# Patient Record
Sex: FEMALE | Race: OTHER | Employment: OTHER | URBAN - METROPOLITAN AREA
[De-identification: names, ages, dates, MRNs, and addresses within clinical notes are randomized per-mention and may not be internally consistent; named-entity substitution may affect disease eponyms.]

---

## 2022-04-17 ENCOUNTER — APPOINTMENT (OUTPATIENT)
Dept: CT IMAGING | Age: 72
DRG: 882 | End: 2022-04-17
Payer: COMMERCIAL

## 2022-04-17 ENCOUNTER — HOSPITAL ENCOUNTER (INPATIENT)
Age: 72
LOS: 11 days | Discharge: NURSING, REHAB OR LONG-TERM CARE | DRG: 882 | End: 2022-04-28
Attending: EMERGENCY MEDICINE
Payer: COMMERCIAL

## 2022-04-17 ENCOUNTER — APPOINTMENT (OUTPATIENT)
Dept: GENERAL RADIOLOGY | Age: 72
DRG: 882 | End: 2022-04-17
Payer: COMMERCIAL

## 2022-04-17 ENCOUNTER — HOSPITAL ENCOUNTER (OUTPATIENT)
Age: 72
Discharge: HOME | End: 2022-04-17

## 2022-04-17 DIAGNOSIS — I63.9 CEREBROVASCULAR ACCIDENT (CVA), UNSPECIFIED MECHANISM (HCC): Primary | ICD-10-CM

## 2022-04-17 PROBLEM — G81.91 HEMIPARESIS OF RIGHT DOMINANT SIDE DUE TO ACUTE CEREBROVASCULAR DISEASE (HCC): Status: ACTIVE | Noted: 2022-04-17

## 2022-04-17 PROBLEM — I67.89 HEMIPARESIS OF RIGHT DOMINANT SIDE DUE TO ACUTE CEREBROVASCULAR DISEASE (HCC): Status: ACTIVE | Noted: 2022-04-17

## 2022-04-17 LAB
ANION GAP: 9 MMOL/L (ref 7–16)
GFR NON-AFRICAN AMERICAN: >60 ML/MIN
GFR SERPL CREATININE-BSD FRML MDRD: >60 ML/MIN
GFR SERPL CREATININE-BSD FRML MDRD: ABNORMAL ML/MIN/{1.73_M2}
GLUCOSE BLD-MCNC: 188 MG/DL (ref 74–100)
HCO3 VENOUS: 23.4 MMOL/L (ref 22–29)
NEGATIVE BASE EXCESS, VEN: 2 (ref 0–2)
O2 SAT, VEN: 87 % (ref 60–85)
PCO2, VEN: 42.4 MM HG (ref 41–51)
PH VENOUS: 7.35 (ref 7.32–7.43)
PO2, VEN: 55.8 MM HG (ref 30–50)
POC BUN: 13 MG/DL (ref 8–26)
POC CHLORIDE: 109 MMOL/L (ref 98–107)
POC CREATININE: 0.49 MG/DL (ref 0.51–1.19)
POC HEMATOCRIT: 34 % (ref 36–46)
POC HEMOGLOBIN: 11.5 G/DL (ref 12–16)
POC IONIZED CALCIUM: 1.15 MMOL/L (ref 1.15–1.33)
POC LACTIC ACID: 1.11 MMOL/L (ref 0.56–1.39)
POC POTASSIUM: 4.5 MMOL/L (ref 3.5–4.5)
POC SODIUM: 141 MMOL/L (ref 138–146)
POC TCO2: 24 MMOL/L (ref 22–30)

## 2022-04-17 PROCEDURE — 94002 VENT MGMT INPAT INIT DAY: CPT

## 2022-04-17 PROCEDURE — 5A1945Z RESPIRATORY VENTILATION, 24-96 CONSECUTIVE HOURS: ICD-10-PCS | Performed by: STUDENT IN AN ORGANIZED HEALTH CARE EDUCATION/TRAINING PROGRAM

## 2022-04-17 PROCEDURE — 6360000002 HC RX W HCPCS: Performed by: PSYCHIATRY & NEUROLOGY

## 2022-04-17 PROCEDURE — 2000000003 HC NEURO ICU R&B

## 2022-04-17 PROCEDURE — 93005 ELECTROCARDIOGRAM TRACING: CPT | Performed by: STUDENT IN AN ORGANIZED HEALTH CARE EDUCATION/TRAINING PROGRAM

## 2022-04-17 PROCEDURE — 85730 THROMBOPLASTIN TIME PARTIAL: CPT

## 2022-04-17 PROCEDURE — 71045 X-RAY EXAM CHEST 1 VIEW: CPT

## 2022-04-17 PROCEDURE — 96374 THER/PROPH/DIAG INJ IV PUSH: CPT

## 2022-04-17 PROCEDURE — 99223 1ST HOSP IP/OBS HIGH 75: CPT | Performed by: PSYCHIATRY & NEUROLOGY

## 2022-04-17 PROCEDURE — 80177 DRUG SCRN QUAN LEVETIRACETAM: CPT

## 2022-04-17 PROCEDURE — 85014 HEMATOCRIT: CPT

## 2022-04-17 PROCEDURE — 2580000003 HC RX 258: Performed by: STUDENT IN AN ORGANIZED HEALTH CARE EDUCATION/TRAINING PROGRAM

## 2022-04-17 PROCEDURE — 99283 EMERGENCY DEPT VISIT LOW MDM: CPT

## 2022-04-17 PROCEDURE — 82803 BLOOD GASES ANY COMBINATION: CPT

## 2022-04-17 PROCEDURE — 84520 ASSAY OF UREA NITROGEN: CPT

## 2022-04-17 PROCEDURE — 83874 ASSAY OF MYOGLOBIN: CPT

## 2022-04-17 PROCEDURE — 2700000000 HC OXYGEN THERAPY PER DAY

## 2022-04-17 PROCEDURE — 80051 ELECTROLYTE PANEL: CPT

## 2022-04-17 PROCEDURE — 70496 CT ANGIOGRAPHY HEAD: CPT

## 2022-04-17 PROCEDURE — 82550 ASSAY OF CK (CPK): CPT

## 2022-04-17 PROCEDURE — 82553 CREATINE MB FRACTION: CPT

## 2022-04-17 PROCEDURE — 84484 ASSAY OF TROPONIN QUANT: CPT

## 2022-04-17 PROCEDURE — 80061 LIPID PANEL: CPT

## 2022-04-17 PROCEDURE — 70450 CT HEAD/BRAIN W/O DYE: CPT

## 2022-04-17 PROCEDURE — 83605 ASSAY OF LACTIC ACID: CPT

## 2022-04-17 PROCEDURE — 82565 ASSAY OF CREATININE: CPT

## 2022-04-17 PROCEDURE — 94761 N-INVAS EAR/PLS OXIMETRY MLT: CPT

## 2022-04-17 PROCEDURE — 82330 ASSAY OF CALCIUM: CPT

## 2022-04-17 PROCEDURE — 80048 BASIC METABOLIC PNL TOTAL CA: CPT

## 2022-04-17 PROCEDURE — 2500000003 HC RX 250 WO HCPCS: Performed by: PSYCHIATRY & NEUROLOGY

## 2022-04-17 PROCEDURE — 0BH17EZ INSERTION OF ENDOTRACHEAL AIRWAY INTO TRACHEA, VIA NATURAL OR ARTIFICIAL OPENING: ICD-10-PCS | Performed by: STUDENT IN AN ORGANIZED HEALTH CARE EDUCATION/TRAINING PROGRAM

## 2022-04-17 PROCEDURE — 85025 COMPLETE CBC W/AUTO DIFF WBC: CPT

## 2022-04-17 PROCEDURE — 2580000003 HC RX 258: Performed by: PSYCHIATRY & NEUROLOGY

## 2022-04-17 PROCEDURE — 6360000004 HC RX CONTRAST MEDICATION: Performed by: STUDENT IN AN ORGANIZED HEALTH CARE EDUCATION/TRAINING PROGRAM

## 2022-04-17 PROCEDURE — 83036 HEMOGLOBIN GLYCOSYLATED A1C: CPT

## 2022-04-17 PROCEDURE — 85610 PROTHROMBIN TIME: CPT

## 2022-04-17 PROCEDURE — 96375 TX/PRO/DX INJ NEW DRUG ADDON: CPT

## 2022-04-17 PROCEDURE — 82947 ASSAY GLUCOSE BLOOD QUANT: CPT

## 2022-04-17 PROCEDURE — 6360000002 HC RX W HCPCS: Performed by: STUDENT IN AN ORGANIZED HEALTH CARE EDUCATION/TRAINING PROGRAM

## 2022-04-17 RX ORDER — ATORVASTATIN CALCIUM 80 MG/1
80 TABLET, FILM COATED ORAL NIGHTLY
Status: DISCONTINUED | OUTPATIENT
Start: 2022-04-18 | End: 2022-04-19

## 2022-04-17 RX ORDER — ALBUTEROL SULFATE 90 UG/1
6 AEROSOL, METERED RESPIRATORY (INHALATION) EVERY 6 HOURS PRN
Status: DISCONTINUED | OUTPATIENT
Start: 2022-04-17 | End: 2022-04-28 | Stop reason: HOSPADM

## 2022-04-17 RX ORDER — PANTOPRAZOLE SODIUM 40 MG/1
40 TABLET, DELAYED RELEASE ORAL
Status: DISCONTINUED | OUTPATIENT
Start: 2022-04-18 | End: 2022-04-19 | Stop reason: CLARIF

## 2022-04-17 RX ORDER — FENTANYL CITRATE 50 UG/ML
200 INJECTION, SOLUTION INTRAMUSCULAR; INTRAVENOUS ONCE
Status: COMPLETED | OUTPATIENT
Start: 2022-04-17 | End: 2022-04-17

## 2022-04-17 RX ORDER — ONDANSETRON 2 MG/ML
4 INJECTION INTRAMUSCULAR; INTRAVENOUS EVERY 6 HOURS PRN
Status: DISCONTINUED | OUTPATIENT
Start: 2022-04-17 | End: 2022-04-28 | Stop reason: HOSPADM

## 2022-04-17 RX ORDER — SODIUM CHLORIDE 9 MG/ML
INJECTION, SOLUTION INTRAVENOUS CONTINUOUS
Status: DISCONTINUED | OUTPATIENT
Start: 2022-04-18 | End: 2022-04-21

## 2022-04-17 RX ORDER — ASPIRIN 300 MG/1
300 SUPPOSITORY RECTAL DAILY
Status: DISCONTINUED | OUTPATIENT
Start: 2022-04-18 | End: 2022-04-27

## 2022-04-17 RX ORDER — ROCURONIUM BROMIDE 10 MG/ML
80 INJECTION, SOLUTION INTRAVENOUS ONCE
Status: COMPLETED | OUTPATIENT
Start: 2022-04-17 | End: 2022-04-17

## 2022-04-17 RX ORDER — PROPOFOL 10 MG/ML
INJECTION, EMULSION INTRAVENOUS
Status: DISPENSED
Start: 2022-04-17 | End: 2022-04-18

## 2022-04-17 RX ORDER — LABETALOL HYDROCHLORIDE 5 MG/ML
10 INJECTION, SOLUTION INTRAVENOUS EVERY 10 MIN PRN
Status: DISCONTINUED | OUTPATIENT
Start: 2022-04-17 | End: 2022-04-28 | Stop reason: HOSPADM

## 2022-04-17 RX ORDER — ONDANSETRON 4 MG/1
4 TABLET, ORALLY DISINTEGRATING ORAL EVERY 8 HOURS PRN
Status: DISCONTINUED | OUTPATIENT
Start: 2022-04-17 | End: 2022-04-28 | Stop reason: HOSPADM

## 2022-04-17 RX ORDER — INSULIN GLARGINE 100 [IU]/ML
10 INJECTION, SOLUTION SUBCUTANEOUS NIGHTLY
Status: DISCONTINUED | OUTPATIENT
Start: 2022-04-18 | End: 2022-04-20

## 2022-04-17 RX ORDER — ASPIRIN 81 MG/1
81 TABLET ORAL DAILY
Status: DISCONTINUED | OUTPATIENT
Start: 2022-04-18 | End: 2022-04-28 | Stop reason: HOSPADM

## 2022-04-17 RX ORDER — POLYETHYLENE GLYCOL 3350 17 G/17G
17 POWDER, FOR SOLUTION ORAL DAILY PRN
Status: DISCONTINUED | OUTPATIENT
Start: 2022-04-17 | End: 2022-04-28 | Stop reason: HOSPADM

## 2022-04-17 RX ADMIN — IOPAMIDOL 90 ML: 755 INJECTION, SOLUTION INTRAVENOUS at 20:49

## 2022-04-17 RX ADMIN — SODIUM CHLORIDE 5 MG/HR: 0.9 INJECTION, SOLUTION INTRAVENOUS at 20:43

## 2022-04-17 RX ADMIN — ALTEPLASE 53.1 MG: KIT at 19:58

## 2022-04-17 RX ADMIN — ALTEPLASE 5.9 MG: KIT at 19:56

## 2022-04-17 RX ADMIN — FENTANYL CITRATE 200 MCG: 50 INJECTION, SOLUTION INTRAMUSCULAR; INTRAVENOUS at 20:28

## 2022-04-17 RX ADMIN — ROCURONIUM BROMIDE 80 MG: 10 INJECTION INTRAVENOUS at 20:28

## 2022-04-17 RX ADMIN — LEVETIRACETAM 2000 MG: 100 INJECTION, SOLUTION INTRAVENOUS at 23:21

## 2022-04-17 ASSESSMENT — PULMONARY FUNCTION TESTS
PIF_VALUE: 19
PIF_VALUE: 22

## 2022-04-18 ENCOUNTER — APPOINTMENT (OUTPATIENT)
Dept: MRI IMAGING | Age: 72
DRG: 882 | End: 2022-04-18
Payer: COMMERCIAL

## 2022-04-18 ENCOUNTER — APPOINTMENT (OUTPATIENT)
Dept: GENERAL RADIOLOGY | Age: 72
DRG: 882 | End: 2022-04-18
Payer: COMMERCIAL

## 2022-04-18 LAB
-: ABNORMAL
ABSOLUTE EOS #: 0.03 K/UL (ref 0–0.44)
ABSOLUTE IMMATURE GRANULOCYTE: <0.03 K/UL (ref 0–0.3)
ABSOLUTE LYMPH #: 2.65 K/UL (ref 1.1–3.7)
ABSOLUTE MONO #: 0.3 K/UL (ref 0.1–1.2)
AMPHETAMINE SCREEN URINE: NEGATIVE
ANION GAP SERPL CALCULATED.3IONS-SCNC: 14 MMOL/L (ref 9–17)
ANION GAP SERPL CALCULATED.3IONS-SCNC: 14 MMOL/L (ref 9–17)
BACTERIA: ABNORMAL
BARBITURATE SCREEN URINE: NEGATIVE
BASOPHILS # BLD: 0 % (ref 0–2)
BASOPHILS ABSOLUTE: <0.03 K/UL (ref 0–0.2)
BENZODIAZEPINE SCREEN, URINE: NEGATIVE
BILIRUBIN URINE: NEGATIVE
BUN BLDV-MCNC: 13 MG/DL (ref 8–23)
BUN BLDV-MCNC: 13 MG/DL (ref 8–23)
CALCIUM SERPL-MCNC: 8.7 MG/DL (ref 8.6–10.4)
CALCIUM SERPL-MCNC: 9 MG/DL (ref 8.6–10.4)
CANNABINOID SCREEN URINE: NEGATIVE
CASTS UA: ABNORMAL /LPF (ref 0–8)
CHLORIDE BLD-SCNC: 102 MMOL/L (ref 98–107)
CHLORIDE BLD-SCNC: 109 MMOL/L (ref 98–107)
CHOLESTEROL/HDL RATIO: 3.7
CHOLESTEROL: 202 MG/DL
CO2: 17 MMOL/L (ref 20–31)
CO2: 20 MMOL/L (ref 20–31)
COCAINE METABOLITE, URINE: NEGATIVE
COLOR: YELLOW
CREAT SERPL-MCNC: 0.4 MG/DL (ref 0.5–0.9)
CREAT SERPL-MCNC: 0.54 MG/DL (ref 0.5–0.9)
EOSINOPHILS RELATIVE PERCENT: 1 % (ref 1–4)
EPITHELIAL CELLS UA: ABNORMAL /HPF (ref 0–5)
ESTIMATED AVERAGE GLUCOSE: 249 MG/DL
GFR AFRICAN AMERICAN: >60 ML/MIN
GFR AFRICAN AMERICAN: >60 ML/MIN
GFR NON-AFRICAN AMERICAN: >60 ML/MIN
GFR NON-AFRICAN AMERICAN: >60 ML/MIN
GFR SERPL CREATININE-BSD FRML MDRD: ABNORMAL ML/MIN/{1.73_M2}
GFR SERPL CREATININE-BSD FRML MDRD: ABNORMAL ML/MIN/{1.73_M2}
GLUCOSE BLD-MCNC: 128 MG/DL (ref 65–105)
GLUCOSE BLD-MCNC: 144 MG/DL (ref 65–105)
GLUCOSE BLD-MCNC: 150 MG/DL (ref 65–105)
GLUCOSE BLD-MCNC: 157 MG/DL (ref 65–105)
GLUCOSE BLD-MCNC: 173 MG/DL (ref 70–99)
GLUCOSE BLD-MCNC: 208 MG/DL (ref 70–99)
GLUCOSE BLD-MCNC: 224 MG/DL (ref 65–105)
GLUCOSE URINE: ABNORMAL
HBA1C MFR BLD: 10.3 % (ref 4–6)
HCT VFR BLD CALC: 32.1 % (ref 36.3–47.1)
HCT VFR BLD CALC: 35.8 % (ref 36.3–47.1)
HDLC SERPL-MCNC: 55 MG/DL
HEMOGLOBIN: 11.3 G/DL (ref 11.9–15.1)
HEMOGLOBIN: 12.2 G/DL (ref 11.9–15.1)
IMMATURE GRANULOCYTES: 0 %
INR BLD: 1
KEPPRA: 14 UG/ML
KETONES, URINE: ABNORMAL
LDL CHOLESTEROL: 114 MG/DL (ref 0–130)
LEUKOCYTE ESTERASE, URINE: ABNORMAL
LYMPHOCYTES # BLD: 57 % (ref 24–43)
MAGNESIUM: 1.8 MG/DL (ref 1.6–2.6)
MCH RBC QN AUTO: 31.5 PG (ref 25.2–33.5)
MCH RBC QN AUTO: 32 PG (ref 25.2–33.5)
MCHC RBC AUTO-ENTMCNC: 34.1 G/DL (ref 28.4–34.8)
MCHC RBC AUTO-ENTMCNC: 35.2 G/DL (ref 28.4–34.8)
MCV RBC AUTO: 90.9 FL (ref 82.6–102.9)
MCV RBC AUTO: 92.5 FL (ref 82.6–102.9)
METHADONE SCREEN, URINE: NEGATIVE
MONOCYTES # BLD: 7 % (ref 3–12)
MYOGLOBIN: <21 NG/ML (ref 25–58)
NITRITE, URINE: NEGATIVE
NRBC AUTOMATED: 0 PER 100 WBC
NRBC AUTOMATED: 0 PER 100 WBC
OPIATES, URINE: NEGATIVE
OXYCODONE SCREEN URINE: NEGATIVE
PARTIAL THROMBOPLASTIN TIME: 23.6 SEC (ref 20.5–30.5)
PDW BLD-RTO: 12.9 % (ref 11.8–14.4)
PDW BLD-RTO: 13 % (ref 11.8–14.4)
PH UA: 8 (ref 5–8)
PHENCYCLIDINE, URINE: NEGATIVE
PHOSPHORUS: 3.1 MG/DL (ref 2.6–4.5)
PLATELET # BLD: 173 K/UL (ref 138–453)
PLATELET # BLD: 179 K/UL (ref 138–453)
PMV BLD AUTO: 9.8 FL (ref 8.1–13.5)
PMV BLD AUTO: 9.8 FL (ref 8.1–13.5)
POC CREATININE: 0.6 MG/DL (ref 0.6–1.4)
POTASSIUM SERPL-SCNC: 3.4 MMOL/L (ref 3.7–5.3)
POTASSIUM SERPL-SCNC: 3.6 MMOL/L (ref 3.7–5.3)
PROTEIN UA: NEGATIVE
PROTHROMBIN TIME: 10.4 SEC (ref 9.1–12.3)
RBC # BLD: 3.53 M/UL (ref 3.95–5.11)
RBC # BLD: 3.87 M/UL (ref 3.95–5.11)
RBC UA: ABNORMAL /HPF (ref 0–4)
SEG NEUTROPHILS: 35 % (ref 36–65)
SEGMENTED NEUTROPHILS ABSOLUTE COUNT: 1.64 K/UL (ref 1.5–8.1)
SODIUM BLD-SCNC: 136 MMOL/L (ref 135–144)
SODIUM BLD-SCNC: 140 MMOL/L (ref 135–144)
SPECIFIC GRAVITY UA: 1.02 (ref 1–1.03)
TEST INFORMATION: NORMAL
TOTAL CK: 39 U/L (ref 26–192)
TRIGL SERPL-MCNC: 164 MG/DL
TROPONIN, HIGH SENSITIVITY: 8 NG/L (ref 0–14)
TURBIDITY: ABNORMAL
URINE HGB: NEGATIVE
UROBILINOGEN, URINE: NORMAL
VALPROIC ACID % FREE: ABNORMAL % (ref 5–18.4)
VALPROIC ACID LEVEL: <3 UG/ML (ref 50–125)
VALPROIC ACID, FREE: <0.4 UG/ML (ref 7–23)
WBC # BLD: 4.6 K/UL (ref 3.5–11.3)
WBC # BLD: 8.5 K/UL (ref 3.5–11.3)
WBC UA: ABNORMAL /HPF (ref 0–5)

## 2022-04-18 PROCEDURE — 82947 ASSAY GLUCOSE BLOOD QUANT: CPT

## 2022-04-18 PROCEDURE — 2500000003 HC RX 250 WO HCPCS: Performed by: STUDENT IN AN ORGANIZED HEALTH CARE EDUCATION/TRAINING PROGRAM

## 2022-04-18 PROCEDURE — 94761 N-INVAS EAR/PLS OXIMETRY MLT: CPT

## 2022-04-18 PROCEDURE — A9576 INJ PROHANCE MULTIPACK: HCPCS | Performed by: STUDENT IN AN ORGANIZED HEALTH CARE EDUCATION/TRAINING PROGRAM

## 2022-04-18 PROCEDURE — 70553 MRI BRAIN STEM W/O & W/DYE: CPT

## 2022-04-18 PROCEDURE — 74018 RADEX ABDOMEN 1 VIEW: CPT

## 2022-04-18 PROCEDURE — 80307 DRUG TEST PRSMV CHEM ANLYZR: CPT

## 2022-04-18 PROCEDURE — 85027 COMPLETE CBC AUTOMATED: CPT

## 2022-04-18 PROCEDURE — 99291 CRITICAL CARE FIRST HOUR: CPT | Performed by: PSYCHIATRY & NEUROLOGY

## 2022-04-18 PROCEDURE — 83735 ASSAY OF MAGNESIUM: CPT

## 2022-04-18 PROCEDURE — 2700000000 HC OXYGEN THERAPY PER DAY

## 2022-04-18 PROCEDURE — 80164 ASSAY DIPROPYLACETIC ACD TOT: CPT

## 2022-04-18 PROCEDURE — 6360000002 HC RX W HCPCS: Performed by: HEALTH CARE PROVIDER

## 2022-04-18 PROCEDURE — 6370000000 HC RX 637 (ALT 250 FOR IP): Performed by: STUDENT IN AN ORGANIZED HEALTH CARE EDUCATION/TRAINING PROGRAM

## 2022-04-18 PROCEDURE — 6360000002 HC RX W HCPCS: Performed by: STUDENT IN AN ORGANIZED HEALTH CARE EDUCATION/TRAINING PROGRAM

## 2022-04-18 PROCEDURE — 84100 ASSAY OF PHOSPHORUS: CPT

## 2022-04-18 PROCEDURE — 80165 DIPROPYLACETIC ACID FREE: CPT

## 2022-04-18 PROCEDURE — 94003 VENT MGMT INPAT SUBQ DAY: CPT

## 2022-04-18 PROCEDURE — 6360000004 HC RX CONTRAST MEDICATION: Performed by: STUDENT IN AN ORGANIZED HEALTH CARE EDUCATION/TRAINING PROGRAM

## 2022-04-18 PROCEDURE — 80048 BASIC METABOLIC PNL TOTAL CA: CPT

## 2022-04-18 PROCEDURE — 2580000003 HC RX 258: Performed by: STUDENT IN AN ORGANIZED HEALTH CARE EDUCATION/TRAINING PROGRAM

## 2022-04-18 PROCEDURE — 2000000003 HC NEURO ICU R&B

## 2022-04-18 PROCEDURE — 71045 X-RAY EXAM CHEST 1 VIEW: CPT

## 2022-04-18 PROCEDURE — 81001 URINALYSIS AUTO W/SCOPE: CPT

## 2022-04-18 PROCEDURE — 36415 COLL VENOUS BLD VENIPUNCTURE: CPT

## 2022-04-18 RX ORDER — DEXAMETHASONE SODIUM PHOSPHATE 10 MG/ML
10 INJECTION INTRAMUSCULAR; INTRAVENOUS ONCE
Status: COMPLETED | OUTPATIENT
Start: 2022-04-18 | End: 2022-04-18

## 2022-04-18 RX ORDER — NICOTINE POLACRILEX 4 MG
15 LOZENGE BUCCAL PRN
Status: DISCONTINUED | OUTPATIENT
Start: 2022-04-18 | End: 2022-04-28 | Stop reason: HOSPADM

## 2022-04-18 RX ORDER — FENOFIBRATE 160 MG/1
160 TABLET ORAL DAILY
Status: ON HOLD | COMMUNITY
End: 2022-05-12 | Stop reason: HOSPADM

## 2022-04-18 RX ORDER — DEXAMETHASONE SODIUM PHOSPHATE 10 MG/ML
10 INJECTION INTRAMUSCULAR; INTRAVENOUS EVERY 8 HOURS
Status: DISCONTINUED | OUTPATIENT
Start: 2022-04-18 | End: 2022-04-19

## 2022-04-18 RX ORDER — POTASSIUM CHLORIDE 7.45 MG/ML
10 INJECTION INTRAVENOUS PRN
Status: DISCONTINUED | OUTPATIENT
Start: 2022-04-18 | End: 2022-04-28 | Stop reason: HOSPADM

## 2022-04-18 RX ORDER — DEXTROSE MONOHYDRATE 50 MG/ML
100 INJECTION, SOLUTION INTRAVENOUS PRN
Status: DISCONTINUED | OUTPATIENT
Start: 2022-04-18 | End: 2022-04-28 | Stop reason: HOSPADM

## 2022-04-18 RX ORDER — LEVETIRACETAM 5 MG/ML
500 INJECTION INTRAVASCULAR EVERY 12 HOURS
Status: DISCONTINUED | OUTPATIENT
Start: 2022-04-18 | End: 2022-04-19

## 2022-04-18 RX ORDER — DEXMEDETOMIDINE HYDROCHLORIDE 4 UG/ML
.1-1.5 INJECTION, SOLUTION INTRAVENOUS CONTINUOUS
Status: DISCONTINUED | OUTPATIENT
Start: 2022-04-18 | End: 2022-04-18

## 2022-04-18 RX ORDER — PROPOFOL 10 MG/ML
5-50 INJECTION, EMULSION INTRAVENOUS CONTINUOUS
Status: DISCONTINUED | OUTPATIENT
Start: 2022-04-18 | End: 2022-04-19

## 2022-04-18 RX ORDER — DEXTROSE MONOHYDRATE 25 G/50ML
12.5 INJECTION, SOLUTION INTRAVENOUS PRN
Status: DISCONTINUED | OUTPATIENT
Start: 2022-04-18 | End: 2022-04-28 | Stop reason: HOSPADM

## 2022-04-18 RX ORDER — CLOPIDOGREL BISULFATE 75 MG/1
75 TABLET ORAL NIGHTLY
Status: ON HOLD | COMMUNITY
End: 2022-05-12 | Stop reason: HOSPADM

## 2022-04-18 RX ORDER — ACETAMINOPHEN 325 MG/1
650 TABLET ORAL EVERY 6 HOURS PRN
Status: DISCONTINUED | OUTPATIENT
Start: 2022-04-18 | End: 2022-04-28 | Stop reason: HOSPADM

## 2022-04-18 RX ORDER — SODIUM CHLORIDE 0.9 % (FLUSH) 0.9 %
10 SYRINGE (ML) INJECTION PRN
Status: DISCONTINUED | OUTPATIENT
Start: 2022-04-18 | End: 2022-04-28 | Stop reason: HOSPADM

## 2022-04-18 RX ORDER — POTASSIUM CHLORIDE 20 MEQ/1
40 TABLET, EXTENDED RELEASE ORAL PRN
Status: DISCONTINUED | OUTPATIENT
Start: 2022-04-18 | End: 2022-04-28 | Stop reason: HOSPADM

## 2022-04-18 RX ORDER — VALPROIC ACID 250 MG/5ML
500 SOLUTION ORAL EVERY 12 HOURS SCHEDULED
Status: DISCONTINUED | OUTPATIENT
Start: 2022-04-18 | End: 2022-04-24

## 2022-04-18 RX ORDER — POTASSIUM CHLORIDE 20 MEQ/1
20 TABLET, EXTENDED RELEASE ORAL 2 TIMES DAILY
Status: ON HOLD | COMMUNITY
End: 2022-05-12 | Stop reason: HOSPADM

## 2022-04-18 RX ORDER — GABAPENTIN 600 MG/1
600 TABLET ORAL 3 TIMES DAILY
Status: ON HOLD | COMMUNITY
End: 2022-05-12 | Stop reason: HOSPADM

## 2022-04-18 RX ADMIN — INSULIN LISPRO 1 UNITS: 100 INJECTION, SOLUTION INTRAVENOUS; SUBCUTANEOUS at 21:45

## 2022-04-18 RX ADMIN — LEVETIRACETAM 500 MG: 5 INJECTION INTRAVENOUS at 20:35

## 2022-04-18 RX ADMIN — DEXAMETHASONE SODIUM PHOSPHATE 10 MG: 10 INJECTION INTRAMUSCULAR; INTRAVENOUS at 21:45

## 2022-04-18 RX ADMIN — LEVETIRACETAM 500 MG: 5 INJECTION INTRAVENOUS at 09:24

## 2022-04-18 RX ADMIN — VALPROIC ACID 500 MG: 250 SOLUTION ORAL at 21:44

## 2022-04-18 RX ADMIN — INSULIN LISPRO 1 UNITS: 100 INJECTION, SOLUTION INTRAVENOUS; SUBCUTANEOUS at 17:26

## 2022-04-18 RX ADMIN — DEXAMETHASONE SODIUM PHOSPHATE 10 MG: 10 INJECTION INTRAMUSCULAR; INTRAVENOUS at 14:17

## 2022-04-18 RX ADMIN — ENOXAPARIN SODIUM 40 MG: 100 INJECTION SUBCUTANEOUS at 21:44

## 2022-04-18 RX ADMIN — ATORVASTATIN CALCIUM 80 MG: 80 TABLET, FILM COATED ORAL at 21:45

## 2022-04-18 RX ADMIN — ATORVASTATIN CALCIUM 80 MG: 80 TABLET, FILM COATED ORAL at 04:59

## 2022-04-18 RX ADMIN — VALPROATE SODIUM 500 MG: 100 INJECTION, SOLUTION INTRAVENOUS at 14:19

## 2022-04-18 RX ADMIN — POTASSIUM BICARBONATE 40 MEQ: 782 TABLET, EFFERVESCENT ORAL at 13:25

## 2022-04-18 RX ADMIN — VALPROATE SODIUM 500 MG: 100 INJECTION, SOLUTION INTRAVENOUS at 02:45

## 2022-04-18 RX ADMIN — PROPOFOL 45 MCG/KG/MIN: 10 INJECTION, EMULSION INTRAVENOUS at 13:37

## 2022-04-18 RX ADMIN — SODIUM CHLORIDE: 9 INJECTION, SOLUTION INTRAVENOUS at 00:15

## 2022-04-18 RX ADMIN — PANTOPRAZOLE SODIUM 40 MG: 40 TABLET, DELAYED RELEASE ORAL at 08:21

## 2022-04-18 RX ADMIN — ACETAMINOPHEN 650 MG: 325 TABLET ORAL at 12:33

## 2022-04-18 RX ADMIN — POTASSIUM BICARBONATE 20 MEQ: 782 TABLET, EFFERVESCENT ORAL at 09:19

## 2022-04-18 RX ADMIN — SODIUM CHLORIDE: 9 INJECTION, SOLUTION INTRAVENOUS at 13:34

## 2022-04-18 RX ADMIN — INSULIN LISPRO 2 UNITS: 100 INJECTION, SOLUTION INTRAVENOUS; SUBCUTANEOUS at 13:24

## 2022-04-18 RX ADMIN — GADOTERIDOL 12 ML: 279.3 INJECTION, SOLUTION INTRAVENOUS at 18:20

## 2022-04-18 RX ADMIN — Medication 81 MG: at 21:45

## 2022-04-18 RX ADMIN — PROPOFOL 50 MCG/KG/MIN: 10 INJECTION, EMULSION INTRAVENOUS at 00:26

## 2022-04-18 RX ADMIN — INSULIN GLARGINE 10 UNITS: 100 INJECTION, SOLUTION SUBCUTANEOUS at 02:41

## 2022-04-18 ASSESSMENT — PULMONARY FUNCTION TESTS
PIF_VALUE: 22
PIF_VALUE: 10
PIF_VALUE: 22
PIF_VALUE: 22
PIF_VALUE: 11
PIF_VALUE: 11

## 2022-04-18 ASSESSMENT — PAIN SCALES - GENERAL
PAINLEVEL_OUTOF10: 0
PAINLEVEL_OUTOF10: 0

## 2022-04-18 NOTE — FLOWSHEET NOTE
[facility] [address]     Emergency/Trauma Note    PATIENT NAME: Vincent Khoury    Shift date: 4/17/2022  Shift day: Sunday   Shift # 2    Room # 12/12   Name: Vincent Khoury           Age: 80 y.o. Gender: female          Protestant: [id number] of [place]:      Trauma/Incident type: Stroke Alert  Admit Date & Time: 4/17/2022  8:39 PM  TRAUMA NAME: Vincent Khoury    ADVANCE DIRECTIVES IN CHART? No    NAME OF DECISION MAKER: (unconfirmed) [de-identified] of Children    RELATIONSHIP OF DECISION MAKER TO PATIENT: Children    PATIENT/EVENT DESCRIPTION:  Vincent Khoury is a 80 y.o. female who arrived via transport from scene as a Stroke Alert following a CMS. . Pt to be admitted to 12/12. SPIRITUAL ASSESSMENT/INTERVENTION:     notified family and provided them care when they arrived.  served as a liaison between the medical team and family. Family was initially anxious but appeared less so as the night progressed and updates were received.  provided compassionate and active listen to family. 04/17/22 0116   Encounter Summary   Services provided to: Family   Referral/Consult From: Multi-disciplinary team   Support System Children   Continue Visiting   (4/17/22)   Complexity of Encounter High   Length of Encounter 1 hour   Spiritual Assessment Completed Yes   Routine   Type Initial   Assessment Anxious; Fearful;Coping   Intervention Active listening;Explored feelings, thoughts, concerns;Prayer;Discussed belief system/Gnosticism practices/mari   Outcome Expressed gratitude          PATIENT BELONGINGS:   did not manage patient's belongings    ANY BELONGINGS OF SIGNIFICANT VALUE NOTED:  No    REGISTRATION STAFF NOTIFIED?   Yes      WHAT IS YOUR SPIRITUAL CARE PLAN FOR THIS PATIENT?:   Chaplains will remain available to offer spiritual and emotional support upon request.      Electronically signed by Chaplain Resident Jaki Corona, [address] 4/17/2022 at 11:37 PM [facility]  [phone]

## 2022-04-18 NOTE — PROGRESS NOTES
[facility]  Speech Language Pathology    Date: 4/18/2022  Patient Name: Mata Armenta  YOB: 1950   AGE: 80 y.o. MRN: [id number]        Patient Not Available for Speech Therapy     Due to:  [] Testing  [] Hemodialysis  [] Cancelled by RN  [] Surgery   [x] Intubation/Sedation/Pain Medication  [] Medical instability  [] Other:    Next scheduled treatment: as medically appropriate   Completed by:  RIVAS Abrams, M.S. [address] [facility]

## 2022-04-18 NOTE — ED PROVIDER NOTES
[facility]     Emergency Department     Faculty Attestation        I performed a history and physical examination of the patient and discussed management with the resident. I reviewed the residents note and agree with the documented findings and plan of care. Any areas of disagreement are noted on the chart. I was personally present for the key portions of any procedures. I have documented in the chart those procedures where I was not present during the key portions. I have reviewed the emergency nurses triage note. I agree with the chief complaint, past medical history, past surgical history, allergies, medications, social and family history as documented unless otherwise noted below. For mid-level providers such as nurse practitioners as well as physicians assistants:    I have personally seen and evaluated the patient. I find the patient's history and physical exam are consistent with NP/PA documentation. I agree with the care provided, treatment rendered, disposition, & follow-up plan. Additional findings are as noted. Vital Signs: BP (!) 194/138   Pulse 137   Resp 16   SpO2 100%   PCP:  Elias Jolley MD    Pertinent Comments:     Patient presents as a stroke alert. Had a right-sided weakness and recently gave his preference was given TPA and became unresponsive afterwards. She had a nearly identical presentation here 2012 and was intubated as well. She is paralyzed by EMS and intubated. Patient taken emergently to CT scanner. Due to the immediate potential for life-threatening deterioration due to stroke, TPA, unresponsive, I spent 15 minutes providing critical care. This time is excluding time spent performing procedures.             Nilsa Esteban MD    Attending Emergency Medicine Physician              Sathish Cruz MD  04/17/22 7887

## 2022-04-18 NOTE — H&P
Neuro ICU History & Physical    Patient Name: Dina Aly  Patient : 1950  Room/Bed: 0527/0527-01  Code Status: FULL   Allergies: No Known Allergies    CHIEF COMPLAINT   Acute Right sided hemiparesis and AMS   HPI    History Obtained From: patient, son, daughter and EMR review     The patient is a 80 y.o.  female who presents via Mobile stroke unit to our ER 22 with acute onset right-sided hemiparesis when discussing with her daughter starting at 6:45 PM this evening. Mobile stroke unit documenting NIH of 21 on arrival at 1915 2 for altered mental status unable to follow commands or answer questions appropriately, facial palsy right, right arm and leg hemiparesis, complete sensory loss on the right, aphasia and neglect. CT head without unremarkable thus patient was given IV tPA bolus and started on infusion when it appears around 20:00 patient became obtunded flaccid paralysis in all 4 extremities unable to answer or follow commands thus was concern for possible hemorrhagic conversion thus tPA infusion was discontinued. Patient required intubation with rocuronium and 200 MCG of fentanyl for sedation. Patient started on Cardene for blood pressure control SBP goal less than 180 which she maintained well under.     On arrival to our emergency department patient was already intubated and paralyzed this went directly to CT scanner for repeat CT head without which again was unremarkable left basal ganglia calcification although no sign of hemorrhagic conversion. CTA head and neck also completed and no evidence for LVO. Decision was made to continue and finish tPA infusion per stroke attending and patient loaded with Keppra 2 g given concern for possible seizure given her history.   Patient admitted to neuro ICU for further work-up including stroke versus seizure and LTM monitoring.        Past medical history significant for very similar episode in 2014 although at that time she had acute onset left-sided hemiparesis and initially thought to be having a stroke then found to be unresponsive requiring intubation and sedation with propofol stroke work-up at that time unremarkable and patient was diagnosed with nonconvulsive seizures started on Keppra 500 mg twice daily and Depakote 500 mg twice daily which is still her home medication and dosage at this time, hypertension, hyperlipidemia.       Of note in care everywhere patient also documented August 2018 to have acute right hemiparesis status post IV TPA at that time for stroke concerns MRI completed no acute intracranial mass, hemorrhage or infarct although did demonstrate white matter signal abnormalities unchanged from previous exams consistent with small vessel ischemia.   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     December 2014-right-sided numbness--> MRI at that time no acute intracranial abnormality    Admitted to ICU From: ER 12  Reason for ICU Admission: Post TPA protocal       PATIENT HISTORY   Past Medical History:        Diagnosis Date    CVA (cerebral infarction) ([address]     Hyperlipidemia     Hypertension        Past Surgical History:        Procedure Laterality Date    APPENDECTOMY      BRONCHOSCOPY DIAGNOSTIC  7/24/2014         HYSTERECTOMY         Social History:   Social History     Socioeconomic History    Marital status:      Spouse name: Not on file    Number of children: Not on file    Years of education: Not on file    Highest education level: Not on file   Occupational History    Not on file   Tobacco Use    Smoking status: Never Smoker    Smokeless tobacco: Never Used   Substance and Sexual Activity    Alcohol use: No    Drug use: No    Sexual activity: Not on file   Other Topics Concern    Not on file   Social History Narrative    Not on file     Social Determinants of Health     Financial Resource Strain:     Difficulty of Paying Living Expenses: Not on file   Food Insecurity:     Worried About Running Out of Food in the Last Year: Not on file    Ran Out of Food in the Last Year: Not on file   Transportation Needs:     Lack of Transportation (Medical): Not on file    Lack of Transportation (Non-Medical): Not on file   Physical Activity:     Days of Exercise per Week: Not on file    Minutes of Exercise per Session: Not on file   Stress:     Feeling of Stress : Not on file   Social Connections:     Frequency of Communication with Friends and Family: Not on file    Frequency of Social Gatherings with Friends and Family: Not on file    Attends Restorationist Services: Not on file    Active Member of [address] [place] [address] [employer] or Organizations: Not on file    Attends Club or Organization Meetings: Not on file    Marital Status: Not on file   Intimate Partner Violence:     Fear of Current or Ex-Partner: Not on file    Emotionally Abused: Not on file    Physically Abused: Not on file    Sexually Abused: Not on file   Housing Stability:     Unable to Pay for Housing in the Last Year: Not on file    Number of [place] in the Last Year: Not on file    Unstable Housing in the Last Year: Not on file       Family History:   No family history on file. Allergies:    Patient has no known allergies. Medications Prior to Admission:    Medications Prior to Admission: Pantoprazole Sodium (PROTONIX PO), Take  by mouth. acetaminophen (TYLENOL) 160 MG/5ML solution, Take 20.3 mLs by mouth every 4 hours as needed for Fever. albuterol (PROVENTIL HFA;VENTOLIN HFA) 108 (90 BASE) MCG/ACT inhaler, Inhale 6 puffs into the lungs every 6 hours as needed for Wheezing. glucagon, rDNA, 1 MG SOLR injection, Inject 1 mg into the muscle as needed for Low blood sugar (Blood glucose less than 70 mg/dL and patient NOT ALERT or NPO and does not have IV access. ). glucose (GLUTOSE) 40 % GEL, Take 15 g by mouth as needed.   insulin glargine (LANTUS) 100 UNIT/ML injection vial, Inject 10 Units into the skin nightly. insulin lispro (HUMALOG) 100 UNIT/ML injection vial, Inject 0-12 Units into the skin every 6 hours. niacin (NIASPAN) 500 MG CR tablet, Take 1 tablet by mouth nightly. potassium chloride (KAYCIEL) 20 MEQ/15ML (10%) solution, Take 15 mLs by mouth daily. levETIRAcetam (KEPPRA) 500 MG tablet, Take 1 tablet by mouth 2 times daily. valproate (DEPAKENE) 250 MG/5ML syrup, Take 10 mLs by mouth 2 times daily.     Current Medications:  Current Facility-Administered Medications: propofol injection, 5-50 mcg/kg/min, IntraVENous, Continuous  valproate (DEPACON) 500 mg in sodium chloride 0.9 % 100 mL IVPB, 500 mg, IntraVENous, Q12H  glucose (GLUTOSE) 40 % oral gel 15 g, 15 g, Oral, PRN  dextrose 50 % IV solution, 12.5 g, IntraVENous, PRN  glucagon (rDNA) injection 1 mg, 1 mg, IntraMUSCular, PRN  dextrose 5 % solution, 100 mL/hr, IntraVENous, PRN  potassium chloride (KLOR-CON M) extended release tablet 40 mEq, 40 mEq, Oral, PRN **OR** potassium bicarb-citric acid (EFFER-K) effervescent tablet 40 mEq, 40 mEq, Oral, PRN **OR** potassium chloride 10 mEq/100 mL IVPB (Peripheral Line), 10 mEq, IntraVENous, PRN  insulin lispro (HUMALOG) injection vial 0-6 Units, 0-6 Units, SubCUTAneous, TID WC  insulin lispro (HUMALOG) injection vial 0-3 Units, 0-3 Units, SubCUTAneous, Nightly  albuterol sulfate  (90 Base) MCG/ACT inhaler 6 puff, 6 puff, Inhalation, Q6H PRN  insulin glargine (LANTUS) injection vial 10 Units, 10 Units, SubCUTAneous, Nightly  pantoprazole (PROTONIX) tablet 40 mg, 40 mg, Oral, QAM AC  potassium bicarb-citric acid (EFFER-K) effervescent tablet 20 mEq, 20 mEq, Oral, Daily  ondansetron (ZOFRAN-ODT) disintegrating tablet 4 mg, 4 mg, Oral, Q8H PRN **OR** ondansetron (ZOFRAN) injection 4 mg, 4 mg, IntraVENous, Q6H PRN  polyethylene glycol (GLYCOLAX) packet 17 g, 17 g, Oral, Daily PRN  enoxaparin (LOVENOX) injection 40 mg, 40 mg, SubCUTAneous, Daily  aspirin EC tablet 81 mg, 81 mg, Oral, Daily **OR** aspirin suppository 300 mg, 300 mg, Rectal, Daily  perflutren lipid microspheres (DEFINITY) injection 1.65 mg, 1.5 mL, IntraVENous, ONCE PRN  0.9 % sodium chloride infusion, , IntraVENous, Continuous  labetalol (NORMODYNE;TRANDATE) injection 10 mg, 10 mg, IntraVENous, Q10 Min PRN  niCARdipine (CARDENE) 25 mg in sodium chloride 0.9 % 250 mL infusion, 2.5-15 mg/hr, IntraVENous, Continuous  atorvastatin (LIPITOR) tablet 80 mg, 80 mg, Oral, Nightly    REVIEW OF SYSTEMS   Limited review of systems as patient is currently intubated on sedation and was paralyzed prior to arrival to our emergency department with rocuronium. However patient's daughter and son were present in the emergency department stated that she just got home from [place] and has been in an argument with her sister very angry and over the last 2 days has been feeling generally unwell and starting day prior to her symptoms began to notice right arm sensations describing somewhat of an aura for this patient    PHYSICAL EXAM:     BP 97/65   Pulse 67   Temp 97 °F (36.1 °C) (Oral)   Resp 18   Ht 5' 5\" (1.651 m)   Wt 136 lb 1.6 oz (61.7 kg)   SpO2 100%   BMI 22.65 kg/m²     PHYSICAL EXAM:  CONSTITUTIONAL:   Critically ill  female who presents via mobile stroke unit after being found with altered mental status and right-sided hemiparesis. Concern for stroke versus seizure with Paulo's paralysis. Patient currently on sedation and when weaned off becomes very combative attempting to pull off her medical equipment.    HEAD:  normocephalic, atraumatic    EYES:  PERRLA, EOMI.   ENT:  moist mucous membranes   NECK:  supple, symmetric, no midline tenderness to palpation    BACK:  without midline tenderness, step-offs or deformities    LUNGS:  Equal air entry bilaterally   CARDIOVASCULAR:  normal s1 / s2   ABDOMEN:  Soft, no rigidity   NEUROLOGIC:  Mental Status:  Not oriented to date, Not oriented to person and Not oriented to place, patient is on sedation with propofol although when weaned quickly becomes combative altered and agitated moving all 4 extremities spontaneously although is noted to have minimal right upper extremity weakness and more significant right lower extremity weakness             Cranial Nerves:    cranial nerves II-XII are grossly intact     Motor Exam:    Drift:  present - Right upper and lower   Tone:  normal     Motor exam is 5 out of 5 all extremities with the exception of RUE 4-/5 RLE 3/5      Sensory:    Touch:    Patient briskly withdraws to pain on the left and less briskly on the right lower extremity and upper.     Deep Tendon Reflexes:    Right Bicep:  1+  Left Bicep:  1+  Right Knee:  1+  Left Knee:  1+     Plantar Response:  Right:  downgoing  Left:  downgoing     Clonus:  absent  Nicholson's:  absent           INITIAL NIH STROKE SCALE:     Time Performed:   9:00PM   Immediately after patient was paralyzed thus will use her exam after CT head WO when taken off sedation for short period of time      1a. Level of consciousness:  0 - alert; keenly responsive  1b. Level of consciousness questions:  2 - answers neither question correctly  1c. Level of consciousness questions:  0 - performs both tasks correctly  2. Best Gaze:  1 - partial gaze palsy  3. Visual:  0 - no visual loss  4. Facial Palsy:  0 - normal symmetric movement  5a. Motor left arm:  0 - no drift, limb holds 90 (or 45) degrees for full 10 seconds  5b. Motor right arm:  2 - some effort against gravity, limb cannot get to or maintain (if cued) 90 (or 45) degrees, drifts down to bed, but has some effort against gravity   6a. Motor left le - no drift; leg holds 30 degree position for full 5 seconds  6b. Motor right le - some effort against gravity; leg falls to bed by 5 seconds but has some effort against gravity  7. Limb Ataxia:  0 - absent  8.     Sensory:  1 - mild to moderate sensory loss; patient feels pinprick is less sharp or is dull on the affected side; there is a loss of superficial pain with pinprick but patient is aware of being touched   9. Best Language:  3 - mute, global aphasia; no usable speech or auditory comprehension  10. Dysarthria:  UN - intubated or other physical barrier  11. Extinction and Inattention:  1 - visual, tactile, auditory, spatial or personal inattention or extinction to bilateral simultaneous stimulation in one of the sensory modalities      TOTAL:  12      SKIN:  no rash          LABS AND IMAGING:     RECENT LABS:  CBC with Differential:    Lab Results   Component Value Date    WBC 8.5 04/18/2022    RBC 3.53 04/18/2022    HGB 11.3 04/18/2022    HCT 32.1 04/18/2022     04/18/2022    MCV 90.9 04/18/2022    MCH 32.0 04/18/2022    MCHC 35.2 04/18/2022    RDW 13.0 04/18/2022    LYMPHOPCT 57 04/17/2022    MONOPCT 7 04/17/2022    BASOPCT 0 04/17/2022    MONOSABS 0.30 04/17/2022    LYMPHSABS 2.65 04/17/2022    EOSABS 0.03 04/17/2022    BASOSABS <0.03 04/17/2022    DIFFTYPE NOT REPORTED 07/23/2014     BMP:    Lab Results   Component Value Date     04/18/2022    K 3.6 04/18/2022     04/18/2022    CO2 17 04/18/2022    BUN 13 04/18/2022    LABALBU 2.9 07/23/2014    CREATININE 0.40 04/18/2022    CREATININE 0.49 04/17/2022    CALCIUM 8.7 04/18/2022    GFRAA >60 04/18/2022    LABGLOM >60 04/18/2022    GLUCOSE 208 04/18/2022       RADIOLOGY:     1.) CT brain without contrast: Reviewed at 8:50 PM 4/17/22  Impression   1. No acute intracranial abnormality. 2. No evidence of large vessel occlusion or hemodynamically significant   stenosis involving the head and neck arteries.            2.) CTA Head/Neck: 4/17/22  Impression   1. No acute intracranial abnormality. 2. No evidence of large vessel occlusion or hemodynamically significant   stenosis involving the head and neck arteries. MRI Cervical spine W/WO 7/25/2014  IMPRESSION:    1.  Degenerative changes most pronounced at C5-C6 where there is moderate    narrowing of spinal canal, severe narrowing of the right neuroforamen and    minimal narrowing of left neuroforamen. 2. Subtle increased intramedullary T2 signal intensity at C6-C7 level    with patchy enhancement. Finding is nonspecific and may represent an    infectious or inflammatory process. Suggest clinical correlation and    follow up imaging in 2-3 months. MRI brain WO 7/19/2014     IMPRESSION:      1. STABLE WHITE MATTER CHANGES, PROBABLY MOST CONSISTENT WITH CHRONIC    MICROVASCULAR ISCHEMIC CHANGE    2. NO EVIDENCE OF ACUTE INFARCTION OR OTHER ACUTE INTRACRANIAL    ABNORMALITY. 3. PARANASAL SINUS INFLAMMATORY CHANGES   4. CHRONIC LEFT MASTOID INFLAMMATORY CHANGE           Labs and Images reviewed with:    [x] Mohammed S. Karilyn Pallas, MD        ASSESSMENT AND PLAN:         ASSESSMENT:     This is a 80 y.o. female who presents via mobile stroke unit 4/18/2022 after receiving IV tPA for acute onset altered mental status and right-sided hemiparesis. Patient's past medical history significant for seizure on Keppra 500 mg twice daily and Depakote 500 mg twice daily, hypertension and hyperlipidemia. Patient intubated and paralyzed with rocuronium prior to arrival to the emergency department. Given patient's altered mental status and right-sided hemiparesis it is most likely that she had a seizure with Paulo's paralysis versus acute CVA. Patient mated to neuro ICU and will obtain MRI brain with and without and initiate LTM E to rule out epileptiform activity. Patient was loaded with 2 g Keppra and will continue Depacon 500 mg twice daily. Patient care will be discussed with attending, will reevaluate patient along with attending.      PLAN/MEDICAL DECISION MAKING:        NEUROLOGIC:  - Imaging   CT head WO and CTA head and neck completed in ER-unremarkable  FU MRI brain W/WO   - AEDs   Loaded keppra 2 grams continue 500mg BID IV   Continue depakon 500mg BID   Valproic acid level on arrival < 3  keppra level 14   - Neuro checks per protocol    CARDIOVASCULAR:  - Goal SBP <180  - started on IV cardene by MSU can continue to titrate as needed  -PRN Labetalol 10mg Q4 hours for SBP>180  - Continue telemetry  ECHO 7/21/2014-->will repeat this admission with bubble study   -EF >65%, mild LVH, grade 1 DD    PULMONARY:  - Vent Settings:  - Daily ABG: FU AM   CXR 4/17/22  Impression   Status post ET tube placement in good position with mild left basilar   atelectasis       RENAL/FLUID/ELECTROLYTE:  - BUN 13/ Creatinine . 54  - Urine output strict intake and output   - IVF: 75 CC/hr NaCl   - Replace electrolytes PRN  - Daily BMP    GI/NUTRITION:  NUTRITION:  Diet NPO  - Bowel regimen: glycolax   - GI prophylaxis: Protonix 40 PO     ID:  - Tmax 97  - wbc 4.6   - Continue to monitor for fevers  - Daily CBC    HEME:   - H&H 12.2/35.8  - Platelets 514  - Daily CBC    ENDOCRINE:  - Continue to monitor blood glucose, goal <180  - FU HBA1c and lipid panel  -on lantus 10 at home and humalog   POCT glucose checks with low intensity sliding scale will hold lantus until patient is able to tolerate a diet.      OTHER:  - PT/OT/ST     PROPHYLAXIS:  Stress ulcer: PPI    DVT PROPHYLAXIS:  - SCD sleeves - Thigh High   - ALLIE stockings - Thigh High  - No chemoprophylaxis anticoagulation at this time post IV TPA 24 hours     DISPOSITION: ADMIT to Aureliano Ramos MD   PGY-3 Neurology Resident   Neuro Critical Care Service   Pager [phone]  4/18/2022     2:34 AM

## 2022-04-18 NOTE — PROGRESS NOTES
Physical Therapy        Physical Therapy Cancel Note      DATE: 2022    NAME: Kentrell Mejia  MRN: [id number]   : 1950      Patient not seen this date for Physical Therapy due to: Other: CPAP trial this AM. PT will continue to montior for needs during admission.        Electronically signed by Puma Ruelas PT on 2022 at 10:25 AM

## 2022-04-18 NOTE — ED NOTES
Pt. Presents to the ED with a CVA. MSU reports pt. Was with family when symptoms started suddenly at 200. Pt. Has total right sided weakness. Pt. Was assessed by MSU and TPA was started at 53.1 ml/ hr with a bolus of 5.9 ml and a waste of 41 mL.      Deion Moreno RN  04/17/22 [id number]

## 2022-04-18 NOTE — PLAN OF CARE
Problem: OXYGENATION/RESPIRATORY FUNCTION  Goal: Patient will maintain patent airway  4/18/2022 1021 by Mary Kessler  Outcome: Ongoing  Goal: Patient will achieve/maintain normal respiratory rate/effort  Description: Respiratory rate and effort will be within normal limits for the patient  4/18/2022 1021 by Mary Kessler  Outcome: Ongoing     Problem: MECHANICAL VENTILATION  Goal: Patient will maintain patent airway  4/18/2022 1021 by Mary Kessler  Outcome: Ongoing  Goal: Oral health is maintained or improved  4/18/2022 1021 by Mary Kessler  Outcome: Ongoing  Goal: ET tube will be managed safely  4/18/2022 1021 by Mary Kessler  Outcome: Ongoing  Goal: Ability to express needs and understand communication  4/18/2022 1908 by Cassidy Garcia  Outcome: Ongoing  4/18/2022 1021 by Mary Kessler  Outcome: Ongoing  Goal: Mobility/activity is maintained at optimum level for patient  4/18/2022 1908 by Cassidy Garcia  Outcome: Ongoing  4/18/2022 1021 by Mary Kessler  Outcome: Ongoing  Patient remained free from falls/injuries. Bed locked and in lowest position. Call light and bedside table within reach. Patient taught to call out appropriately. Bed alarm set. Problem: SKIN INTEGRITY  Goal: Skin integrity is maintained or improved  4/18/2022 1908 by Cassidy Garcia  Outcome: Ongoing  4/18/2022 1907 by Cassidy Garcia  Outcome: Ongoing  4/18/2022 1021 by Mary Kessler  Outcome: Ongoing  Patient's skin is assessed for new skin breakdown. Harrison scale assessment completed. Brief checked frequently. Barrier cream/powder applied as needed. Patient turned every two hours. Heels elevated off of the bed.        Problem: Non-Violent Restraints  Goal: Removal from restraints as soon as assessed to be safe  4/18/2022 1908 by Cassidy Garcia  Outcome: Ongoing  4/18/2022 1907 by Cassidy Garcia  Outcome: Ongoing  Goal: No harm/injury to patient while restraints in use  4/18/2022 1908 by Cassidy Garcia  Outcome: Ongoing  4/18/2022 1907 by Cristy Weinstein  Outcome: Ongoing  Goal: Patient's dignity will be maintained  4/18/2022 1908 by Pernell Weinstein  Outcome: Ongoing  4/18/2022 1907 by Nidhi Leblanc  Outcome: Ongoing     Problem: Nutrition  Goal: Optimal nutrition therapy  4/18/2022 1505 by Santosh Salter RD, LD  Outcome: Ongoing  Note: Nutrition Problem #1: Inadequate oral intake  Intervention: Food and/or Nutrient Delivery: Continue NPO  Nutritional Goals: Meet 75% or greater of estimated nutrition needs      Problem: HEMODYNAMIC STATUS  Goal: Patient has stable vital signs and fluid balance  Outcome: Ongoing     Problem: ACTIVITY INTOLERANCE/IMPAIRED MOBILITY  Goal: Mobility/activity is maintained at optimum level for patient  4/18/2022 1908 by Nidhi Leblanc  Outcome: Ongoing  4/18/2022 1021 by [employer]  Outcome: Ongoing     Problem: COMMUNICATION IMPAIRMENT  Goal: Ability to express needs and understand communication  4/18/2022 1908 by Nidhi Leblanc  Outcome: Ongoing  4/18/2022 1021 by [employer]  Outcome: Ongoing

## 2022-04-18 NOTE — CONSULTS
Stroke Alert paged @ 8:30PM  ER Room #12  Arrival to patient bedside @ 8:43PM         Reason for Consult:  Stroke alert arrived via Mobile stroke unit   Requesting Physician:  Dr. Caren Mari MD   Stroke attending:   [x]Dr. Tanja Santos       History Obtained From:  patient, family member - , electronic medical record    CHIEF COMPLAINT:     Acute onset right sided hemiparesis at 6:45 PM 4/17/22 witness by patients daughter     HISTORY OF PRESENT ILLNESS:     The patient is a 80 y.o.  female who presents via Mobile stroke unit to our ER 4/17/22 with acute onset right-sided hemiparesis when discussing with her daughter starting at 6:45 PM this evening. Mobile stroke unit documenting NIH of 21 on arrival at 1915 2 for altered mental status unable to follow commands or answer questions appropriately, facial palsy right, right arm and leg hemiparesis, complete sensory loss on the right, aphasia and neglect. CT head without unremarkable thus patient was given IV tPA bolus and started on infusion when it appears around 20:00 patient became obtunded flaccid paralysis in all 4 extremities unable to answer or follow commands thus was concern for possible hemorrhagic conversion thus tPA infusion was discontinued. Patient required intubation with rocuronium and 200 MCG of fentanyl for sedation. Patient started on Cardene for blood pressure control SBP goal less than 180 which she maintained well under. On arrival to our emergency department patient was already intubated and paralyzed this went directly to CT scanner for repeat CT head without which again was unremarkable left basal ganglia calcification although no sign of hemorrhagic conversion. CTA head and neck also completed and no evidence for LVO.   Decision was made to continue and finish tPA infusion per stroke attending and patient loaded with Keppra 2 g given concern for possible seizure given her history. Patient admitted to neuro ICU for further work-up including stroke versus seizure and LTM monitoring. Past medical history significant for very similar episode in July 2014 although at that time she had acute onset left-sided hemiparesis and initially thought to be having a stroke then found to be unresponsive requiring intubation and sedation with propofol stroke work-up at that time unremarkable and patient was diagnosed with nonconvulsive seizures started on Keppra 500 mg twice daily and Depakote 500 mg twice daily which is still her home medication and dosage at this time, hypertension, hyperlipidemia. Of note in care everywhere patient also documented August 2018 to have acute right hemiparesis status post IV TPA at that time for stroke concerns MRI completed no acute intracranial mass, hemorrhage or infarct although did demonstrate white matter signal abnormalities unchanged from previous exams consistent with small vessel ischemia. 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     December 2014-right-sided numbness--> MRI at that time no acute intracranial abnormality      Last know well: 6:45 PM 4/17/22     On presentation:  BP:194/138  BSL:188    Prior to arrival patient was on  Antiplatelets/anticoagulants:none   Statins:none     PAST MEDICAL HISTORY :       Past Medical History:        Diagnosis Date    CVA (cerebral infarction) ([facility] [address]     Hyperlipidemia     Hypertension        Past Surgical History:        Procedure Laterality Date    APPENDECTOMY      BRONCHOSCOPY DIAGNOSTIC  7/24/2014         HYSTERECTOMY         Social History:   Social History     Socioeconomic History    Marital status:      Spouse name: Not on file    Number of children: Not on file    Years of education: Not on file    Highest education level: Not on file   Occupational History    Not on file   Tobacco Use  Smoking status: Never Smoker    Smokeless tobacco: Never Used   Substance and Sexual Activity    Alcohol use: No    Drug use: No    Sexual activity: Not on file   Other Topics Concern    Not on file   Social History Narrative    Not on file     Social Determinants of Health     Financial Resource Strain:     Difficulty of Paying Living Expenses: Not on file   Food Insecurity:     Worried About Running Out of Food in the Last Year: Not on file    Tammy of Food in the Last Year: Not on file   Transportation Needs:     Lack of Transportation (Medical): Not on file    Lack of Transportation (Non-Medical): Not on file   Physical Activity:     Days of Exercise per Week: Not on file    Minutes of Exercise per Session: Not on file   Stress:     Feeling of Stress : Not on file   Social Connections:     Frequency of Communication with Friends and Family: Not on file    Frequency of Social Gatherings with Friends and Family: Not on file    Attends Yazidism Services: Not on file    Active Member of [address] [place] [address] or Organizations: Not on file    Attends Club or Organization Meetings: Not on file    Marital Status: Not on file   Intimate Partner Violence:     Fear of Current or Ex-Partner: Not on file    Emotionally Abused: Not on file    Physically Abused: Not on file    Sexually Abused: Not on file   Housing Stability:     Unable to Pay for Housing in the Last Year: Not on file    Number of [place] in the Last Year: Not on file    Unstable Housing in the Last Year: Not on file       Family History:   No family history on file. Allergies:  Patient has no known allergies. Home Medications:  Prior to Admission medications    Medication Sig Start Date End Date Taking? Authorizing Provider   Pantoprazole Sodium (PROTONIX PO) Take  by mouth. Historical Provider, MD   acetaminophen (TYLENOL) 160 MG/5ML solution Take 20.3 mLs by mouth every 4 hours as needed for Fever.  7/28/14   Radha Monzon DO   albuterol (PROVENTIL HFA;VENTOLIN HFA) 108 (90 BASE) MCG/ACT inhaler Inhale 6 puffs into the lungs every 6 hours as needed for Wheezing. 7/28/14   Fabian Trejo DO   glucagon, rDNA, 1 MG SOLR injection Inject 1 mg into the muscle as needed for Low blood sugar (Blood glucose less than 70 mg/dL and patient NOT ALERT or NPO and does not have IV access. ). 7/28/14   Fabian Trejo DO   glucose (GLUTOSE) 40 % GEL Take 15 g by mouth as needed. 7/28/14   Fabian Trejo, DO   insulin glargine (LANTUS) 100 UNIT/ML injection vial Inject 10 Units into the skin nightly. 7/28/14   Fabian Trejo, DO   insulin lispro (HUMALOG) 100 UNIT/ML injection vial Inject 0-12 Units into the skin every 6 hours. 7/28/14   Fabian Trejo DO   niacin (NIASPAN) 500 MG CR tablet Take 1 tablet by mouth nightly. 7/28/14   Fabian Trejo DO   potassium chloride (KAYCIEL) 20 MEQ/15ML (10%) solution Take 15 mLs by mouth daily. 7/28/14   Fabian Trejo DO   levETIRAcetam (KEPPRA) 500 MG tablet Take 1 tablet by mouth 2 times daily. 7/28/14   Fabian Trejo DO   valproate (DEPAKENE) 250 MG/5ML syrup Take 10 mLs by mouth 2 times daily. 7/28/14   Fabian Trejo DO       Current Medications:   Current Facility-Administered Medications: niCARdipine (CARDENE) 25 mg in sodium chloride 0.9 % 250 mL infusion, 5 mg/hr, IntraVENous, Continuous  propofol 1000 MG/100ML injection, , ,     REVIEW OF SYSTEMS:     Limited review of systems as patient is currently intubated on sedation and was paralyzed prior to arrival to our emergency department with rocuronium.   However patient's daughter and son were present in the emergency department stated that she just got home from [place] and has been in an argument with her sister very angry and over the last 2 days has been feeling generally unwell and starting day prior to her symptoms began to notice right arm sensations describing somewhat of an aura for this patient    Review of systems otherwise negative. PHYSICAL EXAM:       BP (!) 145/90   Pulse 85   Temp 97.1 °F (36.2 °C)   Resp 17   Wt 145 lb (65.8 kg)   SpO2 100%   BMI 23.40 kg/m²     CONSTITUTIONAL:   Critically ill  female who presents via mobile stroke unit after being found with altered mental status and right-sided hemiparesis. Concern for stroke versus seizure with Paulo's paralysis. Patient currently on sedation and when weaned off becomes very combative attempting to pull off her medical equipment. HEAD:  normocephalic, atraumatic    EYES:  PERRLA, EOMI.   ENT:  moist mucous membranes   NECK:  supple, symmetric, no midline tenderness to palpation    BACK:  without midline tenderness, step-offs or deformities    LUNGS:  Equal air entry bilaterally   CARDIOVASCULAR:  normal s1 / s2   ABDOMEN:  Soft, no rigidity   NEUROLOGIC:  Mental Status:  Not oriented to date, Not oriented to person and Not oriented to place, patient is on sedation with propofol although when weaned quickly becomes combative altered and agitated moving all 4 extremities spontaneously although is noted to have minimal right upper extremity weakness and more significant right lower extremity weakness             Cranial Nerves:    cranial nerves II-XII are grossly intact    Motor Exam:    Drift:  present - Right upper and lower   Tone:  normal    Motor exam is 5 out of 5 all extremities with the exception of RUE 4-/5 RLE 3/5     Sensory:    Touch:    Patient briskly withdraws to pain on the left and less briskly on the right lower extremity and upper. Deep Tendon Reflexes:    Right Bicep:  1+  Left Bicep:  1+  Right Knee:  1+  Left Knee:  1+    Plantar Response:  Right:  downgoing  Left:  downgoing    Clonus:  absent  Nicholson's:  absent        INITIAL NIH STROKE SCALE:    Time Performed:   9:00PM   Immediately after patient was paralyzed thus will use her exam after CT head WO when taken off sedation for short period of time     1a.   Level of consciousness:  0 - alert; keenly responsive  1b. Level of consciousness questions:  2 - answers neither question correctly  1c. Level of consciousness questions:  0 - performs both tasks correctly  2. Best Gaze:  1 - partial gaze palsy  3. Visual:  0 - no visual loss  4. Facial Palsy:  0 - normal symmetric movement  5a. Motor left arm:  0 - no drift, limb holds 90 (or 45) degrees for full 10 seconds  5b. Motor right arm:  2 - some effort against gravity, limb cannot get to or maintain (if cued) 90 (or 45) degrees, drifts down to bed, but has some effort against gravity   6a. Motor left le - no drift; leg holds 30 degree position for full 5 seconds  6b. Motor right le - some effort against gravity; leg falls to bed by 5 seconds but has some effort against gravity  7. Limb Ataxia:  0 - absent  8. Sensory:  1 - mild to moderate sensory loss; patient feels pinprick is less sharp or is dull on the affected side; there is a loss of superficial pain with pinprick but patient is aware of being touched   9. Best Language:  3 - mute, global aphasia; no usable speech or auditory comprehension  10. Dysarthria:  UN - intubated or other physical barrier  11.   Extinction and Inattention:  1 - visual, tactile, auditory, spatial or personal inattention or extinction to bilateral simultaneous stimulation in one of the sensory modalities     TOTAL:  12     SKIN:  no rash      Modified Amara Score Scale:     [] Zero: No symptoms at all   [x] 1: No significant disability despite symptoms; able to carry out all usual duties and activities   [] 2: Slight disability; unable to carry out all previous activities, but able to look after own affairs without assistance   [] 3:Moderate disability; requiring some help, but able to walk without assistance   [] 4: Moderately severe disability; unable to walk and attend to bodily needs without assistance   [] 5:Severe disability; bedridden, incontinent and requiring constant nursing care and attention    LABS AND IMAGING:     CBC with Differential:    Lab Results   Component Value Date    WBC 5.7 07/28/2014    RBC 2.88 07/28/2014    HGB 9.4 07/28/2014    HCT 27.2 07/28/2014     07/28/2014    MCV 94.3 07/28/2014    MCH 32.6 07/28/2014    MCHC 34.5 07/28/2014    RDW 14.1 07/28/2014    LYMPHOPCT 16 07/23/2014    MONOPCT 6 07/23/2014    BASOPCT 0 07/23/2014    MONOSABS 0.33 07/23/2014    LYMPHSABS 0.88 07/23/2014    EOSABS 0.00 07/23/2014    BASOSABS 0.00 07/23/2014    DIFFTYPE NOT REPORTED 07/23/2014         BMP:    Lab Results   Component Value Date     07/28/2014    K 3.2 07/28/2014     07/28/2014    CO2 31 07/28/2014    BUN 16 07/28/2014    LABALBU 2.9 07/23/2014    CREATININE 0.49 04/17/2022    CREATININE 0.29 07/28/2014    CALCIUM 8.5 07/28/2014    GFRAA >60 07/28/2014    LABGLOM >60 04/17/2022    GLUCOSE 165 07/28/2014       Radiology Review:    1.) CT brain without contrast: (Reviewed at 8:50 PM 4/17/22)  Impression   1. No acute intracranial abnormality. 2. No evidence of large vessel occlusion or hemodynamically significant   stenosis involving the head and neck arteries. 2.) CTA Head/Neck: (Reviewed at 8:55PM 4/17/22)  Impression   1. No acute intracranial abnormality. 2. No evidence of large vessel occlusion or hemodynamically significant   stenosis involving the head and neck arteries.        ASSESSMENT AND PLAN:       Patient Active Problem List   Diagnosis    Altered mental status    Generalized nonconvulsive seizures ([address]    History of CVA (cerebrovascular accident)    Noncompliance    Hemiparesis ([address]    Respiratory failure ([address]    Upper respiratory infection    Acute respiratory failure ([address]    HTN (hypertension)    HLD (hyperlipidemia)    Hyperglycemia    Hypokalemia    Anemia due to acute blood loss    Hemiparesis of right dominant side due to acute cerebrovascular disease ([address]       Assessment 80 y.o. female who presents with acute onset right sided hemiparesis LKW 6:45 PM 4/17/22. PMH significant for HTN, HLD and CVA in 2012 with left sided deficits, found unresponsive 7/18/2014    1. Last Known Well (date and time): 6:45 PM witnessed by patients daughter     2. Candidate for IV tPA therapy     Yes [x]     No  [] due to the following exclusion criteria:     3. Candidate for Thrombectomy    Yes []      No [x] due to the following exclusion criteria: No LVO identified    - Discussed with Dr. Chidi Michael MD      Recommendations:    [] General Neurology Care Status - prefer 5th floor (5A/5C)   [] Internal Medicine General Care Status   [x] NICU Status - (5B)     [] MICU Status   [] Observation Status    Please use the following admission order set for stroke admission:   [] [id number] - MARY Intercerebral Hemorrhage Admission   [] [id number] - MARY Sub Arachnoid Hemorrhage Admission   [] [id number] - MARY Ischemic Stroke TPA Treatment Focused   [x] [id number] - IP Ischemic Stroke ICU Post Alteplase (TPA) Admission    [] [id number] - GEN Ischemic Stroke Non-Thrombolytic Focussed      Imaging   - CT Head  WO : done    - CTA Head and Neck : done   - MRI Brain WO : in AM   - ECHO      Medications   -  Given IV TPA bolus and infusion  - IV keppra 2 grams in ER   - Folic acid 1mg BID  - Atorvastatin 80 mg nightly     Labs  - Fasting Lipid panel  - HgbA1c lab      - PT, OT, Speech eval   - Hydrate with 1000cc bolus then IVF NS @ 75cc/hr   - Telemetry   - Neuro checks per protocol  - We recommend SBP <180  - Blood glucose goal less than 180  - Please avoid dextrose containing solutions        Additional recommendations may follow    Please contact NICU with any changes in patients neurologic status. Thank you for your consult.        Carli Grewal MD   PGY 3 Neurology Resident  4/17/2022 at 11:39 PM

## 2022-04-18 NOTE — CARE COORDINATION
Case Management Initial Discharge Plan  Kentrell Mejia,             Met with:family member son to discuss discharge plans. Information verified: address, contacts, phone number, , insurance Yes  Insurance Provider:   [facility]    Emergency Contact/Next of Kin name & number:   Meli Pierre [phone]       Who are involved in patient's support system? Patient's children    PCP: Pasquale Stauffer MD  Date of last visit: past year      Discharge Planning    Living Arrangements:        Home has 1 stories  0 stairs to climb to get into front door,   Patient able to perform ADL's:Independent    Current Services (outpatient & in home)   DME equipment: glucometer  DME provider:     Is patient receiving oral anticoagulation therapy? No    Does patient have any issues/concerns obtaining medications? No  If yes, what are patient's concerns? Is there a preferred Pharmacy after hours or on weekends? Yes    If yes, which pharmacy? [facility] Needed:       Patient agreeable to home care: Yes  Freedom of choice provided:  yes    Prior SNF/Rehab Placement and Facility: [facility]    Agreeable to SNF/Rehab: Yes  [place] of choice provided: yes     Evaluation: yes    Expected Discharge date:       Patient expects to be discharged to: If home: is the family and/or caregiver wiling & able to provide support at home? yes  Who will be providing this support? Patient's children willing to stay with mother once ready for d/c*    Follow Up Appointment: Best Day/ Time:      Transportation provider: dependent on dispo  Transportation arrangements needed for discharge: Yes    Readmission Risk              Risk of Unplanned Readmission:  17             Does patient have a readmission risk score greater than 14?: Yes  If yes, follow-up appointment must be made within 7 days of discharge.      Goals of Care:       Educated patient's son on transitional options, provided freedom of choice and are agreeable with plan      Discharge Plan: home goal; agreeable to placement. Referral sent to [facility] per patient's son. Will discuss with sister for more SNF choices and IP Rehab.              Electronically signed by Sofia Jackson RN on 4/18/22 at 5:07 PM EDT

## 2022-04-18 NOTE — PROGRESS NOTES
Comprehensive Nutrition Assessment    Type and Reason for Visit:  Initial    Nutrition Recommendations/Plan:   -Monitor for start of nutrition. If TF, suggest Vital HP (Peptide based High Protein) at goal rate of 45ml/hr to provide 1080 kcal, 94gm protein (with Propofol at current rate-will provide total of 1521 kcal/d)    Nutrition Assessment:  Admitted d/t Rt side hemiparesis/AMS. Currrently intubated/sedated (on Propofol). Remains NPO. Malnutrition Assessment:  Malnutrition Status:  Insufficient data    Context:  Acute Illness         Estimated Daily Nutrient Needs:  Energy (kcal):  7822-6413 kcal/d (22-25 kcal/kg); Weight Used for Energy Requirements:  Current     Protein (g):  75-95gm pro/d (1.2-1.5gm/kg);  Weight Used for Protein Requirements:  Current        Fluid (ml/day):  or per MD; Method Used for Fluid Requirements:  1 ml/kcal      Nutrition Related Findings:  labs/meds reviewed      Wounds:  None       Current Nutrition Therapies:    Diet NPO  Additional Calorie Sources:   Propofol at 16.7ml/hr =441 kcal/d    Anthropometric Measures:  · Height: 5' 5\" (165.1 cm)  · Current Body Weight: 136 lb 0.4 oz (61.7 kg)    · Ideal Body Weight: 125 lbs; % Ideal Body Weight 108.8 %   · BMI: 22.6  · BMI Categories: Normal Weight (BMI 22.0 to 24.9) age over 72       Nutrition Diagnosis:   · Inadequate oral intake related to impaired respiratory function as evidenced by NPO or clear liquid status due to medical condition,intubation      Nutrition Interventions:   Food and/or Nutrient Delivery:  Continue NPO  Nutrition Education/Counseling:  No recommendation at this time   Coordination of Nutrition Care:  Continue to monitor while inpatient    Goals:  Meet 75% or greater of estimated nutrition needs       Nutrition Monitoring and Evaluation:   Behavioral-Environmental Outcomes:  None Identified   Food/Nutrient Intake Outcomes:  Diet Advancement/Tolerance  Physical Signs/Symptoms Outcomes:  Biochemical Data,Weight,Nutrition Focused Physical Findings     Discharge Planning:     Too soon to determine     Electronically signed by Jeff Andre RD, LD on 4/18/22 at 3:03 PM EDT    Contact: [phone]

## 2022-04-18 NOTE — PLAN OF CARE
Problem: OXYGENATION/RESPIRATORY FUNCTION  Goal: Patient will maintain patent airway  4/18/2022 1021 by Shruthifelix Ruvalcaba  Outcome: Ongoing     Problem: OXYGENATION/RESPIRATORY FUNCTION  Goal: Patient will achieve/maintain normal respiratory rate/effort  Description: Respiratory rate and effort will be within normal limits for the patient  4/18/2022 1021 by Shruthifelix Ruvalcaba  Outcome: Ongoing     Problem: MECHANICAL VENTILATION  Goal: Patient will maintain patent airway  4/18/2022 1021 by Shruthifelix Ruvalcaba  Outcome: Ongoing     Problem: MECHANICAL VENTILATION  Goal: Oral health is maintained or improved  4/18/2022 1021 by Shruthifelix Ruvalcaba  Outcome: Ongoing     Problem: MECHANICAL VENTILATION  Goal: ET tube will be managed safely  4/18/2022 1021 by Shruthifelix Ruvalcaba  Outcome: Ongoing     Problem: MECHANICAL VENTILATION  Goal: Ability to express needs and understand communication  4/18/2022 1021 by Shruthi Ruvalcaba  Outcome: Ongoing     Problem: MECHANICAL VENTILATION  Goal: Mobility/activity is maintained at optimum level for patient  4/18/2022 1021 by Shruthi Ruvalcaba  Outcome: Ongoing     Problem: SKIN INTEGRITY  Goal: Skin integrity is maintained or improved  4/18/2022 1021 by Shruthi Ruvalcaba  Outcome: Ongoing

## 2022-04-18 NOTE — PROGRESS NOTES
The transport originated from [phone]. Pt. was transported to CT and back. Assisting with the transport was RN + RT. Appropriate devices were applied to monitor the patient's condition during transport. Patient transported  via 80% O2 via ventilator. Patient tolerated well.         Hilda Walker RCP  9:13 PM

## 2022-04-18 NOTE — PROGRESS NOTES
No cuff leak during bedside rounds. Give 10 mg decadron x1 and will reassess later. Exam much improved since arriving to the NICU. Alert and following commands. MAEW. Pending MRI Brain. Vitals:    04/18/22 1300   BP: 121/66   Pulse: 80   Resp:    Temp:    SpO2: 100%           Natividad Mendoza MD, BEATA  Ne[facility] Critical [facility]  4/18/2022 at 1:55 PM         This note is created with the assistance of a speech recognition program.  While intending to generate a document that actually reflects the content of the visit, the document can still have some errors including those of syntax and sound like substitutions which may escape proof reading. In such instances, actual meaning can be extrapolated by contextual diversion.

## 2022-04-18 NOTE — PROGRESS NOTES
04/18/22 0725   Vent Information   Vent Mode   (CPAP/PS)   Pressure Support 6 cmH20   FiO2  30 %   PEEP/CPAP 5     Pt started on SBT. Pt tolerating well at this time. Will continue to monitor.

## 2022-04-18 NOTE — ED NOTES
..    4/17/2022 9:52 PM    Patient: Praveena Vidal, 80 y.o., female Race:   Patient Address: [address] [place] [id number]  Incident Address:  [address] [place] [address]     [] [facility] [place]  [] [facility]  [] [facility] [place]   [x] BLAKE MERCADO JR. [facility]  [] [facility]  Patient Phone #: [phone]   Insurance: Payor: /   Rebecca Hy:  []  Yes   [x]  No  Emergency Contact: Extended Emergency Contact Information  Primary Emergency Contact: [address] [place] [address] Phone: [phone]  Relation: Child  Secondary Emergency Contact: [facility] Phone: [phone]  Relation: Child  MRN: [id number]  # [id number]  YOB: 1950  Primary Care Physician: Robe Boone MD  Advance Directive: [x] Full Code   []DNR-CC   []DNR-CCA    MSU Crew: [place] - CT Tech, JOSE Mclain - Paramedic, Sundeep Becker  - RN    Pt Transported To:  [x] [place] [address]  [] [facility]  [] [facility]  [] [facility]   [] [facility]  [] [facility]  [facility] [] [facility] [] [facility]    Was patient transported to closest facility? []Yes  [x]No (if No specify reason)   []   Patient/family request    [x]   Divert to specialist       Response Code   [] 2  [x] 3  []   Change  [] 2  [] 3   Transport Code   [x] 2  [] 3  []   Change  [] 2  [] 3     Mileage:  [address] [id number]   Total Connye Nab 30.7     Call Received 4/17/2022 1854   Dispatched 4/17/2022 1855   Enroute 4/17/2022 1856   Arrived Scene 4/17/2022 1908   At Patient 4/17/2022 1912   Decision to Scan 4/17/2022 1914   Scan 4/17/2022 1929   Departed Scene 4/17/2022 1 Iain Wilson 4/17/2022 2044   [facility] 4/17/2022 2250   In Service 4/17/2022 2250         Allergies  [] Updated/Reviewed by MSU  [x] Historical Data Only  [] Unavailable  has No Known Allergies. Medications  [] Updated/Reviewed by MSU  [x] Historical Data Only  [] Unavailable  Prior to Admission medications    Medication Sig Start Date End Date Taking?  Authorizing Provider   Pantoprazole Sodium (PROTONIX PO) Take  by mouth. Historical Provider, MD   acetaminophen (TYLENOL) 160 MG/5ML solution Take 20.3 mLs by mouth every 4 hours as needed for Fever. 7/28/14   Anabel Soda, DO   albuterol (PROVENTIL HFA;VENTOLIN HFA) 108 (90 BASE) MCG/ACT inhaler Inhale 6 puffs into the lungs every 6 hours as needed for Wheezing. 7/28/14   Anabel Soda, DO   glucagon, rDNA, 1 MG SOLR injection Inject 1 mg into the muscle as needed for Low blood sugar (Blood glucose less than 70 mg/dL and patient NOT ALERT or NPO and does not have IV access. ). 7/28/14   Anabel Soda, DO   glucose (GLUTOSE) 40 % GEL Take 15 g by mouth as needed. 7/28/14   Anabel Soda, DO   insulin glargine (LANTUS) 100 UNIT/ML injection vial Inject 10 Units into the skin nightly. 7/28/14   Anabel Soda, DO   insulin lispro (HUMALOG) 100 UNIT/ML injection vial Inject 0-12 Units into the skin every 6 hours. 7/28/14   Anabel Soda, DO   niacin (NIASPAN) 500 MG CR tablet Take 1 tablet by mouth nightly. 7/28/14   Anabel Soda, DO   potassium chloride (KAYCIEL) 20 MEQ/15ML (10%) solution Take 15 mLs by mouth daily. 7/28/14   Anabel Soda, DO   levETIRAcetam (KEPPRA) 500 MG tablet Take 1 tablet by mouth 2 times daily. 7/28/14   Anabel Soda, DO   valproate (DEPAKENE) 250 MG/5ML syrup Take 10 mLs by mouth 2 times daily. 7/28/14   Anabel Soda, DO      Past Medical History  [] Updated/Reviewed by MSU  [x] Historical Data Only  [] Unavailable   has a past medical history of CVA (cerebral infarction) ([address] Hyperlipidemia, and Hypertension.     Patient Weight: 144 lbs   [x]   Estimated   []   Stated          VITALS          Time BP Pulse   Resp  Rate N-normal  S-shallow  D-deep Monitor SpO2 O2    LPM BGL   Temp Pain   1930 176/80 78 12 N %      1945 177/89 67 14 N % RA      2000 149/80 63 13 N NSR 98% RA      2015 155/71 68 20 Assisted NSR 97% VENT      2030 190/121 113 10 Assisted % VENT         Pupils GCS   Time R L E  4 V  [facility] 3 3 3 5 11   1945 3 3 3 3 5 11   2000 3 3 1 1 1 3   2015 3 3 1 1 1 3   2030 3 3 1 1 1 3       NIH -   record on all transports and Q15 min after tPA administration    NIHSS       Time 1915 1945 2000 2015   1a. LOC - Arousal Status 2 2 2 2   1b. LOC - Questions 2 2 2 2   1c. LOC - Commands 1 1 2 2   2. Eye Movements (gaze) 0 0 0 0   3. Visual Fields 0 0 0 0   4. Facial Palsy 2 2 2 2   5a. Motor Left Arm 0 0 4 4   5b. Motor Right Arm 4 4 4 4   6a. Motor Left Leg 0 0 4 4   6b. Motor Right Leg 4 4 4 4   7. Limb Ataxia 0 0 0 0   8. Sensory 2 2 2 2   9. Language/Aphasia 2 2 2 2   10. Dysarthria 0 0 0 0   11. Neglect 2 2 2 2   TOTAL 21 21 29 29       Research:    RACE Score: 8 Time: 1915  StrokeVAN Score: positive Time: 1915    Time Last Known Well: 1562 4/17/22    Narrative:    Dispatched to possible CVA per [facility] dispatch to meet [facility]. Arrived to find Mata Armenta, 80 y.o., female c/o right sided weakness, decreased mentation, right facial droop. Report received from [facility] at patients side. Pt very difficult to wake, Reported blood sugar 175, complete neglect to right side. Upon MSU arrival, pt in [facility] rig. Complete right sided neglect. Unable to move right arm, right leg. NO movement on right side with painful stimuli. Significant right sided facial droop. Pt very drowsy and difficult to wake in rig but more responsive once moved over to MSU cot. Pt quickly closes eyes and only opens on verbal commands. Decision to scan. Pt very anxious and swinging left arm during scan attempt. Presumed pt was claustrophobic. Cloth placed over pt's eyes and encouragement given during scan. Dr. Valencia Tatum at patient side via telemedicine unit. Order given for TPA administration. Discussed risks and benefits with patient however pt doesn't seem to respond to verbal que's. At 15 min neuro check pt not moving left or right side. Not responding to verbal commands or painful stimuli. Dr. Mery Brock notified and TPA stopped. (total of 41.6ml remains on pump) Decision made to intubated pt due to GCS of 3. SPO2 remained % Pt intubated and transport remains to [place] [address]. Cardene started due to spike in BP over 165 systolic. Patient received the following medications prior to MSU arrival: NONE  Patient has the following lines prior to MSU arrival: 18g LT FA  Patient has the following airway placed prior to MSU arrival: NONE    Patient was transferred to cot via 2 person assist and secured with straps x3. Zoll ECG, SpO2, and NIBP applied and monitored throughout encounter. HOB maintained at 30 degrees. Patient was transferred to ambulance, cot secured in scan position. Non contrast CT scan performed. After scan cot secured in transport position. IV tPA inclusion/exclusion criteria reviewed with patient and physician. Candidate for IV tPA therapy? [x] Yes   [] No - due to the following exclusion criteria:    [] ICH   [] Taking anticoagulant -   [] Beyond last time known well window  [] At or returned to baseline   [] Marked improvement of symptoms  [] No disabling symptoms  [] Other -     Patient is being transported to [place] [address]. During transport patient rests comfortably. Cabin temp maintained at 70-72 °F throughout transport. Patient was transferred to bed 12 via 4 person sheet lift. Patient care handoff completed with  RN and physician at bedside.        Imaging:  Images were obtained onboard the MSU including:  [x] CT brain without contrast - see results below:      CT HEAD WO CONTRAST    Result Date: 4/17/2022  EXAMINATION: CT OF THE HEAD WITHOUT CONTRAST; CTA OF THE HEAD AND NECK WITH CONTRAST 4/17/2022 8:44 pm: TECHNIQUE: CT of the head was performed without the administration of intravenous contrast. Dose modulation, iterative reconstruction, and/or weight based adjustment of the mA/kV was utilized to reduce the radiation dose to as low as reasonably achievable.; CTA of the head and neck was performed with the administration of intravenous contrast. Multiplanar reformatted images are provided for review. MIP images are provided for review. Stenosis of the internal carotid arteries measured using NASCET criteria. Dose modulation, iterative reconstruction, and/or weight based adjustment of the mA/kV was utilized to reduce the radiation dose to as low as reasonably achievable. Noncontrast CT of the head with reconstructed 2-D images are also provided for review. COMPARISON: None. HISTORY: ORDERING SYSTEM PROVIDED HISTORY: post TPA acute onset right sided hemiparesis TECHNOLOGIST PROVIDED HISTORY: post TPA acute onset right sided hemiparesis Decision Support Exception - unselect if not a suspected or confirmed emergency medical condition->Emergency Medical Condition (MA); ORDERING SYSTEM PROVIDED HISTORY: post TPA acute onset right sided hemiparesis TECHNOLOGIST PROVIDED HISTORY: post TPA acute onset right sided hemiparesis Decision Support Exception - unselect if not a suspected or confirmed emergency medical condition->Emergency Medical Condition (MA) FINDINGS: An endotracheal tube is noted in place. CT HEAD: BRAIN/VENTRICLES:  No acute intracranial hemorrhage or extraaxial fluid collection. Grey-white differentiation is maintained. No evidence of mass, mass effect or midline shift. No evidence of hydrocephalus. Chronic and age related changes including age-appropriate cerebral volume loss and scattered nonspecific white matter hypodensities which are likely the sequela of chronic small vessel ischemic disease. ORBITS: The visualized portion of the orbits demonstrate no acute abnormality. SINUSES:  The visualized paranasal sinuses and mastoid air cells demonstrate no acute abnormality. SOFT TISSUES/SKULL: No acute abnormality of the visualized skull or soft tissues. CTA NECK: AORTIC ARCH/ARCH VESSELS: No dissection or arterial injury.   No significant stenosis of the brachiocephalic or subclavian arteries. CAROTID ARTERIES: No dissection, arterial injury, or hemodynamically significant stenosis by NASCET criteria. VERTEBRAL ARTERIES: No dissection, arterial injury, or significant stenosis. SOFT TISSUES: The lung apices are clear. No cervical or superior mediastinal lymphadenopathy. The larynx and pharynx are unremarkable. No acute abnormality of the salivary and thyroid glands. BONES: No acute osseous abnormality. CTA HEAD: ANTERIOR CIRCULATION: There is absence of a right A1 segment, a normal variant. No significant stenosis of the intracranial internal carotid, anterior cerebral, or middle cerebral arteries. No aneurysm. 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 No significant stenosis of the vertebral, basilar, or posterior cerebral arteries. No aneurysm. OTHER: No dural venous sinus thrombosis on this non-dedicated study. 1. No acute intracranial abnormality. 2. No evidence of large vessel occlusion or hemodynamically significant stenosis involving the head and neck arteries. RECOMMENDATIONS: Unavailable     CT HEAD WO CONTRAST    Result Date: 4/17/2022  EXAMINATION: CT OF THE HEAD WITHOUT CONTRAST  4/17/2022 7:27 pm TECHNIQUE: CT of the head was performed without the administration of intravenous contrast. Dose modulation, iterative reconstruction, and/or weight based adjustment of the mA/kV was utilized to reduce the radiation dose to as low as reasonably achievable. COMPARISON: MRI brain in July 19, 2014, CT head July 18, 2014 HISTORY: ORDERING SYSTEM PROVIDED HISTORY: stroke- unresponsive TECHNOLOGIST PROVIDED HISTORY: stroke- unresponsive Decision Support Exception - unselect if not a suspected or confirmed emergency medical condition->Emergency Medical Condition (MA) FINDINGS: Limited due to motion artifacts. BRAIN/VENTRICLES: There is no acute intracranial hemorrhage, mass effect or midline shift.   No abnormal extra-axial fluid collection. The gray-white differentiation is maintained without evidence of an acute infarct. There is no evidence of hydrocephalus. There is focal calcification in the left basal ganglia (series 1 image 17). ORBITS: The visualized portion of the orbits demonstrate no acute abnormality. SINUSES: The visualized paranasal sinuses and mastoid air cells demonstrate no acute abnormality. SOFT TISSUES/SKULL:  No acute abnormality of the visualized skull or soft tissues. No acute intracranial abnormality. Results were reported to Dr. Shilpi Polanco at 7:50 p.m. on April 17, 2022. Physical assessment performed:    Neuro: right sided hemiplegia, obtunded,    Resp: lungs clear to auscultation bilaterally, normal effort  Cardiac: regular rate, no murmur, 12 lead ECG shows NSR  Muscular/skeletal/peripheral vascular: no edema, no redness or tenderness in the calves, pulses present in 4 extremities   Abd/GI/: abd flat, soft, non tender   Skin: normal color, warm, dry      IV LINES   Time Size Site # Attempts Performed By   1940 20g RT AC 1  S. Mclain EMT-P                     Total Amount of IV Fluids Infused: 150ml    MEDS / ELECTRICAL RX   Time Therapy Dosage Route Response Performed By   2026 Fentenyl 200mcg IV RSI S. Mclain EMT-P   2028 Rocuonium 80mg IV RSI S. Mclain EMT-P   2040 Cardene 5mg/hr IV  S. Mclain EMT-P                               TPA Administration    Time Bolus Dose Infusion Dose Waste   1956 5.9     1958  53.1 41.0       [x] tPA dosing double checked by: Nathaly Hayden EMT-P    Intubation details:  Intubation time: 2030  BY: Christen Morin RN  Tube size: 7.0  Attempts: 1  Centimeter argelia at lip: 25  Secured with: Estela Tube Henderson   Tube confirmed by:  bilateral breath sounds, end tidal CO2, fogging present in tube, symmetrical chest rise.    Due to time restrains, Ventilations were assisted with BVM and 100% O2 at a rate of 10 bpm      ACUTE STROKE DYSPHAGIA SCREEN              YES NO   1 GCS less than 13?         2 Facial Asymmetry or Weakness? 3 Tongue Asymmetry or Weakness? 4 Palatal Asymmetry or Weakness? 5 Signs of aspiration during 3oz water test?*                 If answers to questions 1-4 are NO proceed to 3oz water test               *Administer 3oz of water for sequential drinks, note any throat clearing, cough, or change in vocal quality immediately after and 1 minute following the swallow.                If answers to questions 1-5 are NO proceed with ordered PO meds       POC LABS      TIME 1950     Test (reference range)      FSBG ()      PT (11-13.5)      INR (0.8-1.1)      Na (138-146)      K (3.5-4.9)      Cl ()      iCa (1.2-1.32)      TCO2 (24-29)      Glu ()      BUN (8.0-26)      Crea (0.6-1.3) 0.6     Hct (38-51)      Hb (12.0-17)      AnGap 10.0-20)                Assistance:   []    Fire -     []    Police    [x]    Other EMS (See Below)    Mele Saucedo Notification:    [address] -  [x] [address]  [] [facility]  [] [facility]  [] [facility]  [] [facility] [] [facility] [] [facility] [] [facility]  [] [facility]     [x]    Med Channel:     []    Cell Phone     Med Control Orders Received:   [x] No  []  Yes:     Med Control Physician (if on line medical direction received)  -        Hospital Team Alert:   []    Trauma Alert    []    STEMI Alert         []    Stroke Alert    [x]    RACE Alert      Description Of Valuables: Pt clothing, meds in clear plastic bag    Patient Valuables:   [x]    With Patient    []    Not Received    [x]    ER / Lab     Call Outcome:   [x]    Transport to Facility    []    Care Transferred to [facility]    []    Cancelled    []    Patient Refusal    []                           Mobile Stroke Unit    Electronically signed by Sweta Guy RN on 4/17/22 at 9:52 PM EDT     Sweta Guy RN  04/17/22 Deepika Escamilla RN  04/17/22 0699

## 2022-04-18 NOTE — ED PROVIDER NOTES
[facility] ED  Emergency Department Encounter  Emergency Medicine Resident     Pt Name: Karolyn Rose  MRN: [id number]  [place] 1950  Date of evaluation: 4/17/22  PCP:  Miley Murillo MD    CHIEF COMPLAINT       Chief Complaint   Patient presents with    Cerebrovascular Accident       HISTORY OFPRESENT ILLNESS  (Location/Symptom, Timing/Onset, Context/Setting, Quality, Duration, Modifying Factors,Severity.)      Karolyn Rose is a 80 y. o.yo female who presents with mobile stroke due to Cerebrovascular event. Per report, patient with complete right hemiplegia, with right gaze deficit. Last known well of 6:45 PM, patient was found by her daughter. CT head did not show any bleed, patient was started on TPA, post TPA after bolus was given and after 15 male of infusion was given, patient became completely unresponsive. Patient was intubated prior to arrival to the emergency room. PAST MEDICAL / SURGICAL / SOCIAL / FAMILY HISTORY      has a past medical history of CVA (cerebral infarction) ([address] Hyperlipidemia, and Hypertension. has a past surgical history that includes Appendectomy; Hysterectomy; and Bronchoscopy diagnostic (7/24/2014).      Social History     Socioeconomic History    Marital status:      Spouse name: Not on file    Number of children: Not on file    Years of education: Not on file    Highest education level: Not on file   Occupational History    Not on file   Tobacco Use    Smoking status: Never Smoker    Smokeless tobacco: Never Used   Substance and Sexual Activity    Alcohol use: No    Drug use: No    Sexual activity: Not on file   Other Topics Concern    Not on file   Social History Narrative    Not on file     Social Determinants of Health     Financial Resource Strain:     Difficulty of Paying Living Expenses: Not on file   Food Insecurity:     Worried About Running Out of Food in the Last Year: Not on file    [address] in the Last Year: Not on file   Transportation Needs:     Lack of Transportation (Medical): Not on file    Lack of Transportation (Non-Medical): Not on file   Physical Activity:     Days of Exercise per Week: Not on file    Minutes of Exercise per Session: Not on file   Stress:     Feeling of Stress : Not on file   Social Connections:     Frequency of Communication with Friends and Family: Not on file    Frequency of Social Gatherings with Friends and Family: Not on file    Attends [employer] Services: Not on file    Active Member of [address] [place] [address] Fon or Organizations: Not on file    Attends Club or Organization Meetings: Not on file    Marital Status: Not on file   Intimate Partner Violence:     Fear of Current or Ex-Partner: Not on file    Emotionally Abused: Not on file    Physically Abused: Not on file    Sexually Abused: Not on file   Housing Stability:     Unable to Pay for Housing in the Last Year: Not on file    Number of [place] in the Last Year: Not on file    Unstable Housing in the Last Year: Not on file       No family history on file. Allergies:  Patient has no known allergies. Home Medications:  Prior to Admission medications    Medication Sig Start Date End Date Taking? Authorizing Provider   Pantoprazole Sodium (PROTONIX PO) Take  by mouth. Historical Provider, MD   acetaminophen (TYLENOL) 160 MG/5ML solution Take 20.3 mLs by mouth every 4 hours as needed for Fever. 7/28/14   Melisa Police, DO   albuterol (PROVENTIL HFA;VENTOLIN HFA) 108 (90 BASE) MCG/ACT inhaler Inhale 6 puffs into the lungs every 6 hours as needed for Wheezing. 7/28/14   Melisa Police, DO   glucagon, rDNA, 1 MG SOLR injection Inject 1 mg into the muscle as needed for Low blood sugar (Blood glucose less than 70 mg/dL and patient NOT ALERT or NPO and does not have IV access. ). 7/28/14   Melisa Police, DO   glucose (GLUTOSE) 40 % GEL Take 15 g by mouth as needed.  7/28/14   Melisa Police, DO   insulin glargine (LANTUS) 100 UNIT/ML injection vial Inject 10 Units into the skin nightly. 7/28/14   Candelario Bell DO   insulin lispro (HUMALOG) 100 UNIT/ML injection vial Inject 0-12 Units into the skin every 6 hours. 7/28/14   Candelario Bell DO   niacin (NIASPAN) 500 MG CR tablet Take 1 tablet by mouth nightly. 7/28/14   Candelario Bell DO   potassium chloride (KAYCIEL) 20 MEQ/15ML (10%) solution Take 15 mLs by mouth daily. 7/28/14   Candelario Bell DO   levETIRAcetam (KEPPRA) 500 MG tablet Take 1 tablet by mouth 2 times daily. 7/28/14   Candelario Bell DO   valproate (DEPAKENE) 250 MG/5ML syrup Take 10 mLs by mouth 2 times daily. 7/28/14   Candelario Bell DO       REVIEW OFSYSTEMS    (2-9 systems for level 4, 10 or more for level 5)      Review of Systems   Unable to perform ROS: Intubated       PHYSICAL EXAM   (up to 7 for level 4, 8 or more forlevel 5)      INITIAL VITALS:   ED Triage Vitals [04/17/22 2040]   BP Temp Temp src Pulse Resp SpO2 Height Weight   (!) 194/138 -- -- 137 16 100 % -- --       Physical Exam  Constitutional:       Comments: Pt intubated, paralyzed and sedated    HENT:      Head: Normocephalic and atraumatic. Right Ear: Tympanic membrane normal.      Left Ear: Tympanic membrane normal.      Mouth/Throat:      Mouth: Mucous membranes are moist.   Eyes:      Comments: 2mm, not reactive   Cardiovascular:      Rate and Rhythm: Tachycardia present. Pulmonary:      Comments: Patient intubated and on mechanical ventilation  Abdominal:      General: There is no distension. Palpations: Abdomen is soft. Musculoskeletal:         General: No deformity or signs of injury. Cervical back: No rigidity. Right lower leg: No edema. Left lower leg: No edema. Skin:     Capillary Refill: Capillary refill takes less than 2 seconds. Findings: No bruising or erythema.    Neurological:      Comments: Patient intubated, GCS 3T, she is not responding to painful stimuli DIFFERENTIAL  DIAGNOSIS     PLAN (LABS / IMAGING / EKG):  Orders Placed This Encounter   Procedures    CT HEAD WO CONTRAST    CT HEAD WO CONTRAST    CTA HEAD NECK W CONTRAST    XR CHEST PORTABLE    CT head without contrast    MRI brain without contrast    XR CHEST PORTABLE    XR ABDOMEN (KUB) (SINGLE AP VIEW)    Blood gas, arterial    CBC    Basic Metabolic Panel w/ Reflex to MG    ELECTROLYTES PLUS    Hemoglobin and hematocrit, blood    CALCIUM, IONIC (POC)    Valproic Acid Level, Total and Free    Hemoglobin A1C    Levetiracetam Level    Lipid Panel    STROKE PANEL    Diet NPO    Vital signs during and post alteplase (tPA)    Notify physician during and post alteplase (tPA)    Notify physician during and post alteplase (tPA)    Bedrest during and post alteplase (tPA)    Elevate HOB during and post alteplase (tPA)    Adv Diet as Tolerated (nurse communication)    NIH Stroke Scale (NIHSS) during and post alteplase (tPA)    Implement alteplase (tPA) hemorrhage/bleeding precautions    Avoid antiplatelet and antithrombotic medications for 24 hours post administration of alteplase (tPA)    No Anticoagulants for 24 hours after alteplase (tPA)    Implement suspected intracerebral hemorrhage (ICH) guidelines    Swallow screen by nursing before diet and oral medications started.     Stroke education    Encourage deep breathing and coughing every two hours while awake    Place intermittent pneumatic compression device    Telemetry monitoring - continuous duration    Misc nursing order (specify)    Misc nursing order (specify)    HYPOGLYCEMIA TREATMENT: blood glucose less than 50 mg/dL and patient  ALERT and TOLERATING PO    HYPOGLYCEMIA TREATMENT: blood glucose less than 70 mg/dL and patient ALERT and TOLERATING PO    HYPOGLYCEMIA TREATMENT: blood glucose less than 70 mg/dL and patient NOT ALERT or NPO    Telemetry monitoring - continuous duration    Full Code    Inpatient consult to Neurocritical care    Pharmacy to Dose: Other - See Comments: The pharmacist will review this patient's medication profile to evaluate IV medications and change all base solutions to 0.9% sodium chloride if possible based on compatibility and product availability. This. .. [address] to change base solutions.  Inpatient consult to Stroke Team    OT eval and treat    PT evaluation and treat    Initiate RT Adult Mechanical Ventilation Protocol    Mechanical Ventilation with default initial settings    End Tidal CO2 Continuous    Initiate Oxygen Therapy Protocol    Pulse oximetry, continuous    Initiate Oxygen Therapy Protocol    Incentive spirometry    Pulse oximetry, continuous    Speech Language Pathology (SLP) eval and treat    Venous Blood Gas, POC    Creatinine W/GFR Point of Care    POCT urea (BUN)    Lactic Acid, POC    POCT Glucose    POC Glucose Fingerstick    POCT Glucose    POCT glucose    EKG 12 Lead    Echo Complete    EEG video monitoring    Initiate minimum 2 IV lines for alteplase (tPA) infusion    ADMIT TO INPATIENT    Fall precautions       MEDICATIONS ORDERED:  Orders Placed This Encounter   Medications    alteplase (ACTIVASE) injection    alteplase (ACTIVASE) injection    iopamidol (ISOVUE-370) 76 % injection 90 mL    DISCONTD: niCARdipine (CARDENE) 25 mg in sodium chloride 0.9 % 250 mL infusion     Order Specific Question:   Titrate Infusion? Answer:   No     Order Specific Question:   Infusion Dose: Answer:   5 mg/hr    fentaNYL (SUBLIMAZE) injection 200 mcg    rocuronium (ZEMURON) injection 80 mg    propofol 1000 MG/100ML injection     Martina Silence: cabinet override    albuterol sulfate  (90 Base) MCG/ACT inhaler 6 puff     Order Specific Question:   Initiate RT Bronchodilator Protocol     Answer:    Yes    insulin glargine (LANTUS) injection vial 10 Units    pantoprazole (PROTONIX) tablet 40 mg    potassium bicarb-citric acid (EFFER-K) effervescent tablet 20 mEq    DISCONTD: valproate (DEPAKENE) 250 MG/5ML solution 500 mg    OR Linked Order Group     ondansetron (ZOFRAN-ODT) disintegrating tablet 4 mg     ondansetron (ZOFRAN) injection 4 mg    polyethylene glycol (GLYCOLAX) packet 17 g    enoxaparin (LOVENOX) injection 40 mg    OR Linked Order Group     aspirin EC tablet 81 mg     aspirin suppository 300 mg    perflutren lipid microspheres (DEFINITY) injection 1.65 mg    0.9 % sodium chloride infusion    labetalol (NORMODYNE;TRANDATE) injection 10 mg    niCARdipine (CARDENE) 25 mg in sodium chloride 0.9 % 250 mL infusion     Order Specific Question:   Titrate Infusion? Answer:   Yes     Order Specific Question:   Initial Infusion Rate: Answer:   5 mg/hr     Order Specific Question:   Goal of Therapy is: Answer: Other     Order Specific Question:   Other Goal:     Answer:   Maintain SBP less than 180 mmHg and DBP less than 105 mmHg. Order Specific Question:   Contact Provider if:     Answer:   Patient is receiving the maximum dose and is not achieving the goal of therapy    atorvastatin (LIPITOR) tablet 80 mg    levETIRAcetam (KEPPRA) 2,000 mg in sodium chloride 0.9 % 100 mL IVPB    propofol injection     Order Specific Question:   Titrate Infusion? Answer:   Yes     Order Specific Question:   Initial Infusion Dose:      Answer:   20 mcg/kg/min     Order Specific Question:   Goal of Therapy:     Answer:   RASS of -1 to 0     Order Specific Question:   Contact Provider if:     Answer:   New onset HR less than 50 bpm     Order Specific Question:   Contact Provider if:     Answer:   New onset SBP less than 90 mmHg     Order Specific Question:   Contact Provider if:     Answer:   Patient is receiving maximum dose and is not achieving the goal of therapy     Order Specific Question:   Contact Provider if:     Answer:   Triglycerides greater than 500 mg/dL    valproate (DEPACON) 500 mg in sodium chloride 0.9 % 100 mL IVPB    DISCONTD: dexmedetomidine (PRECEDEX) 400 mcg in sodium chloride 0.9 % 100 mL infusion     Order Specific Question:   Titrate Infusion? Answer:   Yes     Order Specific Question:   Initial Infusion Dose: Answer:   0.2 mcg/kg/hr     Order Specific Question:   Goal of Therapy:     Answer:   RASS of -1 to 0     Order Specific Question:   Contact Provider if:     Answer:   New onset HR less than 50 bpm     Order Specific Question:   Contact Provider if:     Answer:   New onset SBP less than 90 mmHg     Order Specific Question:   Contact Provider if:     Answer:   Patient is receiving maximum dose and is not achieving the goal of therapy    glucose (GLUTOSE) 40 % oral gel 15 g    dextrose 50 % IV solution    glucagon (rDNA) injection 1 mg    dextrose 5 % solution    OR Linked Order Group     potassium chloride (KLOR-CON M) extended release tablet 40 mEq     potassium bicarb-citric acid (EFFER-K) effervescent tablet 40 mEq     potassium chloride 10 mEq/100 mL IVPB (Peripheral Line)    insulin lispro (HUMALOG) injection vial 0-6 Units    insulin lispro (HUMALOG) injection vial 0-3 Units           Initial MDM/Plan: 80 y.o. female who presents with unresponsiveness after she was started on tPA. On arrival, patient intubated, airways intact with 7.0 ET tube, at 21cm at the lip. Of note, patient was sedated paralyzed with 80 mg of rocuronium and 200 of fentanyl. Patient is not responsive, not moving, she is tachycardic, stat well on vent. Intact bilateral breath sounds.   Her pupils are almost pinpoint, not reactive  Pressures were in the 190s on arrival, Cardene drip that was started per EMS was resumed  Stroke team at bedside  CT head and CTA of head and neck plan, the CT head did not show any bleed  Plan for neuro critical care admission    DIAGNOSTIC RESULTS / EMERGENCYDEPARTMENT COURSE / MDM     LABS:  Labs Reviewed   ELECTROLYTES PLUS - Abnormal; Notable for the following components:       Result Value    POC Chloride 109 (*)     All other components within normal limits   HGB/HCT - Abnormal; Notable for the following components:    POC Hemoglobin 11.5 (*)     POC Hematocrit 34 (*)     All other components within normal limits   CBC - Abnormal; Notable for the following components:    RBC 3.53 (*)     Hemoglobin 11.3 (*)     Hematocrit 32.1 (*)     MCHC 35.2 (*)     All other components within normal limits   BASIC METABOLIC PANEL W/ REFLEX TO MG FOR LOW K - Abnormal; Notable for the following components:    Glucose 208 (*)     CREATININE 0.40 (*)     Potassium 3.6 (*)     Chloride 109 (*)     CO2 17 (*)     All other components within normal limits   LIPID PANEL - Abnormal; Notable for the following components:    Cholesterol 202 (*)     Triglycerides 164 (*)     All other components within normal limits   STROKE PANEL - Abnormal; Notable for the following components:    Glucose 173 (*)     Potassium 3.4 (*)     RBC 3.87 (*)     Hematocrit 35.8 (*)     Seg Neutrophils 35 (*)     Lymphocytes 57 (*)     Myoglobin <21 (*)     All other components within normal limits   VALPROIC ACID LEVEL, TOTAL AND FREE - Abnormal; Notable for the following components:    Valproic Acid Lvl <3 (*)     All other components within normal limits   VENOUS BLOOD GAS, POINT OF CARE - Abnormal; Notable for the following components:    pO2, [place] 55.8 (*)     O2 Sat, Esau 87 (*)     All other components within normal limits   CREATININE W/GFR POINT OF CARE - Abnormal; Notable for the following components:    POC Creatinine 0.49 (*)     All other components within normal limits   POCT GLUCOSE - Abnormal; Notable for the following components:    POC Glucose 188 (*)     All other components within normal limits   POC GLUCOSE FINGERSTICK - Abnormal; Notable for the following components:    POC Glucose 144 (*)     All other components within normal limits   CALCIUM, IONIC (POC)   LEVETIRACETAM LEVEL   HEMOGLOBIN A1C UREA N (POC)   LACTIC ACID,POINT OF CARE   POCT GLUCOSE   POCT GLUCOSE   POCT GLUCOSE         RADIOLOGY:  CT HEAD WO CONTRAST    Result Date: 4/17/2022  EXAMINATION: CT OF THE HEAD WITHOUT CONTRAST; CTA OF THE HEAD AND NECK WITH CONTRAST 4/17/2022 8:44 pm: TECHNIQUE: CT of the head was performed without the administration of intravenous contrast. Dose modulation, iterative reconstruction, and/or weight based adjustment of the mA/kV was utilized to reduce the radiation dose to as low as reasonably achievable.; CTA of the head and neck was performed with the administration of intravenous contrast. Multiplanar reformatted images are provided for review. MIP images are provided for review. Stenosis of the internal carotid arteries measured using NASCET criteria. Dose modulation, iterative reconstruction, and/or weight based adjustment of the mA/kV was utilized to reduce the radiation dose to as low as reasonably achievable. Noncontrast CT of the head with reconstructed 2-D images are also provided for review. COMPARISON: None. HISTORY: ORDERING SYSTEM PROVIDED HISTORY: post TPA acute onset right sided hemiparesis TECHNOLOGIST PROVIDED HISTORY: post TPA acute onset right sided hemiparesis Decision Support Exception - unselect if not a suspected or confirmed emergency medical condition->Emergency Medical Condition (MA); ORDERING SYSTEM PROVIDED HISTORY: post TPA acute onset right sided hemiparesis TECHNOLOGIST PROVIDED HISTORY: post TPA acute onset right sided hemiparesis Decision Support Exception - unselect if not a suspected or confirmed emergency medical condition->Emergency Medical Condition (MA) FINDINGS: An endotracheal tube is noted in place. CT HEAD: BRAIN/VENTRICLES:  No acute intracranial hemorrhage or extraaxial fluid collection. Grey-white differentiation is maintained. No evidence of mass, mass effect or midline shift. No evidence of hydrocephalus.   Chronic and age related changes including age-appropriate cerebral volume loss and scattered nonspecific white matter hypodensities which are likely the sequela of chronic small vessel ischemic disease. ORBITS: The visualized portion of the orbits demonstrate no acute abnormality. SINUSES:  The visualized paranasal sinuses and mastoid air cells demonstrate no acute abnormality. SOFT TISSUES/SKULL: No acute abnormality of the visualized skull or soft tissues. CTA NECK: AORTIC ARCH/ARCH VESSELS: No dissection or arterial injury. No significant stenosis of the brachiocephalic or subclavian arteries. CAROTID ARTERIES: No dissection, arterial injury, or hemodynamically significant stenosis by NASCET criteria. VERTEBRAL ARTERIES: No dissection, arterial injury, or significant stenosis. SOFT TISSUES: The lung apices are clear. No cervical or superior mediastinal lymphadenopathy. The larynx and pharynx are unremarkable. No acute abnormality of the salivary and thyroid glands. BONES: No acute osseous abnormality. CTA HEAD: ANTERIOR CIRCULATION: There is absence of a right A1 segment, a normal variant. No significant stenosis of the intracranial internal carotid, anterior cerebral, or middle cerebral arteries. No aneurysm. 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 No significant stenosis of the vertebral, basilar, or posterior cerebral arteries. No aneurysm. OTHER: No dural venous sinus thrombosis on this non-dedicated study. 1. No acute intracranial abnormality. 2. No evidence of large vessel occlusion or hemodynamically significant stenosis involving the head and neck arteries.  RECOMMENDATIONS: Unavailable     CT HEAD WO CONTRAST    Result Date: 4/17/2022  EXAMINATION: CT OF THE HEAD WITHOUT CONTRAST  4/17/2022 7:27 pm TECHNIQUE: CT of the head was performed without the administration of intravenous contrast. Dose modulation, iterative reconstruction, and/or weight based adjustment of the mA/kV was utilized to reduce the radiation dose to as low as reasonably achievable. COMPARISON: MRI brain in July 19, 2014, CT head July 18, 2014 HISTORY: ORDERING SYSTEM PROVIDED HISTORY: stroke- unresponsive TECHNOLOGIST PROVIDED HISTORY: stroke- unresponsive Decision Support Exception - unselect if not a suspected or confirmed emergency medical condition->Emergency Medical Condition (MA) FINDINGS: Limited due to motion artifacts. BRAIN/VENTRICLES: There is no acute intracranial hemorrhage, mass effect or midline shift. No abnormal extra-axial fluid collection. The gray-white differentiation is maintained without evidence of an acute infarct. There is no evidence of hydrocephalus. There is focal calcification in the left basal ganglia (series 1 image 17). ORBITS: The visualized portion of the orbits demonstrate no acute abnormality. SINUSES: The visualized paranasal sinuses and mastoid air cells demonstrate no acute abnormality. SOFT TISSUES/SKULL:  No acute abnormality of the visualized skull or soft tissues. No acute intracranial abnormality. Results were reported to Dr. Mattie Beasley at 7:50 p.m. on April 17, 2022. XR CHEST PORTABLE    Result Date: 4/17/2022  EXAMINATION: ONE XRAY VIEW OF THE CHEST 4/17/2022 10:00 pm COMPARISON: 07/28/2014 HISTORY: ORDERING SYSTEM PROVIDED HISTORY: altered TECHNOLOGIST PROVIDED HISTORY: altered Reason for Exam: port Supine FINDINGS: The heart is less prominent. The pulmonary vessels are less prominent. There is an ET tube in place with the tip approximately 4 cm above the amy. There is some hazy left basilar interstitial and ground-glass opacity which is less prominent. No effusion is seen.      Status post ET tube placement in good position with mild left basilar atelectasis     CTA HEAD NECK W CONTRAST    Result Date: 4/17/2022  EXAMINATION: CT OF THE HEAD WITHOUT CONTRAST; CTA OF THE HEAD AND NECK WITH CONTRAST 4/17/2022 8:44 pm: TECHNIQUE: CT of the head was performed without the administration of intravenous contrast. Dose modulation, iterative reconstruction, and/or weight based adjustment of the mA/kV was utilized to reduce the radiation dose to as low as reasonably achievable.; CTA of the head and neck was performed with the administration of intravenous contrast. Multiplanar reformatted images are provided for review. MIP images are provided for review. Stenosis of the internal carotid arteries measured using NASCET criteria. Dose modulation, iterative reconstruction, and/or weight based adjustment of the mA/kV was utilized to reduce the radiation dose to as low as reasonably achievable. Noncontrast CT of the head with reconstructed 2-D images are also provided for review. COMPARISON: None. HISTORY: ORDERING SYSTEM PROVIDED HISTORY: post TPA acute onset right sided hemiparesis TECHNOLOGIST PROVIDED HISTORY: post TPA acute onset right sided hemiparesis Decision Support Exception - unselect if not a suspected or confirmed emergency medical condition->Emergency Medical Condition (MA); ORDERING SYSTEM PROVIDED HISTORY: post TPA acute onset right sided hemiparesis TECHNOLOGIST PROVIDED HISTORY: post TPA acute onset right sided hemiparesis Decision Support Exception - unselect if not a suspected or confirmed emergency medical condition->Emergency Medical Condition (MA) FINDINGS: An endotracheal tube is noted in place. CT HEAD: BRAIN/VENTRICLES:  No acute intracranial hemorrhage or extraaxial fluid collection. Grey-white differentiation is maintained. No evidence of mass, mass effect or midline shift. No evidence of hydrocephalus. Chronic and age related changes including age-appropriate cerebral volume loss and scattered nonspecific white matter hypodensities which are likely the sequela of chronic small vessel ischemic disease. ORBITS: The visualized portion of the orbits demonstrate no acute abnormality.  SINUSES:  The visualized paranasal sinuses and mastoid air cells demonstrate no acute abnormality. SOFT TISSUES/SKULL: No acute abnormality of the visualized skull or soft tissues. CTA NECK: AORTIC ARCH/ARCH VESSELS: No dissection or arterial injury. No significant stenosis of the brachiocephalic or subclavian arteries. CAROTID ARTERIES: No dissection, arterial injury, or hemodynamically significant stenosis by NASCET criteria. VERTEBRAL ARTERIES: No dissection, arterial injury, or significant stenosis. SOFT TISSUES: The lung apices are clear. No cervical or superior mediastinal lymphadenopathy. The larynx and pharynx are unremarkable. No acute abnormality of the salivary and thyroid glands. BONES: No acute osseous abnormality. CTA HEAD: ANTERIOR CIRCULATION: There is absence of a right A1 segment, a normal variant. No significant stenosis of the intracranial internal carotid, anterior cerebral, or middle cerebral arteries. No aneurysm. 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 No significant stenosis of the vertebral, basilar, or posterior cerebral arteries. No aneurysm. OTHER: No dural venous sinus thrombosis on this non-dedicated study. 1. No acute intracranial abnormality. 2. No evidence of large vessel occlusion or hemodynamically significant stenosis involving the head and neck arteries. RECOMMENDATIONS: Unavailable         EKG      All EKG's are interpreted by the Emergency Department Physicianwho either signs or Co-signs this chart in the absence of a cardiologist.    EMERGENCY DEPARTMENT COURSE:          PROCEDURES:  None    CONSULTS:  IP CONSULT TO NEUROCRITICAL CARE  IP CONSULT TO STROKE TEAM  IP CONSULT TO PHARMACY  PHARMACY TO CHANGE BASE FLUIDS    CRITICAL CARE:      FINAL IMPRESSION      1. Cerebrovascular accident (CVA), unspecified mechanism ([facility] [address]          DISPOSITION / [address] Admitted 04/17/2022 09:28:46 PM      PATIENT REFERRED TO:  No follow-up provider specified.     DISCHARGE MEDICATIONS:  Current Discharge Medication List          Joce Silva MD  Emergency Medicine Resident    (Please note that portions of this note were completed with a voice recognition program.Efforts were made to edit the dictations but occasionally words are mis-transcribed.)        Joce Silva MD  Resident  04/18/22 [id number]

## 2022-04-19 LAB
ALLEN TEST: POSITIVE
ANION GAP SERPL CALCULATED.3IONS-SCNC: 12 MMOL/L (ref 9–17)
ANION GAP SERPL CALCULATED.3IONS-SCNC: 13 MMOL/L (ref 9–17)
BUN BLDV-MCNC: 16 MG/DL (ref 8–23)
BUN BLDV-MCNC: 17 MG/DL (ref 8–23)
CALCIUM SERPL-MCNC: 8.9 MG/DL (ref 8.6–10.4)
CALCIUM SERPL-MCNC: 9 MG/DL (ref 8.6–10.4)
CHLORIDE BLD-SCNC: 111 MMOL/L (ref 98–107)
CHLORIDE BLD-SCNC: 111 MMOL/L (ref 98–107)
CO2: 16 MMOL/L (ref 20–31)
CO2: 19 MMOL/L (ref 20–31)
CREAT SERPL-MCNC: 0.47 MG/DL (ref 0.5–0.9)
CREAT SERPL-MCNC: 0.5 MG/DL (ref 0.5–0.9)
EKG ATRIAL RATE: 108 BPM
EKG P AXIS: 36 DEGREES
EKG P-R INTERVAL: 152 MS
EKG Q-T INTERVAL: 470 MS
EKG QRS DURATION: 138 MS
EKG QTC CALCULATION (BAZETT): 629 MS
EKG R AXIS: 30 DEGREES
EKG T AXIS: 39 DEGREES
EKG VENTRICULAR RATE: 108 BPM
GFR AFRICAN AMERICAN: >60 ML/MIN
GFR AFRICAN AMERICAN: >60 ML/MIN
GFR NON-AFRICAN AMERICAN: >60 ML/MIN
GFR NON-AFRICAN AMERICAN: >60 ML/MIN
GFR SERPL CREATININE-BSD FRML MDRD: ABNORMAL ML/MIN/{1.73_M2}
GFR SERPL CREATININE-BSD FRML MDRD: ABNORMAL ML/MIN/{1.73_M2}
GLUCOSE BLD-MCNC: 184 MG/DL (ref 65–105)
GLUCOSE BLD-MCNC: 193 MG/DL (ref 65–105)
GLUCOSE BLD-MCNC: 222 MG/DL (ref 70–99)
GLUCOSE BLD-MCNC: 232 MG/DL (ref 65–105)
GLUCOSE BLD-MCNC: 245 MG/DL (ref 70–99)
GLUCOSE BLD-MCNC: 259 MG/DL (ref 74–100)
GLUCOSE BLD-MCNC: 262 MG/DL (ref 65–105)
HCT VFR BLD CALC: 31.7 % (ref 36.3–47.1)
HEMOGLOBIN: 10.8 G/DL (ref 11.9–15.1)
MCH RBC QN AUTO: 31.7 PG (ref 25.2–33.5)
MCHC RBC AUTO-ENTMCNC: 34.1 G/DL (ref 28.4–34.8)
MCV RBC AUTO: 93 FL (ref 82.6–102.9)
MODE: ABNORMAL
NEGATIVE BASE EXCESS, ART: 3 (ref 0–2)
NRBC AUTOMATED: 0 PER 100 WBC
O2 DEVICE/FLOW/%: ABNORMAL
PDW BLD-RTO: 13.2 % (ref 11.8–14.4)
PLATELET # BLD: 162 K/UL (ref 138–453)
PMV BLD AUTO: 10 FL (ref 8.1–13.5)
POC HCO3: 21.1 MMOL/L (ref 21–28)
POC O2 SATURATION: 100 % (ref 94–98)
POC PCO2: 32.5 MM HG (ref 35–48)
POC PH: 7.42 (ref 7.35–7.45)
POC PO2: 158.3 MM HG (ref 83–108)
POTASSIUM SERPL-SCNC: 4.4 MMOL/L (ref 3.7–5.3)
POTASSIUM SERPL-SCNC: 5.4 MMOL/L (ref 3.7–5.3)
RBC # BLD: 3.41 M/UL (ref 3.95–5.11)
REASON FOR REJECTION: NORMAL
SAMPLE SITE: ABNORMAL
SODIUM BLD-SCNC: 139 MMOL/L (ref 135–144)
SODIUM BLD-SCNC: 143 MMOL/L (ref 135–144)
WBC # BLD: 8.7 K/UL (ref 3.5–11.3)
ZZ NTE CLEAN UP: ORDERED TEST: NORMAL
ZZ NTE WITH NAME CLEAN UP: SPECIMEN SOURCE: NORMAL

## 2022-04-19 PROCEDURE — 36600 WITHDRAWAL OF ARTERIAL BLOOD: CPT

## 2022-04-19 PROCEDURE — 6370000000 HC RX 637 (ALT 250 FOR IP): Performed by: STUDENT IN AN ORGANIZED HEALTH CARE EDUCATION/TRAINING PROGRAM

## 2022-04-19 PROCEDURE — 80048 BASIC METABOLIC PNL TOTAL CA: CPT

## 2022-04-19 PROCEDURE — 85027 COMPLETE CBC AUTOMATED: CPT

## 2022-04-19 PROCEDURE — 6360000002 HC RX W HCPCS: Performed by: HEALTH CARE PROVIDER

## 2022-04-19 PROCEDURE — 93010 ELECTROCARDIOGRAM REPORT: CPT | Performed by: INTERNAL MEDICINE

## 2022-04-19 PROCEDURE — 6360000002 HC RX W HCPCS: Performed by: STUDENT IN AN ORGANIZED HEALTH CARE EDUCATION/TRAINING PROGRAM

## 2022-04-19 PROCEDURE — 82947 ASSAY GLUCOSE BLOOD QUANT: CPT

## 2022-04-19 PROCEDURE — 99291 CRITICAL CARE FIRST HOUR: CPT | Performed by: PSYCHIATRY & NEUROLOGY

## 2022-04-19 PROCEDURE — 2700000000 HC OXYGEN THERAPY PER DAY

## 2022-04-19 PROCEDURE — 82803 BLOOD GASES ANY COMBINATION: CPT

## 2022-04-19 PROCEDURE — 94003 VENT MGMT INPAT SUBQ DAY: CPT

## 2022-04-19 PROCEDURE — 2000000003 HC NEURO ICU R&B

## 2022-04-19 PROCEDURE — 36415 COLL VENOUS BLD VENIPUNCTURE: CPT

## 2022-04-19 RX ORDER — GABAPENTIN 300 MG/1
600 CAPSULE ORAL 3 TIMES DAILY
Status: DISCONTINUED | OUTPATIENT
Start: 2022-04-19 | End: 2022-04-28 | Stop reason: HOSPADM

## 2022-04-19 RX ORDER — ATORVASTATIN CALCIUM 40 MG/1
40 TABLET, FILM COATED ORAL NIGHTLY
Status: DISCONTINUED | OUTPATIENT
Start: 2022-04-19 | End: 2022-04-28 | Stop reason: HOSPADM

## 2022-04-19 RX ORDER — DEXAMETHASONE SODIUM PHOSPHATE 4 MG/ML
10 INJECTION, SOLUTION INTRA-ARTICULAR; INTRALESIONAL; INTRAMUSCULAR; INTRAVENOUS; SOFT TISSUE ONCE
Status: COMPLETED | OUTPATIENT
Start: 2022-04-19 | End: 2022-04-19

## 2022-04-19 RX ORDER — LEVETIRACETAM 10 MG/ML
1000 INJECTION INTRAVASCULAR EVERY 12 HOURS
Status: DISCONTINUED | OUTPATIENT
Start: 2022-04-19 | End: 2022-04-20

## 2022-04-19 RX ORDER — CLOPIDOGREL BISULFATE 75 MG/1
75 TABLET ORAL DAILY
Status: DISCONTINUED | OUTPATIENT
Start: 2022-04-19 | End: 2022-04-28 | Stop reason: HOSPADM

## 2022-04-19 RX ORDER — LANSOPRAZOLE
30 KIT
Status: DISCONTINUED | OUTPATIENT
Start: 2022-04-20 | End: 2022-04-20

## 2022-04-19 RX ADMIN — ACETAMINOPHEN 650 MG: 325 TABLET ORAL at 20:19

## 2022-04-19 RX ADMIN — PROPOFOL 50 MCG/KG/MIN: 10 INJECTION, EMULSION INTRAVENOUS at 14:01

## 2022-04-19 RX ADMIN — LEVETIRACETAM 500 MG: 5 INJECTION INTRAVENOUS at 08:41

## 2022-04-19 RX ADMIN — INSULIN GLARGINE 10 UNITS: 100 INJECTION, SOLUTION SUBCUTANEOUS at 20:20

## 2022-04-19 RX ADMIN — VALPROIC ACID 500 MG: 250 SOLUTION ORAL at 22:06

## 2022-04-19 RX ADMIN — VALPROIC ACID 500 MG: 250 SOLUTION ORAL at 10:45

## 2022-04-19 RX ADMIN — LEVETIRACETAM 1000 MG: 10 INJECTION INTRAVENOUS at 20:23

## 2022-04-19 RX ADMIN — INSULIN LISPRO 1 UNITS: 100 INJECTION, SOLUTION INTRAVENOUS; SUBCUTANEOUS at 10:46

## 2022-04-19 RX ADMIN — INSULIN LISPRO 2 UNITS: 100 INJECTION, SOLUTION INTRAVENOUS; SUBCUTANEOUS at 13:12

## 2022-04-19 RX ADMIN — GABAPENTIN 600 MG: 300 CAPSULE ORAL at 20:19

## 2022-04-19 RX ADMIN — PROPOFOL 50 MCG/KG/MIN: 10 INJECTION, EMULSION INTRAVENOUS at 09:13

## 2022-04-19 RX ADMIN — ASPIRIN 300 MG: 300 SUPPOSITORY RECTAL at 10:45

## 2022-04-19 RX ADMIN — ENOXAPARIN SODIUM 40 MG: 100 INJECTION SUBCUTANEOUS at 10:45

## 2022-04-19 RX ADMIN — PANTOPRAZOLE SODIUM 40 MG: 40 TABLET, DELAYED RELEASE ORAL at 07:05

## 2022-04-19 RX ADMIN — POTASSIUM BICARBONATE 20 MEQ: 782 TABLET, EFFERVESCENT ORAL at 10:45

## 2022-04-19 RX ADMIN — DESMOPRESSIN ACETATE 40 MG: 0.2 TABLET ORAL at 22:09

## 2022-04-19 RX ADMIN — DEXAMETHASONE SODIUM PHOSPHATE 10 MG: 10 INJECTION INTRAMUSCULAR; INTRAVENOUS at 07:05

## 2022-04-19 RX ADMIN — INSULIN LISPRO 2 UNITS: 100 INJECTION, SOLUTION INTRAVENOUS; SUBCUTANEOUS at 20:20

## 2022-04-19 RX ADMIN — CLOPIDOGREL 75 MG: 75 TABLET, FILM COATED ORAL at 14:01

## 2022-04-19 RX ADMIN — DEXAMETHASONE SODIUM PHOSPHATE 10 MG: 10 INJECTION INTRAMUSCULAR; INTRAVENOUS at 14:00

## 2022-04-19 RX ADMIN — DEXAMETHASONE SODIUM PHOSPHATE 10 MG: 4 INJECTION, SOLUTION INTRAMUSCULAR; INTRAVENOUS at 22:09

## 2022-04-19 RX ADMIN — INSULIN LISPRO 1 UNITS: 100 INJECTION, SOLUTION INTRAVENOUS; SUBCUTANEOUS at 17:23

## 2022-04-19 RX ADMIN — PROPOFOL 45 MCG/KG/MIN: 10 INJECTION, EMULSION INTRAVENOUS at 00:48

## 2022-04-19 RX ADMIN — GABAPENTIN 600 MG: 300 CAPSULE ORAL at 14:01

## 2022-04-19 ASSESSMENT — PULMONARY FUNCTION TESTS
PIF_VALUE: 10
PIF_VALUE: 20
PIF_VALUE: 19
PIF_VALUE: 20

## 2022-04-19 ASSESSMENT — PAIN DESCRIPTION - FREQUENCY: FREQUENCY: CONTINUOUS

## 2022-04-19 ASSESSMENT — PAIN SCALES - GENERAL
PAINLEVEL_OUTOF10: 0
PAINLEVEL_OUTOF10: 8

## 2022-04-19 ASSESSMENT — PAIN DESCRIPTION - LOCATION: LOCATION: HEAD

## 2022-04-19 ASSESSMENT — PAIN DESCRIPTION - ONSET: ONSET: ON-GOING

## 2022-04-19 ASSESSMENT — PAIN DESCRIPTION - DESCRIPTORS: DESCRIPTORS: ACHING

## 2022-04-19 ASSESSMENT — PAIN DESCRIPTION - PAIN TYPE: TYPE: ACUTE PAIN

## 2022-04-19 NOTE — PLAN OF CARE
Problem: OXYGENATION/RESPIRATORY FUNCTION  Goal: Patient will maintain patent airway  4/18/2022 2014 by Jaron Strong RCP  Outcome: Ongoing     Problem: OXYGENATION/RESPIRATORY FUNCTION  Goal: Patient will achieve/maintain normal respiratory rate/effort  Description: Respiratory rate and effort will be within normal limits for the patient  4/18/2022 2014 by Jaron Strong RCP  Outcome: Ongoing     Problem: MECHANICAL VENTILATION  Goal: Patient will maintain patent airway  4/18/2022 2014 by Jaron Strong RCP  Outcome: Ongoing     Problem: MECHANICAL VENTILATION  Goal: Oral health is maintained or improved  4/18/2022 2014 by Jaron Strong RCP  Outcome: Ongoing     Problem: MECHANICAL VENTILATION  Goal: ET tube will be managed safely  4/18/2022 2014 by Jaron Strong RCP  Outcome: Ongoing     Problem: MECHANICAL VENTILATION  Goal: Ability to express needs and understand communication  4/18/2022 2014 by Jaron Strong RCP  Outcome: Ongoing     Problem: MECHANICAL VENTILATION  Goal: Mobility/activity is maintained at optimum level for patient  4/18/2022 2014 by Jaron Strong RCP  Outcome: Ongoing

## 2022-04-19 NOTE — PROGRESS NOTES
Daily Progress Note  Neuro Critical Care    Patient Name: Rm Gurrola  Patient : 1950  Room/Bed: 0527/0527-01  Code Status: FULL Code   Allergies: No Known Allergies    CHIEF COMPLAINT:     Acute onset right sided hemiparesis at 6:45 PM 22 witness by patients daughter   INTERVAL HISTORY    Initial Presentation (Admitted 22 @ 7:25 PM ):  The patient is a 80 y.o.  female who presents via Mobile stroke unit to our ER 22 with acute onset right-sided hemiparesis when discussing with her daughter starting at 6:45 PM this evening. Mobile stroke unit documenting NIH of 21 on arrival at 1915 2 for altered mental status unable to follow commands or answer questions appropriately, facial palsy right, right arm and leg hemiparesis, complete sensory loss on the right, aphasia and neglect. CT head without unremarkable thus patient was given IV tPA bolus and started on infusion when it appears around 20:00 patient became obtunded flaccid paralysis in all 4 extremities unable to answer or follow commands thus was concern for possible hemorrhagic conversion thus tPA infusion was discontinued. Patient required intubation with rocuronium and 200 MCG of fentanyl for sedation. Patient started on Cardene for blood pressure control SBP goal less than 180 which she maintained well under.     On arrival to our emergency department patient was already intubated and paralyzed this went directly to CT scanner for repeat CT head without which again was unremarkable left basal ganglia calcification although no sign of hemorrhagic conversion. CTA head and neck also completed and no evidence for LVO. Decision was made to continue and finish tPA infusion per stroke attending and patient loaded with Keppra 2 g given concern for possible seizure given her history.   Patient admitted to neuro ICU for further work-up including stroke versus seizure and LTM monitoring.        Past medical history significant for very similar episode in July 2014 although at that time she had acute onset left-sided hemiparesis and initially thought to be having a stroke then found to be unresponsive requiring intubation and sedation with propofol stroke work-up at that time unremarkable and patient was diagnosed with nonconvulsive seizures started on Keppra 500 mg twice daily and Depakote 500 mg twice daily which is still her home medication and dosage at this time, hypertension, hyperlipidemia.       Of note in care everywhere patient also documented August 2018 to have acute right hemiparesis status post IV TPA at that time for stroke concerns MRI completed no acute intracranial mass, hemorrhage or infarct although did demonstrate white matter signal abnormalities unchanged from previous exams consistent with small vessel ischemia. 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     December 2014-right-sided numbness--> MRI at that time no acute intracranial abnormality    Hospital Course:   4/18-patient arouses easily when sedation is weaned following commands continues to have persistent right lower extremity weakness although not reported by daughter to walk with a walker at baseline with weakness in this leg. No cuff leak thus patient was given 10 mg IV Decadron. Unable to tolerate extubation    Last 24h:   No acute events overnight. Patient sitting up in bed comfortable following simple and complex commands. Hemodynamically stable -154, heart rate 57-78 T-max 98.5. We will resume patient's home Neurontin 600 mg 3 times daily.   Patient also noted to be on aspirin and Plavix prior to admission thus will continue Plavix 75 mg.    CURRENT MEDICATIONS:  SCHEDULED MEDICATIONS:   insulin lispro  0-6 Units SubCUTAneous TID WC    insulin lispro  0-3 Units SubCUTAneous Nightly    levetiracetam  500 mg IntraVENous Q12H    valproic acid  500 mg Oral 2 times per day  dexamethasone  10 mg IntraVENous Q8H    [Held by provider] insulin glargine  10 Units SubCUTAneous Nightly    pantoprazole  40 mg Oral QAM AC    potassium bicarb-citric acid  20 mEq Oral Daily    enoxaparin  40 mg SubCUTAneous Daily    aspirin  81 mg Oral Daily    Or    aspirin  300 mg Rectal Daily    atorvastatin  80 mg Oral Nightly     CONTINUOUS INFUSIONS:   propofol 45 mcg/kg/min (22 0048)    dextrose      sodium chloride Stopped (22 1727)    niCARdipine       PRN MEDICATIONS:   glucose, dextrose, glucagon (rDNA), dextrose, potassium chloride **OR** potassium alternative oral replacement **OR** potassium chloride, acetaminophen, sodium chloride flush, albuterol sulfate HFA, ondansetron **OR** ondansetron, polyethylene glycol, perflutren lipid microspheres, labetalol    VITALS:  Temperature Range: Temp: (P) 98.5 °F (36.9 °C) Temp  Av.5 °F (36.9 °C)  Min: 97.8 °F (36.6 °C)  Max: 100.6 °F (38.1 °C)  BP Range: Systolic (20CMV), JWY:884 , Min:84 , DBI:270     Diastolic (58GHX), WTQ:41, Min:46, Max:82    Pulse Range: Pulse  Av.5  Min: 55  Max: 94  Respiration Range: Resp  Av.2  Min: 16  Max: 24  Current Pulse Ox: SpO2: 100 %  24HR Pulse Ox Range: SpO2  Av.8 %  Min: 98 %  Max: 100 %  Patient Vitals for the past 12 hrs:   BP Temp Temp src Pulse Resp SpO2   22 0600  (P) 98.5 °F (36.9 °C) (P) Oral 56     22 0500 114/60   55     22 0400 99/60 (P) 98.4 °F (36.9 °C) (P) Oral 57     22 0332    57 16 100 %   22 0300 (!) 93/57   56 17 100 %   22 0200 106/60   61 16 100 %   22 0100 104/63   63 16 100 %   22 0000 103/67 98 °F (36.7 °C) Oral 60  100 %   22 2330  98 °F (36.7 °C) Oral 74 22 100 %   22 2326    64 18 100 %   22 2300 106/63   63  100 %   22 2200 (!) 91/58 97.8 °F (36.6 °C) Oral 61  100 %   22 2100 98/60   62 18 100 %   22 20 99 %   22 109/62 98.2 °F (36.8 °C) Oral 67 17 100 %   04/18/22 1900 (!) 104/58   75  99 %     Estimated body mass index is 22.65 kg/m² as calculated from the following:    Height as of this encounter: 5' 5\" (1.651 m). Weight as of this encounter: 136 lb 1.6 oz (61.7 kg).  []<16 Severe malnutrition  []1616.99 Moderate malnutrition  []1718.49 Mild malnutrition  [x]18.524.9 Normal  []2529.9 Overweight (not obese)  []3034.9 Obese class 1 (Low Risk)  []3539.9 Obese class 2 (Moderate Risk)  []?40 Obese class 3 (High Risk)    RECENT LABS:   Lab Results   Component Value Date    WBC 8.7 04/19/2022    HGB 10.8 (L) 04/19/2022    HCT 31.7 (L) 04/19/2022     04/19/2022    CHOL 202 (H) 04/17/2022    TRIG 164 (H) 04/17/2022    HDL 55 04/17/2022    ALT 56 (H) 07/23/2014    AST 36 (H) 07/23/2014     04/18/2022    K 3.6 (L) 04/18/2022     (H) 04/18/2022    CREATININE 0.40 (L) 04/18/2022    BUN 13 04/18/2022    CO2 17 (L) 04/18/2022    TSH 0.65 07/19/2014    INR 1.0 04/17/2022    LABA1C 10.3 (H) 04/17/2022     24 HOUR INTAKE/OUTPUT:    Intake/Output Summary (Last 24 hours) at 4/19/2022 0642  Last data filed at 4/19/2022 0028  Gross per 24 hour   Intake 1838.51 ml   Output 1275 ml   Net 563.51 ml       IMAGING:   MRI brain with and without 4/18/2022  Impression   1. No acute intracranial abnormality. 2. Mild chronic white matter microvascular ischemic changes. 3. Left mastoid effusion.          1.) CT brain without contrast: Reviewed at 8:50 PM 4/17/22  Impression   1. No acute intracranial abnormality. 2. No evidence of large vessel occlusion or hemodynamically significant   stenosis involving the head and neck arteries.            2.) CTA Head/Neck: 4/17/22  Impression   1. No acute intracranial abnormality. 2. No evidence of large vessel occlusion or hemodynamically significant   stenosis involving the head and neck arteries.      MRI Cervical spine W/WO 7/25/2014  IMPRESSION:    1.  Degenerative changes most pronounced at C5-C6 where there is moderate    narrowing of spinal canal, severe narrowing of the right neuroforamen and    minimal narrowing of left neuroforamen. 2. Subtle increased intramedullary T2 signal intensity at C6-C7 level    with patchy enhancement. Finding is nonspecific and may represent an    infectious or inflammatory process. Suggest clinical correlation and    follow up imaging in 2-3 months.      MRI brain WO 7/19/2014      IMPRESSION:      1. STABLE WHITE MATTER CHANGES, PROBABLY MOST CONSISTENT WITH CHRONIC    MICROVASCULAR ISCHEMIC CHANGE    2. NO EVIDENCE OF ACUTE INFARCTION OR OTHER ACUTE INTRACRANIAL    ABNORMALITY. 3. PARANASAL SINUS INFLAMMATORY CHANGES   4. CHRONIC LEFT MASTOID INFLAMMATORY CHANGE       Labs and Images reviewed with:  [x] Dr. Court Levin [facility]        PHYSICAL EXAM       CONSTITUTIONAL:  Well developed, well nourished, alert and appropriate although currently intubated on vent unable to answer questions verbally, in no acute distress. Nontoxic.   Patient able to show 2 fingers and thumbs up on both upper extremities following simple commands   HEAD:  normocephalic, atraumatic    EYES:  PERRLA, EOMI.   ENT:  moist mucous membranes   NECK:  supple, symmetric   LUNGS:  Equal air entry bilaterally   CARDIOVASCULAR:  normal s1 / s2, RRR, distal pulses intact   ABDOMEN:  Soft, no rigidity   NEUROLOGIC:  Mental Status: Patient is currently intubated on a vent light sedation with Precedex although currently appropriate and able to follow simple commands             Cranial Nerves:    cranial nerves II-XII are grossly intact    Motor Exam:    Drift:  present - Right lower   Tone:  normal    Motor exam is 5 out of 5 all extremities with the exception of RLE 2/5     Sensory:    Touch:    Right Upper Extremity:  normal  Left Upper Extremity:  normal  Right Lower Extremity:  abnormal - does not respond to painful stimulus   Left Lower Extremity:  normal    Deep Tendon Reflexes: Right Bicep:  1+  Left Bicep:  1+  Right Knee:  1+  Left Knee:  1+    Plantar Response:  Right:  downgoing  Left:  downgoing    Clonus:  absent  Nicholson's:  absent   NIH Stroke Scale Total (if not done complete detailed one below):    1a.  Level of consciousness:  0 - alert; keenly responsive  1b. Level of consciousness questions:  2 - answers neither question correctly  1c. Level of consciousness questions:  0 - performs both tasks correctly  2. Best Gaze:  0 - normal  3. Visual:  0 - no visual loss  4. Facial Palsy:  0 - normal symmetric movement  5a. Motor left arm:  0 - no drift, limb holds 90 (or 45) degrees for full 10 seconds  5b. Motor right arm:  1 - drift, limb holds 90 (or 45) degrees but drifts down before full 10 seconds: does not hit bed  6a. Motor left le - no drift; leg holds 30 degree position for full 5 seconds  6b. Motor right leg:  3 - no effort against gravity; leg falls to bed immediately  7. Limb Ataxia:  0 - absent  8. Sensory:  1 - mild to moderate sensory loss; patient feels pinprick is less sharp or is dull on the affected side; there is a loss of superficial pain with pinprick but patient is aware of being touched   9. Best Language:  0 - no aphasia, normal  10. Dysarthria:  UN - intubated or other physical barrier  11. Extinction and Inattention:  0 - no abnormality  TOTAL: 7  DRAINS:  OG 18 Mongolian placed 2022 @02:00  ET tube placed 2022 20:45    ASSESSMENT AND PLAN:         ASSESSMENT:      This is a 80 y.o. female who presents via mobile stroke unit 2022 after receiving IV tPA for acute onset altered mental status and right-sided hemiparesis. Patient's past medical history significant for seizure on Keppra 500 mg twice daily and Depakote 500 mg twice daily, hypertension and hyperlipidemia. Patient intubated and paralyzed with rocuronium prior to arrival to the emergency department.   Given patient's altered mental status and right-sided hemiparesis it is most likely that she had a seizure with Paulo's paralysis versus acute CVA. Patient mated to neuro ICU and will obtain MRI brain with and without and initiate LTM E to rule out epileptiform activity. Patient was loaded with 2 g Keppra and will continue Depacon 500 mg twice daily.     Patient care will be discussed with attending, will reevaluate patient along with attending.      PLAN/MEDICAL DECISION MAKING:           NEUROLOGIC:  - Imaging   CT head WO and CTA head and neck completed in ER-unremarkable  FU MRI brain W/WO   - continue home Asa 81mg and plavix 75mg Daily   - at home on fenofibrate 160mg daily current chol total-202,LDL-114, Trig-164   - AEDs   Loaded keppra 2 grams continue 1000 mg BID IV   Continue depakene 500mg BID   Valproic acid level on arrival < 3  keppra level 14   - Neuro checks per protocol     CARDIOVASCULAR:  - Goal SBP <180  - started on IV cardene by MSU can continue to titrate as needed  -PRN Labetalol 10mg Q4 hours for SBP>180  - Continue telemetry  ECHO 2014-->will repeat this admission with bubble study   -EF >65%, mild LVH, grade 1 DD     PULMONARY:  - Vent Settings:  - Daily AB.420/32.5/158.3/21.1   CXR 22  Impression   Status post ET tube placement in good position with mild left basilar   atelectasis         RENAL/FLUID/ELECTROLYTE:  - BUN 17/ Creatinine . 47  - Urine output strict intake and output   - IVF: 75 CC/hr NaCl   - Replace electrolytes PRN  - Daily BMP     GI/NUTRITION:  NUTRITION:  Diet NPO  - Bowel regimen: glycolax   - GI prophylaxis: Protonix 40 PO      ID:  - Tmax 98.4  - wbc 8.7  - Continue to monitor for fevers  - Daily CBC     HEME:   - H&H 10.8/31.7  - Platelets 852  - Daily CBC     ENDOCRINE:  - Continue to monitor blood glucose, goal <180  - FU HBA1c and lipid panel  -on lantus 10 at home and humalog   POCT glucose checks with low intensity sliding scale will hold lantus until patient is able to tolerate a diet.    - Hba1C 10.3 4/17/22     OTHER:  - PT/OT/ST      PROPHYLAXIS:  Stress ulcer: PPI     DVT PROPHYLAXIS:  - SCD sleeves - Thigh High   - ALLIE stockings - Thigh High  - No chemoprophylaxis anticoagulation at this time post IV TPA 24 hours      DISPOSITION: ADMIT to NICU           DISPOSITION:  [x] To remain ICU: Continues to be intubated on a ventilator requiring sedation as she failed cuff leak unable to extubate yet      We will continue to follow along. For any changes in exam or patient status please contact Neuro Critical Care.       Liss Ga MD   PGY-3 Neurology Resident   Neuro Critical Care  Pager [phone]  4/19/2022     6:42 AM

## 2022-04-19 NOTE — PLAN OF CARE
Problem: OXYGENATION/RESPIRATORY FUNCTION  Goal: Patient will maintain patent airway  4/19/2022 0837 by Shine Aguila RCP  Outcome: Ongoing     Problem: OXYGENATION/RESPIRATORY FUNCTION  Goal: Patient will achieve/maintain normal respiratory rate/effort  Description: Respiratory rate and effort will be within normal limits for the patient  4/19/2022 0837 by Shine Aguila RCP  Outcome: Ongoing     Problem: MECHANICAL VENTILATION  Goal: Patient will maintain patent airway  4/19/2022 0837 by Shine Aguila RCP  Outcome: Ongoing     Problem: MECHANICAL VENTILATION  Goal: Oral health is maintained or improved  4/19/2022 0837 by Shine Aguila RCP  Outcome: Ongoing     Problem: MECHANICAL VENTILATION  Goal: ET tube will be managed safely  4/19/2022 0837 by Shine Aguila RCP  Outcome: Ongoing     Problem: MECHANICAL VENTILATION  Goal: Ability to express needs and understand communication  4/19/2022 0837 by Shine Aguila RCP  Outcome: Ongoing     Problem: MECHANICAL VENTILATION  Goal: Mobility/activity is maintained at optimum level for patient  4/19/2022 3479 by Shine Aguila RCP  Outcome: Ongoing     Problem: SKIN INTEGRITY  Goal: Skin integrity is maintained or improved  4/19/2022 0837 by Shine Aguila RCP  Outcome: Ongoing     Problem: ACTIVITY INTOLERANCE/IMPAIRED MOBILITY  Goal: Mobility/activity is maintained at optimum level for patient  4/19/2022 3231 by Shine Aguila RCP  Outcome: Ongoing     Problem: COMMUNICATION IMPAIRMENT  Goal: Ability to express needs and understand communication  4/19/2022 0837 by Shine Aguila RCP  Outcome: Ongoing

## 2022-04-19 NOTE — PROGRESS NOTES
Physician Progress Note      Lex Phan  CSN #:                  [id number]  :                       1950  ADMIT DATE:       2022 8:39 PM  [address] [place] DATE:  RESPONDING  PROVIDER #:        Isis Sotomayor MD          QUERY TEXT:    Pt admitted with seizure, right hemiparesis and AMS. Pt noted to have MRI   brain and AED levels. If possible, please document if you are evaluating   and/or treating any of the following: The medical record reflects the following:  Risk Factors: Hx nonconvulsive seizures  Clinical Indicators: MRI brain negative for focal/structural   abnormality/seizure focus. Right hemiplegia with right gaze deviation. Keppra   level 14, Valproic Acid levels: Free <0.4 and Total <3 Home meds of Keppra 500   mg twice a day and Depakote 500 mg twice daily. Reported history of  having   an argument with her sister very angry and over the last 2 days has been   feeling generally unwell. Per H&P: patient's altered mental status and   right-sided hemiparesis it is most likely that she had a seizure with Paulo's   paralysis versus acute CVA. Treatment: Continue Keppra and Depakote. Vent Management and ICU. Labs/monitoring with seizure precautions. Leslie Hanley RN, CDS  [email or web address]  Options provided:  -- Seizure due to medication noncompliance with history of epilepsy  -- Psychogenic Seizure  -- Other - I will add my own diagnosis  -- Disagree - Not applicable / Not valid  -- Disagree - Clinically unable to determine / Unknown  -- Refer to Clinical Documentation Reviewer    PROVIDER RESPONSE TEXT:    Provider disagreed with this query.   cannot state medication non-compliance as she was at a SNF and appears she was   not being given the meds most likely    Query created by: Tab Hoskins on 2022 10:00 AM      Electronically signed by:  Isis Sotomayor MD 2022 12:49 PM

## 2022-04-19 NOTE — PROGRESS NOTES
Physical Therapy        Physical Therapy Cancel Note      DATE: 2022    NAME: Janet Vargas  MRN: [id number]   : 1950      Patient not seen this date for Physical Therapy due to: Other: plan to extubate, PT will continue to follow for needs.        Electronically signed by Marylu Le PT on 2022 at 3:47 PM

## 2022-04-19 NOTE — PLAN OF CARE
Problem: OXYGENATION/RESPIRATORY FUNCTION  Goal: Patient will maintain patent airway  4/19/2022 1548 by Mala Taylor RCP  Outcome: Completed  4/19/2022 0837 by Harriet Marques RCP  Outcome: Ongoing  4/19/2022 0352 by Graeme Byrd RN  Outcome: Ongoing  Goal: Patient will achieve/maintain normal respiratory rate/effort  Description: Respiratory rate and effort will be within normal limits for the patient  4/19/2022 1548 by JADE AllenP  Outcome: Completed  4/19/2022 0837 by Harriet Marques RCP  Outcome: Ongoing  4/19/2022 0352 by Graeme Byrd RN  Outcome: Ongoing     Problem: MECHANICAL VENTILATION  Goal: Patient will maintain patent airway  4/19/2022 1548 by Mala Taylor RCP  Outcome: Completed  4/19/2022 0837 by JADE LunsfordP  Outcome: Ongoing  4/19/2022 0352 by Graeme Byrd RN  Outcome: Ongoing  Goal: Oral health is maintained or improved  4/19/2022 1548 by JADE AllenP  Outcome: Completed  4/19/2022 0837 by Harriet Marques RCP  Outcome: Ongoing  4/19/2022 0352 by Graeme Byrd RN  Outcome: Ongoing  Goal: ET tube will be managed safely  4/19/2022 1548 by Mala Taylor RCP  Outcome: Completed  4/19/2022 0837 by Harriet Marques RCP  Outcome: Ongoing  4/19/2022 0352 by Graeme Byrd RN  Outcome: Ongoing  Goal: Ability to express needs and understand communication  4/19/2022 1548 by Mala Taylor RCP  Outcome: Completed  4/19/2022 0837 by Harriet Marques RCP  Outcome: Ongoing  4/19/2022 0352 by Graeme Byrd RN  Outcome: Ongoing  Goal: Mobility/activity is maintained at optimum level for patient  4/19/2022 1548 by Mala Taylor RCP  Outcome: Completed  4/19/2022 0837 by Harriet Marques RCP  Outcome: Ongoing  4/19/2022 0352 by Graeme Byrd RN  Outcome: Ongoing     Problem: SKIN INTEGRITY  Goal: Skin integrity is maintained or improved  4/19/2022 1548 by Mala Taylor PAT  Outcome: Completed  4/19/2022 0837 by Harriet Marques, RCP  Outcome: Ongoing  4/19/2022 0352 by Ericka Jeff RN  Outcome: Ongoing

## 2022-04-19 NOTE — PROGRESS NOTES
Order obtained for extubation. SpO2 of 100 on 40% FiO2. Patient extubated and placed on 3 liters/min via nasal cannula. Post extubation SpO2 is 100% with HR  88 bpm and RR 16 breaths/min. Patient had strong cough that was non-productive. Extubation Well tolerated by patient. .   Breath Sounds: clear    [address] [facility]   3:43 PM

## 2022-04-20 LAB
ANION GAP SERPL CALCULATED.3IONS-SCNC: 12 MMOL/L (ref 9–17)
BUN BLDV-MCNC: 18 MG/DL (ref 8–23)
CALCIUM SERPL-MCNC: 8.5 MG/DL (ref 8.6–10.4)
CHLORIDE BLD-SCNC: 112 MMOL/L (ref 98–107)
CO2: 20 MMOL/L (ref 20–31)
CREAT SERPL-MCNC: 0.47 MG/DL (ref 0.5–0.9)
GFR AFRICAN AMERICAN: >60 ML/MIN
GFR NON-AFRICAN AMERICAN: >60 ML/MIN
GFR SERPL CREATININE-BSD FRML MDRD: ABNORMAL ML/MIN/{1.73_M2}
GLUCOSE BLD-MCNC: 195 MG/DL (ref 65–105)
GLUCOSE BLD-MCNC: 203 MG/DL (ref 65–105)
GLUCOSE BLD-MCNC: 231 MG/DL (ref 70–99)
GLUCOSE BLD-MCNC: 260 MG/DL (ref 65–105)
HCT VFR BLD CALC: 29.7 % (ref 36.3–47.1)
HEMOGLOBIN: 9.8 G/DL (ref 11.9–15.1)
LV EF: 61 %
LVEF MODALITY: NORMAL
MCH RBC QN AUTO: 31.3 PG (ref 25.2–33.5)
MCHC RBC AUTO-ENTMCNC: 33 G/DL (ref 28.4–34.8)
MCV RBC AUTO: 94.9 FL (ref 82.6–102.9)
NRBC AUTOMATED: 0 PER 100 WBC
PDW BLD-RTO: 13.3 % (ref 11.8–14.4)
PLATELET # BLD: 161 K/UL (ref 138–453)
PMV BLD AUTO: 9.9 FL (ref 8.1–13.5)
POTASSIUM SERPL-SCNC: 3.7 MMOL/L (ref 3.7–5.3)
RBC # BLD: 3.13 M/UL (ref 3.95–5.11)
SODIUM BLD-SCNC: 144 MMOL/L (ref 135–144)
VALPROIC ACID LEVEL: 67 UG/ML (ref 50–125)
WBC # BLD: 10.4 K/UL (ref 3.5–11.3)

## 2022-04-20 PROCEDURE — 6370000000 HC RX 637 (ALT 250 FOR IP): Performed by: STUDENT IN AN ORGANIZED HEALTH CARE EDUCATION/TRAINING PROGRAM

## 2022-04-20 PROCEDURE — 99221 1ST HOSP IP/OBS SF/LOW 40: CPT | Performed by: STUDENT IN AN ORGANIZED HEALTH CARE EDUCATION/TRAINING PROGRAM

## 2022-04-20 PROCEDURE — 6370000000 HC RX 637 (ALT 250 FOR IP): Performed by: NURSE PRACTITIONER

## 2022-04-20 PROCEDURE — 80048 BASIC METABOLIC PNL TOTAL CA: CPT

## 2022-04-20 PROCEDURE — 80165 DIPROPYLACETIC ACID FREE: CPT

## 2022-04-20 PROCEDURE — 85027 COMPLETE CBC AUTOMATED: CPT

## 2022-04-20 PROCEDURE — 97167 OT EVAL HIGH COMPLEX 60 MIN: CPT

## 2022-04-20 PROCEDURE — 92523 SPEECH SOUND LANG COMPREHEN: CPT

## 2022-04-20 PROCEDURE — 80164 ASSAY DIPROPYLACETIC ACD TOT: CPT

## 2022-04-20 PROCEDURE — 97530 THERAPEUTIC ACTIVITIES: CPT

## 2022-04-20 PROCEDURE — 2060000000 HC ICU INTERMEDIATE R&B

## 2022-04-20 PROCEDURE — 2580000003 HC RX 258: Performed by: STUDENT IN AN ORGANIZED HEALTH CARE EDUCATION/TRAINING PROGRAM

## 2022-04-20 PROCEDURE — 36415 COLL VENOUS BLD VENIPUNCTURE: CPT

## 2022-04-20 PROCEDURE — 82947 ASSAY GLUCOSE BLOOD QUANT: CPT

## 2022-04-20 PROCEDURE — 97162 PT EVAL MOD COMPLEX 30 MIN: CPT

## 2022-04-20 PROCEDURE — 97535 SELF CARE MNGMENT TRAINING: CPT

## 2022-04-20 PROCEDURE — 99233 SBSQ HOSP IP/OBS HIGH 50: CPT | Performed by: PSYCHIATRY & NEUROLOGY

## 2022-04-20 PROCEDURE — 93306 TTE W/DOPPLER COMPLETE: CPT

## 2022-04-20 PROCEDURE — 6360000002 HC RX W HCPCS: Performed by: STUDENT IN AN ORGANIZED HEALTH CARE EDUCATION/TRAINING PROGRAM

## 2022-04-20 PROCEDURE — 99222 1ST HOSP IP/OBS MODERATE 55: CPT | Performed by: NURSE PRACTITIONER

## 2022-04-20 RX ORDER — TOPIRAMATE 25 MG/1
25 TABLET ORAL 2 TIMES DAILY
Status: DISCONTINUED | OUTPATIENT
Start: 2022-04-20 | End: 2022-04-28 | Stop reason: HOSPADM

## 2022-04-20 RX ORDER — FENOFIBRATE 160 MG/1
160 TABLET ORAL DAILY
Status: DISCONTINUED | OUTPATIENT
Start: 2022-04-20 | End: 2022-04-28 | Stop reason: HOSPADM

## 2022-04-20 RX ORDER — INSULIN LISPRO 100 [IU]/ML
0-6 INJECTION, SOLUTION INTRAVENOUS; SUBCUTANEOUS NIGHTLY
Status: DISCONTINUED | OUTPATIENT
Start: 2022-04-20 | End: 2022-04-28 | Stop reason: HOSPADM

## 2022-04-20 RX ORDER — INSULIN GLARGINE 100 [IU]/ML
20 INJECTION, SOLUTION SUBCUTANEOUS NIGHTLY
Status: DISCONTINUED | OUTPATIENT
Start: 2022-04-20 | End: 2022-04-28 | Stop reason: HOSPADM

## 2022-04-20 RX ORDER — LEVETIRACETAM 500 MG/1
1000 TABLET ORAL 2 TIMES DAILY
Status: DISCONTINUED | OUTPATIENT
Start: 2022-04-20 | End: 2022-04-28 | Stop reason: HOSPADM

## 2022-04-20 RX ORDER — PANTOPRAZOLE SODIUM 40 MG/1
40 TABLET, DELAYED RELEASE ORAL
Status: DISCONTINUED | OUTPATIENT
Start: 2022-04-21 | End: 2022-04-28 | Stop reason: HOSPADM

## 2022-04-20 RX ORDER — INSULIN LISPRO 100 [IU]/ML
0-12 INJECTION, SOLUTION INTRAVENOUS; SUBCUTANEOUS
Status: DISCONTINUED | OUTPATIENT
Start: 2022-04-20 | End: 2022-04-28 | Stop reason: HOSPADM

## 2022-04-20 RX ORDER — LORAZEPAM 2 MG/ML
1 INJECTION INTRAMUSCULAR ONCE
Status: DISCONTINUED | OUTPATIENT
Start: 2022-04-20 | End: 2022-04-28 | Stop reason: HOSPADM

## 2022-04-20 RX ADMIN — POTASSIUM BICARBONATE 20 MEQ: 782 TABLET, EFFERVESCENT ORAL at 09:31

## 2022-04-20 RX ADMIN — VALPROIC ACID 500 MG: 250 SOLUTION ORAL at 09:34

## 2022-04-20 RX ADMIN — TOPIRAMATE 25 MG: 25 TABLET, FILM COATED ORAL at 21:13

## 2022-04-20 RX ADMIN — SODIUM CHLORIDE, PRESERVATIVE FREE 10 ML: 5 INJECTION INTRAVENOUS at 20:36

## 2022-04-20 RX ADMIN — LEVETIRACETAM 1000 MG: 500 TABLET, FILM COATED ORAL at 20:35

## 2022-04-20 RX ADMIN — GABAPENTIN 600 MG: 300 CAPSULE ORAL at 09:32

## 2022-04-20 RX ADMIN — INSULIN LISPRO 2 UNITS: 100 INJECTION, SOLUTION INTRAVENOUS; SUBCUTANEOUS at 21:12

## 2022-04-20 RX ADMIN — LEVETIRACETAM 1000 MG: 10 INJECTION INTRAVENOUS at 09:44

## 2022-04-20 RX ADMIN — INSULIN LISPRO 2 UNITS: 100 INJECTION, SOLUTION INTRAVENOUS; SUBCUTANEOUS at 17:41

## 2022-04-20 RX ADMIN — Medication 81 MG: at 09:32

## 2022-04-20 RX ADMIN — GABAPENTIN 600 MG: 300 CAPSULE ORAL at 14:00

## 2022-04-20 RX ADMIN — Medication 30 MG: at 09:37

## 2022-04-20 RX ADMIN — GABAPENTIN 600 MG: 300 CAPSULE ORAL at 20:35

## 2022-04-20 RX ADMIN — INSULIN GLARGINE 20 UNITS: 100 INJECTION, SOLUTION SUBCUTANEOUS at 21:12

## 2022-04-20 RX ADMIN — VALPROIC ACID 500 MG: 250 SOLUTION ORAL at 20:35

## 2022-04-20 RX ADMIN — FENOFIBRATE 160 MG: 160 TABLET ORAL at 14:00

## 2022-04-20 RX ADMIN — ACETAMINOPHEN 650 MG: 325 TABLET ORAL at 10:49

## 2022-04-20 RX ADMIN — CLOPIDOGREL 75 MG: 75 TABLET, FILM COATED ORAL at 09:32

## 2022-04-20 RX ADMIN — DESMOPRESSIN ACETATE 40 MG: 0.2 TABLET ORAL at 20:35

## 2022-04-20 RX ADMIN — ENOXAPARIN SODIUM 40 MG: 100 INJECTION SUBCUTANEOUS at 09:41

## 2022-04-20 RX ADMIN — INSULIN LISPRO 3 UNITS: 100 INJECTION, SOLUTION INTRAVENOUS; SUBCUTANEOUS at 12:57

## 2022-04-20 ASSESSMENT — PAIN SCALES - GENERAL
PAINLEVEL_OUTOF10: 0
PAINLEVEL_OUTOF10: 3

## 2022-04-20 ASSESSMENT — ENCOUNTER SYMPTOMS
BLURRED VISION: 1
BACK PAIN: 0
SHORTNESS OF BREATH: 0
ABDOMINAL PAIN: 0

## 2022-04-20 ASSESSMENT — PAIN DESCRIPTION - LOCATION: LOCATION: HEAD

## 2022-04-20 NOTE — CONSULTS
Physical Medicine & Rehabilitation  Consult Note      Admitting Physician:  Marci Reed DO    Primary Care Provider:  Prateek Penaloza MD     Reason for Consult:  Acute Inpatient Rehabilitation    Chief Complaint:  Right-sided weakness    History of Present Illness:  Referring Provider is requesting an evaluation for appropriate placement upon discharge from acute care. History from chart review and patient. René Thornton is a 80 y.o. female with history of CVA, HTN, and HLD admitted to [facility] on 4/17/2022. She initially presented with acute-onset right hemiparesis. NIHSS 21. She was given tPA by the mobile stroke unit, but infusion was stopped due to worsening of symptoms and concern for possible hemorrhagic conversion. She was intubated prior to arrival to the ED. CT head showed no acute intracranial abnormality, and tPA was restarted. She was also loaded with keppra due to concern for seizures. MRI brain showed no acute intracranial abnormality. Extubated 4/19/22. Plan is for LTME and MRI cervical spine. Per notes, she had a similar episodes in 2014 and 2018. She reports ongoing right-sided weakness. She also notes headache and blurred vision. She denies any numbness/tingling and changes in bladder or bowel control. Review of Systems:  Review of Systems   Constitutional: Negative for fever. HENT: Positive for hearing loss. Eyes: Positive for blurred vision. Respiratory: Negative for shortness of breath. Cardiovascular: Negative for chest pain. Gastrointestinal: Negative for abdominal pain. No change in bowel control   Genitourinary:        No change in bladder control   Musculoskeletal: Negative for back pain. Skin: Negative for rash. Neurological: Positive for weakness and headaches. Negative for sensory change.       Premorbid function:  Independent    Current function:    PT:  Other position/activity restrictions: SBP <180; Bedrest until seen by PT/ OT following TPA   Transfers  Sit to Stand: Minimal Assistance,Contact guard assistance  Stand to sit: Minimal Assistance  Bed to Chair: Dependent/Total ([facility])  Comment: STS attempted with use of RW on first attempt requiring Edi, pt demonstrating posterior/ L lateral lean with standing attempt requiring min-mod from writer to correct; CGA sit to stand performed in ginny stedy  Ambulation  Comments: Attempted ambulation with use of RW; pt unable to bear weight through RLE due to weakness, pt required max tactile assistance to maintain R knee extension with weightbearing to prevent knee buckling. More Ambulation?: No    Transfers  Sit to Stand: Minimal Assistance,Contact guard assistance  Stand to sit: Minimal Assistance  Bed to Chair: Dependent/Total (transferred to bedside chair with ginny [facility])  Comment: STS attempted with use of RW on first attempt requiring Edi, pt demonstrating posterior/ L lateral lean with standing attempt requiring min-mod from writer to correct; CGA sit to stand [facility]     Ambulation  Comments: Attempted ambulation with use of RW; pt unable to bear weight through RLE due to weakness, pt required max tactile assistance to maintain R knee extension with weightbearing to prevent knee buckling. More Ambulation?: No       Ambulation  Comments: Attempted ambulation with use of RW; pt unable to bear weight through RLE due to weakness, pt required max tactile assistance to maintain R knee extension with weightbearing to prevent knee buckling. More Ambulation?: No      OT:   ADL  Feeding: Modified independent ,Setup,Increased time to complete  Feeding Skilled Clinical Factors: Pt ate breakfast using RUE. Pt provided foam tubing for built-up utensils but did not utilize for feeding. Grooming: Modified independent ,Setup,Increased time to complete  Grooming Skilled Clinical Factors: Pt washed face with LUE while sitting unsupported.   UE Bathing: Setup,Increased time to complete,Contact guard assistance  LE Bathing: Minimal assistance,Setup,Increased time to complete  UE Dressing: Setup,Increased time to complete,Contact guard assistance  LE Dressing: Minimal assistance,Setup,Increased time to complete  Toileting: Minimal assistance,Setup,Increased time to complete         Balance  Sitting Balance: Contact guard assistance (CGA dynamic sitting balance; SBA static sitting balance. Pt sat unsupported ~35min.)  Standing Balance: Moderate assistance   Standing Balance  Time: ~2 min  Activity: static standing  Comment: Pt required Min.-Mod. A with RW during static standing d/t posterior lean and R knee buckling. Pt required physical assist to block R knee. Pt performed L/R weight shifting ~5x per LE. Pt with no LOB during static standing and slight unsteadiness d/t weak RLE. Bed mobility  Supine to Sit: Contact guard assistance  Sit to Supine:  (Did not assess- pt seated in bedside chair upon writer's exit)  Scooting: Stand by assistance  Comment: HOB elevated to ~30 degrees with use of bed rail  Transfers  Sit to stand: Minimal assistance  Stand to sit: Minimal assistance  Transfer Comments: Pt required Min. A for 1st sit<>stand with RW d/t posterior lean and weak RLE. Pt required VCs for hand placement on RW. Pt completed second sit<>stand using Arline Tolliver with CGA. SLP:  Assessment:      Pt presents with mild-moderate cognitive deficits characterized by difficulties with orientation, word associations, immediate recall, delayed recall, verbal reasoning, and deductive reasoning. Pt. Presents with no dysarthria and mild O/M deficits at this time characterized by left labial droop. ST to follow up and provide treatment to address noted deficits. Education provided.       Past Medical History:        Diagnosis Date    CVA (cerebral infarction) ([facility] [address]     Hyperlipidemia     Hypertension        Past Surgical History: Procedure Laterality Date    APPENDECTOMY      BRONCHOSCOPY DIAGNOSTIC  7/24/2014         HYSTERECTOMY         Allergies:    No Known Allergies     Current Medications:   Current Facility-Administered Medications: [START ON 4/21/2022] pantoprazole (PROTONIX) tablet 40 mg, 40 mg, Oral, QAM AC  fenofibrate (TRIGLIDE) tablet 160 mg, 160 mg, Oral, Daily  levETIRAcetam (KEPPRA) tablet 1,000 mg, 1,000 mg, Oral, BID  insulin glargine (LANTUS) injection vial 20 Units, 20 Units, SubCUTAneous, Nightly  insulin lispro (HUMALOG) injection vial 0-12 Units, 0-12 Units, SubCUTAneous, TID WC  insulin lispro (HUMALOG) injection vial 0-6 Units, 0-6 Units, SubCUTAneous, Nightly  topiramate (TOPAMAX) tablet 25 mg, 25 mg, Oral, BID  LORazepam (ATIVAN) injection 1 mg, 1 mg, IntraVENous, Once  clopidogrel (PLAVIX) tablet 75 mg, 75 mg, Oral, Daily  gabapentin (NEURONTIN) capsule 600 mg, 600 mg, Oral, TID  atorvastatin (LIPITOR) tablet 40 mg, 40 mg, Oral, Nightly  glucose (GLUTOSE) 40 % oral gel 15 g, 15 g, Oral, PRN  dextrose 50 % IV solution, 12.5 g, IntraVENous, PRN  glucagon (rDNA) injection 1 mg, 1 mg, IntraMUSCular, PRN  dextrose 5 % solution, 100 mL/hr, IntraVENous, PRN  potassium chloride (KLOR-CON M) extended release tablet 40 mEq, 40 mEq, Oral, PRN **OR** potassium bicarb-citric acid (EFFER-K) effervescent tablet 40 mEq, 40 mEq, Oral, PRN **OR** potassium chloride 10 mEq/100 mL IVPB (Peripheral Line), 10 mEq, IntraVENous, PRN  acetaminophen (TYLENOL) tablet 650 mg, 650 mg, Oral, Q6H PRN  valproic acid (DEPAKENE) 250 MG/5ML oral solution 500 mg, 500 mg, Oral, 2 times per day  sodium chloride flush 0.9 % injection 10 mL, 10 mL, IntraVENous, PRN  albuterol sulfate  (90 Base) MCG/ACT inhaler 6 puff, 6 puff, Inhalation, Q6H PRN  potassium bicarb-citric acid (EFFER-K) effervescent tablet 20 mEq, 20 mEq, Oral, Daily  ondansetron (ZOFRAN-ODT) disintegrating tablet 4 mg, 4 mg, Oral, Q8H PRN **OR** ondansetron (ZOFRAN) injection 4 mg, 4 mg, IntraVENous, Q6H PRN  polyethylene glycol (GLYCOLAX) packet 17 g, 17 g, Oral, Daily PRN  enoxaparin (LOVENOX) injection 40 mg, 40 mg, SubCUTAneous, Daily  aspirin EC tablet 81 mg, 81 mg, Oral, Daily **OR** aspirin suppository 300 mg, 300 mg, Rectal, Daily  perflutren lipid microspheres (DEFINITY) injection 1.65 mg, 1.5 mL, IntraVENous, ONCE PRN  0.9 % sodium chloride infusion, , IntraVENous, Continuous  labetalol (NORMODYNE;TRANDATE) injection 10 mg, 10 mg, IntraVENous, Q10 Min PRN    Family History:   No family history on file.     Social History:  Lives With: Alone  Type of Home: House  Home Layout: One level  Home Access: Level entry  Bathroom Shower/Tub: Tub/Shower unit  Bathroom Toilet: Standard  Bathroom Equipment: Grab bars in [address] [place] [address]: [place] [id number]: [address]: [place] [address] Responsibilities: Yes  Ambulation Assistance: Independent  Transfer Assistance: Independent  Active : Yes  Mode of Transportation: Car  Occupation: Retired  Leisure & Hobbies: Knitting, puzzles  Additional Comments: Pt reports daughter is able to provide prn assist upon discharge  Social History     Socioeconomic History    Marital status:      Spouse name: Not on file    Number of children: Not on file    Years of education: Not on file    Highest education level: Not on file   Occupational History    Not on file   Tobacco Use    Smoking status: Never Smoker    Smokeless tobacco: Never Used   Substance and Sexual Activity    Alcohol use: No    Drug use: No    Sexual activity: Not on file   Other Topics Concern    Not on file   Social History Narrative    Not on file     Social Determinants of Health     Financial Resource Strain:     Difficulty of Paying Living Expenses: Not on file   Food Insecurity:     Worried About [address] in the Last Year: Not on file    [address] in the Last Year: Not on HMaxwell Caraballo Needs:     Lack of Transportation (Medical): Not on file    Lack of Transportation (Non-Medical): Not on file   Physical Activity:     Days of Exercise per Week: Not on file    Minutes of Exercise per Session: Not on file   Stress:     Feeling of Stress : Not on file   Social Connections:     Frequency of Communication with Friends and Family: Not on file    Frequency of Social Gatherings with Friends and Family: Not on file    Attends Mandaeism Services: Not on file    Active Member of [address] [place] [address] or Organizations: Not on file    Attends Club or Organization Meetings: Not on file    Marital Status: Not on file   Intimate Partner Violence:     Fear of Current or Ex-Partner: Not on file    Emotionally Abused: Not on file    Physically Abused: Not on file    Sexually Abused: Not on file   Housing Stability:     Unable to Pay for Housing in the Last Year: Not on file    Number of [place] in the Last Year: Not on file    Unstable Housing in the Last Year: Not on file       Physical Exam:  BP (!) 142/82   Pulse 72   Temp 98.4 °F (36.9 °C) (Oral)   Resp 20   Ht 5' 5\" (1.651 m)   Wt 133 lb 9.6 oz (60.6 kg)   SpO2 95%   BMI 22.23 kg/m²     GEN: Well developed, well nourished, no acute distress  HEENT: NCAT. EOMI. Mildly hard of hearing. Mucous membranes pink and moist.  RESP: Normal breath sounds with no wheezing, rales, or rhonchi. Respirations WNL and unlabored. CV: Regular rate and rhythm. No murmurs, rubs, or gallops. ABD: Soft, non-distended, BS+ and equal.  NEURO: Alert. Speech fluent. No facial droop. Midline tongue protrusion. Sensation to light touch decreased in right upper and lower limbs. MSK:  Muscle tone and bulk are normal bilaterally. Strength 4-/5 in right upper limb. Strength 4/5 in the left upper limb. Strength 4+/5 with left ankle dorsiflexion and plantarflexion. Minimal movement in the right toes. LIMBS: No edema in bilateral lower limbs.   SKIN: Warm and dry with good turgor. PSYCH: Mood WNL. Affect WNL. Appropriately interactive. Diagnostics:    CBC:   Recent Labs     04/18/22  0111 04/19/22  0541 04/20/22  0550   WBC 8.5 8.7 10.4   RBC 3.53* 3.41* 3.13*   HGB 11.3* 10.8* 9.8*   HCT 32.1* 31.7* 29.7*   MCV 90.9 93.0 94.9   RDW 13.0 13.2 13.3    162 161     BMP:   Recent Labs     04/18/22  0111 04/18/22  0111 04/19/22  0657 04/19/22  1437 04/20/22  0550      < > 139 143 144   K 3.6*   < > 5.4* 4.4 3.7   *   < > 111* 111* 112*   CO2 17*   < > 16* 19* 20   PHOS 3.1  --   --   --   --    BUN 13   < > 17 16 18   CREATININE 0.40*   < > 0.47* 0.50 0.47*   GLUCOSE 208*   < > 245* 222* 231*    < > = values in this interval not displayed. HbA1c:   Lab Results   Component Value Date    LABA1C 10.3 (H) 04/17/2022     BNP: No results for input(s): BNP in the last 72 hours. PT/INR:   No results for input(s): PROTIME, INR in the last 72 hours. APTT:   No results for input(s): APTT in the last 72 hours. CARDIAC ENZYMES: No results for input(s): CKMB, CKMBINDEX, TROPONINT in the last 72 hours. Invalid input(s): CKTOTAL;3  FASTING LIPID PANEL:  Lab Results   Component Value Date    CHOL 202 (H) 04/17/2022    HDL 55 04/17/2022    TRIG 164 (H) 04/17/2022     LIVER PROFILE: No results for input(s): AST, ALT, ALB, BILIDIR, BILITOT, ALKPHOS in the last 72 hours. Radiology:  MRI BRAIN W WO CONTRAST   Final Result   1. No acute intracranial abnormality. 2. Mild chronic white matter microvascular ischemic changes. 3. Left mastoid effusion. XR CHEST PORTABLE   Final Result   An enteric tube has been placed. The tip is in the fundus. No evidence of edema or pneumonia. Minimal left basilar atelectasis or scarring. XR ABDOMEN FOR NG/OG/NE TUBE PLACEMENT   Final Result   Gastric tube with the tip in the proximal aspect of the stomach.          XR CHEST PORTABLE   Final Result   Status post ET tube placement in good position with mild left basilar   atelectasis         CTA HEAD NECK W CONTRAST   Final Result   1. No acute intracranial abnormality. 2. No evidence of large vessel occlusion or hemodynamically significant   stenosis involving the head and neck arteries. RECOMMENDATIONS:   Unavailable         CT HEAD WO CONTRAST   Final Result   1. No acute intracranial abnormality. 2. No evidence of large vessel occlusion or hemodynamically significant   stenosis involving the head and neck arteries. RECOMMENDATIONS:   Unavailable         CT HEAD WO CONTRAST   Final Result   No acute intracranial abnormality. Results were reported to Dr. Papo Perea at 7:50 p.m. on April 17, 2022. MRI CERVICAL SPINE WO CONTRAST    (Results Pending)         Impression:    1. Right-sided weakness  2. Cognitive impairment  3. Anemia  4. Elevated hemoglobin A1c  5. HTN  6. HLD  7. History CVA    Recommendations:    1. Diagnosis:  Right-sided weakness  2. Therapy: Has PT/OT/SLP needs  3. Medical Necessity: As above  4. Support: Lives alone  5. Rehab Recommendation:  The patient may benefit from acute inpatient rehabilitation once medically stable per primary service. Will need clarification whether family can provide 24-hour assistance. 6. DVT Prophylaxis: Lovenox    It was my pleasure to evaluate Heddy Sacks today. Please call with questions.     Nati Barger MD

## 2022-04-20 NOTE — PLAN OF CARE
Problem: Non-Violent Restraints  Goal: Removal from restraints as soon as assessed to be safe  Outcome: Ongoing  Goal: No harm/injury to patient while restraints in use  Outcome: Ongoing  Goal: Patient's dignity will be maintained  Outcome: Ongoing     Problem: Nutrition  Goal: Optimal nutrition therapy  Outcome: Ongoing     Problem: HEMODYNAMIC STATUS  Goal: Patient has stable vital signs and fluid balance  Outcome: Ongoing     Problem: Skin Integrity:  Goal: Will show no infection signs and symptoms  Description: Will show no infection signs and symptoms  Outcome: Ongoing  Goal: Absence of new skin breakdown  Description: Absence of new skin breakdown  Outcome: Ongoing     Problem: Falls - Risk of:  Goal: Will remain free from falls  Description: Will remain free from falls  Outcome: Ongoing  Goal: Absence of physical injury  Description: Absence of physical injury  Outcome: Ongoing

## 2022-04-20 NOTE — PROGRESS NOTES
Occupational Therapy  Facility/Department: [address]  Occupational Therapy Initial Assessment    Name: Caleb Schneider  : 1950  MRN: [id number]  Date of Service: 2022    Chief Complaint   Patient presents with    Cerebrovascular Accident       Discharge Recommendations: Further therapy recommended at discharge. The patient should be able to tolerate at least 3 hours of therapy per day over 5 days or 15 hours over 7 days. This patient may benefit from a Physical Medicine and Rehab consult. Patient would benefit from continued therapy after discharge. Patient is currently unsafe to return to prior living arrangements without 24 hour assistance. OT Equipment Recommendations  Equipment Needed: Yes. Equipment recommendations listed below are based on what the patient would need if they were able to return to prior living arrangements at the time of discharge. Mobility Devices: [employer]; ADL Assistive Devices  ADL Assistive Devices: Transfer Tub Bench;Long-handled Shoe Horn;Long-handled Sponge;Sock-Aid Hard;Reacher; Toileting - Drop Arm Commode, Heavy Duty Drop Arm Commode         Patient Diagnosis(es): The encounter diagnosis was Cerebrovascular accident (CVA), unspecified mechanism ([address] Past Medical History:  has a past medical history of CVA (cerebral infarction) ([address] Hyperlipidemia, and Hypertension. Past Surgical History:  has a past surgical history that includes Appendectomy; Hysterectomy; and Bronchoscopy diagnostic (2014). Education: Pt educated on OT role, OT POC, transfer training, and use of foam tubing for built-up handles and foam ball to increase R hand  strength-good return.    Education method: verbal  Barriers to learning: none  Education outcome: demonstrates and verbalizes understanding      Safety:   Types of devices: chair alarm in place; call light within reach; left in chair; gait belt; nurse notified  Restraints: no      Assessment   Performance deficits / Impairments: Decreased functional mobility ; Decreased safe awareness;Decreased balance;Decreased coordination;Decreased ADL status; Decreased ROM; Decreased endurance;Decreased high-level IADLs;Decreased strength;Decreased fine motor control  Assessment: Pt agreeable to OT eval this date. Pt completes bed mobility with CGA for sup>sit for trunk support. Pt sat EOB unsupported  for ~35 min. Pt completes functional sit<>stand transfers and static standing with Min. A and RW d/t posterior lean and weak RLE. Pt required CGA for second sit>stand transfer utilizing \"Margarette Stedy\" to transfer pt to recliner. Pt exhibits RUE and RLE hemiparesis. Pt utilizes RUE for functional activities with deficits in shoulder flexion, elbow flexion, and functional grasp. Pt issued foam tubing for built-up handles and foam ball to increase RUE  strength outside of therapy. Pt demonstrates deficits in functional mobility, safety awareness, balance, coordination, ROM, strength, endurance, FMC, and ADLs/IADLs. Pt would benefit from continued OT services to increase independence in self-care and daily activities. Prognosis: Fair  Decision Making: High Complexity  REQUIRES OT FOLLOW-UP: Yes  Activity Tolerance  Activity Tolerance: Patient Tolerated treatment well        Gross Assessment:  LUE WFL. Demonstrates decreased strength in shoulder flexion (3-/5), elbow flexion (3-/5), and hand (3-/5). Coordination:   Decreased FMC observed RUE. Tone normal, sensation intact.        Plan   Plan  Times per Week: 4-5x/wk  Current Treatment Recommendations: Strengthening,ROM,Balance training,Functional mobility training,Endurance training,Safety education & training,Patient/Caregiver education & training,Equipment evaluation, education, & procurement,Self-Care / ADL,Coordination training,Neuromuscular re-education,Modalities     Restrictions  Restrictions/Precautions  Required Braces or Orthoses?: No  Position Activity Restriction  Other position/activity restrictions: SBP <180; Bedrest until seen by PT/ OT following TPA    Subjective   General  Patient assessed for rehabilitation services?: Yes  Family / Caregiver Present: No  General Comment  Comments: RN ok'd pt for OT eval this date. Pt pleasant/cooperative throughout session. Pt reports no pain. Social/Functional History  Social/Functional History  Lives With: Alone  Type of Home: House  Home Layout: One level  Home Access: Level entry  Bathroom Shower/Tub: Tub/Shower unit  Bathroom Toilet: Standard  Bathroom Equipment: Grab bars in shower  Home Equipment: Rollator,Cane  ADL Assistance: [address]: Independent  Homemaking Responsibilities: Yes  Ambulation Assistance: Independent  Transfer Assistance: Independent  Active : Yes  Mode of Transportation: Car  Occupation: Retired  Leisure & Hobbies: Knitting, puzzles  Additional Comments: Pt reports daughter is able to provide prn assist upon discharge       Objective   Vision Exceptions: Wears glasses at all times  Hearing: Exceptions to [facility]  Hearing Exceptions: Hard of hearing/hearing concerns;Right hearing aid          Balance  Sitting Balance: Contact guard assistance (CGA dynamic sitting balance; SBA static sitting balance. Pt sat unsupported ~35min.)  Standing Balance: Moderate assistance  Standing Balance  Time: ~2 min  Activity: static standing  Comment: Pt required Min.-Mod. A with RW during static standing d/t posterior lean and R knee buckling. Pt required physical assist to block R knee. Pt performed L/R weight shifting ~5x per LE. Pt with no LOB during static standing and slight unsteadiness d/t weak RLE. ADL  Feeding: Modified independent ;Setup; Increased time to complete  Feeding Skilled Clinical Factors: Pt ate breakfast using RUE. Pt provided foam tubing for built-up utensils but did not utilize for feeding. Grooming: Modified independent ;Setup; Increased time to complete  Grooming Skilled Clinical Factors: Pt washed face with LUE while sitting unsupported. UE Bathing: Setup; Increased time to complete;Contact guard assistance  LE Bathing: Minimal assistance;Setup; Increased time to complete  UE Dressing: Setup; Increased time to complete;Contact guard assistance  LE Dressing: Minimal assistance;Setup; Increased time to complete  Toileting: Minimal assistance;Setup; Increased time to complete     Activity Tolerance  Activity Tolerance: Patient tolerated treatment well  Bed mobility  Supine to Sit: Contact guard assistance  Sit to Supine: Unable to assess  Scooting: Stand by assistance  Comment: HOB elevated ~30 deg. for sup>sit  Transfers  Sit to stand: Minimal assistance  Stand to sit: Minimal assistance  Transfer Comments: Pt required Min. A for 1st sit<>stand with RW d/t posterior lean and weak RLE. Pt required VCs for hand placement on RW. Pt completed second sit<>stand using Aida Spare with CGA.      Cognition  Overall Cognitive Status: Exceptions  Arousal/Alertness: Appropriate responses to stimuli  Attention Span: Difficulty attending to directions  Safety Judgement: Decreased awareness of need for assistance;Decreased awareness of need for safety  Insights: Decreased awareness of deficits  Initiation: Requires cues for some  Sequencing: Requires cues for some                 LUE AROM (degrees)  LUE AROM : WFL  Left Hand AROM (degrees)  Left Hand AROM: WFL  RUE PROM (degrees)  RUE PROM: Exceptions  R Shoulder Flex  0-180: 0-160  RUE AROM (degrees)  RUE AROM : Exceptions  R Shoulder Flexion 0-180: 0-90  R Elbow Flexion 0-145: 0-100  Right Hand AROM (degrees)  Right Hand General AROM: Pt unable to make fist w/ <50% digit flexion        Hand Dominance  Hand Dominance: Right          AM-PAC Score     AM-[facility] Inpatient Daily Activity Raw Score: 20 (04/20/22 1324)  AM-PAC Inpatient ADL T-Scale Score : 42.03 (04/20/22 1324)  ADL Inpatient CMS 0-100% Score: 38.32 (04/20/22 1324)  ADL Inpatient CMS G-Code Modifier : CJ (04/20/22 5101)    Goals  Short Term Goals  Time Frame for Short term goals: By discharge, pt will:  Short Term Goal 1: Demo UB ADLs with SUP and setup  Short Term Goal 2: Demo LB ADLs with CGA and setup  Short Term Goal 3: Demo bed mobility with SUP and HOB flat to mimic home setup  Short Term Goal 4: Demo functional sit<>stand transfers with CGA and LRD PRN  Short Term Goal 5: Demo functional mobility with Min. A and LRD PRN  Additional Goals?: Yes  Short Term Goal 6: Increase RUE strength by 1+ muscle grade for improved function and participation in meaningful occupations. Short Term Goal 7: Participate in 5+ min of AROM/PROM to RUE each visit to promote increased functional use throughout ADLs.        Therapy Time   Individual Concurrent Group Co-treatment   Time In 0833         Time Out 0918         Minutes 45         Timed Code Treatment Minutes: 25 Minutes       Iveth Diez, OTS

## 2022-04-20 NOTE — PROGRESS NOTES
Neuro critical care:     Case discussed with resident physician / DAVON including pertinent history and exam findings with the resident. I have reviewed medications, clinical laboratory, imaging and other diagnostic tests with the residents. I have seen and examined the patient and the key elements of the encounter have been performed by me. I agree with the assessment, plan and orders as documented by the resident with changes made to the note.       80year old patient s/p IV tpa for acute right hemiparesis   [facility] patient   Intubated enroute for sudden unresponsiveness and impending respiratory failure  CT head study repeated and no hemorrhage and tpa resumed   CTA with no LVO     Extubated 4/19 and tolerating well, on room air   MRI brain with no ischemic stroke   Get EEG study , AEDs continued with hx of prior reported seizures   Lovenox sc   Asa, statins     Alert, no speech and language deficits, RLE baseline weakness per patient from prior infarction - no clear encephalomalacia seen   Therapy evaluations and PM & R consult   Swallow evaluation and diet   Transition care out of neuro ICU to neuro team      Total time spent was 38 minutes excluding procedural time with greater than 50% of time spent face to face, counseling, coordinating care, examining patient, reviewing images and labs personally, and speaking with team       Electronically signed by Franco Retana MD on 4/20/2022 at 12:57 PM

## 2022-04-20 NOTE — PLAN OF CARE
Problem: Non-Violent Restraints  Goal: Removal from restraints as soon as assessed to be safe  Outcome: Progressing  Goal: No harm/injury to patient while restraints in use  Outcome: Progressing  Goal: Patient's dignity will be maintained  Outcome: Progressing     Problem: Nutrition  Goal: Optimal nutrition therapy  Outcome: Progressing     Problem: HEMODYNAMIC STATUS  Goal: Patient has stable vital signs and fluid balance  Outcome: Progressing     Problem: Skin Integrity:  Goal: Will show no infection signs and symptoms  Description: Will show no infection signs and symptoms  Outcome: Progressing  Goal: Absence of new skin breakdown  Description: Absence of new skin breakdown  Outcome: Progressing     Problem: Falls - Risk of:  Goal: Will remain free from falls  Description: Will remain free from falls  Outcome: Progressing  Goal: Absence of physical injury  Description: Absence of physical injury  Outcome: Progressing     Problem: Discharge Planning  Goal: Discharge to home or other facility with appropriate resources  Outcome: Progressing     Problem: Safety - Medical Restraint  Goal: Remains free of injury from restraints (Restraint for Interference with Medical Device)  Description: INTERVENTIONS:  1. Determine that other, less restrictive measures have been tried or would not be effective before applying the restraint  2. Evaluate the patient's condition at the time of restraint application  3. Inform patient/family regarding the reason for restraint  4.  Q2H: Monitor safety, psychosocial status, comfort, nutrition and hydration  Outcome: Progressing     Problem: Pain  Goal: Verbalizes/displays adequate comfort level or baseline comfort level  Outcome: Progressing     Problem: Chronic Conditions and Co-morbidities  Goal: Patient's chronic conditions and co-morbidity symptoms are monitored and maintained or improved  Outcome: Progressing

## 2022-04-20 NOTE — PROGRESS NOTES
Physical Therapy  DATE: 2022  NAME: Ela Shine  MRN: [id number]   : 1950    Discharge Recommendations: Continue to Assess (pending progress)     Subjective: RN agreeable to PT as tolerated. Pt reported fatigue and agreeable to supine exercises only. Pain: denies  Patient follows: All Commands  Is patient on ventilator: No  Is patient on sedation: No  Precautions: General    Therapeutic exercises:  UE/LE(s)  Bilateral Active/ Active-assist range of motion all planes x 10 reps  bilateral gastrocnemius stretching 3 reps x 20 seconds    Goals  Short Term Goals  Short term goal 1: Pt to perform bed mobility from flat surface independently  Short term goal 2: Demonstrate functional transfers independently  Short term goal 3: Pt to ambulate 100ft w/ RW with supervision  Short term goal 4: Tolerate 45 minutes of therapy to demo increased endurance  Short term goal 5: Demonstrate dynamic standing balance of good - to decrease fall risk    Plan: Progress functional mobility as medically appropriate.    Time In: 1440  Time Out: 1500  Time Coded Minutes (treatment minutes): 20  Rehab Potential: Good  Treatments/week: 5-6xwk    Guerda Chappell PTA

## 2022-04-20 NOTE — PROGRESS NOTES
Speech Language Pathology  [address]  Initial Speech/Language/Cognitive Assessment    NAME: Lisseth Ramon  : 1950   MRN: [id number]  ADMISSION DATE: 2022  ADMITTING DIAGNOSIS: has Altered mental status; Generalized nonconvulsive seizures ([address] History of CVA (cerebrovascular accident); Noncompliance; Hemiparesis ([address] Respiratory failure ([address] Upper respiratory infection; Acute respiratory failure (HCC); HTN (hypertension); HLD (hyperlipidemia); Hyperglycemia; Hypokalemia; Anemia due to acute blood loss; Acute right hemiparesis ([address] Cerebrovascular accident (CVA) ([address] and Tissue plasminogen activator (tPA) administered at other facility within 24 hours before current admission on their problem list.    Date of Eval: 2022   Evaluating Therapist: Clementine Schirmer    RECENT RESULTS  CT OF HEAD/MRI:  1. No acute intracranial abnormality. 2. Mild chronic white matter microvascular ischemic changes. 3. Left mastoid effusion.      Primary Complaint:   The patient is a 76 y.o.  female who presents via Mobile stroke unit to our ER 22 with acute onset right-sided hemiparesis when discussing with her daughter starting at 6:45 PM this evening.  Mobile stroke unit documenting NIH of 21 on arrival at 1915 2 for altered mental status unable to follow commands or answer questions appropriately, facial palsy right, right arm and leg hemiparesis, complete sensory loss on the right, aphasia and neglect.  CT head without unremarkable thus patient was given IV tPA bolus and started on infusion when it appears around 20:00 patient became obtunded flaccid paralysis in all 4 extremities unable to answer or follow commands thus was concern for possible hemorrhagic conversion thus tPA infusion was discontinued.  Patient required intubation with rocuronium and 200 MCG of fentanyl for sedation.  Patient started on Cardene for blood pressure control SBP goal less than 180 which she maintained well under.     On arrival to our emergency department patient was already intubated and paralyzed this went directly to CT scanner for repeat CT head without which again was unremarkable left basal ganglia calcification although no sign of hemorrhagic conversion.   CTA head and neck also completed and no evidence for LVO.  Decision was made to continue and finish tPA infusion per stroke attending and patient loaded with Keppra 2 g given concern for possible seizure given her history.  Patient admitted to neuro ICU for further work-up including stroke versus seizure and LTM monitoring.        Past medical history significant for very similar episode in July 2014 although at that time she had acute onset left-sided hemiparesis and initially thought to be having a stroke then found to be unresponsive requiring intubation and sedation with propofol stroke work-up at that time unremarkable and patient was diagnosed with nonconvulsive seizures started on Keppra 500 mg twice daily and Depakote 500 mg twice daily which is still her home medication and dosage at this time, hypertension, hyperlipidemia.       Of note in care everywhere patient also documented August 2018 to have acute right hemiparesis status post IV TPA at that time for stroke concerns MRI completed no acute intracranial mass, hemorrhage or infarct although did demonstrate white matter signal abnormalities unchanged from previous exams consistent with small vessel ischemia.  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     December 2014-right-sided numbness--> MRI at that time no acute intracranial abnormality    Pain:  Pain Assessment  Pain Assessment: 0-10  Pain Level: 3    Assessment:      Pt presents with mild-moderate cognitive deficits characterized by difficulties with orientation, word associations, immediate recall, delayed recall, verbal reasoning, and deductive reasoning. Pt. Presents with no dysarthria and mild O/M deficits at this time characterized by left labial droop. ST to follow up and provide treatment to address noted deficits. Education provided. Recommendations:  Requires SLP Intervention: Yes     Goals:  Short-term Goals  Goal 1: Pt will state orientation information w/ 90% accuracy. Goal 2: Pt will complete word associations w/ 90% accuracy. Goal 3: Pt will complete verbal reasoning tasks w/ 90% accuracy. Goal 4: Pt will complete deductive reasoning tasks w/ 90% accuracy. Patient/family involved in developing goals and treatment plan: yes    Subjective:  General  Chart Reviewed: Yes  Family / Caregiver Present: No  Social/Functional History  Lives With: Alone  Active : Yes  Vision  Vision: Within Functional Limits  Hearing  Hearing: Exceptions to [facility]  Hearing Exceptions: Hard of hearing/hearing concerns        Cognition:   Orientation  Overall Orientation Status: Impaired (Not oriented to Place and JOHN. )  Attention  Attention: Within Functional Limits  Memory  Memory: Exceptions to [facility]  Short-term Memory: Mild (2/3 increased to 3/3. 3/3.)  Immediate Memory: Mild (3/3. 3/5. 2/3)  Problem Solving  Problem Solving: Exceptions to [facility]  Verbal Reasoning Skills: Mild (Antonyms: 2/3)  Abstract Reasoning  Abstract Reasoning: Within Functional Limits  Safety/Judgment  Safety/Judgment: Within Functional Limits  Deductive Reasoning: Moderate (2/4)  Word Associations: Mild (3/4)    Prognosis:  Speech Therapy Prognosis  Prognosis: Good  Individuals consulted  Consulted and agree with results and recommendations: Patient    Education:  Patient Education: yes  Patient Education Response: Verbalizes understanding          Therapy Time:   Individual Concurrent Group Co-treatment   Time In 0915         Time Out 0930         Minutes 15                Completed by: Shivani Mitchell  Clinician    Cosigned By: Mirella Rich S.CCC/SLP 4/20/2022 11:04 AM

## 2022-04-20 NOTE — H&P
[id number] [facility] Neurology   [address] [place] [address]    HISTORY AND PHYSICAL EXAMINATION            Date:   4/20/2022  Patient name:  Eliceo Jones  Date of admission:  4/17/2022  8:39 PM  MRN:   [id number]  Account:  [de-identified]  YOB: 1950  PCP:    Marshall Farrar MD  Room:   [address] [place] [address]  Code Status:    Full Code    Chief Complaint:     Chief Complaint   Patient presents with    Cerebrovascular Accident       History Obtained From:     Patient & EMR    History of Present Illness: The patient is a 80 y.o. Unavailable / unknown female with H/O seizure disorder (since 2014), chronic R hemiparesis, numbness with R foot drop, migraine headaches, HTN, HLD, DM, idiopathic painful neuropathy RLE, osteoporosis, who presents to the hospital via mobile stroke unit with worsening right sided weakness. Last known well was 6:45 PM on the day of arrival.  [facility] noted NIH score of 21. Patient was altered, unable to follow commands or answer questions appropriately, she had right facial droop, right-sided weakness, complete sensory loss on the right, right-sided neglect and aphasia. CT head was negative for bleed. Patient received IV t-PA bolus. After that patient became obtunded with flaccid paralysis in all 4 extremities. There was concern for possible hemorrhagic conversion; t-PA infusion was held. Patient was intubated and sedated. She was started on Cardene drip before being transferred to [facility][1] for neuro critical care admission. Patient was taking dual antiplatelets at home. Looking through previous medical records, patient was seen by our service on 7/18/2014 when she was brought with unresponsiveness. Patient required intubation due to agitation. Then she became hemiplegic on the left side that later developed into bilateral plegia. CT head and CTA head and neck were negative. She also received CT brain perfusion unremarkable for acute pathology.   With concern of subclinical seizures, patient was loaded with Keppra and continued on maintenance dose. There was some questionable history of prior stroke but prior MRI brain scans were negative for acute stroke. MRI C-spine showed subtle patchy increased cord signal, along with moderate canal stenosis at C5-6. She denied any neck or back pain. At that time, CSF work up, JOSH, ACE, copper & Lyme antibody - all were negative. It was thought that right hemiparesis was secondary to cervical spine myelopathy. Patient did not follow-up after that. She has H/O R foot drop; ambulates with a cane, does report falling. Uses a walker/wheelchair for long distance. As per Care Everywhere patient was seen by a neurologist, as OP, for her seizure disorder, which she was taking Keppra; later Depakene was added; she was on Topamax for chronic migraine headaches (unsure why she stopped taking it). On further questioning patient reported that she started having\" silent\" seizures since 2014. Seizures were described as starting with \"some feeling in head\" (she was unable to explain in words) that the seizure is coming, she will find a place to lay down; her whole body would get stiff, she will be able to hear but unable to respond. No tongue bite or incontinence was reported. Event will last for few minutes before she will come to gradually feeling tired. Patient recalled that she never had these episodes while outside the house; she lives alone so no one has witnessed her seizures. She reported being seizure-free for the last several years since being on seizure medications. Unsure when her last seizure was. She follows up with her PCP for medication refills. She was seen at outside facility several times, including in December 2014 with right-sided numbness; MRI was negative; was again seen in August 2018 with acute right-sided weakness, received IV tPA however MRI brain was negative for acute pathology.   Patient has history of idiopathic peripheral neuropathy, especially painful sensory neuropathy RLE. She was taking Neurontin 600 g 3 times daily at home. HOSPITAL COURSE: Patient arrived intubated, went directly to CT scan. CT head was unremarkable. CTA head and neck with no LVO; t-PA was restarted and patient was loaded with Keppra 2 g IVPB x1 with concern for possible subclinical seizure given her history of possible seizures. Patient was continued on aspirin and statin. Patient stayed stable while in neuro ICU. She was extubated on 4/19. Patient was able to ambulate with rolling walker with PT staff. She continus to have right sided weakness, worse than her baseline. Patient's status was changed to stepdown unit under neurology service on 4/20/2022. Past Medical History:     Past Medical History:   Diagnosis Date    CVA (cerebral infarction) ([facility] [address]     Hyperlipidemia     Hypertension         Past Surgical History:     Past Surgical History:   Procedure Laterality Date    APPENDECTOMY      BRONCHOSCOPY DIAGNOSTIC  7/24/2014         HYSTERECTOMY          Medications Prior to Admission:     Prior to Admission medications    Medication Sig Start Date End Date Taking? Authorizing Provider   clopidogrel (PLAVIX) 75 MG tablet Take 75 mg by mouth nightly   Yes Historical Provider, MD   gabapentin (NEURONTIN) 600 MG tablet Take 600 mg by mouth 3 times daily. Yes Historical Provider, MD   potassium chloride (KLOR-CON M) 20 MEQ extended release tablet Take 20 mEq by mouth 2 times daily   Yes Historical Provider, MD   fenofibrate (TRIGLIDE) 160 MG tablet Take 160 mg by mouth daily   Yes Historical Provider, MD   Pantoprazole Sodium (PROTONIX PO) Take  by mouth. Historical Provider, MD   acetaminophen (TYLENOL) 160 MG/5ML solution Take 20.3 mLs by mouth every 4 hours as needed for Fever.  7/28/14   Teresa Young DO   albuterol (PROVENTIL HFA;VENTOLIN HFA) 108 (90 BASE) MCG/ACT inhaler Inhale 6 puffs into the lungs every 6 hours as needed for Wheezing. 7/28/14   Viri Baljeet, DO   glucagon, rDNA, 1 MG SOLR injection Inject 1 mg into the muscle as needed for Low blood sugar (Blood glucose less than 70 mg/dL and patient NOT ALERT or NPO and does not have IV access. ). 7/28/14   Viri Baljeet, DO   glucose (GLUTOSE) 40 % GEL Take 15 g by mouth as needed. 7/28/14   Viri Baljeet, DO   insulin glargine (LANTUS) 100 UNIT/ML injection vial Inject 10 Units into the skin nightly. 7/28/14   Viri Baljeet, DO   insulin lispro (HUMALOG) 100 UNIT/ML injection vial Inject 0-12 Units into the skin every 6 hours. 7/28/14   Viri Baljeet, DO   niacin (NIASPAN) 500 MG CR tablet Take 1 tablet by mouth nightly. 7/28/14   Viri Baljeet, DO   potassium chloride (KAYCIEL) 20 MEQ/15ML (10%) solution Take 15 mLs by mouth daily. 7/28/14   Viri Baljeet, DO   levETIRAcetam (KEPPRA) 500 MG tablet Take 1 tablet by mouth 2 times daily. Patient taking differently: Take 1,000 mg by mouth 2 times daily  7/28/14   Viri Baljeet, DO   valproate (DEPAKENE) 250 MG/5ML syrup Take 10 mLs by mouth 2 times daily. 7/28/14   Viri Baljeet, DO        Allergies:     Patient has no known allergies. Social History:     Tobacco:    reports that she has never smoked. She has never used smokeless tobacco.  Alcohol:      reports no history of alcohol use. Drug Use:  reports no history of drug use. Family History:     No family history on file.     Review of Systems:     ROS:  Constitutional  Negative for fever and chills    HEENT  Negative for ear discharge, ear pain, nosebleed    Eyes  Negative for photophobia, pain and discharge    Respiratory  Negative for hemoptysis and sputum    Cardiovascular  Negative for orthopnea, claudication and PND    Gastrointestinal  Negative for abdominal pain, diarrhea, blood in stool    Musculoskeletal  + chronic R sided weakness; R foot drop   Skin  Negative for rash or itching    Endo/heme/allergies Negative for polydipsia, environmental allergy    Psychiatric/behavioral  Negative for suicidal ideation.  Patient is not anxious        Physical Exam:   /71   Pulse 78   Temp 98.7 °F (37.1 °C) (Oral)   Resp 20   Ht 5' 5\" (1.651 m)   Wt 133 lb 9.6 oz (60.6 kg)   SpO2 97%   BMI 22.23 kg/m²   Temp (24hrs), Av °F (36.7 °C), Min:97 °F (36.1 °C), Max:98.7 °F (37.1 °C)    Recent Labs     22  0840 22  1308 22  1718 22  2018   POCGLU 193* 232* 184* 262*       Intake/Output Summary (Last 24 hours) at 2022 1150  Last data filed at 2022 0400  Gross per 24 hour   Intake 103.66 ml   Output 1600 ml   Net -1496.34 ml         General Appearance:    Head: Normocephalic, atraumatic  Eyes: Extraocular movements intact  Lungs: Respirations unlabored  ENT: Normal external ear canals, no sinus tenderness  Heart: Regular rate rhythm  Abdomen: No tenderness  Extremities: No cyanosis or edema, 2+ pulses  Skin: Intact, normal skin color      NEUROLOGIC EXAMINATION  GENERAL  Appears comfortable and in no distress   HEENT  NC/ AT   NECK  Supple and no bruits heard   MENTAL STATUS:  Alert, oriented, intact memory, no confusion, normal speech, normal language, no hallucination or delusion   CRANIAL NERVES: II     -      Visual fields intact to confrontation  III,IV,VI -  EOMs full, no afferent defect, no JOE, no ptosis  V     -     Normal facial sensation  VII    -     Normal facial symmetry  VIII   -     R side hearing loss  IX,X -     Symmetrical palate  XI    -     Asymmetrical shoulder shrug  XII   -     Midline tongue, no atrophy    MOTOR FUNCTION:  Strength: Chronic R hemiparesis   R foot drop; unable to make a fist with R hand   RUE 3-/5 & RLE 2/5   Normal bulk   Diminished tone R side    No tremors   SENSORY FUNCTION:  Diminished sensations R knee & distally, R elbow & distally   CEREBELLAR FUNCTION:  Intact fine motor control over LUE   REFLEX FUNCTION:  Ankle reflexes - absent Patellar reflexes 2/4   R plantar - mute   STATION and GAIT  Not tested       Investigations:      Laboratory Testing:  Recent Labs     04/18/22  0111 04/19/22  0541 04/20/22  0550   WBC 8.5 8.7 10.4   RBC 3.53* 3.41* 3.13*   HGB 11.3* 10.8* 9.8*   HCT 32.1* 31.7* 29.7*    162 161     Recent Labs     04/19/22  0657 04/19/22  1437 04/20/22  0550    143 144   K 5.4* 4.4 3.7   * 111* 112*   CO2 16* 19* 20   BUN 17 16 18   CREATININE 0.47* 0.50 0.47*   GLUCOSE 245* 222* 231*     Recent Results (from the past 24 hour(s))   POC Glucose Fingerstick    Collection Time: 04/19/22  1:08 PM   Result Value Ref Range    POC Glucose 232 (H) 65 - 105 mg/dL   Basic Metabolic Panel    Collection Time: 04/19/22  2:37 PM   Result Value Ref Range    Glucose 222 (H) 70 - 99 mg/dL    BUN 16 8 - 23 mg/dL    CREATININE 0.50 0.50 - 0.90 mg/dL    Calcium 8.9 8.6 - 10.4 mg/dL    Sodium 143 135 - 144 mmol/L    Potassium 4.4 3.7 - 5.3 mmol/L    Chloride 111 (H) 98 - 107 mmol/L    CO2 19 (L) 20 - 31 mmol/L    Anion Gap 13 9 - 17 mmol/L    GFR Non-African American >60 >60 mL/min    GFR African American >60 >60 mL/min    GFR Comment         POC Glucose Fingerstick    Collection Time: 04/19/22  5:18 PM   Result Value Ref Range    POC Glucose 184 (H) 65 - 105 mg/dL   POC Glucose Fingerstick    Collection Time: 04/19/22  8:18 PM   Result Value Ref Range    POC Glucose 262 (H) 65 - 105 mg/dL   CBC    Collection Time: 04/20/22  5:50 AM   Result Value Ref Range    WBC 10.4 3.5 - 11.3 k/uL    RBC 3.13 (L) 3.95 - 5.11 m/uL    Hemoglobin 9.8 (L) 11.9 - 15.1 g/dL    Hematocrit 29.7 (L) 36.3 - 47.1 %    MCV 94.9 82.6 - 102.9 fL    MCH 31.3 25.2 - 33.5 pg    MCHC 33.0 28.4 - 34.8 g/dL    RDW 13.3 11.8 - 14.4 %    Platelets 483 887 - 577 k/uL    MPV 9.9 8.1 - 13.5 fL    NRBC Automated 0.0 0.0 per 100 WBC   Basic Metabolic Panel w/ Reflex to MG    Collection Time: 04/20/22  5:50 AM   Result Value Ref Range    Glucose 231 (H) 70 - 99 mg/dL BUN 18 8 - 23 mg/dL    CREATININE 0.47 (L) 0.50 - 0.90 mg/dL    Calcium 8.5 (L) 8.6 - 10.4 mg/dL    Sodium 144 135 - 144 mmol/L    Potassium 3.7 3.7 - 5.3 mmol/L    Chloride 112 (H) 98 - 107 mmol/L    CO2 20 20 - 31 mmol/L    Anion Gap 12 9 - 17 mmol/L    GFR Non-African American >60 >60 mL/min    GFR African American >60 >60 mL/min    GFR Comment           Lab Results   Component Value Date    ALT 56 (H) 07/23/2014    AST 36 (H) 07/23/2014    CO2 20 04/20/2022    TSH 0.65 07/19/2014    INR 1.0 04/17/2022    SVRWTCSJ96 1096 (H) 07/26/2014    FOLATE 15.3 07/26/2014    LABA1C 10.3 (H) 04/17/2022     Lab Results   Component Value Date    CHOL 202 (H) 04/17/2022    CHOL 155 07/20/2014     Lab Results   Component Value Date    TRIG 164 (H) 04/17/2022    TRIG 307 (H) 07/20/2014     Lab Results   Component Value Date    HDL 55 04/17/2022    HDL 27 (L) 07/20/2014     Lab Results   Component Value Date    LDLCHOLESTEROL 114 04/17/2022    LDLCHOLESTEROL 67 07/20/2014     Lab Results   Component Value Date    VLDL NOT REPORTED 07/20/2014     Lab Results   Component Value Date    CHOLHDLRATIO 3.7 04/17/2022    CHOLHDLRATIO 5.7 (H) 07/20/2014 4/17/2022 21:35 4/18/2022 01:10   Levetiracetam 14    Valproic Acid Lvl  <3 (L)   Valproic Acid, Free  <0.4 (L)         Imaging/Diagnostics:  CT HEAD (4/17/2022): No acute intracranial abnormality     MRI BRAIN W/WO (4/182022): Mild chronic microangiopathy    MRI C-SPINE: Ordered    CTA HEAD & NECK (4/17/2022): Unremarkable    ECHO (4/20/2022): EF 61%. LA moderately dilated. Bubble study attempted x 4. Unable to rule out intra-atrial shunt, due to technical limitations of study. No shunt seen by color Doppler. Mild MR & AI. Elevated RA filling pressure    LTME: Awaited              MRI C-SPINE (7/2014): C5-C6: moderate spinal canal stenosis        Assessment :    Recurrent worsening R hemiparesis, s/p IV t-PA.  MRI brain - negative; CTA head/neck - no LVO      H/O chronic R hemiparesis (was seen by our team in 7/2014; weakness was attributed to cervical spinal stenosis). Pt didn't F/U    Pt reported chronic R sided weakness with R foot drop for the past several years    H/O RLE idiopathic painful neuropathy (since 2018)    H/O \"silent\" brief seizures since 2014. Seizures were described as starting with \"some feeling in head\" (she was unable to explain in words) that the seizure is coming, she will find a place to lay down; whole body stiffening, able to hear but unable to respond. Denied tongue bite or incontinence. After few minutes she will come to gradually, feeling tired. Patient recalled that she never had these episodes while outside the house; she lives alone so no one has witnessed her seizures. She reported being seizure-free for the last several years since being on seizure medications     Chronic migraine headaches    DM; on insulin    Comorbid conditions - HTN, HLD, osteoporosis, GERD, depression          Plan:   Case was discussed with Dr. Maye Mcclendon; MRI C-spine - ordered    LTME - awaited    Valproic acid, total & free levels - orered    Continue Keppra 1 g BID PO & Depakene 500 mg Q12Hr PO    Continue ASA 81 mg QD, Plavix 75 mg QD, Lipitor 40 mg HS & fenofibrate 160 mg QD    Will start on Topamax 25 mg BID for headaches     Continue Neurontin 600 TID    DVT Px; Lovenox 40 mg SC QD    PT/OT    Patient is admitted as inpatient status because of co-morbidities listed above, severity of signs and symptoms as outlined, requirement for current medical therapies and most importantly because of direct risk to patient if care not provided in a hospital setting.     D/C planning      Consultations:  IP CONSULT TO NEUROCRITICAL CARE  IP CONSULT TO STROKE TEAM  IP CONSULT TO PHARMACY  PHARMACY TO CHANGE BASE FLUIDS  IP CONSULT TO NEUROLOGY  IP CONSULT TO PHYSICAL MEDICINE ERIN Ding - CNP  4/20/2022  11:50 AM    Copy sent to Dr. Suzan St MD    Please note that this note was generated using a voice recognition dictation software. Although every effort was made to ensure the accuracy of this automated transcription, some errors in transcription may have occurred.

## 2022-04-20 NOTE — PROGRESS NOTES
Physical Therapy  Facility/Department: [address]  Physical Therapy Initial Assessment    Name: Raya Kramer  : 1950  MRN: [id number]  Date of Service: 2022  Chief Complaint   Patient presents with    Cerebrovascular Accident       Discharge Recommendations:    Further therapy recommended at discharge. The patient should be able to tolerate at least 3 hours of therapy per day over 5 days or 15 hours over 7 days. This patient may benefit from a Physical Medicine and Rehab consult. PT Equipment Recommendations  Equipment Needed: No      Patient Diagnosis(es): The encounter diagnosis was Cerebrovascular accident (CVA), unspecified mechanism ([facility] [address] Past Medical History:  has a past medical history of CVA (cerebral infarction) ([facility] [address] Hyperlipidemia, and Hypertension. Past Surgical History:  has a past surgical history that includes Appendectomy; Hysterectomy; and Bronchoscopy diagnostic (2014). Assessment   Body Structures, Functions, Activity Limitations Requiring Skilled Therapeutic Intervention: Decreased functional mobility ; Decreased ROM; Decreased strength;Decreased safe awareness;Decreased endurance;Decreased balance; Increased pain;Decreased posture  Assessment: Pt required Edi to perform sit to stand transfer with RW, pt unable to ambulate this date due to R upper and lower extremity weakness, transfer to bedside chair performed with ginny chavez. Pt is a high fall risk and will require extensive physical therapy to maximize independence.   Therapy Prognosis: Good  Decision Making: High Complexity  Requires PT Follow-Up: Yes  Activity Tolerance  Activity Tolerance: Patient tolerated treatment well     Plan   Plan  Plan: Other (see comment) (6-7x/week)  Current Treatment Recommendations: Strengthening,ROM,Balance training,Functional mobility training,Gait training,Neuromuscular re-education,Stair training,Transfer training,Endurance training,Patient/Caregiver education & training,Home exercise program  Safety Devices  Type of Devices: Chair alarm in place,Call light within reach,Left in chair,Gait belt,Nurse notified,Patient at risk for falls  Restraints  Restraints Initially in Place: No     Restrictions  Restrictions/Precautions  Required Braces or Orthoses?: No  Position Activity Restriction  Other position/activity restrictions: SBP <180; Bedrest until seen by PT/ OT following TPA     Subjective   General  Patient assessed for rehabilitation services?: Yes  Response To Previous Treatment: Not applicable  Family / Caregiver Present: No  Follows Commands: Within Functional Limits  Subjective  Subjective: RN and pt in agreement for PT eval; pt supine in bed upon PT arrival, pt pleasant and cooperative throughout session         Social/Functional History  Social/Functional History  Lives With: Alone  Type of Home: House  Home Layout: One level  Home Access: Level entry  Bathroom Shower/Tub: Tub/Shower unit  Bathroom Toilet: Standard  Bathroom Equipment: Grab bars in shower  Home Equipment: Rollator,Cane  ADL Assistance: Independent  Homemaking Assistance: Independent  Homemaking Responsibilities: Yes  Ambulation Assistance: Independent  Transfer Assistance: Independent  Active : Yes  Mode of Transportation: Car  Occupation: Retired  Leisure & Hobbies: Knitting, puzzles  Additional Comments: Pt reports daughter is able to provide prn assist upon discharge  Vision/Hearing  Vision Exceptions: Wears glasses at all times  Hearing: Exceptions to [facility]  Hearing Exceptions: Hard of hearing/hearing concerns;Right hearing aid    Cognition   Orientation  Overall Orientation Status: Within Functional Limits  Cognition  Overall Cognitive Status: Exceptions  Arousal/Alertness: Appropriate responses to stimuli  Attention Span: Difficulty dividing attention; Difficulty attending to directions  Safety Judgement: Decreased awareness of need for assistance;Decreased awareness of need for safety  Insights: Decreased awareness of deficits  Initiation: Requires cues for some  Sequencing: Requires cues for some     Objective        Joint Mobility  ROM RLE: PROM WFL  ROM LLE: WFL  ROM RUE: PROM WFL  ROM LUE: WFL  Strength RLE  Strength RLE: Exception  R Hip Flexion: 3+/5  R Knee Flexion: 2+/5  R Knee Extension: 3-/5  R Ankle Dorsiflexion: 1/5 (pt reports a history of R drop foot from prior CVA)  R Ankle Plantar flexion: 2+/5  Strength LLE  Strength LLE: Exception  L Hip Flexion: 4/5  L Knee Flexion: 4/5  L Knee Extension: 4/5  L Ankle Dorsiflexion: 4/5  L Ankle Plantar Flexion: 4/5  Strength RUE  Strength RUE: Exception  Comment: See OT assessment for full evaluation- grossly 2/5 at shoulder, 3/5 at elbow, wrist, hand  Strength LUE  Strength LUE: WFL        Bed mobility  Supine to Sit: Contact guard assistance  Sit to Supine:  (Did not assess- pt seated in bedside chair upon writer's exit)  Scooting: Stand by assistance  Comment: HOB elevated to ~30 degrees with use of bed rail  Transfers  Sit to Stand: Minimal Assistance;Contact guard assistance  Stand to sit: Minimal Assistance  Bed to Chair: Dependent/Total (transferred to bedside chair with ginny stedy)  Comment: STS attempted with use of RW on first attempt requiring Edi, pt demonstrating posterior/ L lateral lean with standing attempt requiring min-mod from writer to correct; CGA sit to stand performed in ginny stedy  Ambulation  Comments: Attempted ambulation with use of RW; pt unable to bear weight through RLE due to weakness, pt required max tactile assistance to maintain R knee extension with weightbearing to prevent knee buckling.   More Ambulation?: No  Stairs/Curb  Stairs?: No     Balance  Posture: Fair  Sitting - Static: Fair;+  Sitting - Dynamic: Fair;+  Standing - Static: Fair;-  Standing - Dynamic: Poor;+  Comments: standing balance assessed with RW; pt able to sit EOB with supervision      AM-PAC Score     AM-PAC Inpatient Mobility without Stair Climbing Raw Score : 14 (04/20/22 1151)  AM-PAC Inpatient without Stair Climbing T-Scale Score : 40.85 (04/20/22 1151)  Mobility Inpatient CMS 0-100% Score: 53.33 (04/20/22 1151)  Mobility Inpatient without Stair CMS G-Code Modifier : CK (04/20/22 1151)       Goals  Short Term Goals  Time Frame for Short term goals: 14  Short term goal 1: Pt to perform bed mobility from flat surface independently  Short term goal 2: Demonstrate functional transfers independently  Short term goal 3: Pt to ambulate 100ft w/ RW with supervision  Short term goal 4: Tolerate 45 minutes of therapy to demo increased endurance  Short term goal 5: Demonstrate dynamic standing balance of good - to decrease fall risk  Patient Goals   Patient goals :  To get better       Therapy Time   Individual Concurrent Group Co-treatment   Time In 0832         Time Out 0918         Minutes 46         Timed Code Treatment Minutes: [address] [facility]       Romana Brownie, PT

## 2022-04-20 NOTE — PROGRESS NOTES
Daily Progress Note  Neuro Critical Care    Patient Name: Nika Khan  Patient : 1950  Room/Bed: 0527/0527-01  Code Status: FULL CODE   Allergies: No Known Allergies    CHIEF COMPLAINT:     Acute onset right sided hemiparesis at 6:45 PM 22 witness by patients daughter   INTERVAL HISTORY    Initial Presentation (Admitted 22 @ 7:25 PM ):  The patient is [phone] y.o.  female who presents via Mobile stroke unit to our ER 22 with acute onset right-sided hemiparesis when discussing with her daughter starting at 6:45 PM this evening.  Mobile stroke unit documenting NIH of 21 on arrival at 1915 2 for altered mental status unable to follow commands or answer questions appropriately, facial palsy right, right arm and leg hemiparesis, complete sensory loss on the right, aphasia and neglect.  CT head without unremarkable thus patient was given IV tPA bolus and started on infusion when it appears around 20:00 patient became obtunded flaccid paralysis in all 4 extremities unable to answer or follow commands thus was concern for possible hemorrhagic conversion thus tPA infusion was discontinued.  Patient required intubation with rocuronium and 200 MCG of fentanyl for sedation.  Patient started on Cardene for blood pressure control SBP goal less than 180 which she maintained well under.     On arrival to our emergency department patient was already intubated and paralyzed this went directly to CT scanner for repeat CT head without which again was unremarkable left basal ganglia calcification although no sign of hemorrhagic conversion.   CTA head and neck also completed and no evidence for LVO.  Decision was made to continue and finish tPA infusion per stroke attending and patient loaded with Keppra 2 g given concern for possible seizure given her history.  Patient admitted to neuro ICU for further work-up including stroke versus seizure and LTM monitoring.        Past medical history significant for very similar episode in July 2014 although at that time she had acute onset left-sided hemiparesis and initially thought to be having a stroke then found to be unresponsive requiring intubation and sedation with propofol stroke work-up at that time unremarkable and patient was diagnosed with nonconvulsive seizures started on Keppra 500 mg twice daily and Depakote 500 mg twice daily which is still her home medication and dosage at this time, hypertension, hyperlipidemia.       Of note in care everywhere patient also documented August 2018 to have acute right hemiparesis status post IV TPA at that time for stroke concerns MRI completed no acute intracranial mass, hemorrhage or infarct although did demonstrate white matter signal abnormalities unchanged from previous exams consistent with small vessel ischemia.  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     December 2014-right-sided numbness--> MRI at that time no acute intracranial abnormality     Hospital Course:   4/18-patient arouses easily when sedation is weaned following commands continues to have persistent right lower extremity weakness although not reported by daughter to walk with a walker at baseline with weakness in this leg. No cuff leak thus patient was given 10 mg IV Decadron. Unable to tolerate extubation  4/19: Patient sitting up in bed comfortable following simple and complex commands. Hemodynamically stable -154, heart rate 57-78 T-max 98.5. We will resume patient's home Neurontin 600 mg 3 times daily. Patient also noted to be on aspirin and Plavix prior to admission thus will continue Plavix 75 mg. Last 24h:   No acute events overnight. Patient tolerated extubation well yesterday evening. Working with PT at bedside this AM patient able to get up out of bed walking with a rolling walker. Continues to have right leg>arm weakness worse then her baseline.  Patient noted to be very hard of hearing in right ear. Blood sugars continue to trend up and given that she has started p.o. diet we will increase her home Lantus from 10 to 20units nightly and continue Sliding scale replacement.      CURRENT MEDICATIONS:  SCHEDULED MEDICATIONS:   levetiracetam  1,000 mg IntraVENous Q12H    clopidogrel  75 mg Oral Daily    gabapentin  600 mg Oral TID    lansoprazole  30 mg Per NG tube QAM AC    atorvastatin  40 mg Oral Nightly    insulin lispro  0-6 Units SubCUTAneous TID WC    insulin lispro  0-3 Units SubCUTAneous Nightly    valproic acid  500 mg Oral 2 times per day    insulin glargine  10 Units SubCUTAneous Nightly    potassium bicarb-citric acid  20 mEq Oral Daily    enoxaparin  40 mg SubCUTAneous Daily    aspirin  81 mg Oral Daily    Or    aspirin  300 mg Rectal Daily     CONTINUOUS INFUSIONS:   dextrose      sodium chloride Stopped (22 1727)    niCARdipine       PRN MEDICATIONS:   glucose, dextrose, glucagon (rDNA), dextrose, potassium chloride **OR** potassium alternative oral replacement **OR** potassium chloride, acetaminophen, sodium chloride flush, albuterol sulfate HFA, ondansetron **OR** ondansetron, polyethylene glycol, perflutren lipid microspheres, labetalol    VITALS:  Temperature Range: Temp: 98.7 °F (37.1 °C) Temp  Av °F (36.7 °C)  Min: 97 °F (36.1 °C)  Max: 98.7 °F (37.1 °C)  BP Range: Systolic (43UQW), BUK:804 , Min:93 , OSC:657     Diastolic (66PKZ), QOO:94, Min:61, Max:92    Pulse Range: Pulse  Av.2  Min: 55  Max: 107  Respiration Range: Resp  Av.1  Min: 16  Max: 22  Current Pulse Ox: SpO2: 90 %  24HR Pulse Ox Range: SpO2  Av.6 %  Min: 90 %  Max: 100 %  Patient Vitals for the past 12 hrs:   BP Temp Temp src Pulse Resp SpO2 Weight   22 0500 123/67   70   133 lb 9.6 oz (60.6 kg)   22 0400 132/70 98.7 °F (37.1 °C) Oral 70 16     22 0300 134/70   73      22 0200 124/64   78      22 0100 130/61   74      04/20/22 0000 130/69 98.3 °F (36.8 °C) Oral 82 16 90 %    04/19/22 2100 (!) 148/79   85  97 %    04/19/22 2000 (!) 148/78 98.5 °F (36.9 °C) Oral 94 18 97 %    04/19/22 1900 (!) 143/79   102  99 %      Estimated body mass index is 22.23 kg/m² as calculated from the following:    Height as of this encounter: 5' 5\" (1.651 m). Weight as of this encounter: 133 lb 9.6 oz (60.6 kg).  []<16 Severe malnutrition  []1616.99 Moderate malnutrition  []1718.49 Mild malnutrition  [x]18.524.9 Normal  []2529.9 Overweight (not obese)  []3034.9 Obese class 1 (Low Risk)  []3539.9 Obese class 2 (Moderate Risk)  []?40 Obese class 3 (High Risk)    RECENT LABS:   Lab Results   Component Value Date    WBC 8.7 04/19/2022    HGB 10.8 (L) 04/19/2022    HCT 31.7 (L) 04/19/2022     04/19/2022    CHOL 202 (H) 04/17/2022    TRIG 164 (H) 04/17/2022    HDL 55 04/17/2022    ALT 56 (H) 07/23/2014    AST 36 (H) 07/23/2014     04/19/2022    K 4.4 04/19/2022     (H) 04/19/2022    CREATININE 0.50 04/19/2022    BUN 16 04/19/2022    CO2 19 (L) 04/19/2022    TSH 0.65 07/19/2014    INR 1.0 04/17/2022    LABA1C 10.3 (H) 04/17/2022     24 HOUR INTAKE/OUTPUT:    Intake/Output Summary (Last 24 hours) at 4/20/2022 6278  Last data filed at 4/20/2022 0400  Gross per 24 hour   Intake 216.74 ml   Output 1600 ml   Net -1383.26 ml       IMAGING:   MRI brain with and without 4/18/2022  Impression   1. No acute intracranial abnormality. 2. Mild chronic white matter microvascular ischemic changes. 3. Left mastoid effusion.          1.) CT brain without contrast: Reviewed at 8:50 PM 4/17/22  Impression   1. No acute intracranial abnormality. 2. No evidence of large vessel occlusion or hemodynamically significant   stenosis involving the head and neck arteries.            2.) CTA Head/Neck: 4/17/22  Impression   1. No acute intracranial abnormality.    2. No evidence of large vessel occlusion or hemodynamically significant   stenosis involving the head and neck arteries.      MRI Cervical spine W/WO 7/25/2014  IMPRESSION:    1. Degenerative changes most pronounced at C5-C6 where there is moderate    narrowing of spinal canal, severe narrowing of the right neuroforamen and    minimal narrowing of left neuroforamen. 2. Subtle increased intramedullary T2 signal intensity at C6-C7 level    with patchy enhancement. Finding is nonspecific and may represent an    infectious or inflammatory process. Suggest clinical correlation and    follow up imaging in 2-3 months.      MRI brain WO 7/19/2014      IMPRESSION:      1. STABLE WHITE MATTER CHANGES, PROBABLY MOST CONSISTENT WITH CHRONIC    MICROVASCULAR ISCHEMIC CHANGE    2. NO EVIDENCE OF ACUTE INFARCTION OR OTHER ACUTE INTRACRANIAL    ABNORMALITY. 3. PARANASAL SINUS INFLAMMATORY CHANGES   4. CHRONIC LEFT MASTOID INFLAMMATORY CHANGE         Labs and Images reviewed with:  [x] Dr. Srinivas Lentz. [facility]        PHYSICAL EXAM       CONSTITUTIONAL:  Well developed, well nourished, alert and oriented x 3, in no acute distress. GCS 15. Nontoxic. No dysarthria. No aphasia.  Patient severely hard of hearing in right ear at baseline    HEAD:  normocephalic, atraumatic    EYES:  PERRLA, EOMI.   ENT:  moist mucous membranes   NECK:  supple, symmetric   LUNGS:  Equal air entry bilaterally   CARDIOVASCULAR:  normal s1 / s2, RRR, distal pulses intact   ABDOMEN:  Soft, no rigidity   NEUROLOGIC:  Mental Status:  A & O x3,awake             Cranial Nerves:    cranial nerves II-XII are grossly intact    Motor Exam:    Drift:  present - right leg and minimal right arm   Tone:  normal    LUE 5/5 LLE 5/5   RUE 4-/5 RLE 3/5     Sensory:    Touch:    Right Upper Extremity:  normal  Left Upper Extremity:  normal  Right Lower Extremity:  abnormal decreased  Left Lower Extremity:  normal    Deep Tendon Reflexes:    Right Bicep:  1+  Left Bicep:  1+  Right Knee:  1+  Left Knee:  1+    Plantar Response:  Right: downgoing  Left:  downgoing    Clonus:  absent  Nicholson's:  absent     NIH Stroke Scale Total (if not done complete detailed one below):    1a.  Level of consciousness:  0 - alert; keenly responsive  1b. Level of consciousness questions:  0 - answers both questions correctly  1c. Level of consciousness questions:  0 - performs both tasks correctly  2. Best Gaze:  0 - normal  3. Visual:  0 - no visual loss  4. Facial Palsy:  0 - normal symmetric movement  5a. Motor left arm:  0 - no drift, limb holds 90 (or 45) degrees for full 10 seconds  5b. Motor right arm:  1 - drift, limb holds 90 (or 45) degrees but drifts down before full 10 seconds: does not hit bed  6a. Motor left le - no drift; leg holds 30 degree position for full 5 seconds  6b. Motor right leg:  3 - no effort against gravity; leg falls to bed immediately  7. Limb Ataxia:  0 - absent  8. Sensory:  1 - mild to moderate sensory loss; patient feels pinprick is less sharp or is dull on the affected side; there is a loss of superficial pain with pinprick but patient is aware of being touched   9. Best Language:  0 - no aphasia, normal  10. Dysarthria:  0 - normal  11. Extinction and Inattention:  0 - no abnormality  TOTAL: 5    DRAINS:  [x] There are no drains for Neuro Critical Care to monitor at this time.      ASSESSMENT AND PLAN:     ASSESSMENT:      This is a 76 y.o. female who presents via mobile stroke unit 2022 after receiving IV tPA for acute onset altered mental status and right-sided hemiparesis.  Patient's past medical history significant for seizure on Keppra 500 mg twice daily and Depakote 500 mg twice daily, hypertension and hyperlipidemia.  Patient intubated and paralyzed with rocuronium prior to arrival to the emergency department.  Given patient's altered mental status and right-sided hemiparesis it is most likely that she had a seizure with Paulo's paralysis versus acute CVA.  Patient mated to neuro ICU and will obtain MRI brain with and without and initiate LTME to rule out epileptiform activity.  Patient was loaded with 2 g Keppra and will continue Depacon 500 mg twice daily.     Patient care will be discussed with attending, will reevaluate patient along with attending.      PLAN/MEDICAL DECISION MAKING:     NEUROLOGIC:  - Imaging   CT head WO and CTA head and neck completed in ER-unremarkable  FU MRI brain W/WO   - continue home Asa 81mg and plavix 75mg Daily   - at home on fenofibrate 160mg daily current chol total-202,LDL-114, Trig-164   - AEDs   Loaded keppra 2 grams 22 @23:21   Continue depakene 500mg BID and change keppra to 1gram BID PO   Valproic acid level on arrival < 3  keppra level 14   - Neuro checks per protocol transfer to neuro stepdown today      CARDIOVASCULAR:  - Goal SBP <180  - started on IV cardene by MSU can continue to titrate as needed  -PRN Labetalol 10mg Q4 hours for SBP>180  - Continue telemetry  ECHO 2014-->will repeat this admission with bubble study   -EF >65%, mild LVH, grade 1 DD     PULMONARY:  - Vent Settings:  - Daily AB.420/32.5/158.3/21.1   CXR 22  Impression   Status post ET tube placement in good position with mild left basilar   atelectasis         RENAL/FLUID/ELECTROLYTE:  - BUN 18/ Creatinine .47  - Urine output strict intake and output   - IVF: 75 CC/hr NaCl   - Replace electrolytes PRN  - Daily BMP     GI/NUTRITION:  NUTRITION:  Diet NPO  - Bowel regimen: glycolax   - GI prophylaxis: Protonix 40 PO      ID:  - Tmax 98.7  - wbc 10.4  - Continue to monitor for fevers  - Daily CBC     HEME:   - H&H 9.8/29.7  - Platelets 161  - Daily CBC     ENDOCRINE:  - Continue to monitor blood glucose, goal <180  - A1c 10.3 22, lipid panel , trig 164  Given uncontrolled diabetes will continue home fenofibrate 160mg and lipitor 40   -on lantus 10 at home and humalog   POCT glucose checks with low intensity sliding scale will hold lantus until patient is able to tolerate a diet.        OTHER:  - PT/OT/ST      PROPHYLAXIS:  Stress ulcer: PPI     DVT PROPHYLAXIS:  - SCD sleeves - Thigh High   - ALLIE stockings - Thigh High  - No chemoprophylaxis anticoagulation at this time post IV TPA 24 hours         DISPOSITION:    [x] OK for out of ICU from Neuro Critical Care standpoint will discuss with general neurology and transfer to there service today 4/20/22    We will continue to follow along. For any changes in exam or patient status please contact Neuro Critical Care.       Michael Buckley MD   PGY-3 Neurology Resident   Neuro Critical Care  Pager [phone]  4/20/2022     6:33 AM

## 2022-04-21 ENCOUNTER — APPOINTMENT (OUTPATIENT)
Dept: MRI IMAGING | Age: 72
DRG: 882 | End: 2022-04-21
Payer: COMMERCIAL

## 2022-04-21 PROBLEM — R53.1 RIGHT SIDED WEAKNESS: Status: ACTIVE | Noted: 2022-04-17

## 2022-04-21 LAB
ANION GAP SERPL CALCULATED.3IONS-SCNC: 9 MMOL/L (ref 9–17)
BUN BLDV-MCNC: 17 MG/DL (ref 8–23)
CALCIUM SERPL-MCNC: 8.5 MG/DL (ref 8.6–10.4)
CHLORIDE BLD-SCNC: 104 MMOL/L (ref 98–107)
CO2: 24 MMOL/L (ref 20–31)
CREAT SERPL-MCNC: 0.51 MG/DL (ref 0.5–0.9)
GFR AFRICAN AMERICAN: >60 ML/MIN
GFR NON-AFRICAN AMERICAN: >60 ML/MIN
GFR SERPL CREATININE-BSD FRML MDRD: ABNORMAL ML/MIN/{1.73_M2}
GLUCOSE BLD-MCNC: 134 MG/DL (ref 65–105)
GLUCOSE BLD-MCNC: 142 MG/DL (ref 65–105)
GLUCOSE BLD-MCNC: 198 MG/DL (ref 65–105)
GLUCOSE BLD-MCNC: 75 MG/DL (ref 65–105)
GLUCOSE BLD-MCNC: 76 MG/DL (ref 70–99)
HCT VFR BLD CALC: 29.8 % (ref 36.3–47.1)
HEMOGLOBIN: 10.3 G/DL (ref 11.9–15.1)
MAGNESIUM: 1.9 MG/DL (ref 1.6–2.6)
MCH RBC QN AUTO: 31.7 PG (ref 25.2–33.5)
MCHC RBC AUTO-ENTMCNC: 34.6 G/DL (ref 28.4–34.8)
MCV RBC AUTO: 91.7 FL (ref 82.6–102.9)
NRBC AUTOMATED: 0 PER 100 WBC
PDW BLD-RTO: 13 % (ref 11.8–14.4)
PLATELET # BLD: 168 K/UL (ref 138–453)
PMV BLD AUTO: 9.4 FL (ref 8.1–13.5)
POTASSIUM SERPL-SCNC: 2.8 MMOL/L (ref 3.7–5.3)
POTASSIUM SERPL-SCNC: 3.7 MMOL/L (ref 3.7–5.3)
RBC # BLD: 3.25 M/UL (ref 3.95–5.11)
SODIUM BLD-SCNC: 137 MMOL/L (ref 135–144)
VALPROIC ACID, FREE: 9.4 UG/ML (ref 7–23)
WBC # BLD: 8.6 K/UL (ref 3.5–11.3)

## 2022-04-21 PROCEDURE — 36415 COLL VENOUS BLD VENIPUNCTURE: CPT

## 2022-04-21 PROCEDURE — 6370000000 HC RX 637 (ALT 250 FOR IP): Performed by: NURSE PRACTITIONER

## 2022-04-21 PROCEDURE — 99232 SBSQ HOSP IP/OBS MODERATE 35: CPT | Performed by: PSYCHIATRY & NEUROLOGY

## 2022-04-21 PROCEDURE — 84132 ASSAY OF SERUM POTASSIUM: CPT

## 2022-04-21 PROCEDURE — 2580000003 HC RX 258: Performed by: STUDENT IN AN ORGANIZED HEALTH CARE EDUCATION/TRAINING PROGRAM

## 2022-04-21 PROCEDURE — 72141 MRI NECK SPINE W/O DYE: CPT

## 2022-04-21 PROCEDURE — 6370000000 HC RX 637 (ALT 250 FOR IP): Performed by: STUDENT IN AN ORGANIZED HEALTH CARE EDUCATION/TRAINING PROGRAM

## 2022-04-21 PROCEDURE — 85027 COMPLETE CBC AUTOMATED: CPT

## 2022-04-21 PROCEDURE — 2060000000 HC ICU INTERMEDIATE R&B

## 2022-04-21 PROCEDURE — 80048 BASIC METABOLIC PNL TOTAL CA: CPT

## 2022-04-21 PROCEDURE — 6360000002 HC RX W HCPCS: Performed by: STUDENT IN AN ORGANIZED HEALTH CARE EDUCATION/TRAINING PROGRAM

## 2022-04-21 PROCEDURE — 95816 EEG AWAKE AND DROWSY: CPT

## 2022-04-21 PROCEDURE — 97530 THERAPEUTIC ACTIVITIES: CPT

## 2022-04-21 PROCEDURE — 97535 SELF CARE MNGMENT TRAINING: CPT

## 2022-04-21 PROCEDURE — 76937 US GUIDE VASCULAR ACCESS: CPT

## 2022-04-21 PROCEDURE — 82947 ASSAY GLUCOSE BLOOD QUANT: CPT

## 2022-04-21 PROCEDURE — 83735 ASSAY OF MAGNESIUM: CPT

## 2022-04-21 RX ORDER — LORAZEPAM 2 MG/ML
0.5 INJECTION INTRAMUSCULAR
Status: COMPLETED | OUTPATIENT
Start: 2022-04-21 | End: 2022-04-21

## 2022-04-21 RX ORDER — LEVETIRACETAM 1000 MG/1
1000 TABLET ORAL 2 TIMES DAILY
Qty: 60 TABLET | Refills: 3 | Status: ON HOLD
Start: 2022-04-21 | End: 2022-05-12 | Stop reason: HOSPADM

## 2022-04-21 RX ORDER — TOPIRAMATE 25 MG/1
25 TABLET ORAL 2 TIMES DAILY
Qty: 60 TABLET | Refills: 3 | Status: ON HOLD
Start: 2022-04-21 | End: 2022-05-12 | Stop reason: HOSPADM

## 2022-04-21 RX ADMIN — POTASSIUM CHLORIDE 10 MEQ: 7.46 INJECTION, SOLUTION INTRAVENOUS at 09:28

## 2022-04-21 RX ADMIN — POTASSIUM CHLORIDE 10 MEQ: 7.46 INJECTION, SOLUTION INTRAVENOUS at 11:56

## 2022-04-21 RX ADMIN — Medication 81 MG: at 07:55

## 2022-04-21 RX ADMIN — GABAPENTIN 600 MG: 300 CAPSULE ORAL at 20:49

## 2022-04-21 RX ADMIN — POTASSIUM CHLORIDE 10 MEQ: 7.46 INJECTION, SOLUTION INTRAVENOUS at 04:03

## 2022-04-21 RX ADMIN — DESMOPRESSIN ACETATE 40 MG: 0.2 TABLET ORAL at 20:49

## 2022-04-21 RX ADMIN — GABAPENTIN 600 MG: 300 CAPSULE ORAL at 15:30

## 2022-04-21 RX ADMIN — POTASSIUM CHLORIDE 10 MEQ: 7.46 INJECTION, SOLUTION INTRAVENOUS at 05:17

## 2022-04-21 RX ADMIN — LEVETIRACETAM 1000 MG: 500 TABLET, FILM COATED ORAL at 09:42

## 2022-04-21 RX ADMIN — LEVETIRACETAM 1000 MG: 500 TABLET, FILM COATED ORAL at 20:51

## 2022-04-21 RX ADMIN — FENOFIBRATE 160 MG: 160 TABLET ORAL at 07:55

## 2022-04-21 RX ADMIN — SODIUM CHLORIDE: 9 INJECTION, SOLUTION INTRAVENOUS at 04:00

## 2022-04-21 RX ADMIN — VALPROIC ACID 500 MG: 250 SOLUTION ORAL at 09:43

## 2022-04-21 RX ADMIN — CLOPIDOGREL 75 MG: 75 TABLET, FILM COATED ORAL at 07:55

## 2022-04-21 RX ADMIN — TOPIRAMATE 25 MG: 25 TABLET, FILM COATED ORAL at 20:50

## 2022-04-21 RX ADMIN — ENOXAPARIN SODIUM 40 MG: 100 INJECTION SUBCUTANEOUS at 09:42

## 2022-04-21 RX ADMIN — GABAPENTIN 600 MG: 300 CAPSULE ORAL at 09:42

## 2022-04-21 RX ADMIN — INSULIN LISPRO 2 UNITS: 100 INJECTION, SOLUTION INTRAVENOUS; SUBCUTANEOUS at 16:20

## 2022-04-21 RX ADMIN — ONDANSETRON 4 MG: 4 TABLET, ORALLY DISINTEGRATING ORAL at 11:54

## 2022-04-21 RX ADMIN — VALPROIC ACID 500 MG: 250 SOLUTION ORAL at 20:49

## 2022-04-21 RX ADMIN — TOPIRAMATE 25 MG: 25 TABLET, FILM COATED ORAL at 09:42

## 2022-04-21 RX ADMIN — POTASSIUM CHLORIDE 10 MEQ: 7.46 INJECTION, SOLUTION INTRAVENOUS at 18:26

## 2022-04-21 RX ADMIN — POTASSIUM CHLORIDE 10 MEQ: 7.46 INJECTION, SOLUTION INTRAVENOUS at 06:38

## 2022-04-21 RX ADMIN — PANTOPRAZOLE SODIUM 40 MG: 40 TABLET, DELAYED RELEASE ORAL at 07:55

## 2022-04-21 RX ADMIN — LORAZEPAM 0.5 MG: 2 INJECTION INTRAMUSCULAR; INTRAVENOUS at 12:59

## 2022-04-21 NOTE — PROGRESS NOTES
NEUROLOGY INPATIENT PROGRESS NOTE    4/21/2022         Subjective: Mata Armenta is a  80 y.o. female admitted on 4/17/2022 with Hemiparesis of right dominant side due to acute cerebrovascular disease ([facility] [I67.89, G81.91]    Briefly, this is a  80 y.o. female admitted on 4/17/2022 with history of right hemiparesis that she attributes to an old stroke in 2008 and 2014, without any clear encephalomalacia on the MRI, questionable history of seizures without any EEG, admitted for strokelike symptoms with right-sided weakness given tPA with subsequent unresponsiveness, intubated in the ER, tPA was held, patient was intubated and sedated and admitted to neuro critical care. No large vessel occlusion on CTA. Patient was loaded with 2 g Keppra, continue on aspirin Plavix, MRI did not show any acute stroke, extubated on the 19th, able to ambulate with rolling walker with PT, continue to have right-sided weakness worse than her baseline, transferred to the neurology stepdown service on the 20th. Today patient is seen chart reviewed, no acute events overnight, continues to have some effort dependent right upper extremity and lower extremity 4/5 weakness with decreased dexterity. We will reportedly caught up with therapy and difficulty with putting weight on the right leg and difficulty with ambulation. No current facility-administered medications on file prior to encounter. Current Outpatient Medications on File Prior to Encounter   Medication Sig Dispense Refill    clopidogrel (PLAVIX) 75 MG tablet Take 75 mg by mouth nightly      gabapentin (NEURONTIN) 600 MG tablet Take 600 mg by mouth 3 times daily.  potassium chloride (KLOR-CON M) 20 MEQ extended release tablet Take 20 mEq by mouth 2 times daily      fenofibrate (TRIGLIDE) 160 MG tablet Take 160 mg by mouth daily      Pantoprazole Sodium (PROTONIX PO) Take  by mouth.       acetaminophen (TYLENOL) 160 MG/5ML solution Take 20.3 mLs by mouth every 4 hours as needed for Fever. 473 mL 3    albuterol (PROVENTIL HFA;VENTOLIN HFA) 108 (90 BASE) MCG/ACT inhaler Inhale 6 puffs into the lungs every 6 hours as needed for Wheezing. 1 Inhaler 3    glucagon, rDNA, 1 MG SOLR injection Inject 1 mg into the muscle as needed for Low blood sugar (Blood glucose less than 70 mg/dL and patient NOT ALERT or NPO and does not have IV access. ).  0    glucose (GLUTOSE) 40 % GEL Take 15 g by mouth as needed. 45 g 1    insulin glargine (LANTUS) 100 UNIT/ML injection vial Inject 10 Units into the skin nightly. 1 Pen 3    insulin lispro (HUMALOG) 100 UNIT/ML injection vial Inject 0-12 Units into the skin every 6 hours. 1 Pen 3    niacin (NIASPAN) 500 MG CR tablet Take 1 tablet by mouth nightly. 30 tablet 3    potassium chloride (KAYCIEL) 20 MEQ/15ML (10%) solution Take 15 mLs by mouth daily.  levETIRAcetam (KEPPRA) 500 MG tablet Take 1 tablet by mouth 2 times daily. (Patient taking differently: Take 1,000 mg by mouth 2 times daily ) 60 tablet 3    valproate (DEPAKENE) 250 MG/5ML syrup Take 10 mLs by mouth 2 times daily. 1 Bottle 3       Allergies: Ela Shine has No Known Allergies.     Past Medical History:   Diagnosis Date    CVA (cerebral infarction) ([facility]     Hyperlipidemia     Hypertension        Past Surgical History:   Procedure Laterality Date    APPENDECTOMY      BRONCHOSCOPY DIAGNOSTIC  7/24/2014         HYSTERECTOMY         Medications:     pantoprazole  40 mg Oral QAM AC    fenofibrate  160 mg Oral Daily    levETIRAcetam  1,000 mg Oral BID    insulin glargine  20 Units SubCUTAneous Nightly    insulin lispro  0-12 Units SubCUTAneous TID WC    insulin lispro  0-6 Units SubCUTAneous Nightly    topiramate  25 mg Oral BID    LORazepam  1 mg IntraVENous Once    clopidogrel  75 mg Oral Daily    gabapentin  600 mg Oral TID    atorvastatin  40 mg Oral Nightly    valproic acid  500 mg Oral 2 times per day    potassium bicarb-citric acid  20 mEq Oral Daily    enoxaparin  40 mg SubCUTAneous Daily    aspirin  81 mg Oral Daily    Or    aspirin  300 mg Rectal Daily     PRN Meds include: glucose, dextrose, glucagon (rDNA), dextrose, potassium chloride **OR** potassium alternative oral replacement **OR** potassium chloride, acetaminophen, sodium chloride flush, albuterol sulfate HFA, ondansetron **OR** ondansetron, polyethylene glycol, perflutren lipid microspheres, labetalol    Objective:   /80   Pulse 75   Temp 98.5 °F (36.9 °C) (Oral)   Resp 17   Ht 5' 5\" (1.651 m)   Wt 133 lb 9.6 oz (60.6 kg)   SpO2 96%   BMI 22.23 kg/m²     Blood pressure range: Systolic (53GAZ), DJI:468 , Min:100 , JEA:382   ; Diastolic (71MVF), KXU:41, Min:64, Max:85      ROS:  CONSTITUTIONAL:  Positive for fatigue and malaise   EYES: negative for double vision and photophobia    HEENT: negative for tinnitus and sore throat   RESPIRATORY: negative for cough, shortness of breath   CARDIOVASCULAR: negative for chest pain, palpitations, or syncope   GASTROINTESTINAL: negative for abdominal pain, nausea, vomiting, diarrhea, or constipation    GENITOURINARY: negative for incontinence or retention    MUSCULOSKELETAL: negative for neck or back pain, negative for extremity pain   NEUROLOGICAL: Negative for seizures, confusion, aphasia, dysarthria   PSYCHIATRIC: negative for agitation, hallucination, SI/HI   SKIN Negative for spontaneous contusions, rashes, or lesions        NEUROLOGIC EXAMINATION  GENERAL  Appears comfortable and in no distress   HEENT  NC/ AT   HEART  S1 and S2 heard; palpation of pulses: radial pulse    NECK  Supple and no bruits heard   MENTAL STATUS:  Alert, oriented, intact memory, no confusion, normal speech, normal language, no hallucination or delusion   CRANIAL NERVES: II     -      Visual fields intact to confrontation  III,IV,VI -  PERR, EOMs full, no ptosis  V     -     Normal facial sensation   VII    -     Normal facial symmetry  VIII   -     Intact hearing   IX,X -     Symmetrical palate  XI    -     Symmetrical shoulder shrug  XII   -     Midline tongue, no atrophy    MOTOR FUNCTION: RUE: Significant for good strength of grade 4/5 in proximal and distal muscle groups, decreased effort  LUE: Significant for good strength of grade 5/5 in proximal and distal muscle groups   RLE: Significant for good strength of grade 4/5 in proximal and distal muscle groups, decreased effort  LLE: Significant for good strength of grade 5/5 in proximal and distal muscle groups      Normal bulk, normal tone and no involuntary movements, no tremor   SENSORY FUNCTION:  Subjective numbness right upper extremity distally from the elbow all minutes, distally from knee onwards right lower extremity. Intact response to pinprick   CEREBELLAR FUNCTION:  Intact fine motor control over upper limbs and lower limbs   REFLEX FUNCTION:  Symmetric in upper and lower extremities, no Babinski sign   STATION and GAIT  Not tested     Data:    Lab Results:   CBC:   Recent Labs     04/19/22  0541 04/20/22  0550 04/21/22  0300   WBC 8.7 10.4 8.6   HGB 10.8* 9.8* 10.3*    161 168     BMP:    Recent Labs     04/19/22  1437 04/20/22  0550 04/21/22  0300    144 137   K 4.4 3.7 2.8*   * 112* 104   CO2 19* 20 24   BUN 16 18 17   CREATININE 0.50 0.47* 0.51   GLUCOSE 222* 231* 76         Lab Results   Component Value Date    CHOL 202 (H) 04/17/2022    LDLCHOLESTEROL 114 04/17/2022    HDL 55 04/17/2022    TRIG 164 (H) 04/17/2022    ALT 56 (H) 07/23/2014    AST 36 (H) 07/23/2014    TSH 0.65 07/19/2014    INR 1.0 04/17/2022    LABA1C 10.3 (H) 04/17/2022    [id number] 1096 (H) 07/26/2014       Lab Results   Component Value Date    VALPROATE 67 04/20/2022    VALPROATE 9.4 04/20/2022       IMAGING    CT HEAD WO CONTRAST    Result Date: 4/17/2022  1. No acute intracranial abnormality.  2. No evidence of large vessel occlusion or hemodynamically significant stenosis involving the head and neck arteries. RECOMMENDATIONS: Unavailable     CT HEAD WO CONTRAST    Result Date: 4/17/2022  No acute intracranial abnormality. Results were reported to Dr. Eladia Schafer at 7:50 p.m. on April 17, 2022. MRI CERVICAL SPINE WO CONTRAST    Result Date: 4/21/2022  Multilevel degenerative disc disease with associated uncovertebral and facet hypertrophy with canal stenosis at C3-4, C4-5, and C5-6. Bilateral foraminal narrowing as described above. XR CHEST PORTABLE    Result Date: 4/19/2022  An enteric tube has been placed. The tip is in the fundus. No evidence of edema or pneumonia. Minimal left basilar atelectasis or scarring. XR CHEST PORTABLE    Result Date: 4/17/2022  Status post ET tube placement in good position with mild left basilar atelectasis     XR ABDOMEN FOR NG/OG/NE TUBE PLACEMENT    Result Date: 4/18/2022  Gastric tube with the tip in the proximal aspect of the stomach. CTA HEAD NECK W CONTRAST    Result Date: 4/17/2022  1. No acute intracranial abnormality. 2. No evidence of large vessel occlusion or hemodynamically significant stenosis involving the head and neck arteries. RECOMMENDATIONS: Unavailable     MRI BRAIN W WO CONTRAST    Result Date: 4/18/2022  1. No acute intracranial abnormality. 2. Mild chronic white matter microvascular ischemic changes. 3. Left mastoid effusion. Assessment and Plan     80 y.o. female with history of right hemiparesis that she attributes to an old stroke in 2008 and 2014, without any clear encephalomalacia on the MRI, questionable history of seizures without any EEG, admitted for strokelike symptoms with right-sided weakness given tPA with subsequent unresponsiveness, intubated in the ER, tPA was held, patient was intubated and sedated and admitted to neuro critical care. Extubated on 19th subsequently transferred to step down on 20th. No large vessel occlusion or stenosis on CTA.   on aspirin Plavix, Lipitor 40 mg,   MRI did not show any acute stroke, continues to have some effort dependent right upper extremity and lower extremity 4/5 weakness with decreased dexterity. . Hemoglobin A1c 10.3.  2D echo, unable to rule out  Intra-atrial shunt due to technical limitations, no shunting by color Doppler, EF 61%. loaded with 2 g Keppra, continued on 1 g twice daily maintenance therapy for concern of history of seizures as well as unresponsiveness possibly due to epileptic event. Continue PT/OT, PM&R eval.  Patient is insisting to go home. We will discuss rehab options. We will also check MRI cervical spine, as there is some question of history of cervical myelopathy. Will follow-up on routine EEG. Hypokalemic. Replace as needed.   DM2, continue insulin sliding scale, hypoglycemia protocol        Pema Dubose MD  Neurology Resident PGY-4  4/21/2022  1:35 PM

## 2022-04-21 NOTE — PROGRESS NOTES
Occupational Therapy  Facility/Department: [address] STEP DOWN  Occupational Therapy Initial Assessment    Name: Praveena Client  : 1950  MRN: [id number]  Date of Service: 2022    Discharge Recommendations:  Further therapy recommended at discharge. The patient should be able to tolerate at least 3 hours of therapy per day over 5 days or 15 hours over 7 days. This patient may benefit from a Physical Medicine and Rehab consult. Patient Diagnosis(es): The encounter diagnosis was Cerebrovascular accident (CVA), unspecified mechanism ([facility] Assessment   Performance deficits / Impairments: Decreased functional mobility ; Decreased safe awareness;Decreased balance;Decreased coordination;Decreased ADL status; Decreased ROM; Decreased endurance;Decreased high-level IADLs;Decreased strength;Decreased fine motor control;Decreased cognition  Prognosis: Fair  REQUIRES OT FOLLOW-UP: Yes  Activity Tolerance  Activity Tolerance: Patient limited by fatigue;Treatment limited secondary to decreased cognition        Safety precautions: bed alarm on; pt left in bed; gait belt: RN notified  Education: proper hand and foot placement; balance maintaince; safety precautions; importance of activity    Plan   Plan  Times per Week: 4-5x/wk  Current Treatment Recommendations: Strengthening,ROM,Balance training,Functional mobility training,Endurance training,Safety education & training,Patient/Caregiver education & training,Equipment evaluation, education, & procurement,Self-Care / ADL,Coordination training,Neuromuscular re-education,Modalities     Restrictions  Restrictions/Precautions  Required Braces or Orthoses?: No  Position Activity Restriction  Other position/activity restrictions: SBP <180    Subjective   General  Chart Reviewed: Yes  Patient assessed for rehabilitation services?: Yes  Family / Caregiver Present: No  General Comment  Comments: Pt and RN agreeable to therapy this day    Objective Balance  Sitting Balance: Moderate assistance (Pt tolerated approx 10 min static sitting EOB demo longitudinal/horizontal sway with 0-2 hand support increasing to intermitent periods of CGA)  Standing Balance: Moderate assistance (Mod x2)  Standing Balance  Time: approx 2 min  Activity: static standing at EOB with RW  Comment: demo anterior/posterior sway with BLE buckling tolerating approx 30 sec weight shifting unable to safely attempt BLE progression  Functional Mobility  Functional Mobility Comments: JOELLEN sec to decreased balance  ADL  Grooming: Modified independent ;Setup  Grooming Skilled Clinical Factors: Face washing facilitated with pt supine in bed  LE Dressing: Moderate assistance;Setup;Verbal cueing; Increased time to complete  LE Dressing Skilled Clinical Factors: LB dressing facilitated seated unsupported long sitting in bed pt able to doff B socks req assist to thread and don. Additional Comments: Throughout session pt limited per fatigue, decreased cognition and decreased balance. Pt unable to tolerate further activity. Bed mobility  Supine to Sit: Minimal assistance (assist with trunk)  Sit to Supine: Minimal assistance (assist with BLE)  Scooting: Minimal assistance  Comment: HOB elevated, utilizing bed rails  Transfers  Sit to stand: Moderate assistance;2 Person assistance  Stand to sit: 2 Person assistance; Moderate assistance  Transfer Comments: utilizing RW demo anterior/posterior leans     Cognition  Overall Cognitive Status: Exceptions  Arousal/Alertness: Delayed responses to stimuli  Following Commands: Follows multistep commands with increased time; Follows multistep commands with repitition  Attention Span: Attends with cues to redirect  Safety Judgement: Decreased awareness of need for assistance  Problem Solving: Assistance required to correct errors made;Decreased awareness of errors  Insights: Decreased awareness of deficits  Initiation: Requires cues for all  Sequencing: Requires cues for some                                                                    AM-PAC Score        AM-PAC Inpatient Daily Activity Raw Score: 14 (04/21/22 1300)  AM-PAC Inpatient ADL T-Scale Score : 33.39 (04/21/22 1300)  ADL Inpatient CMS 0-100% Score: 59.67 (04/21/22 1300)  ADL Inpatient CMS G-Code Modifier : CK (04/21/22 1300)    Goals  Short Term Goals  Time Frame for Short term goals: By discharge, pt will:  Short Term Goal 1: Demo UB ADLs with SUP and setup  Short Term Goal 2: Demo LB ADLs with CGA and setup  Short Term Goal 3: Demo bed mobility with SUP and HOB flat to mimic home setup  Short Term Goal 4: Demo functional sit<>stand transfers with CGA and LRD PRN  Short Term Goal 5: Demo functional mobility with Min. A and LRD PRN  Additional Goals?: Yes  Short Term Goal 6: Increase RUE strength by 1+ muscle grade for improved function and participation in meaningful occupations. Short Term Goal 7: Participate in 5+ min of AROM/PROM to RUE each visit to promote increased functional use throughout ADLs.        Therapy Time   Individual Concurrent Group Co-treatment   Time In 4623         Time Out 1219         Minutes 21         Timed Code Treatment Minutes: 11 Minutes (co tx with PTA)       ORLANDO Garcia/GEGE

## 2022-04-21 NOTE — PROGRESS NOTES
Comprehensive Nutrition Assessment    Type and Reason for Visit:  Reassess    Nutrition Recommendations/Plan:   1. Continue current diet. Will provide Ensure Enlive oral supplements x 2 per day. 2. Encourage/monitor PO intakes as tolerated. 3. Monitor labs, weights, and plan of care. Malnutrition Assessment:  Malnutrition Status: At risk for malnutrition  Context:  Acute Illness     Findings of the 6 clinical characteristics of malnutrition:  Energy Intake:  Mild decrease in energy intake   Weight Loss:  Unable to assess     Body Fat Loss:  No significant body fat loss   Muscle Mass Loss:  No significant muscle mass loss  Fluid Accumulation:  No significant fluid accumulation   Strength:  Not Performed    Nutrition Assessment:    Pt extubated on 4/19 and has been started on an oral diet. Consuming 50% of meals. Labs reviewed: K 2.8 mmol/L, Glucose  mg/dL. Meds include: Effer-K, 60 mEq KCl replacement. Nutrition Related Findings:    labs/meds reviewed. last BM 4/20. Wound Type: None       Current Nutrition Intake & Therapies:    Average Meal Intake: 26-50%,51-75%  Average Supplements Intake: None Ordered  ADULT DIET; Regular    Anthropometric Measures:  Height: 5' 5\" (165.1 cm)  Ideal Body Weight (IBW): 125 lbs (57 kg)    Admission Body Weight: 136 lb 0.4 oz (61.7 kg)  Current Body Weight: 133 lb 9.6 oz (60.6 kg), 106.9 % IBW. Weight Source: Bed Scale  Current BMI (kg/m2): 22.2                          BMI Categories: Normal Weight (BMI 18.5-24. 9)    Estimated Daily Nutrient Needs:  Energy Requirements Based On: Kcal/kg  Weight Used for Energy Requirements: Current  Energy (kcal/day): 25-28 kcal/kg = 5734-0181 kcal/day  Weight Used for Protein Requirements: Current  Protein (g/day): 1.2-1.5 gm/kg = 70-90 gm pro/day  Method Used for Fluid Requirements: 1 ml/kcal  Fluid (ml/day): or per MD    Nutrition Diagnosis:   · Inadequate oral intake related to  (recent extubation; current condition) as evidenced by  (variable PO intakes; need for ONS)    Nutrition Interventions:   Food and/or Nutrient Delivery: Continue Current Diet,Start Oral Nutrition Supplement  Nutrition Education/Counseling: No recommendation at this time  Coordination of Nutrition Care: Continue to monitor while inpatient       Goals:  Previous Goal Met: Progressing toward Goal(s)  Goals: Meet at least 75% of estimated needs       Nutrition Monitoring and Evaluation:   Behavioral-Environmental Outcomes: None Identified  Food/Nutrient Intake Outcomes: Food and Nutrient Intake  Physical Signs/Symptoms Outcomes: Biochemical Data,GI Status,Nutrition Focused Physical Findings,Skin,Weight    Discharge Planning:     Too soon to determine     Urban Villanueva RD, LD  Contact: [phone]

## 2022-04-21 NOTE — CARE COORDINATION
Transitional Planning    Spoke with patient regarding placement. CM educated patient benefits of going to IP rehab. Patient was adamant that she does not need help and that she would like to go home. CM provided her with list, patient states she will discuss with daughter, but will most likely go home.

## 2022-04-21 NOTE — CARE COORDINATION
Met with pt to assess for support and complete PHQ-9 depression screen  Pt stated she lives alone in [place]. Stated she has one dtr and one son, that both live in [place] as well. Pt stated she has 16 grchildren. Pt reports her son and dtr live close by and help if needed. Stated her family is supportive. Pt reports she enjoys knitting and putting together puzzles but that it is difficult to do lately. Pt denies any recent feelings of depression or SI.  PHQ-9 screen completed - score 1  Patient Health Questionnaire-9 (PHQ-9)    Over the last 2 weeks, how often have you been bothered by any of the following problems? 1. Little Interest or pleasure in doing things? [x] Not at all  [] Several Days  [] More than half the day  []  Nearly every day    2. Feeling down, depressed or hopeless? [x] Not at all  [] Several Days  [] More than half the day  []  Nearly every day    3. Trouble falling or staying asleep, or sleeping too much? [] Not at all  [x] Several Days  [] More than half the day  []  Nearly every day    4. Feeling tired or having little energy? [x] Not at all  [] Several Days  [] More than half the day  []  Nearly every day    5. Poor apettite or overeating? [x] Not at all  [] Several Days  [] More than half the day  []  Nearly every day    6. Feeling bad about yourself-or that you are a failure or have let yourself or your family down? [x] Not at all  [] Several Days  [] More than half the day  []  Nearly every day    7. Trouble concentrating on things, such as reading the newspaper or watching television? [x] Not at all  [] Several Days  [] More than half the day  []  Nearly every day    8. Moving or speaking so slowly that other people could have noticed? Or the opposite-being so fidgety or restless that you have been moving around a lot more than usual?   [x] Not at all  [] Several Days  [] More than half the day  []  Nearly every day    9.  Thoughts that you would be better off dead or of hurting yourself in some way? [x] Not at all  [] Several Days  [] More than half the day  []  Nearly every day    Total Score: 1    If you checked off any problems, how difficult have these problems made it for you to do your work, take care of things at home, or get along with other people?    [x] Not difficult at all  [] Somewhat Difficult  [] Very Difficult  []  Extremely Difficult

## 2022-04-21 NOTE — PLAN OF CARE
Problem: Non-Violent Restraints  Goal: Removal from restraints as soon as assessed to be safe  4/21/2022 0446 by Maulik Young RN  Outcome: Completed  4/20/2022 1625 by Colette Carmona RN  Outcome: Progressing  Goal: No harm/injury to patient while restraints in use  4/21/2022 0446 by Maulik Young RN  Outcome: Completed  4/20/2022 1625 by Colette Carmona RN  Outcome: Progressing  Goal: Patient's dignity will be maintained  4/21/2022 0446 by Maulik Young RN  Outcome: Completed  4/20/2022 1625 by Colette Carmona RN  Outcome: Progressing     Problem: Nutrition  Goal: Optimal nutrition therapy  4/21/2022 0446 by Maulik Young RN  Outcome: Progressing  4/20/2022 1625 by Colette Carmona RN  Outcome: Progressing     Problem: HEMODYNAMIC STATUS  Goal: Patient has stable vital signs and fluid balance  4/21/2022 0446 by Maulik Young RN  Outcome: Progressing  4/20/2022 1625 by Colette Carmona RN  Outcome: Progressing     Problem: Skin Integrity:  Goal: Will show no infection signs and symptoms  Description: Will show no infection signs and symptoms  4/21/2022 0446 by Maulik Young RN  Outcome: Progressing  4/20/2022 1625 by Colette Carmona RN  Outcome: Progressing  Goal: Absence of new skin breakdown  Description: Absence of new skin breakdown  4/21/2022 0446 by Maulik Young RN  Outcome: Progressing  4/20/2022 1625 by Colette Carmona RN  Outcome: Progressing     Problem: Falls - Risk of:  Goal: Will remain free from falls  Description: Will remain free from falls  4/21/2022 0446 by Maulik Young RN  Outcome: Progressing  4/20/2022 1625 by Colette Carmona RN  Outcome: Progressing  Goal: Absence of physical injury  Description: Absence of physical injury  4/21/2022 0446 by Maulik Young RN  Outcome: Progressing  4/20/2022 1625 by Colette Carmona RN  Outcome: Progressing     Problem: Discharge Planning  Goal: Discharge to home or other facility with appropriate resources  4/21/2022 0446 by Maulik Young RN  Outcome: Progressing  4/20/2022 1625 by Pina Nelson RN  Outcome: Progressing     Problem: Pain  Goal: Verbalizes/displays adequate comfort level or baseline comfort level  4/21/2022 0446 by Geoff Hernandez RN  Outcome: Progressing  4/20/2022 1625 by Pina Nelson RN  Outcome: Progressing     Problem: Chronic Conditions and Co-morbidities  Goal: Patient's chronic conditions and co-morbidity symptoms are monitored and maintained or improved  4/21/2022 0446 by Geoff Hernandez RN  Outcome: Progressing  4/20/2022 1625 by Pina Nelson RN  Outcome: Progressing

## 2022-04-21 NOTE — PLAN OF CARE
Problem: Nutrition  Goal: Optimal nutrition therapy  4/21/2022 1604 by Prabhjot Saunders RN  Outcome: Progressing  4/21/2022 0446 by Juan David RN  Outcome: Progressing     Problem: HEMODYNAMIC STATUS  Goal: Patient has stable vital signs and fluid balance  4/21/2022 1604 by Prabhjot Saunders RN  Outcome: Progressing  4/21/2022 0446 by Juan David RN  Outcome: Progressing     Problem: Skin Integrity:  Goal: Will show no infection signs and symptoms  Description: Will show no infection signs and symptoms  4/21/2022 1604 by Prabhjot Saunders RN  Outcome: Progressing  4/21/2022 0446 by Juan David RN  Outcome: Progressing  Goal: Absence of new skin breakdown  Description: Absence of new skin breakdown  4/21/2022 1604 by Prabhjot Saunders RN  Outcome: Progressing  4/21/2022 0446 by Juan David RN  Outcome: Progressing     Problem: Falls - Risk of:  Goal: Will remain free from falls  Description: Will remain free from falls  4/21/2022 1604 by Prabhjot Saunders RN  Outcome: Progressing  4/21/2022 0446 by Juan David RN  Outcome: Progressing  Goal: Absence of physical injury  Description: Absence of physical injury  4/21/2022 1604 by Prabhjot Saunders RN  Outcome: Progressing  4/21/2022 0446 by Juan David RN  Outcome: Progressing     Problem: Discharge Planning  Goal: Discharge to home or other facility with appropriate resources  4/21/2022 1604 by Prabhjot Saunders RN  Outcome: Progressing  4/21/2022 0446 by Juan David RN  Outcome: Progressing     Problem: Safety - Medical Restraint  Goal: Remains free of injury from restraints (Restraint for Interference with Medical Device)  Description: INTERVENTIONS:  1. Determine that other, less restrictive measures have been tried or would not be effective before applying the restraint  2. Evaluate the patient's condition at the time of restraint application  3. Inform patient/family regarding the reason for restraint  4.  Q2H: Monitor safety, psychosocial status, comfort, nutrition and hydration  4/21/2022 1604 by Angela Nails RN  Outcome: Progressing  4/21/2022 0446 by Vanessa Cline RN  Outcome: Progressing     Problem: Pain  Goal: Verbalizes/displays adequate comfort level or baseline comfort level  4/21/2022 1604 by Angela Nails RN  Outcome: Progressing  4/21/2022 0446 by Vanessa Cline RN  Outcome: Progressing     Problem: Chronic Conditions and Co-morbidities  Goal: Patient's chronic conditions and co-morbidity symptoms are monitored and maintained or improved  4/21/2022 1604 by Angela Nails RN  Outcome: Progressing  4/21/2022 0446 by Vanessa Cline RN  Outcome: Progressing

## 2022-04-21 NOTE — PROGRESS NOTES
Physical Therapy  Facility/Department: [address] STEP DOWN  Physical Therapy Daily treatment note    Name: Dilshad Christianson  : 1950  MRN: [id number]  Date of Service: 2022    Discharge Recommendations:    Further therapy recommended at discharge. The patient should be able to tolerate at least 3 hours of therapy per day over 5 days or 15 hours over 7 days. This patient may benefit from a Physical Medicine and Rehab consult. PT Equipment Recommendations  Equipment Needed: No      Assessment   Body Structures, Functions, Activity Limitations Requiring Skilled Therapeutic Intervention: Decreased functional mobility ; Decreased ROM; Decreased strength;Decreased safe awareness;Decreased endurance;Decreased balance;Decreased posture  Assessment: Pt required MOD A x2 sit to stand with RW. LImited by knees bucling, trunk extension and post lean. Pt high fall risk and would benefit from aggressive PT after d/c to address deficits  Therapy Prognosis: Good  Activity Tolerance  Activity Tolerance: Treatment limited secondary to medical complications; Other (comment)  Activity Tolerance Comments: limited by lethargy, closing eyes and falling asleep, poor balance.  RN notified     Plan   Plan  Plan:  (6-7x)  Current Treatment Recommendations: Strengthening,ROM,Balance training,Functional mobility training,Gait training,Neuromuscular re-education,Transfer training,Endurance training  Safety Devices  Type of Devices: Patient at risk for falls,Left in bed,Bed alarm in place,Call light within reach,Nurse notified,Gait belt  Restraints  Restraints Initially in Place: No     Restrictions  Restrictions/Precautions  Restrictions/Precautions: Fall Risk,General Precautions  Required Braces or Orthoses?: No  Position Activity Restriction  Other position/activity restrictions: SBP <180     Subjective   General  Patient assessed for rehabilitation services?: Yes  Response To Previous Treatment: Patient with no complaints from previous session. Family / Caregiver Present: No  Follows Commands: Within Functional Limits  Subjective  Subjective: Pt resting in bed upon arrival, agreeable to PT, pleasant and cooperative however reports feeling very fatigued and nausous after several tests done recently. denies pain. Cognition   Orientation  Overall Orientation Status: Within Functional Limits     Objective    Bed mobility  Rolling to Left: Minimal assistance  Supine to Sit: Minimal assistance  Sit to Supine: Minimal assistance  Scooting: Minimal assistance  Transfers  Sit to Stand: Moderate Assistance;2 Person Assistance  Stand to sit: Moderate Assistance;2 Person Assistance  Bed to Chair: Unable to assess (Not safe to attempt at this time due to poor seated and standing balance with increased lethargy)  Comment: Sit to stand x 2 trials, due to knees buckling as pt stands, R > L  Ambulation  More Ambulation?: No (JOELLEN this date due to significant unsteadiness and heavy post lean upon standing)     Balance  Posture: Fair  Sitting - Static: Fair;-  Sitting - Dynamic: Fair;- (Pt sat EOB x ~5 min, limited by c/o nausesa, lethargy, unsteady, alternationg post and ant sway)  Standing - Static: Fair;-  Standing - Dynamic: Poor;+ (Pt stood x 1 trial with RW x 1 min, requiring MOD Ax 2 due to significant trunk extension with heavy post lean and R knee flex. Unable to correct despite cues)   Exercise  Deferred due to falling asleep, altered mentation, reports not feeling well.  Vitals WNL, RN notified             AM-PAC Score  AM-PAC Inpatient Mobility Raw Score : 12 (04/21/22 1545)  AM-PAC Inpatient T-Scale Score : 35.33 (04/21/22 1545)  Mobility Inpatient CMS 0-100% Score: 68.66 (04/21/22 1545)  Mobility Inpatient CMS G-Code Modifier : CL (04/21/22 1545)          Goals  Short Term Goals  Time Frame for Short term goals: 14  Short term goal 1: Pt to perform bed mobility from flat surface independently  Short term goal 2: Demonstrate functional transfers independently  Short term goal 3: Pt to ambulate 100ft w/ RW with supervision  Short term goal 4: Tolerate 45 minutes of therapy to demo increased endurance  Short term goal 5: Demonstrate dynamic standing balance of good - to decrease fall risk  Patient Goals   Patient goals :  To get better       Therapy Time   Individual Concurrent Group Co-treatment   Time In 0113         Time Out 1220         Minutes 16         Timed Code Treatment Minutes: [address], PTA

## 2022-04-21 NOTE — PROGRESS NOTES
Notified FARIDA Newsome CM, per Dr Donald Myles pt is approp for ARU. Requested if pt is interested in [facility] ARU.

## 2022-04-22 LAB
ANION GAP SERPL CALCULATED.3IONS-SCNC: 11 MMOL/L (ref 9–17)
BUN BLDV-MCNC: 18 MG/DL (ref 8–23)
CALCIUM SERPL-MCNC: 9.1 MG/DL (ref 8.6–10.4)
CHLORIDE BLD-SCNC: 105 MMOL/L (ref 98–107)
CO2: 20 MMOL/L (ref 20–31)
CREAT SERPL-MCNC: 0.61 MG/DL (ref 0.5–0.9)
GFR AFRICAN AMERICAN: >60 ML/MIN
GFR NON-AFRICAN AMERICAN: >60 ML/MIN
GFR SERPL CREATININE-BSD FRML MDRD: ABNORMAL ML/MIN/{1.73_M2}
GLUCOSE BLD-MCNC: 147 MG/DL (ref 65–105)
GLUCOSE BLD-MCNC: 160 MG/DL (ref 70–99)
GLUCOSE BLD-MCNC: 221 MG/DL (ref 65–105)
GLUCOSE BLD-MCNC: 233 MG/DL (ref 65–105)
GLUCOSE BLD-MCNC: 281 MG/DL (ref 65–105)
HCT VFR BLD CALC: 35.6 % (ref 36.3–47.1)
HEMOGLOBIN: 11.7 G/DL (ref 11.9–15.1)
MCH RBC QN AUTO: 31 PG (ref 25.2–33.5)
MCHC RBC AUTO-ENTMCNC: 32.9 G/DL (ref 28.4–34.8)
MCV RBC AUTO: 94.4 FL (ref 82.6–102.9)
NRBC AUTOMATED: 0 PER 100 WBC
PDW BLD-RTO: 13 % (ref 11.8–14.4)
PLATELET # BLD: 203 K/UL (ref 138–453)
PMV BLD AUTO: 10.1 FL (ref 8.1–13.5)
POTASSIUM SERPL-SCNC: 3.8 MMOL/L (ref 3.7–5.3)
RBC # BLD: 3.77 M/UL (ref 3.95–5.11)
SODIUM BLD-SCNC: 136 MMOL/L (ref 135–144)
WBC # BLD: 5.8 K/UL (ref 3.5–11.3)

## 2022-04-22 PROCEDURE — 97116 GAIT TRAINING THERAPY: CPT

## 2022-04-22 PROCEDURE — 2060000000 HC ICU INTERMEDIATE R&B

## 2022-04-22 PROCEDURE — 6370000000 HC RX 637 (ALT 250 FOR IP): Performed by: NURSE PRACTITIONER

## 2022-04-22 PROCEDURE — 80048 BASIC METABOLIC PNL TOTAL CA: CPT

## 2022-04-22 PROCEDURE — 6370000000 HC RX 637 (ALT 250 FOR IP): Performed by: STUDENT IN AN ORGANIZED HEALTH CARE EDUCATION/TRAINING PROGRAM

## 2022-04-22 PROCEDURE — 95819 EEG AWAKE AND ASLEEP: CPT | Performed by: PSYCHIATRY & NEUROLOGY

## 2022-04-22 PROCEDURE — 94761 N-INVAS EAR/PLS OXIMETRY MLT: CPT

## 2022-04-22 PROCEDURE — 85027 COMPLETE CBC AUTOMATED: CPT

## 2022-04-22 PROCEDURE — 97129 THER IVNTJ 1ST 15 MIN: CPT

## 2022-04-22 PROCEDURE — 97535 SELF CARE MNGMENT TRAINING: CPT

## 2022-04-22 PROCEDURE — 6360000002 HC RX W HCPCS: Performed by: STUDENT IN AN ORGANIZED HEALTH CARE EDUCATION/TRAINING PROGRAM

## 2022-04-22 PROCEDURE — 82947 ASSAY GLUCOSE BLOOD QUANT: CPT

## 2022-04-22 PROCEDURE — 95816 EEG AWAKE AND DROWSY: CPT | Performed by: STUDENT IN AN ORGANIZED HEALTH CARE EDUCATION/TRAINING PROGRAM

## 2022-04-22 PROCEDURE — 36415 COLL VENOUS BLD VENIPUNCTURE: CPT

## 2022-04-22 RX ADMIN — INSULIN GLARGINE 20 UNITS: 100 INJECTION, SOLUTION SUBCUTANEOUS at 21:43

## 2022-04-22 RX ADMIN — GABAPENTIN 600 MG: 300 CAPSULE ORAL at 21:43

## 2022-04-22 RX ADMIN — POTASSIUM BICARBONATE 20 MEQ: 782 TABLET, EFFERVESCENT ORAL at 08:03

## 2022-04-22 RX ADMIN — INSULIN LISPRO 4 UNITS: 100 INJECTION, SOLUTION INTRAVENOUS; SUBCUTANEOUS at 16:38

## 2022-04-22 RX ADMIN — GABAPENTIN 600 MG: 300 CAPSULE ORAL at 09:18

## 2022-04-22 RX ADMIN — DESMOPRESSIN ACETATE 40 MG: 0.2 TABLET ORAL at 21:44

## 2022-04-22 RX ADMIN — ACETAMINOPHEN 650 MG: 325 TABLET ORAL at 08:03

## 2022-04-22 RX ADMIN — PANTOPRAZOLE SODIUM 40 MG: 40 TABLET, DELAYED RELEASE ORAL at 08:03

## 2022-04-22 RX ADMIN — TOPIRAMATE 25 MG: 25 TABLET, FILM COATED ORAL at 21:43

## 2022-04-22 RX ADMIN — VALPROIC ACID 500 MG: 250 SOLUTION ORAL at 21:44

## 2022-04-22 RX ADMIN — TOPIRAMATE 25 MG: 25 TABLET, FILM COATED ORAL at 09:18

## 2022-04-22 RX ADMIN — LEVETIRACETAM 1000 MG: 500 TABLET, FILM COATED ORAL at 09:18

## 2022-04-22 RX ADMIN — VALPROIC ACID 500 MG: 250 SOLUTION ORAL at 09:18

## 2022-04-22 RX ADMIN — CLOPIDOGREL 75 MG: 75 TABLET, FILM COATED ORAL at 08:03

## 2022-04-22 RX ADMIN — INSULIN LISPRO 4 UNITS: 100 INJECTION, SOLUTION INTRAVENOUS; SUBCUTANEOUS at 11:56

## 2022-04-22 RX ADMIN — FENOFIBRATE 160 MG: 160 TABLET ORAL at 08:03

## 2022-04-22 RX ADMIN — Medication 81 MG: at 08:03

## 2022-04-22 RX ADMIN — INSULIN LISPRO 3 UNITS: 100 INJECTION, SOLUTION INTRAVENOUS; SUBCUTANEOUS at 21:42

## 2022-04-22 RX ADMIN — LEVETIRACETAM 1000 MG: 500 TABLET, FILM COATED ORAL at 21:44

## 2022-04-22 RX ADMIN — GABAPENTIN 600 MG: 300 CAPSULE ORAL at 15:22

## 2022-04-22 RX ADMIN — ENOXAPARIN SODIUM 40 MG: 100 INJECTION SUBCUTANEOUS at 09:18

## 2022-04-22 ASSESSMENT — PAIN SCALES - GENERAL: PAINLEVEL_OUTOF10: 5

## 2022-04-22 ASSESSMENT — PAIN DESCRIPTION - LOCATION: LOCATION: ARM

## 2022-04-22 ASSESSMENT — PAIN DESCRIPTION - DESCRIPTORS: DESCRIPTORS: DISCOMFORT

## 2022-04-22 ASSESSMENT — PAIN DESCRIPTION - ORIENTATION: ORIENTATION: RIGHT

## 2022-04-22 NOTE — PROGRESS NOTES
NEUROLOGY INPATIENT PROGRESS NOTE    4/22/2022         Current Exam:     Chart reviewed. Discussed with RN. She reports continued right sided weakness but states she is near her baseline. Is now agreeable to rehab placement. CM working on placement. Brief History:    Mamie Amaro is a  80 y.o. female with H/O questionable seizure disorder, chronic right hemiparesis, numbness of right foot, migraines, who was admitted on 4/17/2022 via [facility] after being found altered, unable to follow commands or answer questions, with right-sided weakness and sensory loss, right-sided neglect and aphasia. NIH 21. Patient was started on IV tPA but then became obtunded and flaccid in all 4 extremities with concern for possible hemorrhagic conversion and tPA was held. She was started on a Cardene drip and admitted to the neuro ICU for admission. She had been taking dual antiplatelets at home. She arrived intubated and went directly to CT scan. CT head unremarkable, CTA head neck with no LVO and patient was restarted on tPA as well as loaded with IV Keppra for possible subclinical seizure. She was extubated on 4/19. Continues to report right-sided weakness worse than her baseline and was transferred to the neurology service on 4/20/2022. Her right-sided weakness has not been consistent on exam, fluctuates in nature. She also has inconsistencies in her sensory exam.  MRI cervical spine were done to rule out myelopathy; her weakness was felt to be more psychosomatic in nature. MRI cervical spine showing multilevel degenerative disc disease with no       No current facility-administered medications on file prior to encounter. Current Outpatient Medications on File Prior to Encounter   Medication Sig Dispense Refill    clopidogrel (PLAVIX) 75 MG tablet Take 75 mg by mouth nightly      gabapentin (NEURONTIN) 600 MG tablet Take 600 mg by mouth 3 times daily.       potassium chloride (KLOR-CON M) 20 MEQ extended release tablet Take 20 mEq by mouth 2 times daily      fenofibrate (TRIGLIDE) 160 MG tablet Take 160 mg by mouth daily      Pantoprazole Sodium (PROTONIX PO) Take  by mouth.  acetaminophen (TYLENOL) 160 MG/5ML solution Take 20.3 mLs by mouth every 4 hours as needed for Fever. 473 mL 3    albuterol (PROVENTIL HFA;VENTOLIN HFA) 108 (90 BASE) MCG/ACT inhaler Inhale 6 puffs into the lungs every 6 hours as needed for Wheezing. 1 Inhaler 3    glucagon, rDNA, 1 MG SOLR injection Inject 1 mg into the muscle as needed for Low blood sugar (Blood glucose less than 70 mg/dL and patient NOT ALERT or NPO and does not have IV access. ).  0    glucose (GLUTOSE) 40 % GEL Take 15 g by mouth as needed. 45 g 1    insulin glargine (LANTUS) 100 UNIT/ML injection vial Inject 10 Units into the skin nightly. 1 Pen 3    insulin lispro (HUMALOG) 100 UNIT/ML injection vial Inject 0-12 Units into the skin every 6 hours. 1 Pen 3    niacin (NIASPAN) 500 MG CR tablet Take 1 tablet by mouth nightly. 30 tablet 3    potassium chloride (KAYCIEL) 20 MEQ/15ML (10%) solution Take 15 mLs by mouth daily. Allergies: Shai Anderson has No Known Allergies. Past Medical History:   Diagnosis Date    CVA (cerebral infarction) ([facility] [address]     Hyperlipidemia     Hypertension        Past Surgical History:   Procedure Laterality Date    APPENDECTOMY      BRONCHOSCOPY DIAGNOSTIC  7/24/2014         HYSTERECTOMY         Social History: Shai Anderson  reports that she has never smoked. She has never used smokeless tobacco. She reports that she does not drink alcohol and does not use drugs. No family history on file.     Objective:   /63   Pulse 70   Temp 97.8 °F (36.6 °C) (Oral)   Resp 18   Ht 5' 5\" (1.651 m)   Wt 133 lb 9.6 oz (60.6 kg)   SpO2 99%   BMI 22.23 kg/m²     Blood pressure range: Systolic (00HGV), ZYX:783 , Min:106 , TKE:524   ; Diastolic (63KTQ), BRR:59, Min:63, Max:93      Review of Systems:  Constitutional Negative for fever and chills    HEENT  Negative for ear discharge, ear pain, nosebleed    Eyes  Negative for photophobia, pain and discharge    Respiratory  Negative for hemoptysis and sputum    Cardiovascular  Negative for orthopnea, claudication and PND    Gastrointestinal  Negative for abdominal pain, diarrhea, blood in stool    Musculoskeletal  Negative for joint pain, negative for myalgia. Right sided weakness   Skin  Negative for rash or itching    Endo/heme/allergies  Negative for polydipsia, environmental allergy    Psychiatric/behavioral  Negative for suicidal ideation.  Patient is not anxious        NEUROLOGIC EXAMINATION  GENERAL  Appears comfortable and in no distress   HEENT  NC/ AT   NECK  Supple   MENTAL STATUS:  Alert, oriented, intact memory, no confusion, normal speech, normal language, no hallucination or delusion   CRANIAL NERVES: II     -      Visual fields intact to confrontation  III,IV,VI -  EOMs full, no afferent defect, no JOE, no ptosis  V     -     Normal facial sensation  VII    -     Normal facial symmetry  VIII   -     Intact hearing  IX,X -     Symmetrical palate  XI    -     Symmetrical shoulder shrug  XII   -     Midline tongue, no atrophy    MOTOR FUNCTION:  Full strength to left side, 4/5 to RUE and 2/5 to RLE with poor effort given, with normal bulk, normal tone and no involuntary movements, no tremor   SENSORY FUNCTION:  Decreased sensation right arm and leg   CEREBELLAR FUNCTION:  Intact fine motor control over upper limbs   REFLEX FUNCTION:  Symmetric, no perverted reflex, no Babinski sign   STATION and GAIT  Not tested       Data:    Lab Results:   CBC:   Recent Labs     04/20/22  0550 04/21/22  0300 04/22/22  0437   WBC 10.4 8.6 5.8   HGB 9.8* 10.3* 11.7*    168 203     BMP:    Recent Labs     04/20/22  0550 04/20/22  0550 04/21/22  0300 04/21/22  2220 04/22/22  0437     --  137  --  136   K 3.7   < > 2.8* 3.7 3.8   *  --  104  --  105   CO2 20  --  24  -- 20   BUN 18  --  17  --  18   CREATININE 0.47*  --  0.51  --  0.61   GLUCOSE 231*  --  76  --  160*    < > = values in this interval not displayed. Lab Results   Component Value Date    CHOL 202 (H) 04/17/2022    LDLCHOLESTEROL 114 04/17/2022    HDL 55 04/17/2022    TRIG 164 (H) 04/17/2022    ALT 56 (H) 07/23/2014    AST 36 (H) 07/23/2014    TSH 0.65 07/19/2014    INR 1.0 04/17/2022    LABA1C 10.3 (H) 04/17/2022    PWVNTXHK00 1096 (H) 07/26/2014           Diagnostic data reviewed:  CT HEAD (4/17/2022): No acute intracranial abnormality      MRI BRAIN W/WO (4/182022): Mild chronic microangiopathy     MRI C-SPINE: (4/21/22) -  Multilevel degenerative disc disease with associated uncovertebral and facet   hypertrophy with canal stenosis at C3-4, C4-5, and C5-6.       Bilateral foraminal narrowing as described above.          CTA HEAD & NECK (4/17/2022):   1. No acute intracranial abnormality. 2. No evidence of large vessel occlusion or hemodynamically significant   stenosis involving the head and neck arteries. ECHO (4/20/2022): EF 61%. LA moderately dilated. Bubble study attempted x 4. Unable to rule out intra-atrial shunt, due to technical limitations of study. No shunt seen by color Doppler. Mild MR & AI. Elevated RA filling pressure      EEG (4/21/22) -  This EEG was abnormal.  Occasional right frontocentral polymorphic delta slowing suggested underlying neuronal dysfunction. Right frontal sharp waves conferred an increased risk for focal onset seizures. Impression:  -Acute onset right hemiparesis of unclear etiology status post IV tPA with negative MRI brain  -Reported history of chronic infarcts but not clearly evident on brain imaging  -History of questionable seizures in the past    Plan:  -Patient with fluctuating exam both subjectively and objectively; weakness appears to be psychosomatic in etiology.   Continue work with therapies; she was felt to be a candidate for acute rehab and is now agreeable to discharge. CM working on discharge.  -Continue aspirin, Plavix, Lipitor daily  -Continue home dose Keppra and Depakene  -DVT prophylaxis; on Lovenox    Please note that this note was generated using a voice recognition dictation software. Although every effort was made to ensure the accuracy of this automated transcription, some errors in transcription may have occurred.

## 2022-04-22 NOTE — PLAN OF CARE
Problem: Nutrition  Goal: Optimal nutrition therapy  4/22/2022 1617 by Connie Chavez RN  Outcome: Progressing  4/22/2022 0600 by Shashi Van RN  Outcome: Progressing     Problem: HEMODYNAMIC STATUS  Goal: Patient has stable vital signs and fluid balance  4/22/2022 1617 by Connie Chavez RN  Outcome: Progressing  4/22/2022 0600 by Shashi Van RN  Outcome: Progressing     Problem: Skin Integrity:  Goal: Will show no infection signs and symptoms  Description: Will show no infection signs and symptoms  4/22/2022 1617 by Connie Chavez RN  Outcome: Progressing  4/22/2022 0600 by Shashi Van RN  Outcome: Progressing  Goal: Absence of new skin breakdown  Description: Absence of new skin breakdown  4/22/2022 1617 by Connie Chavez RN  Outcome: Progressing  4/22/2022 0600 by Shashi Van RN  Outcome: Progressing     Problem: Falls - Risk of:  Goal: Will remain free from falls  Description: Will remain free from falls  4/22/2022 1617 by Connie Chavez RN  Outcome: Progressing  4/22/2022 0600 by Shashi Van RN  Outcome: Progressing  Goal: Absence of physical injury  Description: Absence of physical injury  4/22/2022 1617 by Connie Chavez RN  Outcome: Progressing  4/22/2022 0600 by Shashi Van RN  Outcome: Progressing     Problem: Discharge Planning  Goal: Discharge to home or other facility with appropriate resources  4/22/2022 1617 by Connie Chavez RN  Outcome: Progressing  4/22/2022 0600 by Shashi Van RN  Outcome: Progressing     Problem: Safety - Medical Restraint  Goal: Remains free of injury from restraints (Restraint for Interference with Medical Device)  Description: INTERVENTIONS:  1. Determine that other, less restrictive measures have been tried or would not be effective before applying the restraint  2. Evaluate the patient's condition at the time of restraint application  3.  Inform patient/family regarding the reason for restraint  4.  Q2H: Monitor safety, psychosocial status, comfort, nutrition and hydration  4/22/2022 1617 by Elvis Putnam RN  Outcome: Progressing  4/22/2022 0600 by Veronica Sullivan RN  Outcome: Progressing     Problem: Pain  Goal: Verbalizes/displays adequate comfort level or baseline comfort level  4/22/2022 1617 by Elvis Putnam RN  Outcome: Progressing  4/22/2022 0600 by Veronica Sullivan RN  Outcome: Progressing     Problem: Chronic Conditions and Co-morbidities  Goal: Patient's chronic conditions and co-morbidity symptoms are monitored and maintained or improved  4/22/2022 1617 by Elvis Putnam RN  Outcome: Progressing  4/22/2022 0600 by Veronica Sullivan RN  Outcome: Progressing

## 2022-04-22 NOTE — PLAN OF CARE
Problem: HEMODYNAMIC STATUS  Goal: Patient has stable vital signs and fluid balance  4/22/2022 0600 by Bud Aly RN  Outcome: Progressing  4/21/2022 1604 by Heaven Centeno RN  Outcome: Progressing     Problem: Nutrition  Goal: Optimal nutrition therapy  4/22/2022 0600 by Bud Aly RN  Outcome: Progressing  4/21/2022 1604 by Heaven Centeno RN  Outcome: Progressing     Problem: HEMODYNAMIC STATUS  Goal: Patient has stable vital signs and fluid balance  4/22/2022 0600 by Bud Aly RN  Outcome: Progressing  4/21/2022 1604 by Heaven Centeno RN  Outcome: Progressing     Problem: Skin Integrity:  Goal: Will show no infection signs and symptoms  Description: Will show no infection signs and symptoms  4/22/2022 0600 by Bud Aly RN  Outcome: Progressing  4/21/2022 1604 by Heaven Centeno RN  Outcome: Progressing  Goal: Absence of new skin breakdown  Description: Absence of new skin breakdown  4/22/2022 0600 by Bud Aly RN  Outcome: Progressing  4/21/2022 1604 by Heaven Centeno RN  Outcome: Progressing     Problem: Falls - Risk of:  Goal: Will remain free from falls  Description: Will remain free from falls  4/22/2022 0600 by Bud Aly RN  Outcome: Progressing  4/21/2022 1604 by Heaven Centeno RN  Outcome: Progressing  Goal: Absence of physical injury  Description: Absence of physical injury  4/22/2022 0600 by Bud Ayl RN  Outcome: Progressing  4/21/2022 1604 by Heaven Centeno RN  Outcome: Progressing     Problem: Discharge Planning  Goal: Discharge to home or other facility with appropriate resources  4/22/2022 0600 by Bud Aly RN  Outcome: Progressing  4/21/2022 1604 by Heaven Centeno RN  Outcome: Progressing     Problem: Safety - Medical Restraint  Goal: Remains free of injury from restraints (Restraint for Interference with Medical Device)  Description: INTERVENTIONS:  1.  Determine that other, less restrictive measures have been tried or would not be effective before applying the restraint  2. Evaluate the patient's condition at the time of restraint application  3. Inform patient/family regarding the reason for restraint  4.  Q2H: Monitor safety, psychosocial status, comfort, nutrition and hydration  4/22/2022 0600 by Elida Smith RN  Outcome: Progressing  4/21/2022 1604 by Josy Ely RN  Outcome: Progressing     Problem: Pain  Goal: Verbalizes/displays adequate comfort level or baseline comfort level  4/22/2022 0600 by Elida Smith RN  Outcome: Progressing  4/21/2022 1604 by Josy Ely RN  Outcome: Progressing     Problem: Chronic Conditions and Co-morbidities  Goal: Patient's chronic conditions and co-morbidity symptoms are monitored and maintained or improved  4/22/2022 0600 by Elida Smith RN  Outcome: Progressing  4/21/2022 1604 by Josy Ely RN  Outcome: Progressing

## 2022-04-22 NOTE — PROGRESS NOTES
Physical Therapy  Facility/Department: [address] STEP DOWN  Physical Therapy Daily Treatment Note    Name: Сергей Orr  : 1950  MRN: [id number]  Date of Service: 2022    Discharge Recommendations:    Further therapy recommended at discharge. The patient should be able to tolerate at least 3 hours of therapy per day over 5 days or 15 hours over 7 days. This patient may benefit from a Physical Medicine and Rehab consult. PT Equipment Recommendations  Equipment Needed: No      Assessment   Body Structures, Functions, Activity Limitations Requiring Skilled Therapeutic Intervention: Decreased functional mobility ; Decreased ROM; Decreased strength;Decreased safe awareness;Decreased endurance;Decreased balance;Decreased posture  Assessment: Pt amb 8 ft with RW and MOD Ax2. Limtied by ataxia and poor balance. Pt unsafe to return to prior living arrangments, recommend aggressive PT after d/c to address deficits. Therapy Prognosis: Good  Activity Tolerance  Activity Tolerance: Patient tolerated treatment well;Patient limited by endurance     Plan   Plan  Plan:  (6-7x)  Current Treatment Recommendations: Strengthening,ROM,Balance training,Functional mobility training,Gait training,Neuromuscular re-education,Transfer training,Endurance training  Safety Devices  Type of Devices: Patient at risk for falls,Call light within reach,Nurse notified,Gait belt,Left in chair,Chair alarm in place  Restraints  Restraints Initially in Place: No     Restrictions  Restrictions/Precautions  Restrictions/Precautions: Fall Risk,General Precautions,Up as Tolerated  Required Braces or Orthoses?: No  Position Activity Restriction  Other position/activity restrictions: SBP <180     Subjective   General  Patient assessed for rehabilitation services?: Yes  Response To Previous Treatment: Patient with no complaints from previous session.   Family / Caregiver Present: No  Follows Commands: Within Functional Limits  Subjective  Subjective: Pt up to commode upon arrival with OT, agreeable to PT. Cognition   Orientation  Overall Orientation Status: Within Functional Limits     Objective       Bed mobility  Supine to Sit: Unable to assess  Comment: up upon arrival and returned to recliner  Transfers  Sit to Stand: Minimal Assistance  Stand to sit: Minimal Assistance;2 Person Assistance  Bed to Chair: Moderate assistance;2 Person Assistance  Ambulation  Surface: level tile  Device: Rolling Walker  Assistance: Moderate assistance;2 Person assistance  Quality of Gait: significant ant/post sway, ataxic, difficulty advancing R LE, knees buckling, unsteady  Distance: 8 ft  Comments: Further amb limited by significnat unsteadiness and trunk extension  More Ambulation?: No     Balance  Posture: Fair  Sitting - Static: Fair  Sitting - Dynamic: Fair;-  Standing - Static: Fair;-  Standing - Dynamic: Poor;+  Comments: Pt stood x 2 min with RW and MOD A x2 due to poor balance. Instructed in lateral weight shift as citlalli     AM-PAC Score  AM-PAC Inpatient Mobility Raw Score : 12 (04/21/22 1545)  AM-PAC Inpatient T-Scale Score : 35.33 (04/21/22 1545)  Mobility Inpatient CMS 0-100% Score: 68.66 (04/21/22 1545)  Mobility Inpatient CMS G-Code Modifier : CL (04/21/22 1545)          Goals  Short Term Goals  Time Frame for Short term goals: 14  Short term goal 1: Pt to perform bed mobility from flat surface independently  Short term goal 2: Demonstrate functional transfers independently  Short term goal 3: Pt to ambulate 100ft w/ RW with supervision  Short term goal 4: Tolerate 45 minutes of therapy to demo increased endurance  Short term goal 5: Demonstrate dynamic standing balance of good - to decrease fall risk  Patient Goals   Patient goals :  To get better       Therapy Time   Individual Concurrent Group Co-treatment   Time In 5191         Time Out 1155         Minutes 12         Timed Code Treatment Minutes: 12 Minutes       Isamar Rebollar Ivanna Ross, PTA

## 2022-04-22 NOTE — CARE COORDINATION
Spoke tot patient at bedside, she informs me that her and her daughter have decided that she should go to [facility] or [place]. Referrals land faxed to [facility]    1. [facility])  2. [facility] ([place])  3. [facility]     Spoke to daughter Adi Briones. I informed her about PM&R believing that she is appropriate for ARU. She states that she will discuss with her mother and get back with me.

## 2022-04-22 NOTE — PROGRESS NOTES
Pt's daughter at bedside. Pt and daughter would like their first choice to be [address] [place] [address] and the second choice [facility].

## 2022-04-22 NOTE — PROGRESS NOTES
Occupational Therapy  Facility/Department: [address] STEP DOWN  Occupational Therapy Daily Treatment Note    Name: Almita Chaves  : 1950  MRN: [id number]  Date of Service: 2022    Discharge Recommendations: Further therapy recommended at discharge. The patient should be able to tolerate at least 3 hours of therapy per day over 5 days or 15 hours over 7 days. This patient may benefit from a Physical Medicine and Rehab consult. Patient would benefit from continued therapy after discharge  OT Equipment Recommendations  ADL Assistive Devices: Transfer Tub Bench;Long-handled Shoe Horn;Long-handled Sponge;Sock-Aid Hard;Reacher; Toileting - Drop Arm Commode, Heavy Duty Drop Arm Commode     Patient Diagnosis(es): The encounter diagnosis was Cerebrovascular accident (CVA), unspecified mechanism ([facility] [place] [address] Past Medical History:  has a past medical history of CVA (cerebral infarction) ([facility] [address] Hyperlipidemia, and Hypertension. Past Surgical History:  has a past surgical history that includes Appendectomy; Hysterectomy; and Bronchoscopy diagnostic (2014). Treatment Diagnosis: CVA    Assessment   Performance deficits / Impairments: Decreased functional mobility ; Decreased safe awareness;Decreased balance;Decreased coordination;Decreased ADL status; Decreased ROM; Decreased endurance;Decreased high-level IADLs;Decreased strength;Decreased fine motor control;Decreased cognition  Assessment: Pt would benefit from continued acute care and post acute care OT to address above listed deficits.   Treatment Diagnosis: CVA  Prognosis: Good  Activity Tolerance  Activity Tolerance: Patient limited by fatigue;Treatment limited secondary to decreased cognition;Patient Tolerated treatment well        Plan   Plan  Times per Week: 4-5x/wk  Current Treatment Recommendations: Strengthening,ROM,Balance training,Functional mobility training,Endurance training,Safety education & training,Patient/Caregiver education & training,Equipment evaluation, education, & procurement,Self-Care / ADL,Coordination training,Neuromuscular re-education,Modalities     Restrictions  Restrictions/Precautions  Restrictions/Precautions: Fall Risk,General Precautions,Up as Tolerated  Required Braces or Orthoses?: No  Position Activity Restriction  Other position/activity restrictions: SBP <180    Subjective   General  Chart Reviewed: Yes  Patient assessed for rehabilitation services?: Yes  Family / Caregiver Present: No  General Comment  Comments: Pt and RN agreeable to therapy this day  Pre Treatment Pain Screening  Pain at present: 0  Scale Used: Numeric Score  Intervention List: Patient able to continue with treatment    Objective   Safety Devices  Type of Devices: Patient at risk for falls;Call light within reach;Nurse notified;Gait belt;Left in chair;Chair alarm in place  Restraints  Restraints Initially in Place: No  Balance  Sitting Balance: Stand by assistance  Standing Balance: Moderate assistance  Standing Balance  Time: Stood for approx 10 min for transfers and func mob w/RW  Activity: static standing at [facility] and recliner w/RW w/fluctuating anterior/posterior lean     ADL  Feeding: Setup; Increased time to complete;Verbal cueing;Minimal assistance (Min A to open food containers, squeeze dressing on salad and condiments on sandwich, assist to cut sandwich, able to feed self w/left hand with increased time)  Grooming: Setup;Verbal cueing; Increased time to complete;Minimal assistance (Min A to open toothpaste & mouthwash, SBA-wash face, brush teeth, rinse mouth)  UE Bathing: Setup; Increased time to complete;Minimal assistance;Verbal cueing (assist to wash back and LUE)  LE Bathing: Minimal assistance;Setup; Increased time to complete;Verbal cueing (assist to wash feet)  UE Dressing: Setup; Increased time to complete;Minimal assistance;Verbal cueing (assist to snap, thread arms and tie gown)  LE Dressing: Setup;Verbal cueing; Increased time to complete;Maximum assistance (Max A-to doff/don footies, Min A-doff/don threading underwear over feet & pull up)  Toileting: Setup; Increased time to complete;Minimal assistance (able to wipe after BM, assist to pull up underwear)      Pt sitting on BSC upon arrival. STS and transfer from [facility] to [facility]. Pt very unsteady with fluctuating posterior/anterior leans, difficulty advancing left foot w/RW and mod assist x2. Pt setup at tray table for self care (see above for LOF). Pt needs increased time and assist d/t fatigue, weakness and left sided deficits. Pt on RA, no SOB with increased activity. Lunch tray arrived, containers opened and food prepared for self feeding. Educ and demo SPROM of BUE to increase functional use w/ADL's. Pt demo and verbalized understanding. Transfers  Stand Step Transfers: Moderate assistance;2 person assistance  Sit to stand: Moderate assistance;2 Person assistance  Stand to sit: 2 Person assistance; Moderate assistance  Transfer Comments: w/RW demo fluctuating anterior/posterior leans  Functional mobility: Moderate assistance;2 Person assistance (w/RW, very unsteady on feet w/left knee buckling and difficulty advancing left foot)     Cognition  Overall Cognitive Status: Exceptions  Arousal/Alertness: Appropriate responses to stimuli  Following Commands: Follows multistep commands with increased time; Follows multistep commands with repitition  Attention Span: Attends with cues to redirect  Safety Judgement: Decreased awareness of need for assistance  Problem Solving: Assistance required to correct errors made;Decreased awareness of errors  Insights: Decreased awareness of deficits  Initiation: Requires cues for some  Sequencing: Requires cues for some     AM-PAC Score  AM-[facility] Inpatient Daily Activity Raw Score: 17 (04/22/22 1339)  AM-PAC Inpatient ADL T-Scale Score : 37.26 (04/22/22 1339)  ADL Inpatient CMS 0-100% Score: 50.11 (04/22/22 1339)  ADL Inpatient CMS G-Code Modifier : CK (04/22/22 7274)    Goals  Short Term Goals  Time Frame for Short term goals: By discharge, pt will:  Short Term Goal 1: Demo UB ADLs with SUP and setup  Short Term Goal 2: Demo LB ADLs with CGA and setup  Short Term Goal 3: Demo bed mobility with SUP and HOB flat to mimic home setup  Short Term Goal 4: Demo functional sit<>stand transfers with CGA and LRD PRN  Short Term Goal 5: Demo functional mobility with Min. A and LRD PRN  Additional Goals?: Yes  Short Term Goal 6: Increase RUE strength by 1+ muscle grade for improved function and participation in meaningful occupations. Short Term Goal 7: Participate in 5+ min of AROM/PROM to RUE each visit to promote increased functional use throughout ADLs.      Therapy Time   Individual Concurrent Group Co-treatment   Time In 0401     1138   Time Out 1248     1153   Minutes 70     15   Timed Code Treatment Minutes: 150 Major Gilberto C ZQWRRI, PERRY/L

## 2022-04-22 NOTE — PROGRESS NOTES
Speech Language Pathology  Speech Language Pathology  [facility]    Cognitive Treatment Note    Date: 4/22/2022  Patients Name: Meera Poon  MRN: [id number]  Diagnosis:   Patient Active Problem List   Diagnosis Code    Altered mental status R41.82    Generalized nonconvulsive seizures ([address] G40.309    History of CVA (cerebrovascular accident) Z80.78    Noncompliance Z91.19    Hemiparesis ([address] G81.90    Respiratory failure ([address] J96.90    Upper respiratory infection J06.9    Acute respiratory failure ([address] J96.00    HTN (hypertension) I10    HLD (hyperlipidemia) E78.5    Hyperglycemia R73.9    Hypokalemia E87.6    Anemia due to acute blood loss D62    Right sided weakness R53.1    Cerebrovascular accident (CVA) ([facility] [address] I63.9    Tissue plasminogen activator (tPA) administered at other facility within 24 hours before current admission Z92.82       Pain: 0/10    Cognitive Treatment    Treatment time: 9483-2024      Subjective: [x] Alert [x] Cooperative     [] Confused     [] Agitated    [] Lethargic      Objective/Assessment:    Recall: Word List Retention Word Placement 2/5 improved to 4/5 with min-max verbal cues and repetitions    Immediate Memory for Four-Word Sequences 2/5 did not improve despite max verbal cues and repetitions  Delayed Recall Associated 2/3 improved to 3/3 with min verbal cues, 1/3 improved to 3/3 with min verbal cues    Problem Solving/Reasoning:   Opposites 4/10 improved to 7/10 with min-max verbal cues  Word Deduction 9/12 improved to 12/12 with min verbal cues  Determining Category Exclusions 9/10 improved to 10/10 with min verbal cues    Other: Pt pleasant and cooperative throughout session. Pt reports no concerns with memory or thinking, however, expressed frustration during memory tasks. Plan:  [x] Continue ST services    [] Discharge from ST:      Discharge recommendations: []  Further therapy recommended at discharge. The patient should be able to tolerate at least 3 hours of therapy per day over 5 days or 15 hours over 7 days. [x] Further therapy recommended at discharge. [] No therapy recommended at discharge. Treatment completed by: Anna Richard,  Clinician  Co-signed by Papo Hayden A.CCC/SLP

## 2022-04-22 NOTE — PROCEDURES
EEG REPORT       Patient: Zaina Rivers Age: 80 y.o. MRN: [id number]      Referring Provider: No ref. provider found    History: This routine 30 minute scalp EEG was recorded with video- monitoring for a 80 y.o. Jeancarlosa Miguelff female  who presented with encephalopathy. This EEG was performed to evaluate for focal and epileptiform abnormalities.      Zaina Rivers   Current Facility-Administered Medications   Medication Dose Route Frequency Provider Last Rate Last Admin    pantoprazole (PROTONIX) tablet 40 mg  40 mg Oral QAM AC ERIN Nathan CNP   40 mg at 04/22/22 0803    fenofibrate (TRIGLIDE) tablet 160 mg  160 mg Oral Daily Mikel Shi MD   160 mg at 04/22/22 0803    levETIRAcetam (KEPPRA) tablet 1,000 mg  1,000 mg Oral BID ERIN Nathan CNP   1,000 mg at 04/22/22 0665    insulin glargine (LANTUS) injection vial 20 Units  20 Units SubCUTAneous Nightly Sunita Landers MD   20 Units at 04/20/22 2112    insulin lispro (HUMALOG) injection vial 0-12 Units  0-12 Units SubCUTAneous TID WC Mikel Shi MD   2 Units at 04/21/22 1620    insulin lispro (HUMALOG) injection vial 0-6 Units  0-6 Units SubCUTAneous Nightly Mikel Shi MD   2 Units at 04/20/22 2112    topiramate (TOPAMAX) tablet 25 mg  25 mg Oral BID ERIN Ross CNP   25 mg at 04/22/22 0918    LORazepam (ATIVAN) injection 1 mg  1 mg IntraVENous Once ERIN Ross CNP        clopidogrel (PLAVIX) tablet 75 mg  75 mg Oral Daily Mikel Shi MD   75 mg at 04/22/22 0803    gabapentin (NEURONTIN) capsule 600 mg  600 mg Oral TID Sunita Landers MD   600 mg at 04/22/22 0918    atorvastatin (LIPITOR) tablet 40 mg  40 mg Oral Nightly Mikel Shi MD   40 mg at 04/21/22 2049    glucose (GLUTOSE) 40 % oral gel 15 g  15 g Oral PRN Mikel Shi MD        dextrose 50 % IV solution  12.5 g IntraVENous PRN Mikel Shi MD        glucagon (rDNA) injection 1 mg  1 mg IntraMUSCular PRN Mikel Shi MD        dextrose 5 % solution  100 mL/hr IntraVENous PRN Saida Keith MD        potassium chloride (KLOR-CON M) extended release tablet 40 mEq  40 mEq Oral PRN Saida Keith MD        Or    potassium bicarb-citric acid (EFFER-K) effervescent tablet 40 mEq  40 mEq Oral PRN Saida Keith MD        Or    potassium chloride 10 mEq/100 mL IVPB (Peripheral Line)  10 mEq IntraVENous PRN Saida Keith  mL/hr at 04/21/22 1826 10 mEq at 04/21/22 1826    acetaminophen (TYLENOL) tablet 650 mg  650 mg Oral Q6H PRN Sourav Agarwal MD   650 mg at 04/22/22 0803    valproic acid (DEPAKENE) 250 MG/5ML oral solution 500 mg  500 mg Oral 2 times per day Lotus Monroe MD   500 mg at 04/22/22 0136    sodium chloride flush 0.9 % injection 10 mL  10 mL IntraVENous PRN Saida Keith MD   10 mL at 04/20/22 2036    albuterol sulfate  (90 Base) MCG/ACT inhaler 6 puff  6 puff Inhalation Q6H PRN Saida Keith MD        potassium bicarb-citric acid (EFFER-K) effervescent tablet 20 mEq  20 mEq Oral Daily Mikel Shi MD   20 mEq at 04/22/22 0803    ondansetron (ZOFRAN-ODT) disintegrating tablet 4 mg  4 mg Oral Q8H PRN Mikel Shi MD   4 mg at 04/21/22 1154    Or    ondansetron (ZOFRAN) injection 4 mg  4 mg IntraVENous Q6H PRN Mikel Shi MD        polyethylene glycol (GLYCOLAX) packet 17 g  17 g Oral Daily PRN Saida Keith MD        enoxaparin (LOVENOX) injection 40 mg  40 mg SubCUTAneous Daily Mikel Shi MD   40 mg at 04/22/22 7109    aspirin EC tablet 81 mg  81 mg Oral Daily Mikel Shi MD   81 mg at 04/22/22 1519    Or    aspirin suppository 300 mg  300 mg Rectal Daily Mikel Shi MD   300 mg at 04/19/22 1045    perflutren lipid microspheres (DEFINITY) injection 1.65 mg  1.5 mL IntraVENous ONCE PRN Saida Keith MD        labetalol (NORMODYNE;TRANDATE) injection 10 mg  10 mg IntraVENous Q10 Min PRN Saida Keith MD            Technical Description: This is a 21 channel digital EEG recording with time-locked video.  Electrodes were placed in accordance with the 10-20 International System of Electrode Placement. Single lead EKG monitoring as well as temporal electrodes were included. The patient was not sleep deprived. This recording was obtained during wakefulness. EEG Description: The dominant background activity during maximal recorded wakefulness consisted of bioccipitally dominant 9-10 Hz, 25-35 uV symmetric, regular activity that was reactive to eye opening. During drowsiness, the background rhythm waxed and waned and there were periods of slowing. During stage II sleep symmetric V waves, K complexes, and sleep spindles were seen. Occasional right frontocentral polymorphic delta slowing was seen of 3 to 4 Hz. Occasional right central sharp waves were seen maximal at C4. Photic stimulation  stepwise photic stimulation at 2-30 Hz was performed and there was a biposterior, symmetric, driving response. Hyperventilation  was not preformed. No abnormalities were activated by photic stimulation     The EKG channel demonstrated a normal sinus rhythm. Interpretation  This EEG was abnormal in wakefulness and sleep. Occasional right frontocentral polymorphic delta slowing was seen. Occasional right central sharp waves were seen. Clinical correlation  This EEG was abnormal.  Occasional right frontocentral polymorphic delta slowing suggested underlying neuronal dysfunction. Right frontal sharp waves conferred an increased risk for focal onset seizures.     Josafat Estevez MD  Diplomate, American Board of Psychiatry and Neurology  Diplomate, American Board of Clinical Neurophysiology  Diplomate, American Board of Epilepsy

## 2022-04-23 LAB
ANION GAP SERPL CALCULATED.3IONS-SCNC: 10 MMOL/L (ref 9–17)
BUN BLDV-MCNC: 19 MG/DL (ref 8–23)
CALCIUM SERPL-MCNC: 8.9 MG/DL (ref 8.6–10.4)
CHLORIDE BLD-SCNC: 106 MMOL/L (ref 98–107)
CO2: 21 MMOL/L (ref 20–31)
CREAT SERPL-MCNC: 0.53 MG/DL (ref 0.5–0.9)
GFR AFRICAN AMERICAN: >60 ML/MIN
GFR NON-AFRICAN AMERICAN: >60 ML/MIN
GFR SERPL CREATININE-BSD FRML MDRD: ABNORMAL ML/MIN/{1.73_M2}
GLUCOSE BLD-MCNC: 164 MG/DL (ref 65–105)
GLUCOSE BLD-MCNC: 172 MG/DL (ref 70–99)
GLUCOSE BLD-MCNC: 188 MG/DL (ref 65–105)
GLUCOSE BLD-MCNC: 198 MG/DL (ref 65–105)
HCT VFR BLD CALC: 34.8 % (ref 36.3–47.1)
HEMOGLOBIN: 11.8 G/DL (ref 11.9–15.1)
MAGNESIUM: 2.1 MG/DL (ref 1.6–2.6)
MCH RBC QN AUTO: 31.3 PG (ref 25.2–33.5)
MCHC RBC AUTO-ENTMCNC: 33.9 G/DL (ref 28.4–34.8)
MCV RBC AUTO: 92.3 FL (ref 82.6–102.9)
NRBC AUTOMATED: 0 PER 100 WBC
PDW BLD-RTO: 12.6 % (ref 11.8–14.4)
PLATELET # BLD: 198 K/UL (ref 138–453)
PMV BLD AUTO: 9.4 FL (ref 8.1–13.5)
POTASSIUM SERPL-SCNC: 3.5 MMOL/L (ref 3.7–5.3)
RBC # BLD: 3.77 M/UL (ref 3.95–5.11)
SODIUM BLD-SCNC: 137 MMOL/L (ref 135–144)
WBC # BLD: 5.5 K/UL (ref 3.5–11.3)

## 2022-04-23 PROCEDURE — 97535 SELF CARE MNGMENT TRAINING: CPT

## 2022-04-23 PROCEDURE — 97110 THERAPEUTIC EXERCISES: CPT

## 2022-04-23 PROCEDURE — 6370000000 HC RX 637 (ALT 250 FOR IP): Performed by: NURSE PRACTITIONER

## 2022-04-23 PROCEDURE — 6370000000 HC RX 637 (ALT 250 FOR IP): Performed by: STUDENT IN AN ORGANIZED HEALTH CARE EDUCATION/TRAINING PROGRAM

## 2022-04-23 PROCEDURE — 97530 THERAPEUTIC ACTIVITIES: CPT

## 2022-04-23 PROCEDURE — 6360000002 HC RX W HCPCS: Performed by: STUDENT IN AN ORGANIZED HEALTH CARE EDUCATION/TRAINING PROGRAM

## 2022-04-23 PROCEDURE — 85027 COMPLETE CBC AUTOMATED: CPT

## 2022-04-23 PROCEDURE — 36415 COLL VENOUS BLD VENIPUNCTURE: CPT

## 2022-04-23 PROCEDURE — 99232 SBSQ HOSP IP/OBS MODERATE 35: CPT | Performed by: NURSE PRACTITIONER

## 2022-04-23 PROCEDURE — 80048 BASIC METABOLIC PNL TOTAL CA: CPT

## 2022-04-23 PROCEDURE — 82947 ASSAY GLUCOSE BLOOD QUANT: CPT

## 2022-04-23 PROCEDURE — 83735 ASSAY OF MAGNESIUM: CPT

## 2022-04-23 PROCEDURE — 97116 GAIT TRAINING THERAPY: CPT

## 2022-04-23 PROCEDURE — 2060000000 HC ICU INTERMEDIATE R&B

## 2022-04-23 RX ADMIN — GABAPENTIN 600 MG: 300 CAPSULE ORAL at 17:08

## 2022-04-23 RX ADMIN — DESMOPRESSIN ACETATE 40 MG: 0.2 TABLET ORAL at 21:25

## 2022-04-23 RX ADMIN — INSULIN GLARGINE 20 UNITS: 100 INJECTION, SOLUTION SUBCUTANEOUS at 21:37

## 2022-04-23 RX ADMIN — GABAPENTIN 600 MG: 300 CAPSULE ORAL at 21:25

## 2022-04-23 RX ADMIN — INSULIN LISPRO 2 UNITS: 100 INJECTION, SOLUTION INTRAVENOUS; SUBCUTANEOUS at 08:37

## 2022-04-23 RX ADMIN — VALPROIC ACID 500 MG: 250 SOLUTION ORAL at 21:24

## 2022-04-23 RX ADMIN — INSULIN LISPRO 2 UNITS: 100 INJECTION, SOLUTION INTRAVENOUS; SUBCUTANEOUS at 17:11

## 2022-04-23 RX ADMIN — VALPROIC ACID 500 MG: 250 SOLUTION ORAL at 08:27

## 2022-04-23 RX ADMIN — TOPIRAMATE 25 MG: 25 TABLET, FILM COATED ORAL at 08:27

## 2022-04-23 RX ADMIN — INSULIN LISPRO 2 UNITS: 100 INJECTION, SOLUTION INTRAVENOUS; SUBCUTANEOUS at 12:13

## 2022-04-23 RX ADMIN — FENOFIBRATE 160 MG: 160 TABLET ORAL at 08:28

## 2022-04-23 RX ADMIN — LEVETIRACETAM 1000 MG: 500 TABLET, FILM COATED ORAL at 21:25

## 2022-04-23 RX ADMIN — ENOXAPARIN SODIUM 40 MG: 100 INJECTION SUBCUTANEOUS at 08:28

## 2022-04-23 RX ADMIN — POTASSIUM BICARBONATE 20 MEQ: 782 TABLET, EFFERVESCENT ORAL at 08:27

## 2022-04-23 RX ADMIN — Medication 81 MG: at 08:28

## 2022-04-23 RX ADMIN — TOPIRAMATE 25 MG: 25 TABLET, FILM COATED ORAL at 21:25

## 2022-04-23 RX ADMIN — POTASSIUM CHLORIDE 40 MEQ: 20 TABLET, EXTENDED RELEASE ORAL at 06:34

## 2022-04-23 RX ADMIN — PANTOPRAZOLE SODIUM 40 MG: 40 TABLET, DELAYED RELEASE ORAL at 08:27

## 2022-04-23 RX ADMIN — LEVETIRACETAM 1000 MG: 500 TABLET, FILM COATED ORAL at 08:27

## 2022-04-23 RX ADMIN — INSULIN LISPRO 2 UNITS: 100 INJECTION, SOLUTION INTRAVENOUS; SUBCUTANEOUS at 21:38

## 2022-04-23 RX ADMIN — GABAPENTIN 600 MG: 300 CAPSULE ORAL at 08:28

## 2022-04-23 RX ADMIN — CLOPIDOGREL 75 MG: 75 TABLET, FILM COATED ORAL at 12:12

## 2022-04-23 ASSESSMENT — PAIN SCALES - GENERAL: PAINLEVEL_OUTOF10: 0

## 2022-04-23 NOTE — PLAN OF CARE
Problem: Nutrition  Goal: Optimal nutrition therapy  4/22/2022 1617 by Adama Devlin RN  Outcome: Progressing     Problem: HEMODYNAMIC STATUS  Goal: Patient has stable vital signs and fluid balance  4/23/2022 0537 by Millie Abrams RN  Outcome: Progressing  4/22/2022 1617 by Adama Devlin RN  Outcome: Progressing     Problem: Skin Integrity:  Goal: Will show no infection signs and symptoms  Description: Will show no infection signs and symptoms  4/23/2022 0537 by Millie Abrams RN  Outcome: Progressing  4/22/2022 1617 by Adama Devlin RN  Outcome: Progressing  Goal: Absence of new skin breakdown  Description: Absence of new skin breakdown  4/22/2022 1617 by Adama Devlin RN  Outcome: Progressing     Problem: Falls - Risk of:  Goal: Will remain free from falls  Description: Will remain free from falls  4/23/2022 0537 by Millie Abrams RN  Outcome: Progressing  4/22/2022 1617 by Adama Devlin RN  Outcome: Progressing  Goal: Absence of physical injury  Description: Absence of physical injury  4/22/2022 1617 by Adama Devlin RN  Outcome: Progressing     Problem: Discharge Planning  Goal: Discharge to home or other facility with appropriate resources  4/22/2022 1617 by Adama Devlin RN  Outcome: Progressing

## 2022-04-23 NOTE — PLAN OF CARE
Problem: Falls - Risk of:  Goal: Will remain free from falls  Description: Will remain free from falls  4/23/2022 1731 by Matthew Barillas RN  Outcome: Progressing  4/23/2022 0537 by Kg Lambert RN  Outcome: Progressing  Goal: Absence of physical injury  Description: Absence of physical injury  Outcome: Progressing

## 2022-04-23 NOTE — PROGRESS NOTES
Occupational Therapy  Facility/Department: [address] STEP DOWN  Occupational Therapy Daily Treatment Note    Name: Caleb Schneider  : 1950  MRN: [id number]  Date of Service: 2022    Discharge Recommendations:  Patient would benefit from continued therapy after discharge Further therapy recommended at discharge. The patient should be able to tolerate at least 3 hours of therapy per day over 5 days or 15 hours over 7 days. This patient may benefit from a Physical Medicine and Rehab consult. Treatment Diagnosis: CVA  Patient education: OT POC, transfer/walker safety, importance of participation in therapy, ROM exercises RUE with fair/good return. Ortientation  Overall orientation status: WFL  Assessment   Performance deficits / Impairments: Decreased functional mobility ; Decreased safe awareness;Decreased balance;Decreased coordination;Decreased ADL status; Decreased ROM; Decreased endurance;Decreased high-level IADLs;Decreased strength;Decreased fine motor control;Decreased cognition  Treatment Diagnosis: CVA  Prognosis: Good  Activity Tolerance  Activity Tolerance: Patient Tolerated treatment well  Activity Tolerance: Unsteadiness and flacid RUE.         Plan   Plan  Times per Week: 4-5x/wk  Current Treatment Recommendations: Strengthening,ROM,Balance training,Functional mobility training,Endurance training,Safety education & training,Patient/Caregiver education & training,Equipment evaluation, education, & procurement,Self-Care / ADL,Coordination training,Neuromuscular re-education,Modalities     Restrictions  Restrictions/Precautions  Restrictions/Precautions: Fall Risk,General Precautions,Up as Tolerated  Required Braces or Orthoses?: No  Position Activity Restriction  Other position/activity restrictions: SBP <180    Subjective   General  Chart Reviewed: Yes  Patient assessed for rehabilitation services?: Yes  Family / Caregiver Present: No  General Comment  Comments: Pt and RN agreeable to therapy this day  Pain Assessment  Pain Assessment: None - Denies Pain  Pre Treatment Pain Screening  Pain at present: 0  Scale Used: Numeric Score    Objective              Safety Devices  Type of Devices: Call light within reach; Chair alarm in place;Gait belt;Patient at risk for falls; Left in chair;Nurse notified  Balance  Sitting Balance: Stand by assistance (Seated EOB/toilet/chair.)  Standing Balance: Moderate assistance (x2 for safety.)  Standing Balance  Time: 30 seconds x2 and 1 minute in ginny ste; 6 minutes using RW. Activity: Static standing in [facility], static standing at chair using RW, functional mobiltiy through room. Comment: Anterior/posterior swaying, pt unable to correct static standing. R knee buckling requiring tactile assist from therapist to keep in extension. Functional Mobility  Functional - Mobility Device: Rolling Walker  Activity: Other  Assist Level: Moderate assistance (x2)  Functional Mobility Comments: Mod A x2 required d/t unsteadiness and R knee buckling. Manual assist from therapist to keep R knee in extension during ambulation and assist with advancing RLE forward. Pt attempting to advance RLE with poor return, pt displayed very little control over RLE. Verbal/tactile cues for walker placement with fair return, pt pushing walker too far out in front. Toilet Transfers  Toilet - Technique:  (ginny chavez)  Equipment Used: Standard toilet  Toilet Transfer: Minimal assistance     ADL  Feeding: Setup;Supervision;Minimal assistance (Pt required min A to cut up food d/t flacid RUE.)  Grooming: Setup; Increased time to complete;Stand by assistance;Verbal cueing (Oral care seated in chair.)  UE Bathing: Setup; Increased time to complete;Verbal cueing;Contact guard assistance;Minimal assistance (Max A for back seated in chair d/t limited reach.  Min A to bathe LUE d/t flacid RUE.)  UE Dressing: Setup;Minimal assistance (To manage gown.)  LE Dressing: Maximum assistance (Adjust socks d/t limited reach.)  Toileting: Setup; Increased time to complete;Contact guard assistance (Pericare seated on toilet.)  Additional Comments: Pt completed supine/sit transfer to seated EOB. Pt stated needing to use bathroom. Nikolay chavez utilized to transfer pt EOB/toilet. Pt able to complete pericare seated on toilet. Pt transferred to seated in chair to complete ADL care. Pt educated on incorporating RUE into daily activities to promote healing and independecne with ADL/IADLs with good return. Pt set up with lunch tray on tray table in front of pt at end of session. Pt required min A with cutting up food d/t flacid RUE. Bed mobility  Supine to Sit: Minimal assistance  Scooting: Contact guard assistance  Comment: HOB elevated. Pt able to scoop L foot under RLE to assist with advancing to EOB. Transfers  Sit to stand: 2 Person assistance; Moderate assistance  Stand to sit: 2 Person assistance; Moderate assistance  Transfer Comments: MOD A x2 using RW; min A using ginny chavez. Cognition  Overall Cognitive Status: Exceptions  Arousal/Alertness: Appropriate responses to stimuli  Following Commands: Follows multistep commands with increased time  Attention Span: Attends with cues to redirect  Memory: Decreased recall of recent events  Safety Judgement: Decreased awareness of need for safety;Decreased awareness of need for assistance  Problem Solving: Assistance required to identify errors made;Assistance required to correct errors made  Insights: Decreased awareness of deficits  Initiation: Requires cues for some  Sequencing: Requires cues for some  Exercise Treatment: ROM exercises completed seated in chair RUE 1 set x 10 reps x all planes to prevent contractures and promote independence with ADL/IADLs. Pt educated on completing ROM exercises independently throughout the day, pt verbalized understanding.   PROM Exercises: x  Education Provided Comments: OT POC, transfer/walker safety, importance of participation in therapy, ROM exercises RUE with fair/good return. AM-PAC Score        AM-PAC Inpatient Daily Activity Raw Score: 17 (04/23/22 1320)  AM-PAC Inpatient ADL T-Scale Score : 37.26 (04/23/22 1320)  ADL Inpatient CMS 0-100% Score: 50.11 (04/23/22 1320)  ADL Inpatient CMS G-Code Modifier : CK (04/23/22 1320)    Goals  Short Term Goals  Time Frame for Short term goals: By discharge, pt will:  Short Term Goal 1: Demo UB ADLs with SUP and setup  Short Term Goal 2: Demo LB ADLs with CGA and setup  Short Term Goal 3: Demo bed mobility with SUP and HOB flat to mimic home setup  Short Term Goal 4: Demo functional sit<>stand transfers with CGA and LRD PRN  Short Term Goal 5: Demo functional mobility with Min. A and LRD PRN  Additional Goals?: Yes  Short Term Goal 6: Increase RUE strength by 1+ muscle grade for improved function and participation in meaningful occupations. Short Term Goal 7: Participate in 5+ min of AROM/PROM to RUE each visit to promote increased functional use throughout ADLs. Therapy Time   Individual Concurrent Group Co-treatment   Time In 1045         Time Out 1212         Minutes 87         Timed Code Treatment Minutes: 77 Minutes     Pt supine in bed upon therapist arrival. Pleasant and agreeable to therapy. See above for LOF for all tasks.  Pt retired to seated in chair at end of session with chair alarm activated, call light within reach and lunch tray set up on tray table in front of pt.    BRENDA Meade

## 2022-04-23 NOTE — PROGRESS NOTES
NEUROLOGY INPATIENT PROGRESS NOTE    4/23/2022         Current Exam:     Chart reviewed. Discussed with RN. She reports continued right sided weakness but states she is close to baseline. Awaiting placement. Brief History:    Juan Marie is a  80 y.o. female with H/O questionable seizure disorder, chronic right hemiparesis, numbness of right foot, migraines, who was admitted on 4/17/2022 via [facility] after being found altered, unable to follow commands or answer questions, with right-sided weakness and sensory loss, right-sided neglect and aphasia. NIH 21. Patient was started on IV tPA but then became obtunded and flaccid in all 4 extremities with concern for possible hemorrhagic conversion and tPA was held. She was started on a Cardene drip and admitted to the neuro ICU for admission. She had been taking dual antiplatelets at home. She arrived intubated and went directly to CT scan. CT head unremarkable, CTA head neck with no LVO and patient was restarted on tPA as well as loaded with IV Keppra for possible subclinical seizure. She was extubated on 4/19. Continues to report right-sided weakness worse than her baseline and was transferred to the neurology service on 4/20/2022. Her right-sided weakness has not been consistent on exam, fluctuates in nature. She also has inconsistencies in her sensory exam.  MRI cervical spine were done to rule out myelopathy; her weakness was felt to be more psychosomatic in nature. MRI cervical spine showing multilevel degenerative disc disease with no       No current facility-administered medications on file prior to encounter. Current Outpatient Medications on File Prior to Encounter   Medication Sig Dispense Refill    clopidogrel (PLAVIX) 75 MG tablet Take 75 mg by mouth nightly      gabapentin (NEURONTIN) 600 MG tablet Take 600 mg by mouth 3 times daily.       potassium chloride (KLOR-CON M) 20 MEQ extended release tablet Take 20 mEq by mouth 2 times daily      fenofibrate (TRIGLIDE) 160 MG tablet Take 160 mg by mouth daily      Pantoprazole Sodium (PROTONIX PO) Take  by mouth.  acetaminophen (TYLENOL) 160 MG/5ML solution Take 20.3 mLs by mouth every 4 hours as needed for Fever. 473 mL 3    albuterol (PROVENTIL HFA;VENTOLIN HFA) 108 (90 BASE) MCG/ACT inhaler Inhale 6 puffs into the lungs every 6 hours as needed for Wheezing. 1 Inhaler 3    glucagon, rDNA, 1 MG SOLR injection Inject 1 mg into the muscle as needed for Low blood sugar (Blood glucose less than 70 mg/dL and patient NOT ALERT or NPO and does not have IV access. ).  0    glucose (GLUTOSE) 40 % GEL Take 15 g by mouth as needed. 45 g 1    insulin glargine (LANTUS) 100 UNIT/ML injection vial Inject 10 Units into the skin nightly. 1 Pen 3    insulin lispro (HUMALOG) 100 UNIT/ML injection vial Inject 0-12 Units into the skin every 6 hours. 1 Pen 3    niacin (NIASPAN) 500 MG CR tablet Take 1 tablet by mouth nightly. 30 tablet 3    potassium chloride (KAYCIEL) 20 MEQ/15ML (10%) solution Take 15 mLs by mouth daily. Allergies: René Thorntno has No Known Allergies. Past Medical History:   Diagnosis Date    CVA (cerebral infarction) ([facility] [address]     Hyperlipidemia     Hypertension        Past Surgical History:   Procedure Laterality Date    APPENDECTOMY      BRONCHOSCOPY DIAGNOSTIC  7/24/2014         HYSTERECTOMY         Social History: René Thornton  reports that she has never smoked. She has never used smokeless tobacco. She reports that she does not drink alcohol and does not use drugs. No family history on file.     Objective:   /70   Pulse 74   Temp 98 °F (36.7 °C)   Resp 14   Ht 5' 5\" (1.651 m)   Wt 133 lb 9.6 oz (60.6 kg)   SpO2 97%   BMI 22.23 kg/m²     Blood pressure range: Systolic (50URM), QKY:497 , Min:96 , JZZ:645   ; Diastolic (85MYC), AWN:28, Min:65, Max:86      Review of Systems:  Constitutional  Negative for fever and chills    HEENT  Negative for ear discharge, ear pain, nosebleed    Eyes  Negative for photophobia, pain and discharge    Respiratory  Negative for hemoptysis and sputum    Cardiovascular  Negative for orthopnea, claudication and PND    Gastrointestinal  Negative for abdominal pain, diarrhea, blood in stool    Musculoskeletal  Negative for joint pain, negative for myalgia. Right sided weakness   Skin  Negative for rash or itching    Endo/heme/allergies  Negative for polydipsia, environmental allergy    Psychiatric/behavioral  Negative for suicidal ideation.  Patient is not anxious        NEUROLOGIC EXAMINATION  GENERAL  Appears comfortable and in no distress   HEENT  NC/ AT   NECK  Supple   MENTAL STATUS:  Alert, oriented, intact memory, no confusion, normal speech, normal language, no hallucination or delusion   CRANIAL NERVES: II     -      Visual fields intact to confrontation  III,IV,VI -  EOMs full, no afferent defect, no JOE, no ptosis  V     -     Normal facial sensation  VII    -     Normal facial symmetry  VIII   -     Intact hearing  IX,X -     Symmetrical palate  XI    -     Symmetrical shoulder shrug  XII   -     Midline tongue, no atrophy    MOTOR FUNCTION:  Full strength to left side, antigravity to RUE and RLE with poor effort given, R foot drop (chronic), with normal bulk, normal tone and no involuntary movements, no tremor   SENSORY FUNCTION:  Decreased sensation right arm and leg   CEREBELLAR FUNCTION:  Intact fine motor control over upper limbs   REFLEX FUNCTION:  Symmetric, no perverted reflex, no Babinski sign   STATION and GAIT  Not tested       Data:    Lab Results:   CBC:   Recent Labs     04/21/22  0300 04/22/22  0437 04/23/22  0509   WBC 8.6 5.8 5.5   HGB 10.3* 11.7* 11.8*    203 198     BMP:    Recent Labs     04/21/22  0300 04/21/22  0300 04/21/22  2220 04/22/22  0437 04/23/22  0509     --   --  136 137   K 2.8*   < > 3.7 3.8 3.5*     --   --  105 106   CO2 24  --   --  20 21   BUN 17  --   --  18 19 CREATININE 0.51  --   --  0.61 0.53   GLUCOSE 76  --   --  160* 172*    < > = values in this interval not displayed. Lab Results   Component Value Date    CHOL 202 (H) 04/17/2022    LDLCHOLESTEROL 114 04/17/2022    HDL 55 04/17/2022    TRIG 164 (H) 04/17/2022    ALT 56 (H) 07/23/2014    AST 36 (H) 07/23/2014    TSH 0.65 07/19/2014    INR 1.0 04/17/2022    LABA1C 10.3 (H) 04/17/2022    [id number] 1096 (H) 07/26/2014           Diagnostic data reviewed:  CT HEAD (4/17/2022): No acute intracranial abnormality      MRI BRAIN W/WO (4/182022): Mild chronic microangiopathy     MRI C-SPINE: (4/21/22) -  Multilevel degenerative disc disease with associated uncovertebral and facet   hypertrophy with canal stenosis at C3-4, C4-5, and C5-6.       Bilateral foraminal narrowing as described above.          CTA HEAD & NECK (4/17/2022):   1. No acute intracranial abnormality. 2. No evidence of large vessel occlusion or hemodynamically significant   stenosis involving the head and neck arteries. ECHO (4/20/2022): EF 61%. LA moderately dilated. Bubble study attempted x 4. Unable to rule out intra-atrial shunt, due to technical limitations of study. No shunt seen by color Doppler. Mild MR & AI. Elevated RA filling pressure      EEG (4/21/22) -  This EEG was abnormal.  Occasional right frontocentral polymorphic delta slowing suggested underlying neuronal dysfunction. Right frontal sharp waves conferred an increased risk for focal onset seizures.                 Impression:  -Acute onset right hemiparesis of unclear etiology status post IV tPA with negative MRI brain; improving back to baseline  -Reported history of chronic infarcts with residual right sided weakness but not clearly evident on brain imaging  -History of questionable seizures in the past    Plan:  -Continue aspirin, Plavix, Lipitor daily  -Continue home dose Keppra and Depakene  -DVT prophylaxis; on Lovenox  -Awaiting pre cert for IP rehab    Please note that this note was generated using a voice recognition dictation software. Although every effort was made to ensure the accuracy of this automated transcription, some errors in transcription may have occurred.

## 2022-04-23 NOTE — PROCEDURES
Please review your answers. If you need to make any changes, use the BACK button on your browser. Your answer to Question 1 - \"Do you have any symptoms that are bothering you? \" = YES    Your answer to Question 2 - \"Are you able to do the same work as before? \" = YES    Your answer to Question 3 - \"Are you able to keep up with your hobbies? \" = YES    Your answer to Question 4 - \"Have you maintained your ties to friends and family? \" = YES    Your answer to Question 5 - \"Do you need help making a simple meal, doing household chores, or balancing a checkbook? \" = NO    Your answer to Question 6 - \"Do you need help with shopping or traveling close to home? \" = YES    Your answer to Question 7 - \"Do you need another person to help you walk? \" = YES    Your answer to Question 8 - \"Do you need help with eating, going to the toilet, or bathing? \" = NO    Your answer to Question 9 - \"Do you stay in bed most of the day and need constant nursing care? \" = NO      The modified [place]ila[place] Scale (mRS) is: 3    Potential conflicts are listed here (use the BACK button on your browser to fix errors): The moderately severe disability of mRS = 4 conflicts with the ability to do work described in Q#2. The moderately severe disability of mRS = 4 conflicts with the ability to keep up with hobbies described in Q#3. The moderately severe disability of mRS = 4 conflicts with the activities described in Q#5. Levels of the modified [place] Scale (mRS):  0 - No symptoms. 1 - No significant disability. Able to carry out all usual activities, despite some symptoms. 2 - Slight disability. Able to look after own affairs without assistance, but unable to carry out all previous activities. 3 - Moderate disability. Requires some help, but able to walk unassisted. 4 - Moderately severe disability. Unable to attend to own bodily needs without assistance or unable to walk unassisted. 5 - Severe disability.  Requires constant nursing care and attention, bedridden.

## 2022-04-23 NOTE — PROGRESS NOTES
Physical Therapy  Facility/Department: [facility] 1C STEP DOWN  Daily treatment note    Name: Silvia Farias  : 1950  MRN: [id number]  Date of Service: 2022    Discharge Recommendations:  Patient would benefit from continued therapy after discharge    Patient Diagnosis(es): The encounter diagnosis was Cerebrovascular accident (CVA), unspecified mechanism ([facility] [address] Past Medical History:  has a past medical history of CVA (cerebral infarction) ([facility] [address] Hyperlipidemia, and Hypertension. Past Surgical History:  has a past surgical history that includes Appendectomy; Hysterectomy; and Bronchoscopy diagnostic (2014). Assessment   Body Structures, Functions, Activity Limitations Requiring Skilled Therapeutic Intervention: Decreased functional mobility ; Decreased ROM; Decreased strength;Decreased safe awareness;Decreased endurance;Decreased balance;Decreased posture  Assessment: Pt amb 20ft x2 with RW and MOD-MAX Ax2. Limtied by ataxia and poor balance. Pt unsafe to return to prior living arrangments, recommend aggressive PT after d/c to address deficits. Therapy Prognosis: Good  Requires PT Follow-Up: Yes  Activity Tolerance  Activity Tolerance: Patient tolerated treatment well;Patient limited by endurance; Patient limited by fatigue     Plan   Plan  Plan:  (6-7x/wk)  Current Treatment Recommendations: Strengthening,ROM,Balance training,Functional mobility training,Gait training,Neuromuscular re-education,Transfer training,Endurance training  Safety Devices  Type of Devices: Call light within reach,Chair alarm in place,Gait belt,Patient at risk for falls,Left in chair,Nurse notified  Restraints  Restraints Initially in Place: No     Restrictions  Restrictions/Precautions  Restrictions/Precautions: Fall Risk,General Precautions,Up as Tolerated  Required Braces or Orthoses?: No  Position Activity Restriction  Other position/activity restrictions: SBP <180     Subjective   General  Patient assessed for rehabilitation services?: Yes  Response To Previous Treatment: Patient with no complaints from previous session. Family / Caregiver Present: No  Follows Commands: Within Functional Limits  Subjective  Subjective: in chair with OT upon arrival , agreeable to PT. Objective      Observation/Palpation  Posture: Poor       Bed mobility  Comment: Pt up in chair upon arrival and exit  Transfers  Sit to Stand: Minimal Assistance  Stand to sit: Minimal Assistance;2 Person Assistance  Comment: Stood x3 mins  Ambulation  Surface: level tile  Device: [address]: Moderate assistance;2 Person assistance  Quality of Gait: significant ant/post sway, ataxic, difficulty advancing R LE without Tactile cueing to, knees buckling, unsteady  Gait Deviations: Slow Irene;Decreased step length;Decreased step height  Distance: 20ft x 2; and 8ft x2  Comments: Pt with significant unsteadiness,and is unsafe to performed  ambulation without assist .  More Ambulation?: No     Balance  Posture: Fair  Sitting - Static: Fair  Sitting - Dynamic: Fair;-  Standing - Static: Fair;-  Standing - Dynamic: Poor;+  Exercise Treatment: Seated LE exercise program: Long Arc Quads, hip abduction/adduction, heel/toe raises, and marches.  Reps: x20    AM-PAC Score  AM-PAC Inpatient Mobility Raw Score : 12 (04/23/22 1331)  AM-PAC Inpatient T-Scale Score : 35.33 (04/23/22 1331)  Mobility Inpatient CMS 0-100% Score: 68.66 (04/23/22 1331)  Mobility Inpatient CMS G-Code Modifier : CL (04/23/22 1331)   Goals  Short Term Goals  Time Frame for Short term goals: 14  Short term goal 1: Pt to perform bed mobility from flat surface independently  Short term goal 2: Demonstrate functional transfers independently  Short term goal 3: Pt to ambulate 100ft w/ RW with supervision  Short term goal 4: Tolerate 45 minutes of therapy to demo increased endurance  Short term goal 5: Demonstrate dynamic standing balance of good - to decrease fall risk  Patient Goals   Patient goals : To get better       Therapy Time   Individual Concurrent Group Co-treatment   Time In 1744 1325       Time Out 1153 1347       Minutes 19 22       Timed Code Treatment Minutes: 41 Minutes (came back 1325 -2916 22mins for The ex, pt performed Transfer to and from restroom.)       Carlos Null, PTA

## 2022-04-23 NOTE — CARE COORDINATION
Transitional planning. Spoke to pt, she stated she would like to go to [facility], called pt's daughter Tricia Rodarte, she would like referral sent to [facility], reviewed PM & R note. Pt's daughter states someone will stay with her mother or her mother will come to her house, either way, her mother will not be alone.

## 2022-04-24 LAB
ANION GAP SERPL CALCULATED.3IONS-SCNC: 12 MMOL/L (ref 9–17)
BUN BLDV-MCNC: 21 MG/DL (ref 8–23)
CALCIUM SERPL-MCNC: 9 MG/DL (ref 8.6–10.4)
CHLORIDE BLD-SCNC: 107 MMOL/L (ref 98–107)
CO2: 19 MMOL/L (ref 20–31)
CREAT SERPL-MCNC: 0.65 MG/DL (ref 0.5–0.9)
GFR AFRICAN AMERICAN: >60 ML/MIN
GFR NON-AFRICAN AMERICAN: >60 ML/MIN
GFR SERPL CREATININE-BSD FRML MDRD: ABNORMAL ML/MIN/{1.73_M2}
GLUCOSE BLD-MCNC: 146 MG/DL (ref 65–105)
GLUCOSE BLD-MCNC: 148 MG/DL (ref 70–99)
GLUCOSE BLD-MCNC: 196 MG/DL (ref 65–105)
GLUCOSE BLD-MCNC: 202 MG/DL (ref 65–105)
GLUCOSE BLD-MCNC: 206 MG/DL (ref 65–105)
GLUCOSE BLD-MCNC: 215 MG/DL (ref 65–105)
HCT VFR BLD CALC: 34.3 % (ref 36.3–47.1)
HEMOGLOBIN: 11.3 G/DL (ref 11.9–15.1)
MAGNESIUM: 2.1 MG/DL (ref 1.6–2.6)
MCH RBC QN AUTO: 31.2 PG (ref 25.2–33.5)
MCHC RBC AUTO-ENTMCNC: 32.9 G/DL (ref 28.4–34.8)
MCV RBC AUTO: 94.8 FL (ref 82.6–102.9)
NRBC AUTOMATED: 0 PER 100 WBC
PDW BLD-RTO: 12.7 % (ref 11.8–14.4)
PLATELET # BLD: 212 K/UL (ref 138–453)
PMV BLD AUTO: 9.5 FL (ref 8.1–13.5)
POTASSIUM SERPL-SCNC: 3.4 MMOL/L (ref 3.7–5.3)
RBC # BLD: 3.62 M/UL (ref 3.95–5.11)
SODIUM BLD-SCNC: 138 MMOL/L (ref 135–144)
WBC # BLD: 5 K/UL (ref 3.5–11.3)

## 2022-04-24 PROCEDURE — 6370000000 HC RX 637 (ALT 250 FOR IP): Performed by: STUDENT IN AN ORGANIZED HEALTH CARE EDUCATION/TRAINING PROGRAM

## 2022-04-24 PROCEDURE — 85027 COMPLETE CBC AUTOMATED: CPT

## 2022-04-24 PROCEDURE — 6370000000 HC RX 637 (ALT 250 FOR IP): Performed by: NURSE PRACTITIONER

## 2022-04-24 PROCEDURE — 36415 COLL VENOUS BLD VENIPUNCTURE: CPT

## 2022-04-24 PROCEDURE — 6360000002 HC RX W HCPCS: Performed by: STUDENT IN AN ORGANIZED HEALTH CARE EDUCATION/TRAINING PROGRAM

## 2022-04-24 PROCEDURE — 80048 BASIC METABOLIC PNL TOTAL CA: CPT

## 2022-04-24 PROCEDURE — 83735 ASSAY OF MAGNESIUM: CPT

## 2022-04-24 PROCEDURE — 2060000000 HC ICU INTERMEDIATE R&B

## 2022-04-24 PROCEDURE — 99232 SBSQ HOSP IP/OBS MODERATE 35: CPT | Performed by: NURSE PRACTITIONER

## 2022-04-24 RX ORDER — POTASSIUM CHLORIDE 20 MEQ/1
20 TABLET, EXTENDED RELEASE ORAL
Status: DISCONTINUED | OUTPATIENT
Start: 2022-04-25 | End: 2022-04-28 | Stop reason: HOSPADM

## 2022-04-24 RX ORDER — DIVALPROEX SODIUM 500 MG/1
500 TABLET, EXTENDED RELEASE ORAL 2 TIMES DAILY
Status: DISCONTINUED | OUTPATIENT
Start: 2022-04-24 | End: 2022-04-28 | Stop reason: HOSPADM

## 2022-04-24 RX ADMIN — GABAPENTIN 600 MG: 300 CAPSULE ORAL at 09:08

## 2022-04-24 RX ADMIN — LEVETIRACETAM 1000 MG: 500 TABLET, FILM COATED ORAL at 09:08

## 2022-04-24 RX ADMIN — FENOFIBRATE 160 MG: 160 TABLET ORAL at 09:08

## 2022-04-24 RX ADMIN — LEVETIRACETAM 1000 MG: 500 TABLET, FILM COATED ORAL at 21:14

## 2022-04-24 RX ADMIN — ENOXAPARIN SODIUM 40 MG: 100 INJECTION SUBCUTANEOUS at 16:19

## 2022-04-24 RX ADMIN — TOPIRAMATE 25 MG: 25 TABLET, FILM COATED ORAL at 21:14

## 2022-04-24 RX ADMIN — INSULIN LISPRO 2 UNITS: 100 INJECTION, SOLUTION INTRAVENOUS; SUBCUTANEOUS at 21:22

## 2022-04-24 RX ADMIN — INSULIN LISPRO 4 UNITS: 100 INJECTION, SOLUTION INTRAVENOUS; SUBCUTANEOUS at 16:23

## 2022-04-24 RX ADMIN — VALPROIC ACID 500 MG: 250 SOLUTION ORAL at 09:08

## 2022-04-24 RX ADMIN — CLOPIDOGREL 75 MG: 75 TABLET, FILM COATED ORAL at 09:05

## 2022-04-24 RX ADMIN — DESMOPRESSIN ACETATE 40 MG: 0.2 TABLET ORAL at 21:15

## 2022-04-24 RX ADMIN — TOPIRAMATE 25 MG: 25 TABLET, FILM COATED ORAL at 09:08

## 2022-04-24 RX ADMIN — INSULIN LISPRO 2 UNITS: 100 INJECTION, SOLUTION INTRAVENOUS; SUBCUTANEOUS at 13:39

## 2022-04-24 RX ADMIN — Medication 81 MG: at 09:05

## 2022-04-24 RX ADMIN — PANTOPRAZOLE SODIUM 40 MG: 40 TABLET, DELAYED RELEASE ORAL at 09:08

## 2022-04-24 RX ADMIN — GABAPENTIN 600 MG: 300 CAPSULE ORAL at 21:14

## 2022-04-24 RX ADMIN — POTASSIUM BICARBONATE 20 MEQ: 782 TABLET, EFFERVESCENT ORAL at 09:08

## 2022-04-24 RX ADMIN — INSULIN GLARGINE 20 UNITS: 100 INJECTION, SOLUTION SUBCUTANEOUS at 21:20

## 2022-04-24 RX ADMIN — DIVALPROEX SODIUM 500 MG: 500 TABLET, EXTENDED RELEASE ORAL at 21:18

## 2022-04-24 RX ADMIN — GABAPENTIN 600 MG: 300 CAPSULE ORAL at 13:43

## 2022-04-24 RX ADMIN — INSULIN LISPRO 2 UNITS: 100 INJECTION, SOLUTION INTRAVENOUS; SUBCUTANEOUS at 09:15

## 2022-04-24 NOTE — PROGRESS NOTES
NEUROLOGY INPATIENT PROGRESS NOTE    4/24/2022         Current Exam:     Chart reviewed. Discussed with RN. She reports continued right sided weakness but states she is close to baseline. Awaiting placement. No new complaints. Brief History:    Lisseth Ramon is a  80 y.o. female with H/O questionable seizure disorder, chronic right hemiparesis, numbness of right foot, migraines, who was admitted on 4/17/2022 via [facility] after being found altered, unable to follow commands or answer questions, with right-sided weakness and sensory loss, right-sided neglect and aphasia. NIH 21. Patient was started on IV tPA but then became obtunded and flaccid in all 4 extremities with concern for possible hemorrhagic conversion and tPA was held. She was started on a Cardene drip and admitted to the neuro ICU for admission. She had been taking dual antiplatelets at home. She arrived intubated and went directly to CT scan. CT head unremarkable, CTA head neck with no LVO and patient was restarted on tPA as well as loaded with IV Keppra for possible subclinical seizure. She was extubated on 4/19. Continues to report right-sided weakness worse than her baseline and was transferred to the neurology service on 4/20/2022. Her right-sided weakness has not been consistent on exam, fluctuates in nature. She also has inconsistencies in her sensory exam.  MRI cervical spine were done to rule out myelopathy; her weakness was felt to be more psychosomatic in nature. MRI cervical spine showing multilevel degenerative disc disease with no       No current facility-administered medications on file prior to encounter. Current Outpatient Medications on File Prior to Encounter   Medication Sig Dispense Refill    clopidogrel (PLAVIX) 75 MG tablet Take 75 mg by mouth nightly      gabapentin (NEURONTIN) 600 MG tablet Take 600 mg by mouth 3 times daily.       potassium chloride (KLOR-CON M) 20 MEQ extended release tablet Take 20 mEq by mouth 2 times daily      fenofibrate (TRIGLIDE) 160 MG tablet Take 160 mg by mouth daily      Pantoprazole Sodium (PROTONIX PO) Take  by mouth.  acetaminophen (TYLENOL) 160 MG/5ML solution Take 20.3 mLs by mouth every 4 hours as needed for Fever. 473 mL 3    albuterol (PROVENTIL HFA;VENTOLIN HFA) 108 (90 BASE) MCG/ACT inhaler Inhale 6 puffs into the lungs every 6 hours as needed for Wheezing. 1 Inhaler 3    glucagon, rDNA, 1 MG SOLR injection Inject 1 mg into the muscle as needed for Low blood sugar (Blood glucose less than 70 mg/dL and patient NOT ALERT or NPO and does not have IV access. ).  0    glucose (GLUTOSE) 40 % GEL Take 15 g by mouth as needed. 45 g 1    insulin glargine (LANTUS) 100 UNIT/ML injection vial Inject 10 Units into the skin nightly. 1 Pen 3    insulin lispro (HUMALOG) 100 UNIT/ML injection vial Inject 0-12 Units into the skin every 6 hours. 1 Pen 3    niacin (NIASPAN) 500 MG CR tablet Take 1 tablet by mouth nightly. 30 tablet 3    potassium chloride (KAYCIEL) 20 MEQ/15ML (10%) solution Take 15 mLs by mouth daily. Allergies: Charisse Scanlon has No Known Allergies. Past Medical History:   Diagnosis Date    CVA [facility]cerebral infarction) ([facility] [address]     Hyperlipidemia     Hypertension        Past Surgical History:   Procedure Laterality Date    APPENDECTOMY      BRONCHOSCOPY DIAGNOSTIC  7/24/2014         HYSTERECTOMY         Social History: Charisse Scanlon  reports that she has never smoked. She has never used smokeless tobacco. She reports that she does not drink alcohol and does not use drugs. No family history on file.     Objective:   /67   Pulse 81   Temp 98.1 °F (36.7 °C) (Oral)   Resp 19   Ht 5' 5\" (1.651 m)   Wt 133 lb 9.6 oz (60.6 kg)   SpO2 98%   BMI 22.23 kg/m²     Blood pressure range: Systolic (83DDB), UAY:846 , Min:97 , SIP:732   ; Diastolic (75AOB), RZV:28, Min:64, Max:82      Review of Systems:  Constitutional  Negative for fever and chills HEENT  Negative for ear discharge, ear pain, nosebleed    Eyes  Negative for photophobia, pain and discharge    Respiratory  Negative for hemoptysis and sputum    Cardiovascular  Negative for orthopnea, claudication and PND    Gastrointestinal  Negative for abdominal pain, diarrhea, blood in stool    Musculoskeletal  Negative for joint pain, negative for myalgia. Right sided weakness   Skin  Negative for rash or itching    Endo/heme/allergies  Negative for polydipsia, environmental allergy    Psychiatric/behavioral  Negative for suicidal ideation.  Patient is not anxious        NEUROLOGIC EXAMINATION  GENERAL  Appears comfortable and in no distress   HEENT  NC/ AT   NECK  Supple   MENTAL STATUS:  Alert, oriented, intact memory, no confusion, normal speech, normal language, no hallucination or delusion   CRANIAL NERVES: II     -      Visual fields intact to confrontation  III,IV,VI -  EOMs full, no afferent defect, no JOE, no ptosis  V     -     Normal facial sensation  VII    -     Normal facial symmetry  VIII   -     Intact hearing  IX,X -     Symmetrical palate  XI    -     Symmetrical shoulder shrug  XII   -     Midline tongue, no atrophy    MOTOR FUNCTION:  Full strength to left side, antigravity to RUE and RLE with poor effort given, R foot drop (chronic), with normal bulk, normal tone and no involuntary movements, no tremor   SENSORY FUNCTION:  Decreased sensation right arm and leg   CEREBELLAR FUNCTION:  Intact fine motor control over upper limbs   REFLEX FUNCTION:  Symmetric, no perverted reflex, no Babinski sign   STATION and GAIT  Not tested       Data:    Lab Results:   CBC:   Recent Labs     04/22/22 0437 04/23/22  0509 04/24/22 0432   WBC 5.8 5.5 5.0   HGB 11.7* 11.8* 11.3*    198 212     BMP:    Recent Labs     04/22/22 0437 04/23/22  0509 04/24/22  0432    137 138   K 3.8 3.5* 3.4*    106 107   CO2 20 21 19*   BUN 18 19 21   CREATININE 0.61 0.53 0.65   GLUCOSE 160* 172* 148* Lab Results   Component Value Date    CHOL 202 (H) 04/17/2022    LDLCHOLESTEROL 114 04/17/2022    HDL 55 04/17/2022    TRIG 164 (H) 04/17/2022    ALT 56 (H) 07/23/2014    AST 36 (H) 07/23/2014    TSH 0.65 07/19/2014    INR 1.0 04/17/2022    LABA1C 10.3 (H) 04/17/2022    EYQSJEUP26 1096 (H) 07/26/2014           Diagnostic data reviewed:  CT HEAD (4/17/2022): No acute intracranial abnormality      MRI BRAIN W/WO (4/182022): Mild chronic microangiopathy     MRI C-SPINE: (4/21/22) -  Multilevel degenerative disc disease with associated uncovertebral and facet   hypertrophy with canal stenosis at C3-4, C4-5, and C5-6.       Bilateral foraminal narrowing as described above.          CTA HEAD & NECK (4/17/2022):   1. No acute intracranial abnormality. 2. No evidence of large vessel occlusion or hemodynamically significant   stenosis involving the head and neck arteries. ECHO (4/20/2022): EF 61%. LA moderately dilated. Bubble study attempted x 4. Unable to rule out intra-atrial shunt, due to technical limitations of study. No shunt seen by color Doppler. Mild MR & AI. Elevated RA filling pressure      EEG (4/21/22) -  This EEG was abnormal.  Occasional right frontocentral polymorphic delta slowing suggested underlying neuronal dysfunction. Right frontal sharp waves conferred an increased risk for focal onset seizures. Impression:  -Acute onset right hemiparesis of unclear etiology status post IV tPA with negative MRI brain; improving back to baseline level of chronic hemiparesis   -Reported history of chronic infarcts with residual right sided weakness but not clearly evident on brain imaging  -History of questionable seizures in the past    Plan:  -Continue aspirin, Plavix, Lipitor daily  -Continue home dose Keppra and Depakene  -DVT prophylaxis; on Lovenox  -Awaiting pre cert for IP rehab    Please note that this note was generated using a voice recognition dictation software.  Although every effort was made to ensure the accuracy of this automated transcription, some errors in transcription may have occurred.

## 2022-04-24 NOTE — PLAN OF CARE
Problem: Nutrition  Goal: Optimal nutrition therapy  4/24/2022 0522 by Kaylan Bourne RN  Outcome: Progressing  4/23/2022 1731 by Estrella Aschoff, RN  Outcome: Progressing     Problem: HEMODYNAMIC STATUS  Goal: Patient has stable vital signs and fluid balance  4/24/2022 0522 by Kaylan Bourne RN  Outcome: Progressing  4/23/2022 1731 by Estrella Aschoff, RN  Outcome: Progressing     Problem: Skin Integrity:  Goal: Will show no infection signs and symptoms  Description: Will show no infection signs and symptoms  4/24/2022 0522 by Kaylan Bourne RN  Outcome: Progressing  4/23/2022 1731 by Estrella Aschoff, RN  Outcome: Progressing  Goal: Absence of new skin breakdown  Description: Absence of new skin breakdown  4/24/2022 0522 by Kaylan Bourne RN  Outcome: Progressing  4/23/2022 1731 by Estrella Aschoff, RN  Outcome: Progressing     Problem: Falls - Risk of:  Goal: Will remain free from falls  Description: Will remain free from falls  4/24/2022 0522 by Kaylan Bourne RN  Outcome: Progressing  4/23/2022 1731 by Estrella Aschoff, RN  Outcome: Progressing  Goal: Absence of physical injury  Description: Absence of physical injury  4/24/2022 0522 by Kaylan Bourne RN  Outcome: Progressing  4/23/2022 1731 by Estrella Aschoff, RN  Outcome: Progressing     Problem: Discharge Planning  Goal: Discharge to home or other facility with appropriate resources  4/24/2022 0522 by Kaylan Bourne RN  Outcome: Progressing  4/23/2022 1731 by Estrella Aschoff, RN  Outcome: Progressing     Problem: Safety - Medical Restraint  Goal: Remains free of injury from restraints (Restraint for Interference with Medical Device)  Description: INTERVENTIONS:  1. Determine that other, less restrictive measures have been tried or would not be effective before applying the restraint  2. Evaluate the patient's condition at the time of restraint application  3. Inform patient/family regarding the reason for restraint  4.  Q2H: Monitor safety, psychosocial status, comfort, nutrition and hydration  4/24/2022 0522 by Shin Dela Cruz RN  Outcome: Progressing  4/23/2022 1731 by Lizy Grove RN  Outcome: Progressing     Problem: Pain  Goal: Verbalizes/displays adequate comfort level or baseline comfort level  4/24/2022 0522 by Shin Dela Cruz RN  Outcome: Progressing  4/23/2022 1731 by Lizy Grove RN  Outcome: Progressing     Problem: Chronic Conditions and Co-morbidities  Goal: Patient's chronic conditions and co-morbidity symptoms are monitored and maintained or improved  4/24/2022 0522 by Shin Dela Cruz RN  Outcome: Progressing  4/23/2022 1731 by Lizy Grove RN  Outcome: Progressing     Problem: ABCDS Injury Assessment  Goal: Absence of physical injury  4/24/2022 0522 by Shin Dela Cruz RN  Outcome: Progressing  4/23/2022 1731 by Lizy Grove RN  Outcome: Progressing

## 2022-04-24 NOTE — PLAN OF CARE
Problem: Falls - Risk of:  Goal: Will remain free from falls  Description: Will remain free from falls  4/24/2022 1713 by Bridger Ramirez RN  Outcome: Progressing  4/24/2022 0522 by Frankey Filbert, RN  Outcome: Progressing  Goal: Absence of physical injury  Description: Absence of physical injury  4/24/2022 1713 by Bridger Ramirez RN  Outcome: Progressing  4/24/2022 0522 by Frankey Filbert, RN  Outcome: Progressing

## 2022-04-25 LAB
ANION GAP SERPL CALCULATED.3IONS-SCNC: 9 MMOL/L (ref 9–17)
BUN BLDV-MCNC: 27 MG/DL (ref 8–23)
CALCIUM SERPL-MCNC: 8.8 MG/DL (ref 8.6–10.4)
CHLORIDE BLD-SCNC: 107 MMOL/L (ref 98–107)
CO2: 20 MMOL/L (ref 20–31)
CREAT SERPL-MCNC: 0.67 MG/DL (ref 0.5–0.9)
GFR AFRICAN AMERICAN: >60 ML/MIN
GFR NON-AFRICAN AMERICAN: >60 ML/MIN
GFR SERPL CREATININE-BSD FRML MDRD: ABNORMAL ML/MIN/{1.73_M2}
GLUCOSE BLD-MCNC: 141 MG/DL (ref 65–105)
GLUCOSE BLD-MCNC: 150 MG/DL (ref 70–99)
GLUCOSE BLD-MCNC: 151 MG/DL (ref 65–105)
GLUCOSE BLD-MCNC: 186 MG/DL (ref 65–105)
GLUCOSE BLD-MCNC: 220 MG/DL (ref 65–105)
HCT VFR BLD CALC: 33 % (ref 36.3–47.1)
HEMOGLOBIN: 11 G/DL (ref 11.9–15.1)
MCH RBC QN AUTO: 31.3 PG (ref 25.2–33.5)
MCHC RBC AUTO-ENTMCNC: 33.3 G/DL (ref 28.4–34.8)
MCV RBC AUTO: 94 FL (ref 82.6–102.9)
NRBC AUTOMATED: 0 PER 100 WBC
PDW BLD-RTO: 12.8 % (ref 11.8–14.4)
PLATELET # BLD: 203 K/UL (ref 138–453)
PMV BLD AUTO: 9.7 FL (ref 8.1–13.5)
POTASSIUM SERPL-SCNC: 3.6 MMOL/L (ref 3.7–5.3)
RBC # BLD: 3.51 M/UL (ref 3.95–5.11)
SODIUM BLD-SCNC: 136 MMOL/L (ref 135–144)
WBC # BLD: 5.6 K/UL (ref 3.5–11.3)

## 2022-04-25 PROCEDURE — 80048 BASIC METABOLIC PNL TOTAL CA: CPT

## 2022-04-25 PROCEDURE — 99232 SBSQ HOSP IP/OBS MODERATE 35: CPT | Performed by: PHYSICAL MEDICINE & REHABILITATION

## 2022-04-25 PROCEDURE — 6370000000 HC RX 637 (ALT 250 FOR IP): Performed by: NURSE PRACTITIONER

## 2022-04-25 PROCEDURE — 2580000003 HC RX 258: Performed by: STUDENT IN AN ORGANIZED HEALTH CARE EDUCATION/TRAINING PROGRAM

## 2022-04-25 PROCEDURE — 6360000002 HC RX W HCPCS: Performed by: STUDENT IN AN ORGANIZED HEALTH CARE EDUCATION/TRAINING PROGRAM

## 2022-04-25 PROCEDURE — 99233 SBSQ HOSP IP/OBS HIGH 50: CPT | Performed by: PSYCHIATRY & NEUROLOGY

## 2022-04-25 PROCEDURE — 36415 COLL VENOUS BLD VENIPUNCTURE: CPT

## 2022-04-25 PROCEDURE — APPSS30 APP SPLIT SHARED TIME 16-30 MINUTES: Performed by: NURSE PRACTITIONER

## 2022-04-25 PROCEDURE — 82947 ASSAY GLUCOSE BLOOD QUANT: CPT

## 2022-04-25 PROCEDURE — 6370000000 HC RX 637 (ALT 250 FOR IP): Performed by: STUDENT IN AN ORGANIZED HEALTH CARE EDUCATION/TRAINING PROGRAM

## 2022-04-25 PROCEDURE — 85027 COMPLETE CBC AUTOMATED: CPT

## 2022-04-25 PROCEDURE — 97535 SELF CARE MNGMENT TRAINING: CPT

## 2022-04-25 PROCEDURE — 2060000000 HC ICU INTERMEDIATE R&B

## 2022-04-25 PROCEDURE — 94761 N-INVAS EAR/PLS OXIMETRY MLT: CPT

## 2022-04-25 RX ADMIN — DIVALPROEX SODIUM 500 MG: 500 TABLET, EXTENDED RELEASE ORAL at 21:42

## 2022-04-25 RX ADMIN — INSULIN LISPRO 1 UNITS: 100 INJECTION, SOLUTION INTRAVENOUS; SUBCUTANEOUS at 21:42

## 2022-04-25 RX ADMIN — INSULIN LISPRO 2 UNITS: 100 INJECTION, SOLUTION INTRAVENOUS; SUBCUTANEOUS at 17:56

## 2022-04-25 RX ADMIN — GABAPENTIN 600 MG: 300 CAPSULE ORAL at 08:54

## 2022-04-25 RX ADMIN — DESMOPRESSIN ACETATE 40 MG: 0.2 TABLET ORAL at 21:40

## 2022-04-25 RX ADMIN — PANTOPRAZOLE SODIUM 40 MG: 40 TABLET, DELAYED RELEASE ORAL at 08:54

## 2022-04-25 RX ADMIN — INSULIN GLARGINE 20 UNITS: 100 INJECTION, SOLUTION SUBCUTANEOUS at 21:48

## 2022-04-25 RX ADMIN — LEVETIRACETAM 1000 MG: 500 TABLET, FILM COATED ORAL at 08:55

## 2022-04-25 RX ADMIN — POTASSIUM CHLORIDE 20 MEQ: 1500 TABLET, EXTENDED RELEASE ORAL at 08:54

## 2022-04-25 RX ADMIN — FENOFIBRATE 160 MG: 160 TABLET ORAL at 08:54

## 2022-04-25 RX ADMIN — SODIUM CHLORIDE, PRESERVATIVE FREE 10 ML: 5 INJECTION INTRAVENOUS at 08:55

## 2022-04-25 RX ADMIN — GABAPENTIN 600 MG: 300 CAPSULE ORAL at 21:40

## 2022-04-25 RX ADMIN — CLOPIDOGREL 75 MG: 75 TABLET, FILM COATED ORAL at 08:54

## 2022-04-25 RX ADMIN — GABAPENTIN 600 MG: 300 CAPSULE ORAL at 14:44

## 2022-04-25 RX ADMIN — DIVALPROEX SODIUM 500 MG: 500 TABLET, EXTENDED RELEASE ORAL at 08:54

## 2022-04-25 RX ADMIN — ENOXAPARIN SODIUM 40 MG: 100 INJECTION SUBCUTANEOUS at 08:55

## 2022-04-25 RX ADMIN — Medication 81 MG: at 08:55

## 2022-04-25 RX ADMIN — TOPIRAMATE 25 MG: 25 TABLET, FILM COATED ORAL at 08:54

## 2022-04-25 RX ADMIN — TOPIRAMATE 25 MG: 25 TABLET, FILM COATED ORAL at 21:39

## 2022-04-25 RX ADMIN — LEVETIRACETAM 1000 MG: 500 TABLET, FILM COATED ORAL at 21:39

## 2022-04-25 RX ADMIN — INSULIN LISPRO 4 UNITS: 100 INJECTION, SOLUTION INTRAVENOUS; SUBCUTANEOUS at 12:36

## 2022-04-25 ASSESSMENT — PAIN SCALES - GENERAL
PAINLEVEL_OUTOF10: 0
PAINLEVEL_OUTOF10: 0

## 2022-04-25 NOTE — PROGRESS NOTES
Physical Therapy Cancel Note      DATE: 2022    NAME: Rm Gurrola  MRN: [id number]   : 1950      Patient not seen this date for Physical Therapy due to:    Patient Declined: pt states she was up in the chair for several hours and just got back to bed; she states she's frustrated hearing that we are working on rehab placement and waiting for percert \"they say that every day\"; pt requested PT to return later--will attempt to see pt later if time allows, otherwise try to see AM for precert      Electronically signed by Seth Jane PT on 2022 at 3:15 PM

## 2022-04-25 NOTE — PROGRESS NOTES
Physician Progress Note      Kranthi Iglesias  [facility] #:                  [id number]  :                       1950  ADMIT DATE:       2022 8:39 PM  [address] [place] DATE:  RESPONDING  PROVIDER #:        Katerine Patel DO          QUERY TEXT:    Pt admitted with TIA. Pt noted to have negative MRI post TPA. If possible,   please document if you are evaluating and /or treating any of the following: The medical record reflects the following:  Risk Factors: HTN HLD age 80 with history of prior CVA  Clinical Indicators: Acute right hemiparesis. Given TPA in ED. Neurology   progress note 22: Patient's weakness is likely psychosomatic in etiology. MRI brain negative for acute infarct. CTA H/N negative for LVO. Right   hemiparesis of unclear etiology status post IV tPA with negative MRI. Reported history of chronic infarct but does not appear to be evident on   imaging. History of questionable seizures in the past. Right-sided weakness is   not consistent on exam.  It fluctuates in nature and she is able to control   her fall when arm lifted up into the air. There is no obvio  hypertrophy with canal stenosis at C3-4, C4-5, and C5-6. Treatment: MRI brain and cervical spine, ICU with vent management,   labs/monitoring    Thank-you,  Alexandro Charles RN, [email or web address]  Options provided:  -- Psychosomatic. -- Symptoms due to CVA aborted by TPA  -- Symptoms of weakness are due to seizure  -- Weakness due to cervical stenosis. -- Other - I will add my own diagnosis  -- Disagree - Not applicable / Not valid  -- Disagree - Clinically unable to determine / Unknown  -- Refer to Clinical Documentation Reviewer    PROVIDER RESPONSE TEXT:    Patient's symptoms of right sided weakness are most likely due to   psychosomatic disease.     Query created by: Sergio Maldonado on 2022 10:59 AM      Electronically signed by:  Katerine Patel DO 2022 11:05 AM

## 2022-04-25 NOTE — PROGRESS NOTES
Physical Medicine & Rehabilitation  Progress Note    4/25/2022 10:06 AM     CC: Ambulatory and ADL dysfunction due to right hemiparesis    Subjective:   Feels well. No complaints, frustrated she has not been able to get to rehab yet. ROS:  Denies fevers, chills, sweats. No chest pain, palpitations, lightheadedness. Denies coughing, wheezing or shortness of breath. Denies abdominal pain, nausea, diarrhea or constipation. No new areas of joint pain. Denies new areas of numbness or weakness. Denies new anxiety or depression issues. No new skin problems. Rehabilitation:   PT:  Restrictions/Precautions: Fall Risk,General Precautions,Up as Tolerated  Other position/activity restrictions: SBP <180   Transfers  Sit to Stand: Minimal Assistance  Stand to sit: Minimal Assistance,2 Person Assistance  Bed to Chair: Moderate assistance,2 Person Assistance  Comment: Stood x3 mins  Ambulation  Surface: level tile  Device: Rolling Walker  Assistance: Moderate assistance,2 Person assistance  Quality of Gait: significant ant/post sway, ataxic, difficulty advancing R LE without Tactile cueing to, knees buckling, unsteady  Gait Deviations: Slow Irene,Decreased step length,Decreased step height  Distance: 20ft x 2  Comments: Pt with significant unsteadiness,and is unsafe to performed  ambulation without assist .  More Ambulation?: No          OT:  ADL  Feeding: Setup,Supervision,Minimal assistance (Pt required min A to cut up food d/t flacid RUE.)  Feeding Skilled Clinical Factors: Pt ate breakfast using RUE. Pt provided foam tubing for built-up utensils but did not utilize for feeding. Grooming: Setup,Increased time to complete,Stand by assistance,Verbal cueing (Oral care seated in chair.)  Grooming Skilled Clinical Factors:  Face washing facilitated with pt supine in bed  UE Bathing: Setup,Increased time to complete,Verbal cueing,Contact guard assistance,Minimal assistance (Max A for back seated in chair d/t limited reach. Min A to bathe LUE d/t flacid RUE.)  LE Bathing: Minimal assistance,Setup,Increased time to complete,Verbal cueing  UE Dressing: Setup,Minimal assistance (To manage gown.)  LE Dressing: Maximum assistance (Adjust socks d/t limited reach.)  LE Dressing Skilled Clinical Factors: LB dressing facilitated seated unsupported long sitting in bed pt able to doff B socks req assist to thread and don. Toileting: Setup,Increased time to complete,Contact guard assistance (Pericare seated on toilet.)  Additional Comments: Pt completed supine/sit transfer to seated EOB. Pt stated needing to use bathroom. Bhargavi myersdy utilized to transfer pt EOB/toilet. Pt able to complete pericare seated on toilet. Pt transferred to seated in chair to complete ADL care. Pt educated on incorporating RUE into daily activities to promote healing and independecne with ADL/IADLs with good return. Pt set up with lunch tray on tray table in front of pt at end of session. Pt required min A with cutting up food d/t flacid RUE. Balance  Sitting Balance: Stand by assistance (Seated EOB/toilet/chair.)  Standing Balance: Moderate assistance (x2 for safety.)   Standing Balance  Time: 30 seconds x2 and 1 minute in ginny stedy; 6 minutes using RW. Activity: Static standing in ginny stedy, static standing at chair using RW, functional mobiltiy through room. Comment: Anterior/posterior swaying, pt unable to correct static standing. R knee buckling requiring tactile assist from therapist to keep in extension. Functional Mobility  Functional - Mobility Device: Rolling Walker  Activity: Other  Assist Level: Moderate assistance (x2)  Functional Mobility Comments: Mod A x2 required d/t unsteadiness and R knee buckling. Manual assist from therapist to keep R knee in extension during ambulation and assist with advancing RLE forward. Pt attempting to advance RLE with poor return, pt displayed very little control over RLE.  Verbal/tactile cues for walker placement with fair return, pt pushing walker too far out in front. Bed mobility  Rolling to Left: Minimal assistance  Supine to Sit: Minimal assistance  Sit to Supine: Minimal assistance  Scooting: Contact guard assistance  Comment: Pt up in chair upon arrival and exit  Transfers  Stand Step Transfers: Moderate assistance  Stand Pivot Transfers: Moderate assistance  Sit to stand: 2 Person assistance,Moderate assistance  Stand to sit: 2 Person assistance,Moderate assistance  Transfer Comments: MOD A x2 using RW; min A using ginny stedy. Toilet Transfers  Toilet - Technique:  (ginny stedy)  Equipment Used: Standard toilet  Toilet Transfer: Minimal assistance               ST:       Subjective: [x]? Alert     [x]? Cooperative     []? Confused     []? Agitated    []? Lethargic        Objective/Assessment:     Recall: Word List Retention Word Placement 2/5 improved to 4/5 with min-max verbal cues and repetitions    Immediate Memory for Four-Word Sequences 2/5 did not improve despite max verbal cues and repetitions  Delayed Recall Associated 2/3 improved to 3/3 with min verbal cues, 1/3 improved to 3/3 with min verbal cues     Problem Solving/Reasoning:   Opposites 4/10 improved to 7/10 with min-max verbal cues  Word Deduction 9/12 improved to 12/12 with min verbal cues  Determining Category Exclusions 9/10 improved to 10/10 with min verbal cues     Other: Pt pleasant and cooperative throughout session. Pt reports no concerns with memory or thinking, however, expressed         Objective:  /69   Pulse 67   Temp 97.7 °F (36.5 °C) (Oral)   Resp 20   Ht 5' 5\" (1.651 m)   Wt 133 lb 9.6 oz (60.6 kg)   SpO2 98%   BMI 22.23 kg/m²  I Body mass index is 22.23 kg/m².  I   Wt Readings from Last 1 Encounters:   22 133 lb 9.6 oz (60.6 kg)      Temp (24hrs), Av °F (36.7 °C), Min:97.6 °F (36.4 °C), Max:98.4 °F (36.9 °C)         GEN: well developed, well nourished, no acute distress  HEENT: Normocephalic atraumatic, EOMI, mucous membranes pink and moist  CV: RRR, no murmurs, rubs or gallops  PULM: CTAB, no rales or rhonchi. Respirations WNL and unlabored  ABD: soft, NT, ND, +BS and equal  NEURO: A&O x3. Sensation intact to light touch. MSK: 0/5 right upper lower extremity noted at this time, 4+/5 left upper lower extremity  EXTREMITIES: No calf tenderness to palpation bilaterally. No edema BLEs  SKIN: warm dry and intact with good turgor  PSYCH: appropriately interactive. Affect WNL.   Good spirits        Medications   Scheduled Meds:   divalproex  500 mg Oral BID    potassium chloride  20 mEq Oral Daily with breakfast    pantoprazole  40 mg Oral QAM AC    fenofibrate  160 mg Oral Daily    levETIRAcetam  1,000 mg Oral BID    insulin glargine  20 Units SubCUTAneous Nightly    insulin lispro  0-12 Units SubCUTAneous TID WC    insulin lispro  0-6 Units SubCUTAneous Nightly    topiramate  25 mg Oral BID    LORazepam  1 mg IntraVENous Once    clopidogrel  75 mg Oral Daily    gabapentin  600 mg Oral TID    atorvastatin  40 mg Oral Nightly    enoxaparin  40 mg SubCUTAneous Daily    aspirin  81 mg Oral Daily    Or    aspirin  300 mg Rectal Daily     Continuous Infusions:   dextrose       PRN Meds:.glucose, dextrose, glucagon (rDNA), dextrose, potassium chloride **OR** potassium alternative oral replacement **OR** potassium chloride, acetaminophen, sodium chloride flush, albuterol sulfate HFA, ondansetron **OR** ondansetron, polyethylene glycol, perflutren lipid microspheres, labetalol     Diagnostics:     CBC:   Recent Labs     04/23/22  0509 04/24/22  0432 04/25/22  0443   WBC 5.5 5.0 5.6   RBC 3.77* 3.62* 3.51*   HGB 11.8* 11.3* 11.0*   HCT 34.8* 34.3* 33.0*   MCV 92.3 94.8 94.0   RDW 12.6 12.7 12.8    212 203     BMP:   Recent Labs     04/23/22  0509 04/24/22  0432 04/25/22  0443    138 136   K 3.5* 3.4* 3.6*    107 107   CO2 21 19* 20   BUN 19 21 27*   CREATININE 0.53 0.65 0.67 BNP: No results for input(s): BNP in the last 72 hours. PT/INR: No results for input(s): PROTIME, INR in the last 72 hours. APTT: No results for input(s): APTT in the last 72 hours. CARDIAC ENZYMES: No results for input(s): CKMB, CKMBINDEX, TROPONINT in the last 72 hours. Invalid input(s): CKTOTAL;3  FASTING LIPID PANEL:  Lab Results   Component Value Date    CHOL 202 (H) 04/17/2022    HDL 55 04/17/2022    TRIG 164 (H) 04/17/2022     LIVER PROFILE: No results for input(s): AST, ALT, ALB, BILIDIR, BILITOT, ALKPHOS in the last 72 hours. I/O (24Hr): Intake/Output Summary (Last 24 hours) at 4/25/2022 1006  Last data filed at 4/24/2022 1300  Gross per 24 hour   Intake 350 ml   Output    Net 350 ml       Glu last 24 hour  Recent Labs     04/24/22  1117 04/24/22  1525 04/24/22 2016 04/25/22  0821   POCGLU 196* 202* 215* 141*       No results for input(s): CLARITYU, COLORU, PHUR, SPECGRAV, PROTEINU, RBCUA, BLOODU, BACTERIA, NITRU, WBCUA, LEUKOCYTESUR, YEAST, GLUCOSEU, BILIRUBINUR in the last 72 hours. MRI CERVICAL SPINE WO CONTRAST    Result Date: 4/21/2022  Multilevel degenerative disc disease with associated uncovertebral and facet hypertrophy with canal stenosis at C3-4, C4-5, and C5-6. Bilateral foraminal narrowing as described above. MRI BRAIN W WO CONTRAST    Result Date: 4/18/2022  1. No acute intracranial abnormality. 2. Mild chronic white matter microvascular ischemic changes. 3. Left mastoid effusion.      Impression:     1. Right-sided weaknessunclear etiology, had tPA, negative MRI,aspirin Plavix Lipitor, neurology notes inconsistent exam right hemiparesis, possible psychosomatic per neurology  2. Questionable seizureon Keppra and Depakote  3. Cognitive impairment  4. Anemia  5. Elevated hemoglobin A1c  6. HTN  7. HLD  8. History CVA     Recommendations:     1. Diagnosis:  Right-sided weakness  2. Therapy: Has PT/OT/SLP needs  3. Medical Necessity: As above  4.  Support: Lives alone informed her family can provide support on discharge  5. Rehab Recommendation:   Would benefit acute inpatient rehabilitation when medically/neurology cleared  6. DVT Prophylaxis: Denise Giraldo MD       This note is created with the assistance of a speech recognition program.  While intending to generate a document that actually reflects the content of the visit, the document can still have some errors including those of syntax and sound a like substitutions which may escape proof reading.   In such instances, actual meaning can be extrapolated by contextual diversion

## 2022-04-25 NOTE — PLAN OF CARE
Problem: Nutrition  Goal: Optimal nutrition therapy  4/25/2022 1454 by Ludwin Kaplan RN  Outcome: Progressing  4/25/2022 0645 by Teo Veliz RN  Outcome: Progressing     Problem: HEMODYNAMIC STATUS  Goal: Patient has stable vital signs and fluid balance  4/25/2022 1454 by Ludwin Kaplan RN  Outcome: Progressing  4/25/2022 0645 by Teo Veliz RN  Outcome: Progressing     Problem: Skin Integrity:  Goal: Will show no infection signs and symptoms  Description: Will show no infection signs and symptoms  4/25/2022 1454 by Ludwin Kaplan RN  Outcome: Progressing  4/25/2022 0645 by Teo Veliz RN  Outcome: Progressing  Goal: Absence of new skin breakdown  Description: Absence of new skin breakdown  4/25/2022 1454 by Ludwin Kaplan RN  Outcome: Progressing  4/25/2022 0645 by Teo Veliz RN  Outcome: Progressing     Problem: Falls - Risk of:  Goal: Will remain free from falls  Description: Will remain free from falls  4/25/2022 1454 by Ludwin Kaplan RN  Outcome: Progressing  4/25/2022 0645 by Teo Veliz RN  Outcome: Progressing  Goal: Absence of physical injury  Description: Absence of physical injury  4/25/2022 1454 by Ludwin Kaplan RN  Outcome: Progressing  4/25/2022 0645 by Teo Veliz RN  Outcome: Progressing     Problem: Discharge Planning  Goal: Discharge to home or other facility with appropriate resources  4/25/2022 1454 by Ludwin Kaplan RN  Outcome: Progressing  4/25/2022 0645 by Teo Veliz RN  Outcome: Progressing     Problem: Pain  Goal: Verbalizes/displays adequate comfort level or baseline comfort level  4/25/2022 1454 by Ludwin Kaplan RN  Outcome: Progressing  4/25/2022 0645 by Teo Veliz RN  Outcome: Progressing     Problem: Chronic Conditions and Co-morbidities  Goal: Patient's chronic conditions and co-morbidity symptoms are monitored and maintained or improved  4/25/2022 1454 by Ludwin Kaplan RN  Outcome: Progressing  4/25/2022 0645 by Teo Veliz RN  Outcome: Progressing     Problem: ABCDS Injury Assessment  Goal: Absence of physical injury  4/25/2022 1454 by Diana Limon RN  Outcome: Progressing  4/25/2022 0645 by Adriano Bledsoe RN  Outcome: Progressing

## 2022-04-25 NOTE — PROGRESS NOTES
NEUROLOGY INPATIENT PROGRESS NOTE    4/25/2022         Current Exam:     Chart reviewed. Discussed with RN. No new complaints overnight; awaiting discharge to ARU. Brief History:    Carole Cevallos is a  80 y.o. female with H/O questionable seizure disorder, chronic right hemiparesis, numbness of right foot, migraines, who was admitted on 4/17/2022 via [facility] after being found altered, unable to follow commands or answer questions, with right-sided weakness and sensory loss, right-sided neglect and aphasia. NIH 21. Patient was started on IV tPA but then became obtunded and flaccid in all 4 extremities with concern for possible hemorrhagic conversion and tPA was held. She was started on a Cardene drip and admitted to the neuro ICU for admission. She had been taking dual antiplatelets at home. She arrived intubated and went directly to CT scan. CT head unremarkable, CTA head neck with no LVO and patient was restarted on tPA as well as loaded with IV Keppra for possible subclinical seizure. She was extubated on 4/19. Continues to report right-sided weakness worse than her baseline and was transferred to the neurology service on 4/20/2022. Her right-sided weakness has not been consistent on exam, fluctuates in nature. She also has inconsistencies in her sensory exam.  MRI cervical spine were done to rule out myelopathy; her weakness was felt to be more psychosomatic in nature. MRI cervical spine showing multilevel degenerative disc disease. No current facility-administered medications on file prior to encounter. Current Outpatient Medications on File Prior to Encounter   Medication Sig Dispense Refill    clopidogrel (PLAVIX) 75 MG tablet Take 75 mg by mouth nightly      gabapentin (NEURONTIN) 600 MG tablet Take 600 mg by mouth 3 times daily.       potassium chloride (KLOR-CON M) 20 MEQ extended release tablet Take 20 mEq by mouth 2 times daily      fenofibrate (TRIGLIDE) 160 MG tablet Take 160 mg by mouth daily      Pantoprazole Sodium (PROTONIX PO) Take  by mouth.  acetaminophen (TYLENOL) 160 MG/5ML solution Take 20.3 mLs by mouth every 4 hours as needed for Fever. 473 mL 3    albuterol (PROVENTIL HFA;VENTOLIN HFA) 108 (90 BASE) MCG/ACT inhaler Inhale 6 puffs into the lungs every 6 hours as needed for Wheezing. 1 Inhaler 3    glucagon, rDNA, 1 MG SOLR injection Inject 1 mg into the muscle as needed for Low blood sugar (Blood glucose less than 70 mg/dL and patient NOT ALERT or NPO and does not have IV access. ).  0    glucose (GLUTOSE) 40 % GEL Take 15 g by mouth as needed. 45 g 1    insulin glargine (LANTUS) 100 UNIT/ML injection vial Inject 10 Units into the skin nightly. 1 Pen 3    insulin lispro (HUMALOG) 100 UNIT/ML injection vial Inject 0-12 Units into the skin every 6 hours. 1 Pen 3    niacin (NIASPAN) 500 MG CR tablet Take 1 tablet by mouth nightly. 30 tablet 3    potassium chloride (KAYCIEL) 20 MEQ/15ML (10%) solution Take 15 mLs by mouth daily. Allergies: Nika Khan has No Known Allergies. Past Medical History:   Diagnosis Date    CVA (cerebral infarction) ([facility] [address]     Hyperlipidemia     Hypertension        Past Surgical History:   Procedure Laterality Date    APPENDECTOMY      BRONCHOSCOPY DIAGNOSTIC  2014         HYSTERECTOMY         Social History: Nika Khan  reports that she has never smoked. She has never used smokeless tobacco. She reports that she does not drink alcohol and does not use drugs. No family history on file.     Objective:   BP (!) 93/59   Pulse 66   Temp 98.4 °F (36.9 °C) (Oral)   Resp 14   Ht 5' 5\" (1.651 m)   Wt 133 lb 9.6 oz (60.6 kg)   SpO2 99%   BMI 22.23 kg/m²     Blood pressure range: Systolic (21RPR), AR , Min:93 , VPK:879   ; Diastolic (62IQA), EAC:40, Min:59, Max:78      Review of Systems:  Constitutional  Negative for fever and chills    HEENT  Negative for ear discharge, ear pain, nosebleed    Eyes Negative for photophobia, pain and discharge    Respiratory  Negative for hemoptysis and sputum    Cardiovascular  Negative for orthopnea, claudication and PND    Gastrointestinal  Negative for abdominal pain, diarrhea, blood in stool    Musculoskeletal  Negative for joint pain, negative for myalgia. Right sided weakness   Skin  Negative for rash or itching    Endo/heme/allergies  Negative for polydipsia, environmental allergy    Psychiatric/behavioral  Negative for suicidal ideation.  Patient is not anxious        NEUROLOGIC EXAMINATION  GENERAL  Appears comfortable and in no distress   HEENT  NC/ AT   NECK  Supple   MENTAL STATUS:  Alert, oriented, intact memory, no confusion, normal speech, normal language, no hallucination or delusion   CRANIAL NERVES: II     -      Visual fields intact to confrontation  III,IV,VI -  EOMs full, no afferent defect, no JOE, no ptosis  V     -     Normal facial sensation  VII    -     Normal facial symmetry  VIII   -     Intact hearing  IX,X -     Symmetrical palate  XI    -     Symmetrical shoulder shrug  XII   -     Midline tongue, no atrophy    MOTOR FUNCTION:  Full strength to left side, unable to antigravity to RUE and RLE with poor effort given, R foot drop (chronic), with normal bulk, normal tone and no involuntary movements, no tremor   SENSORY FUNCTION:  Decreased sensation right arm and leg   CEREBELLAR FUNCTION:  Intact fine motor control over upper limbs   REFLEX FUNCTION:  Symmetric, no perverted reflex, no Babinski sign   STATION and GAIT  Not tested       Data:    Lab Results:   CBC:   Recent Labs     04/23/22  0509 04/24/22  0432 04/25/22  0443   WBC 5.5 5.0 5.6   HGB 11.8* 11.3* 11.0*    212 203     BMP:    Recent Labs     04/23/22  0509 04/24/22  0432 04/25/22  0443    138 136   K 3.5* 3.4* 3.6*    107 107   CO2 21 19* 20   BUN 19 21 27*   CREATININE 0.53 0.65 0.67   GLUCOSE 172* 148* 150*         Lab Results   Component Value Date    CHOL 202 (H) 04/17/2022    LDLCHOLESTEROL 114 04/17/2022    HDL 55 04/17/2022    TRIG 164 (H) 04/17/2022    ALT 56 (H) 07/23/2014    AST 36 (H) 07/23/2014    TSH 0.65 07/19/2014    INR 1.0 04/17/2022    LABA1C 10.3 (H) 04/17/2022    [id number] 1096 (H) 07/26/2014           Diagnostic data reviewed:  CT HEAD (4/17/2022): No acute intracranial abnormality      MRI BRAIN W/WO (4/182022): Mild chronic microangiopathy     MRI C-SPINE: (4/21/22) -  Multilevel degenerative disc disease with associated uncovertebral and facet   hypertrophy with canal stenosis at C3-4, C4-5, and C5-6.       Bilateral foraminal narrowing as described above.          CTA HEAD & NECK (4/17/2022):   1. No acute intracranial abnormality. 2. No evidence of large vessel occlusion or hemodynamically significant   stenosis involving the head and neck arteries. ECHO (4/20/2022): EF 61%. LA moderately dilated. Bubble study attempted x 4. Unable to rule out intra-atrial shunt, due to technical limitations of study. No shunt seen by color Doppler. Mild MR & AI. Elevated RA filling pressure      EEG (4/21/22) -  This EEG was abnormal.  Occasional right frontocentral polymorphic delta slowing suggested underlying neuronal dysfunction. Right frontal sharp waves conferred an increased risk for focal onset seizures. Impression:  -Acute onset right hemiparesis of unclear etiology status post IV tPA with negative MRI brain; fluctuating exam while inpatient   -Reported history of chronic infarcts with residual right sided weakness but not clearly evident on brain imaging  -History of questionable seizures in the past    Plan:  -Continue aspirin, Plavix, Lipitor daily  -Continue home dose Keppra and Depakene  -Will plan for outpatient EMG/NCS  -DVT prophylaxis; on Lovenox  -Awaiting pre cert for IP rehab    Please note that this note was generated using a voice recognition dictation software.  Although every effort was made to ensure the accuracy of this automated transcription, some errors in transcription may have occurred.

## 2022-04-25 NOTE — DISCHARGE INSTR - COC
Continuity of Care Form    Patient Name: Toro Quick   :  1950  MRN:  [id number]    Admit date:  2022  Discharge date:  ***    Code Status Order: Full Code   Advance Directives:      Admitting Physician:  No admitting provider for patient encounter.   PCP: Landen Delong MD    Discharging Nurse: [facility] Unit/Room#: 5362/9414-33  Discharging Unit Phone Number: ***    Emergency Contact:   Extended Emergency Contact Information  Primary Emergency Contact: [address]  Phone: [phone]  Work Phone: [phone]  Mobile Phone: [phone]  Relation: Child  Secondary Emergency Contact: [facility] Phone: [phone]  Work Phone: [phone]  Mobile Phone: [phone]  Relation: Child    Past Surgical History:  Past Surgical History:   Procedure Laterality Date    APPENDECTOMY      BRONCHOSCOPY DIAGNOSTIC  2014         HYSTERECTOMY         Immunization History:   Immunization History   Administered Date(s) Administered    COVID-19, [place], Primary or Immunocompromised, PF, 100mcg/0.5mL 2021, 2021       Active Problems:  Patient Active Problem List   Diagnosis Code    Altered mental status R41.82    Generalized nonconvulsive seizures ([address] G40.309    History of CVA (cerebrovascular accident) Z86.73    Noncompliance Z91.19    Hemiparesis ([address] G81.90    Respiratory failure ([address] J96.90    Upper respiratory infection J06.9    Acute respiratory failure (HCC) J96.00    HTN (hypertension) I10    HLD (hyperlipidemia) E78.5    Hyperglycemia R73.9    Hypokalemia E87.6    Anemia due to acute blood loss D62    Right sided weakness R53.1    Cerebrovascular accident (CVA) ([address] I63.9    Tissue plasminogen activator (tPA) administered at other facility within 24 hours before current admission Z92.82       Isolation/Infection:   Isolation            No Isolation          Patient Infection Status       None to display            Nurse Assessment:  Last Vital Signs: /69   Pulse 67   Temp 97.7 °F (36.5 °C) (Oral)   Resp 20   Ht 5' 5\" (1.651 m)   Wt 133 lb 9.6 oz (60.6 kg)   SpO2 98%   BMI 22.23 kg/m²     Last documented pain score (0-10 scale): Pain Level: 0  Last Weight:   Wt Readings from Last 1 Encounters:   04/20/22 133 lb 9.6 oz (60.6 kg)     Mental Status:   alert oriented X4    IV Access:  - None    Nursing Mobility/ADLs:  Walking   Assisted  Transfer  Assisted  Bathing  Assisted  Dressing  Assisted  Toileting  Assisted  Feeding  Independent  Med Admin  Assisted  Med Delivery   whole    Wound Care Documentation and Therapy:        Elimination:  Continence: Bowel: Yes  Bladder: Yes  Urinary Catheter: None   Colostomy/Ileostomy/Ileal Conduit: No       Date of Last BM: 4/24/22    Intake/Output Summary (Last 24 hours) at 4/25/2022 1131  Last data filed at 4/24/2022 1300  Gross per 24 hour   Intake 350 ml   Output --   Net 350 ml     I/O last 3 completed shifts: In: 750 [P.O.:750]  Out: -     Safety Concerns: At Risk for Falls and HX CVA with right side weakness    Impairments/Disabilities:      Right sided weakness    Nutrition Therapy:  Current Nutrition Therapy:   - Oral Diet:  General    Routes of Feeding: Oral  Liquids: No Restrictions  Daily Fluid Restriction: no  Last Modified Barium Swallow with Video (Video Swallowing Test): not done    Treatments at the Time of Hospital Discharge:   Respiratory Treatments: SEE MAR  Oxygen Therapy:  is not on home oxygen therapy.   Ventilator:    - No ventilator support    Rehab Therapies: Physical Therapy, Occupational Therapy, and Speech/Language Therapy  Weight Bearing Status/Restrictions: No weight bearing restrictions  Other Medical Equipment (for information only, NOT a DME order):  walker, bath bench, bedside commode, and hospital bed  Other Treatments: Needs assistance with ambulating and getting OOB    Patient's personal belongings (please select all that are sent with patient):  Glasses, Hearing Aides right cell phone    RN SIGNATURE:  {Esignature:[id number]}    CASE MANAGEMENT/SOCIAL WORK SECTION    Inpatient Status Date: ***    Readmission Risk Assessment Score:  Readmission Risk              Risk of Unplanned Readmission:  20           Discharging to Facility/ Agency   Name:   Address:  Phone:  Fax:    Dialysis Facility (if applicable)   Name:  Address:  Dialysis Schedule:  Phone:  Fax:    / signature: {Esignature:[id number]}    PHYSICIAN SECTION    Prognosis: Fair    Condition at Discharge: Stable    Rehab Potential (if transferring to Rehab): Fair    Recommended Labs or Other Treatments After Discharge: None    Physician Certification: I certify the above information and transfer of Ketan Grief  is necessary for the continuing treatment of the diagnosis listed and that she requires Acute Rehab for greater 30 days.      Update Admission H&P: No change in H&P    PHYSICIAN SIGNATURE:  {Esignature:[id number]}

## 2022-04-25 NOTE — PLAN OF CARE
Problem: Nutrition  Goal: Optimal nutrition therapy  4/25/2022 1454 by Sue Simon RN  Outcome: Progressing  4/25/2022 0645 by Laurie Castaneda RN  Outcome: Progressing     Problem: HEMODYNAMIC STATUS  Goal: Patient has stable vital signs and fluid balance  4/25/2022 1454 by Sue Simon RN  Outcome: Progressing  4/25/2022 0645 by Laurie Castaneda RN  Outcome: Progressing     Problem: Skin Integrity:  Goal: Will show no infection signs and symptoms  Description: Will show no infection signs and symptoms  4/25/2022 1454 by Sue Simon RN  Outcome: Progressing  4/25/2022 0645 by Laurie Castaneda RN  Outcome: Progressing  Goal: Absence of new skin breakdown  Description: Absence of new skin breakdown  4/25/2022 1454 by Sue Simon RN  Outcome: Progressing  4/25/2022 0645 by Laurie Castaneda RN  Outcome: Progressing     Problem: Falls - Risk of:  Goal: Will remain free from falls  Description: Will remain free from falls  4/25/2022 1454 by Sue Simon RN  Outcome: Progressing  4/25/2022 0645 by Laurie Castaneda RN  Outcome: Progressing  Goal: Absence of physical injury  Description: Absence of physical injury  4/25/2022 1454 by Sue Simon RN  Outcome: Progressing  4/25/2022 0645 by Laurie Castaneda RN  Outcome: Progressing     Problem: Discharge Planning  Goal: Discharge to home or other facility with appropriate resources  4/25/2022 1454 by Sue Simon RN  Outcome: Progressing  4/25/2022 0645 by Laurie Castaneda RN  Outcome: Progressing     Problem: Pain  Goal: Verbalizes/displays adequate comfort level or baseline comfort level  4/25/2022 1454 by Sue Simon RN  Outcome: Progressing  4/25/2022 0645 by Laurie Castaneda RN  Outcome: Progressing     Problem: Chronic Conditions and Co-morbidities  Goal: Patient's chronic conditions and co-morbidity symptoms are monitored and maintained or improved  4/25/2022 1454 by Sue Simon RN  Outcome: Progressing  4/25/2022 0645 by Laurie Castaneda RN  Outcome: Progressing     Problem: ABCDS Injury Assessment  Goal: Absence of physical injury  4/25/2022 1454 by Ludwin Kaplan RN  Outcome: Progressing  4/25/2022 0645 by Teo Veliz RN  Outcome: Progressing

## 2022-04-25 NOTE — PLAN OF CARE
Problem: Nutrition  Goal: Optimal nutrition therapy  Outcome: Progressing     Problem: HEMODYNAMIC STATUS  Goal: Patient has stable vital signs and fluid balance  Outcome: Progressing     Problem: Skin Integrity:  Goal: Will show no infection signs and symptoms  Description: Will show no infection signs and symptoms  Outcome: Progressing  Goal: Absence of new skin breakdown  Description: Absence of new skin breakdown  Outcome: Progressing     Problem: Falls - Risk of:  Goal: Will remain free from falls  Description: Will remain free from falls  4/25/2022 0645 by Didier Hinojosa RN  Outcome: Progressing  4/24/2022 1713 by Larisa Sutherland RN  Outcome: Progressing  Goal: Absence of physical injury  Description: Absence of physical injury  4/25/2022 0645 by Didier Hinojosa RN  Outcome: Progressing  4/24/2022 1713 by Larisa Sutherland RN  Outcome: Progressing     Problem: Discharge Planning  Goal: Discharge to home or other facility with appropriate resources  Outcome: Progressing     Problem: Safety - Medical Restraint  Goal: Remains free of injury from restraints (Restraint for Interference with Medical Device)  Description: INTERVENTIONS:  1. Determine that other, less restrictive measures have been tried or would not be effective before applying the restraint  2. Evaluate the patient's condition at the time of restraint application  3. Inform patient/family regarding the reason for restraint  4.  Q2H: Monitor safety, psychosocial status, comfort, nutrition and hydration  Outcome: Progressing     Problem: Pain  Goal: Verbalizes/displays adequate comfort level or baseline comfort level  Outcome: Progressing     Problem: Chronic Conditions and Co-morbidities  Goal: Patient's chronic conditions and co-morbidity symptoms are monitored and maintained or improved  Outcome: Progressing     Problem: ABCDS Injury Assessment  Goal: Absence of physical injury  Outcome: Progressing

## 2022-04-25 NOTE — PROGRESS NOTES
Occupational Therapy  Facility/Department: [address] STEP DOWN  Occupational Therapy Daily Treatment Note    Name: Leo Hobbs  : 1950  MRN: [id number]  Date of Service: 2022    Discharge Recommendations:  Patient would benefit from continued therapy after discharge       Patient Diagnosis(es): The encounter diagnosis was Cerebrovascular accident (CVA), unspecified mechanism ([facility] [address] Past Medical History:  has a past medical history of CVA (cerebral infarction) ([facility] [address] Hyperlipidemia, and Hypertension. Past Surgical History:  has a past surgical history that includes Appendectomy; Hysterectomy; and Bronchoscopy diagnostic (2014). Treatment Diagnosis: CVA    Assessment   Activity Tolerance  Activity Tolerance: Unsteadiness and flacid RUE. Plan   Plan  Times per Week: 4-5x/wk  Current Treatment Recommendations: Strengthening,ROM,Balance training,Functional mobility training,Endurance training,Safety education & training,Patient/Caregiver education & training,Equipment evaluation, education, & procurement,Self-Care / ADL,Coordination training,Neuromuscular re-education,Modalities     Restrictions  Restrictions/Precautions  Restrictions/Precautions: Fall Risk,General Precautions,Up as Tolerated  Required Braces or Orthoses?: No  Position Activity Restriction  Other position/activity restrictions: SBP <180    Subjective   General  Chart Reviewed: Yes  Patient assessed for rehabilitation services?: Yes  Family / Caregiver Present: No  General Comment  Comments: Pt and RN agreeable to therapy this day  Pre Treatment Pain Screening  Pain at present: 0  Scale Used: Numeric Score  Intervention List: Patient able to continue with treatment  Objective    Safety Devices  Type of Devices: Call light within reach; Bed alarm in place;Gait belt;Patient at risk for falls; Left in bed;Nurse notified  Restraints  Restraints Initially in Place: No     ADL  Feeding: Setup;Supervision;Minimal assistance (assist to open containers, able to feed self w/left hand)  Grooming: Setup; Increased time to complete;Minimal assistance;Verbal cueing (Min A-to brush back of hair and pull up in pony, SBA-wash face, brush teeth)  UE Bathing: Setup; Increased time to complete;Verbal cueing;Minimal assistance (assist to wash back and LUE)  LE Bathing: Minimal assistance;Setup; Increased time to complete;Verbal cueing (assist to wash feet)  UE Dressing: Setup;Minimal assistance;Verbal cueing; Increased time to complete (assist to snap, thread arms and tie gown)  LE Dressing: Maximum assistance;Setup; Increased time to complete;Verbal cueing (assist to doff/don footies and underwear)  Toileting: Unable to assess (wearing underwear, declined need to use bathroom at this time)      Pt in bed upon arrival. Transferred to EOB and setup for self care (see above for LOF). Pt needed increased time and assist d/t RUE flaccid and RLE weakness. Pt on RA, fatigues easily and needed frequent rest breaks. STS at EOB w/mod assist. Retired to bed w/all needs met. Bed mobility  Rolling to Left: Minimal assistance  Supine to Sit: Minimal assistance  Sit to Supine: Stand by assistance  Scooting: Stand by assistance  Transfers  Sit to stand: Moderate assistance  Stand to sit: Moderate assistance     Cognition  Overall Cognitive Status: Exceptions  Arousal/Alertness: Appropriate responses to stimuli  Following Commands:  Follows multistep commands with increased time  Attention Span: Appears intact  Memory: Decreased recall of recent events  Safety Judgement: Decreased awareness of need for safety;Decreased awareness of need for assistance  Problem Solving: Assistance required to identify errors made;Assistance required to correct errors made  Insights: Decreased awareness of deficits  Initiation: Requires cues for some  Sequencing: Requires cues for some  Education Given To: Patient  Education Provided: Role of Therapy;Precautions;Transfer Training  Education Provided Comments: OT POC, transfer/walker safety, importance of participation in therapy, ROM exercises RUE with fair/good return. Education Method: Verbal  Barriers to Learning: None  Education Outcome: Demonstrated understanding;Verbalized understanding    AM-PAC Score  AM-PAC Inpatient Daily Activity Raw Score: 17 (04/25/22 1618)  AM-PAC Inpatient ADL T-Scale Score : 37.26 (04/25/22 1618)  ADL Inpatient CMS 0-100% Score: 50.11 (04/25/22 1618)  ADL Inpatient CMS G-Code Modifier : CK (04/25/22 1618)    Goals  Short Term Goals  Time Frame for Short term goals: By discharge, pt will:  Short Term Goal 1: Demo UB ADLs with SUP and setup  Short Term Goal 2: Demo LB ADLs with CGA and setup  Short Term Goal 3: Demo bed mobility with SUP and HOB flat to mimic home setup  Short Term Goal 4: Demo functional sit<>stand transfers with CGA and LRD PRN  Short Term Goal 5: Demo functional mobility with Min. A and LRD PRN  Additional Goals?: Yes  Short Term Goal 6: Increase RUE strength by 1+ muscle grade for improved function and participation in meaningful occupations. Short Term Goal 7: Participate in 5+ min of AROM/PROM to RUE each visit to promote increased functional use throughout ADLs.        Therapy Time   Individual Concurrent Group Co-treatment   Time In 5394         Time Out  1612         Minutes  57 total tx time                 ORLANDO HUDSON/GEGE

## 2022-04-25 NOTE — PROGRESS NOTES
Spoke with FARIDA Franz CM, who states pt is requesting [facility] and precert can be initiated. Writer called [employer] to initiate precert. Left a vm with a request for a return call. Spoke with [employer] @ [employer] who states writer can fax auth form to [employer] for [employer] request, and writer faxed precert for ARU as instructed. Via verified pt's benefits are as follows:  Copay of $310/d for days 1-7 with 100% coverage beginning day 8.

## 2022-04-25 NOTE — CARE COORDINATION
Called Noemi at [facility] to informed her of patient wanting [facility]. Left voicemail. Daughter will be staying with her post ARU.     920 spoke to Olamide at [address]. She informs me that she will start precert for patient.     Will need updated therapy notes

## 2022-04-26 LAB
ANION GAP SERPL CALCULATED.3IONS-SCNC: 11 MMOL/L (ref 9–17)
BUN BLDV-MCNC: 27 MG/DL (ref 8–23)
CALCIUM SERPL-MCNC: 8.7 MG/DL (ref 8.6–10.4)
CHLORIDE BLD-SCNC: 108 MMOL/L (ref 98–107)
CO2: 19 MMOL/L (ref 20–31)
CREAT SERPL-MCNC: 0.65 MG/DL (ref 0.5–0.9)
GFR AFRICAN AMERICAN: >60 ML/MIN
GFR NON-AFRICAN AMERICAN: >60 ML/MIN
GFR SERPL CREATININE-BSD FRML MDRD: ABNORMAL ML/MIN/{1.73_M2}
GLUCOSE BLD-MCNC: 119 MG/DL (ref 70–99)
GLUCOSE BLD-MCNC: 166 MG/DL (ref 65–105)
GLUCOSE BLD-MCNC: 168 MG/DL (ref 65–105)
GLUCOSE BLD-MCNC: 217 MG/DL (ref 65–105)
HCT VFR BLD CALC: 32.8 % (ref 36.3–47.1)
HEMOGLOBIN: 10.9 G/DL (ref 11.9–15.1)
MCH RBC QN AUTO: 31.3 PG (ref 25.2–33.5)
MCHC RBC AUTO-ENTMCNC: 33.2 G/DL (ref 28.4–34.8)
MCV RBC AUTO: 94.3 FL (ref 82.6–102.9)
NRBC AUTOMATED: 0 PER 100 WBC
PDW BLD-RTO: 12.9 % (ref 11.8–14.4)
PLATELET # BLD: 221 K/UL (ref 138–453)
PMV BLD AUTO: 9.9 FL (ref 8.1–13.5)
POTASSIUM SERPL-SCNC: 3.7 MMOL/L (ref 3.7–5.3)
RBC # BLD: 3.48 M/UL (ref 3.95–5.11)
SODIUM BLD-SCNC: 138 MMOL/L (ref 135–144)
WBC # BLD: 6.4 K/UL (ref 3.5–11.3)

## 2022-04-26 PROCEDURE — 2060000000 HC ICU INTERMEDIATE R&B

## 2022-04-26 PROCEDURE — 97130 THER IVNTJ EA ADDL 15 MIN: CPT

## 2022-04-26 PROCEDURE — 97116 GAIT TRAINING THERAPY: CPT

## 2022-04-26 PROCEDURE — 94761 N-INVAS EAR/PLS OXIMETRY MLT: CPT

## 2022-04-26 PROCEDURE — 6370000000 HC RX 637 (ALT 250 FOR IP): Performed by: NURSE PRACTITIONER

## 2022-04-26 PROCEDURE — 97535 SELF CARE MNGMENT TRAINING: CPT

## 2022-04-26 PROCEDURE — 99232 SBSQ HOSP IP/OBS MODERATE 35: CPT | Performed by: PSYCHIATRY & NEUROLOGY

## 2022-04-26 PROCEDURE — 97110 THERAPEUTIC EXERCISES: CPT

## 2022-04-26 PROCEDURE — 36415 COLL VENOUS BLD VENIPUNCTURE: CPT

## 2022-04-26 PROCEDURE — 6360000002 HC RX W HCPCS: Performed by: STUDENT IN AN ORGANIZED HEALTH CARE EDUCATION/TRAINING PROGRAM

## 2022-04-26 PROCEDURE — 80048 BASIC METABOLIC PNL TOTAL CA: CPT

## 2022-04-26 PROCEDURE — 6370000000 HC RX 637 (ALT 250 FOR IP): Performed by: STUDENT IN AN ORGANIZED HEALTH CARE EDUCATION/TRAINING PROGRAM

## 2022-04-26 PROCEDURE — 99232 SBSQ HOSP IP/OBS MODERATE 35: CPT | Performed by: PHYSICAL MEDICINE & REHABILITATION

## 2022-04-26 PROCEDURE — 85027 COMPLETE CBC AUTOMATED: CPT

## 2022-04-26 PROCEDURE — 97129 THER IVNTJ 1ST 15 MIN: CPT

## 2022-04-26 PROCEDURE — 82947 ASSAY GLUCOSE BLOOD QUANT: CPT

## 2022-04-26 RX ORDER — DIVALPROEX SODIUM 500 MG/1
500 TABLET, EXTENDED RELEASE ORAL 2 TIMES DAILY
Qty: 30 TABLET | Refills: 3 | Status: ON HOLD
Start: 2022-04-26 | End: 2022-05-12 | Stop reason: HOSPADM

## 2022-04-26 RX ADMIN — CLOPIDOGREL 75 MG: 75 TABLET, FILM COATED ORAL at 09:50

## 2022-04-26 RX ADMIN — ENOXAPARIN SODIUM 40 MG: 100 INJECTION SUBCUTANEOUS at 09:50

## 2022-04-26 RX ADMIN — GABAPENTIN 600 MG: 300 CAPSULE ORAL at 21:31

## 2022-04-26 RX ADMIN — GABAPENTIN 600 MG: 300 CAPSULE ORAL at 13:05

## 2022-04-26 RX ADMIN — INSULIN LISPRO 2 UNITS: 100 INJECTION, SOLUTION INTRAVENOUS; SUBCUTANEOUS at 21:34

## 2022-04-26 RX ADMIN — DESMOPRESSIN ACETATE 40 MG: 0.2 TABLET ORAL at 23:22

## 2022-04-26 RX ADMIN — PANTOPRAZOLE SODIUM 40 MG: 40 TABLET, DELAYED RELEASE ORAL at 09:50

## 2022-04-26 RX ADMIN — Medication 81 MG: at 09:50

## 2022-04-26 RX ADMIN — TOPIRAMATE 25 MG: 25 TABLET, FILM COATED ORAL at 21:33

## 2022-04-26 RX ADMIN — GABAPENTIN 600 MG: 300 CAPSULE ORAL at 09:50

## 2022-04-26 RX ADMIN — INSULIN LISPRO 2 UNITS: 100 INJECTION, SOLUTION INTRAVENOUS; SUBCUTANEOUS at 13:04

## 2022-04-26 RX ADMIN — INSULIN LISPRO 2 UNITS: 100 INJECTION, SOLUTION INTRAVENOUS; SUBCUTANEOUS at 17:26

## 2022-04-26 RX ADMIN — INSULIN GLARGINE 20 UNITS: 100 INJECTION, SOLUTION SUBCUTANEOUS at 21:35

## 2022-04-26 RX ADMIN — DIVALPROEX SODIUM 500 MG: 500 TABLET, EXTENDED RELEASE ORAL at 09:50

## 2022-04-26 RX ADMIN — LEVETIRACETAM 1000 MG: 500 TABLET, FILM COATED ORAL at 09:50

## 2022-04-26 RX ADMIN — LEVETIRACETAM 1000 MG: 500 TABLET, FILM COATED ORAL at 21:33

## 2022-04-26 RX ADMIN — TOPIRAMATE 25 MG: 25 TABLET, FILM COATED ORAL at 09:49

## 2022-04-26 RX ADMIN — POTASSIUM CHLORIDE 20 MEQ: 1500 TABLET, EXTENDED RELEASE ORAL at 09:49

## 2022-04-26 RX ADMIN — FENOFIBRATE 160 MG: 160 TABLET ORAL at 09:50

## 2022-04-26 RX ADMIN — DIVALPROEX SODIUM 500 MG: 500 TABLET, EXTENDED RELEASE ORAL at 21:32

## 2022-04-26 NOTE — PROGRESS NOTES
NEUROLOGY INPATIENT PROGRESS NOTE    4/26/2022         Current Exam:    Patient seen and examined bedside. No acute complaints overnight. Awaiting discharge to acute rehab. Pre-CERT sent. Discussed with RN         Brief History:    Silvia Farias is a  80 y.o. female with H/O questionable seizure disorder, chronic right hemiparesis, numbness of right foot, migraines, who was admitted on 4/17/2022 via [facility] after being found altered, unable to follow commands or answer questions, with right-sided weakness and sensory loss, right-sided neglect and aphasia. NIH 21. Patient was started on IV tPA but then became obtunded and flaccid in all 4 extremities with concern for possible hemorrhagic conversion and tPA was held. She was started on a Cardene drip and admitted to the neuro ICU for admission. She had been taking dual antiplatelets at home. She arrived intubated and went directly to CT scan. CT head unremarkable, CTA head neck with no LVO and patient was restarted on tPA as well as loaded with IV Keppra for possible subclinical seizure. She was extubated on 4/19. Continues to report right-sided weakness worse than her baseline and was transferred to the neurology service on 4/20/2022. Her right-sided weakness has not been consistent on exam, fluctuates in nature. She also has inconsistencies in her sensory exam.  MRI cervical spine were done to rule out myelopathy; her weakness was felt to be more psychosomatic in nature. MRI cervical spine showing multilevel degenerative disc disease. No current facility-administered medications on file prior to encounter. Current Outpatient Medications on File Prior to Encounter   Medication Sig Dispense Refill    clopidogrel (PLAVIX) 75 MG tablet Take 75 mg by mouth nightly      gabapentin (NEURONTIN) 600 MG tablet Take 600 mg by mouth 3 times daily.       potassium chloride (KLOR-CON M) 20 MEQ extended release tablet Take 20 mEq by mouth 2 times daily  fenofibrate (TRIGLIDE) 160 MG tablet Take 160 mg by mouth daily      Pantoprazole Sodium (PROTONIX PO) Take  by mouth.  acetaminophen (TYLENOL) 160 MG/5ML solution Take 20.3 mLs by mouth every 4 hours as needed for Fever. 473 mL 3    albuterol (PROVENTIL HFA;VENTOLIN HFA) 108 (90 BASE) MCG/ACT inhaler Inhale 6 puffs into the lungs every 6 hours as needed for Wheezing. 1 Inhaler 3    glucagon, rDNA, 1 MG SOLR injection Inject 1 mg into the muscle as needed for Low blood sugar (Blood glucose less than 70 mg/dL and patient NOT ALERT or NPO and does not have IV access. ).  0    glucose (GLUTOSE) 40 % GEL Take 15 g by mouth as needed. 45 g 1    insulin glargine (LANTUS) 100 UNIT/ML injection vial Inject 10 Units into the skin nightly. 1 Pen 3    insulin lispro (HUMALOG) 100 UNIT/ML injection vial Inject 0-12 Units into the skin every 6 hours. 1 Pen 3    niacin (NIASPAN) 500 MG CR tablet Take 1 tablet by mouth nightly. 30 tablet 3    potassium chloride (KAYCIEL) 20 MEQ/15ML (10%) solution Take 15 mLs by mouth daily. Allergies: Lisseth Ramon has No Known Allergies. Past Medical History:   Diagnosis Date    CVA (cerebral infarction) ([facility] [address]     Hyperlipidemia     Hypertension        Past Surgical History:   Procedure Laterality Date    APPENDECTOMY      BRONCHOSCOPY DIAGNOSTIC  7/24/2014         HYSTERECTOMY         Social History: Lisseth Ramon  reports that she has never smoked. She has never used smokeless tobacco. She reports that she does not drink alcohol and does not use drugs. No family history on file.     Objective:   /65   Pulse 78   Temp 97.7 °F (36.5 °C) (Oral)   Resp 20   Ht 5' 5\" (1.651 m)   Wt 133 lb 9.6 oz (60.6 kg)   SpO2 97%   BMI 22.23 kg/m²     Blood pressure range: Systolic (54OVY), PHZ:006 , Min:80 , RKM:600   ; Diastolic (36UBA), NJR:36, Min:61, Max:71      Review of Systems:  Constitutional  Negative for fever and chills    HEENT  Negative for ear discharge, ear pain, nosebleed    Eyes  Negative for photophobia, pain and discharge    Respiratory  Negative for hemoptysis and sputum    Cardiovascular  Negative for orthopnea, claudication and PND    Gastrointestinal  Negative for abdominal pain, diarrhea, blood in stool    Musculoskeletal  Negative for joint pain, negative for myalgia. Right sided weakness   Skin  Negative for rash or itching    Endo/heme/allergies  Negative for polydipsia, environmental allergy    Psychiatric/behavioral  Negative for suicidal ideation.  Patient is not anxious        NEUROLOGIC EXAMINATION  GENERAL  Appears comfortable and in no distress   HEENT  NC/ AT   NECK  Supple   MENTAL STATUS:  Alert, oriented, intact memory, no confusion, normal speech, normal language, no hallucination or delusion   CRANIAL NERVES: II     -      Visual fields intact to confrontation  III,IV,VI -  EOMs full, no afferent defect, no JOE, no ptosis  V     -     Normal facial sensation  VII    -     Normal facial symmetry  VIII   -     Intact hearing  IX,X -     Symmetrical palate  XI    -     Symmetrical shoulder shrug  XII   -     Midline tongue, no atrophy    MOTOR FUNCTION:  Full strength to left side, unable to antigravity to RUE and RLE with poor effort given, R foot drop (chronic), with normal bulk, normal tone and no involuntary movements, no tremor   SENSORY FUNCTION:  Decreased sensation right arm and leg   CEREBELLAR FUNCTION:  Intact fine motor control over upper limbs   REFLEX FUNCTION:  Symmetric, no perverted reflex, no Babinski sign   STATION and GAIT  Not tested       Data:    Lab Results:   CBC:   Recent Labs     04/24/22 0432 04/25/22 0443 04/26/22 0434   WBC 5.0 5.6 6.4   HGB 11.3* 11.0* 10.9*    203 221     BMP:    Recent Labs     04/24/22 0432 04/25/22 0443 04/26/22 0434    136 138   K 3.4* 3.6* 3.7    107 108*   CO2 19* 20 19*   BUN 21 27* 27*   CREATININE 0.65 0.67 0.65   GLUCOSE 148* 150* 119*         Lab Results   Component Value Date    CHOL 202 (H) 04/17/2022    LDLCHOLESTEROL 114 04/17/2022    HDL 55 04/17/2022    TRIG 164 (H) 04/17/2022    ALT 56 (H) 07/23/2014    AST 36 (H) 07/23/2014    TSH 0.65 07/19/2014    INR 1.0 04/17/2022    LABA1C 10.3 (H) 04/17/2022    [id number] 1096 (H) 07/26/2014           Diagnostic data reviewed:  CT HEAD (4/17/2022): No acute intracranial abnormality      MRI BRAIN W/WO (4/182022): Mild chronic microangiopathy     MRI C-SPINE: (4/21/22) -  Multilevel degenerative disc disease with associated uncovertebral and facet   hypertrophy with canal stenosis at C3-4, C4-5, and C5-6.       Bilateral foraminal narrowing as described above.          CTA HEAD & NECK (4/17/2022):   1. No acute intracranial abnormality. 2. No evidence of large vessel occlusion or hemodynamically significant   stenosis involving the head and neck arteries. ECHO (4/20/2022): EF 61%. LA moderately dilated. Bubble study attempted x 4. Unable to rule out intra-atrial shunt, due to technical limitations of study. No shunt seen by color Doppler. Mild MR & AI. Elevated RA filling pressure      EEG (4/21/22) -  This EEG was abnormal.  Occasional right frontocentral polymorphic delta slowing suggested underlying neuronal dysfunction. Right frontal sharp waves conferred an increased risk for focal onset seizures. Impression:  -Acute onset right hemiparesis of unclear etiology status post IV tPA with negative MRI brain; fluctuating exam while inpatient   -Reported history of chronic infarcts with residual right sided weakness but not clearly evident on brain imaging  -History of questionable seizures in the past    Plan:  -Continue aspirin, Plavix, Lipitor daily  -Continue home dose Keppra and Depakene  -Will plan for outpatient EMG/NCS  -DVT prophylaxis; on Lovenox  -Awaiting pre cert for IP rehab    Please note that this note was generated using a voice recognition dictation software.  Although every effort was made to ensure the accuracy of this automated transcription, some errors in transcription may have occurred.

## 2022-04-26 NOTE — PROGRESS NOTES
Physical Medicine & Rehabilitation  Progress Note    4/26/2022 9:43 AM     CC: Ambulatory and ADL dysfunction due to right hemiparesis    Subjective:   Feels well. No complaints, continue to be frustrated awaiting rehab. ROS:  Denies fevers, chills, sweats. No chest pain, palpitations, lightheadedness. Denies coughing, wheezing or shortness of breath. Denies abdominal pain, nausea, diarrhea or constipation. No new areas of joint pain. Denies new areas of numbness or weakness. Denies new anxiety or depression issues. No new skin problems. Rehabilitation:   PT:  Restrictions/Precautions: Fall Risk,General Precautions,Up as Tolerated  Other position/activity restrictions: SBP <180   Transfers  Sit to Stand: Minimal Assistance  Stand to sit: Minimal Assistance,2 Person Assistance  Bed to Chair: Moderate assistance,2 Person Assistance  Comment: Stood x3 mins  Ambulation  Surface: level tile  Device: Rolling Walker  Assistance: Moderate assistance,2 Person assistance  Quality of Gait: significant ant/post sway, ataxic, difficulty advancing R LE without Tactile cueing to, knees buckling, unsteady  Gait Deviations: Slow Irene,Decreased step length,Decreased step height  Distance: 20ft x 2  Comments: Pt with significant unsteadiness,and is unsafe to performed  ambulation without assist .  More Ambulation?: No          OT:  ADL  Feeding: Setup,Supervision,Minimal assistance  Feeding Skilled Clinical Factors: Pt ate breakfast using RUE. Pt provided foam tubing for built-up utensils but did not utilize for feeding. Grooming: Setup,Increased time to complete,Stand by assistance,Verbal cueing  Grooming Skilled Clinical Factors:  Face washing facilitated with pt supine in bed  UE Bathing: Setup,Increased time to complete,Verbal cueing,Minimal assistance  LE Bathing: Minimal assistance,Setup,Increased time to complete,Verbal cueing  UE Dressing: Setup,Minimal assistance,Verbal cueing,Increased time to complete  LE Dressing: Maximum assistance,Setup,Increased time to complete,Verbal cueing  LE Dressing Skilled Clinical Factors: LB dressing facilitated seated unsupported long sitting in bed pt able to doff B socks req assist to thread and don. Toileting: Setup,Increased time to complete,Contact guard assistance  Additional Comments: Pt completed supine/sit transfer to seated EOB. Pt stated needing to use bathroom. [place] stedy utilized to transfer pt EOB/toilet. Pt able to complete pericare seated on toilet. Pt transferred to seated in chair to complete ADL care. Pt educated on incorporating RUE into daily activities to promote healing and independecne with ADL/IADLs with good return. Pt set up with lunch tray on tray table in front of pt at end of session. Pt required min A with cutting up food d/t flacid RUE. Balance  Sitting Balance: Stand by assistance (Seated EOB/toilet/chair.)  Standing Balance: Moderate assistance (x2 for safety.)   Standing Balance  Time: 30 seconds x2 and 1 minute in ginny stedy; 6 minutes using RW. Activity: Static standing in ginny stedy, static standing at chair using RW, functional mobiltiy through room. Comment: Anterior/posterior swaying, pt unable to correct static standing. R knee buckling requiring tactile assist from therapist to keep in extension. Functional Mobility  Functional - Mobility Device: Rolling Walker  Activity: Other  Assist Level: Moderate assistance (x2)  Functional Mobility Comments: Mod A x2 required d/t unsteadiness and R knee buckling. Manual assist from therapist to keep R knee in extension during ambulation and assist with advancing RLE forward. Pt attempting to advance RLE with poor return, pt displayed very little control over RLE. Verbal/tactile cues for walker placement with fair return, pt pushing walker too far out in front.      Bed mobility  Rolling to Left: Minimal assistance  Supine to Sit: Minimal assistance  Sit to Supine: Stand by assistance  Scooting: Stand by assistance  Comment: Pt up in chair upon arrival and exit  Transfers  Stand Step Transfers: Moderate assistance  Stand Pivot Transfers: Moderate assistance  Sit to stand: Moderate assistance  Stand to sit: Moderate assistance  Transfer Comments: MOD A x2 using RW; min A using ginny stedy. Toilet Transfers  Toilet - Technique:  (ginny stedy)  Equipment Used: Standard toilet  Toilet Transfer: Minimal assistance               ST:       Subjective: [x]? Alert     [x]? Cooperative     []? Confused     []? Agitated    []? Lethargic        Objective/Assessment:     Recall: Word List Retention Word Placement 2/5 improved to 4/5 with min-max verbal cues and repetitions    Immediate Memory for Four-Word Sequences 2/5 did not improve despite max verbal cues and repetitions  Delayed Recall Associated 2/3 improved to 3/3 with min verbal cues, 1/3 improved to 3/3 with min verbal cues     Problem Solving/Reasoning:   Opposites 4/10 improved to 7/10 with min-max verbal cues  Word Deduction 9/12 improved to 12/12 with min verbal cues  Determining Category Exclusions 9/10 improved to 10/10 with min verbal cues     Other: Pt pleasant and cooperative throughout session. Pt reports no concerns with memory or thinking, however, expressed         Objective:  /65   Pulse 78   Temp 97.7 °F (36.5 °C) (Oral)   Resp 20   Ht 5' 5\" (1.651 m)   Wt 133 lb 9.6 oz (60.6 kg)   SpO2 97%   BMI 22.23 kg/m²  I Body mass index is 22.23 kg/m². I   Wt Readings from Last 1 Encounters:   22 133 lb 9.6 oz (60.6 kg)      Temp (24hrs), Av.9 °F (36.6 °C), Min:97.7 °F (36.5 °C), Max:98 °F (36.7 °C)         GEN: well developed, well nourished, no acute distress  HEENT: Normocephalic atraumatic, EOMI, mucous membranes pink and moist  CV: RRR, no murmurs, rubs or gallops  PULM: CTAB, no rales or rhonchi. Respirations WNL and unlabored  ABD: soft, NT, ND, +BS and equal  NEURO: A&O x3. Sensation intact to light touch.    MSK: Today  right upper and lower extremity though  some giveaway weakness in the upper extremity, 4+/5 left upper lower extremity  EXTREMITIES: No calf tenderness to palpation bilaterally. No edema BLEs  SKIN: warm dry and intact with good turgor  PSYCH: appropriately interactive. Affect WNL. Medications   Scheduled Meds:   divalproex  500 mg Oral BID    potassium chloride  20 mEq Oral Daily with breakfast    pantoprazole  40 mg Oral QAM AC    fenofibrate  160 mg Oral Daily    levETIRAcetam  1,000 mg Oral BID    insulin glargine  20 Units SubCUTAneous Nightly    insulin lispro  0-12 Units SubCUTAneous TID WC    insulin lispro  0-6 Units SubCUTAneous Nightly    topiramate  25 mg Oral BID    LORazepam  1 mg IntraVENous Once    clopidogrel  75 mg Oral Daily    gabapentin  600 mg Oral TID    atorvastatin  40 mg Oral Nightly    enoxaparin  40 mg SubCUTAneous Daily    aspirin  81 mg Oral Daily    Or    aspirin  300 mg Rectal Daily     Continuous Infusions:   dextrose       PRN Meds:.glucose, dextrose, glucagon (rDNA), dextrose, potassium chloride **OR** potassium alternative oral replacement **OR** potassium chloride, acetaminophen, sodium chloride flush, albuterol sulfate HFA, ondansetron **OR** ondansetron, polyethylene glycol, perflutren lipid microspheres, labetalol     Diagnostics:     CBC:   Recent Labs     04/24/22  0432 04/25/22  0443 04/26/22 0434   WBC 5.0 5.6 6.4   RBC 3.62* 3.51* 3.48*   HGB 11.3* 11.0* 10.9*   HCT 34.3* 33.0* 32.8*   MCV 94.8 94.0 94.3   RDW 12.7 12.8 12.9    203 221     BMP:   Recent Labs     04/24/22  0432 04/25/22 0443 04/26/22 0434    136 138   K 3.4* 3.6* 3.7    107 108*   CO2 19* 20 19*   BUN 21 27* 27*   CREATININE 0.65 0.67 0.65     BNP: No results for input(s): BNP in the last 72 hours. PT/INR: No results for input(s): PROTIME, INR in the last 72 hours. APTT: No results for input(s): APTT in the last 72 hours.   CARDIAC ENZYMES: No results for input(s): CKMB, CKMBINDEX, TROPONINT in the last 72 hours. Invalid input(s): CKTOTAL;3  FASTING LIPID PANEL:  Lab Results   Component Value Date    CHOL 202 (H) 04/17/2022    HDL 55 04/17/2022    TRIG 164 (H) 04/17/2022     LIVER PROFILE: No results for input(s): AST, ALT, ALB, BILIDIR, BILITOT, ALKPHOS in the last 72 hours. I/O (24Hr): Intake/Output Summary (Last 24 hours) at 4/26/2022 0943  Last data filed at 4/25/2022 2100  Gross per 24 hour   Intake 100 ml   Output 300 ml   Net -200 ml       Glu last 24 hour  Recent Labs     04/25/22  0821 04/25/22  1218 04/25/22  1701 04/25/22  1941   POCGLU 141* 220* 151* 186*       No results for input(s): CLARITYU, COLORU, PHUR, SPECGRAV, PROTEINU, RBCUA, BLOODU, BACTERIA, NITRU, WBCUA, LEUKOCYTESUR, YEAST, GLUCOSEU, BILIRUBINUR in the last 72 hours. MRI CERVICAL SPINE WO CONTRAST    Result Date: 4/21/2022  Multilevel degenerative disc disease with associated uncovertebral and facet hypertrophy with canal stenosis at C3-4, C4-5, and C5-6. Bilateral foraminal narrowing as described above. MRI BRAIN W WO CONTRAST    Result Date: 4/18/2022  1. No acute intracranial abnormality. 2. Mild chronic white matter microvascular ischemic changes. 3. Left mastoid effusion.      Impression:     1. Right-sided weaknessunclear etiology, had tPA, negative MRI,aspirin Plavix Lipitor, right hemiparesis, possible psychosomatic per neurology, noted plan for outpatient EMG  2.  seizureon Keppra and Depakote  3. Cognitive impairment  4. Anemia  5. Elevated hemoglobin A1cinsulin and sliding scale insulin  6. HTN/HLD  7. History CVA  8. GERDProtonix     Recommendations:     1. Diagnosis:  Right-sided weakness  2. Therapy: Has PT/OT/SLP needs  3. Medical Necessity: As above  4. Support: Lives alone informed her family can provide support on discharge  5. Rehab Recommendation:   Would benefit acute inpatient rehabilitation when medically/neurology cleared  6.  DVT Prophylaxis: Lovenox    Discussed with patient, neurology,     Harjeet Jeff MD       This note is created with the assistance of a speech recognition program.  While intending to generate a document that actually reflects the content of the visit, the document can still have some errors including those of syntax and sound a like substitutions which may escape proof reading.   In such instances, actual meaning can be extrapolated by contextual diversion

## 2022-04-26 NOTE — PROGRESS NOTES
Comprehensive Nutrition Assessment    Type and Reason for Visit:  Reassess    Nutrition Recommendations/Plan:   1. Continue current diet, Regular  2. Will monitor for nutrition needs     Malnutrition Assessment:  Malnutrition Status: At risk for malnutrition (Comment) (04/21/22 0377)    Context:  Acute Illness     Findings of the 6 clinical characteristics of malnutrition:  Energy Intake:  Mild decrease in energy intake (Comment)  Weight Loss:  Unable to assess     Body Fat Loss:  No significant body fat loss     Muscle Mass Loss:  No significant muscle mass loss    Fluid Accumulation:  No significant fluid accumulation     Strength:  Not Performed    Nutrition Assessment:    Pt reports good appetite, eating % of meals w/ no n/v. ONS previously noted not ordered, pt declined ONS at this time. Nutrition Related Findings:    Labs/meds reviewed. No edema noted. LBM 4/24. Wound Type: None       Current Nutrition Intake & Therapies:    Average Meal Intake: 51-75%,%  Average Supplements Intake: None Ordered  ADULT DIET; Regular    Anthropometric Measures:  Height: 5' 5\" (165.1 cm)  Ideal Body Weight (IBW): 125 lbs (57 kg)    Admission Body Weight: 136 lb 0.4 oz (61.7 kg)  Current Body Weight: 133 lb 9.6 oz (60.6 kg), 106.9 % IBW. Weight Source: Bed Scale  Current BMI (kg/m2): 22.2                          BMI Categories: Normal Weight (BMI 18.5-24. 9)    Estimated Daily Nutrient Needs:  Energy Requirements Based On: Kcal/kg  Weight Used for Energy Requirements: Current  Energy (kcal/day): 25-28 kcal/kg = 8955-7980 kcal/day  Weight Used for Protein Requirements: Current  Protein (g/day): 1.2-1.5 gm/kg = 70-90 gm pro/day  Method Used for Fluid Requirements: 1 ml/kcal  Fluid (ml/day): or per MD    Nutrition Diagnosis:   · Inadequate oral intake related to  (recent extubation; current condition) as evidenced by  (recent variable intakes)      Nutrition Interventions:   Food and/or Nutrient Delivery: Continue Current Diet  Nutrition Education/Counseling: No recommendation at this time  Coordination of Nutrition Care: Continue to monitor while inpatient       Goals:  Previous Goal Met: Goal(s) Achieved  Goals: Meet at least 75% of estimated needs       Nutrition Monitoring and Evaluation:   Behavioral-Environmental Outcomes: None Identified  Food/Nutrient Intake Outcomes: Food and Nutrient Intake  Physical Signs/Symptoms Outcomes: Biochemical Data,GI Status,Nutrition Focused Physical Findings,Skin,Weight    Discharge Planning:    No discharge needs at this time     Anne Marie Malave MS, RD, LD  Contact: [phone]

## 2022-04-26 NOTE — PROGRESS NOTES
Speech Language Pathology  Speech Language Pathology  [facility]    Cognitive Treatment Note    Date: 4/26/2022  Patients Name: Esperanza Ruelas  MRN: [id number]  Diagnosis:   Patient Active Problem List   Diagnosis Code    Altered mental status R41.82    Generalized nonconvulsive seizures ([facility] [address] G40.309    History of CVA (cerebrovascular accident) Z80.78    Noncompliance Z91.19    Hemiparesis ([address] G81.90    Respiratory failure ([address] J96.90    Upper respiratory infection J06.9    Acute respiratory failure ([address] J96.00    HTN (hypertension) I10    HLD (hyperlipidemia) E78.5    Hyperglycemia R73.9    Hypokalemia E87.6    Anemia due to acute blood loss D62    Right sided weakness R53.1    Cerebrovascular accident (CVA) ([facility] [address] I63.9    Tissue plasminogen activator (tPA) administered at other facility within 24 hours before current admission Z92.82       Pain: 0/10    Cognitive Treatment    Treatment time: 0353-8283      Subjective: [x] Alert [x] Cooperative     [] Confused     [] Agitated    [] Lethargic      Objective/Assessment:    Recall: Word List Retention:  4/5 improved to 5/5 with min verbal cues and repetitions    Delayed Recall with distraction Associated 3/3 independent X3    Problem Solving/Reasoning:   Opposites 8/8 independent  Word Deduction 8/10 improved to 10/10 with min verbal cues  Word associations 4/5 increased to 5/5 with repetition  Category Generation (first letter) 5 increased to 10 with mod cues    Other: Pt pleasant and cooperative throughout session. Pt. Reports she is frustrated with how long she has been here and is ready to go to rehab. Plan:  [x] Continue ST services    [] Discharge from ST:      Discharge recommendations: []  Further therapy recommended at discharge. The patient should be able to tolerate at least 3 hours of therapy per day over 5 days or 15 hours over 7 days. [x] Further therapy recommended at discharge.    [] No therapy recommended at discharge.     KATLYN DAVENPORT MS, CCC/SLP

## 2022-04-26 NOTE — PROGRESS NOTES
Physical Therapy  Facility/Department: [address] STEP DOWN  Physical Therapy Daily Treatment Note    Name: Kentrell Mejia  : 1950  MRN: [id number]  Date of Service: 2022    Discharge Recommendations:  Patient would benefit from continued therapy after discharge   PT Equipment Recommendations  Equipment Needed: No      Patient Diagnosis(es): The encounter diagnosis was Cerebrovascular accident (CVA), unspecified mechanism ([facility] [address] Past Medical History:  has a past medical history of CVA (cerebral infarction) ([facility] [address] Hyperlipidemia, and Hypertension. Past Surgical History:  has a past surgical history that includes Appendectomy; Hysterectomy; and Bronchoscopy diagnostic (2014). Assessment   Body Structures, Functions, Activity Limitations Requiring Skilled Therapeutic Intervention: Decreased functional mobility ; Decreased ROM; Decreased strength;Decreased safe awareness;Decreased endurance;Decreased balance;Decreased posture  Assessment: Pt amb 20 ft with RW and MOD A x2. Limited by poor balance. Recommend aggressive PT after d/c to address deficits. Therapy Prognosis: Good  Requires PT Follow-Up: Yes  Activity Tolerance  Activity Tolerance: Patient tolerated treatment well;Patient limited by endurance; Patient limited by fatigue     Plan   Plan  Plan:  (6-7)  Current Treatment Recommendations: Strengthening,ROM,Balance training,Functional mobility training,Gait training,Neuromuscular re-education,Transfer training,Endurance training  Safety Devices  Type of Devices: Call light within reach,Chair alarm in place,Gait belt,Patient at risk for falls,Left in chair,Nurse notified  Restraints  Restraints Initially in Place: No     Restrictions  Restrictions/Precautions  Restrictions/Precautions: Fall Risk,General Precautions,Up as Tolerated  Required Braces or Orthoses?: No  Position Activity Restriction  Other position/activity restrictions: SBP <180     Subjective   General  Response To Previous Treatment: Patient with no complaints from previous session. Family / Caregiver Present: No  Follows Commands: Within Functional Limits  Subjective  Subjective: Pt resting in bed upon arrival, agreeable to PT              Cognition   Orientation  Overall Orientation Status: Within Functional Limits     Objective       Bed mobility  Rolling to Right: Minimal assistance  Supine to Sit: Minimal assistance  Scooting: Minimal assistance  Transfers  Sit to Stand: Minimal Assistance  Stand to sit: Moderate Assistance;2 Person Assistance  Bed to Chair: Moderate assistance;2 Person Assistance  Comment: MIN A to stand initially, then MOD A x2 to maintain due to poor standign balance  Ambulation  Surface: level tile  Device: Rolling Walker  Assistance:  Moderate assistance;2 Person assistance  Quality of Gait: significant ant/post sway, ataxic, difficulty advancing R LE, knees buckling, unsteady  Distance: 20 ft  Comments: MAX A to assist with R LE and RW advancement     Balance  Posture: Fair  Sitting - Static: Fair  Sitting - Dynamic: Fair;-  Standing - Static: Poor  Standing - Dynamic: Poor;-  Comments: Pt stood x 4 min with RW and MOD Ax2, due to poor balance, unsable to correct depsite cues   Exercise  Supine BLE AROM/AAROM in all planes x 10  AM-PAC Score  AM-PAC Inpatient Mobility Raw Score : 12 (04/21/22 1545)  AM-PAC Inpatient T-Scale Score : 35.33 (04/21/22 1545)  Mobility Inpatient CMS 0-100% Score: 68.66 (04/21/22 1545)  Mobility Inpatient CMS G-Code Modifier : CL (04/21/22 1545)          Goals  Short Term Goals  Time Frame for Short term goals: 14  Short term goal 1: Pt to perform bed mobility from flat surface independently  Short term goal 2: Demonstrate functional transfers independently  Short term goal 3: Pt to ambulate 100ft w/ RW with supervision  Short term goal 4: Tolerate 45 minutes of therapy to demo increased endurance  Short term goal 5: Demonstrate dynamic standing balance of good - to decrease fall risk  Patient Goals   Patient goals :  To get better       Therapy Time   Individual Concurrent Group Co-treatment   Time In 1038         Time Out 1107         Minutes 29         Timed Code Treatment Minutes: [address], [place]

## 2022-04-27 LAB
ANION GAP SERPL CALCULATED.3IONS-SCNC: 11 MMOL/L (ref 9–17)
BUN BLDV-MCNC: 31 MG/DL (ref 8–23)
CALCIUM SERPL-MCNC: 8.8 MG/DL (ref 8.6–10.4)
CHLORIDE BLD-SCNC: 108 MMOL/L (ref 98–107)
CO2: 20 MMOL/L (ref 20–31)
CREAT SERPL-MCNC: 0.92 MG/DL (ref 0.5–0.9)
EKG ATRIAL RATE: 74 BPM
EKG P AXIS: -7 DEGREES
EKG P-R INTERVAL: 154 MS
EKG Q-T INTERVAL: 414 MS
EKG QRS DURATION: 136 MS
EKG QTC CALCULATION (BAZETT): 459 MS
EKG R AXIS: 26 DEGREES
EKG T AXIS: -17 DEGREES
EKG VENTRICULAR RATE: 74 BPM
GFR AFRICAN AMERICAN: >60 ML/MIN
GFR NON-AFRICAN AMERICAN: >60 ML/MIN
GFR SERPL CREATININE-BSD FRML MDRD: ABNORMAL ML/MIN/{1.73_M2}
GLUCOSE BLD-MCNC: 119 MG/DL (ref 65–105)
GLUCOSE BLD-MCNC: 190 MG/DL (ref 65–105)
GLUCOSE BLD-MCNC: 227 MG/DL (ref 65–105)
GLUCOSE BLD-MCNC: 77 MG/DL (ref 65–105)
GLUCOSE BLD-MCNC: 98 MG/DL (ref 70–99)
HCT VFR BLD CALC: 31.8 % (ref 36.3–47.1)
HEMOGLOBIN: 10.7 G/DL (ref 11.9–15.1)
MAGNESIUM: 2 MG/DL (ref 1.6–2.6)
MCH RBC QN AUTO: 31.2 PG (ref 25.2–33.5)
MCHC RBC AUTO-ENTMCNC: 33.6 G/DL (ref 28.4–34.8)
MCV RBC AUTO: 92.7 FL (ref 82.6–102.9)
NRBC AUTOMATED: 0 PER 100 WBC
PDW BLD-RTO: 12.9 % (ref 11.8–14.4)
PLATELET # BLD: 226 K/UL (ref 138–453)
PMV BLD AUTO: 9.5 FL (ref 8.1–13.5)
POTASSIUM SERPL-SCNC: 3.4 MMOL/L (ref 3.7–5.3)
RBC # BLD: 3.43 M/UL (ref 3.95–5.11)
SODIUM BLD-SCNC: 139 MMOL/L (ref 135–144)
TROPONIN, HIGH SENSITIVITY: 7 NG/L (ref 0–14)
WBC # BLD: 6.9 K/UL (ref 3.5–11.3)

## 2022-04-27 PROCEDURE — 97129 THER IVNTJ 1ST 15 MIN: CPT

## 2022-04-27 PROCEDURE — 6370000000 HC RX 637 (ALT 250 FOR IP): Performed by: STUDENT IN AN ORGANIZED HEALTH CARE EDUCATION/TRAINING PROGRAM

## 2022-04-27 PROCEDURE — 82947 ASSAY GLUCOSE BLOOD QUANT: CPT

## 2022-04-27 PROCEDURE — 6360000002 HC RX W HCPCS: Performed by: STUDENT IN AN ORGANIZED HEALTH CARE EDUCATION/TRAINING PROGRAM

## 2022-04-27 PROCEDURE — 6370000000 HC RX 637 (ALT 250 FOR IP): Performed by: NURSE PRACTITIONER

## 2022-04-27 PROCEDURE — 93005 ELECTROCARDIOGRAM TRACING: CPT | Performed by: STUDENT IN AN ORGANIZED HEALTH CARE EDUCATION/TRAINING PROGRAM

## 2022-04-27 PROCEDURE — 97130 THER IVNTJ EA ADDL 15 MIN: CPT

## 2022-04-27 PROCEDURE — 80048 BASIC METABOLIC PNL TOTAL CA: CPT

## 2022-04-27 PROCEDURE — 36415 COLL VENOUS BLD VENIPUNCTURE: CPT

## 2022-04-27 PROCEDURE — 93010 ELECTROCARDIOGRAM REPORT: CPT | Performed by: INTERNAL MEDICINE

## 2022-04-27 PROCEDURE — 2060000000 HC ICU INTERMEDIATE R&B

## 2022-04-27 PROCEDURE — 99232 SBSQ HOSP IP/OBS MODERATE 35: CPT | Performed by: PSYCHIATRY & NEUROLOGY

## 2022-04-27 PROCEDURE — 83735 ASSAY OF MAGNESIUM: CPT

## 2022-04-27 PROCEDURE — 99231 SBSQ HOSP IP/OBS SF/LOW 25: CPT | Performed by: PHYSICAL MEDICINE & REHABILITATION

## 2022-04-27 PROCEDURE — 84484 ASSAY OF TROPONIN QUANT: CPT

## 2022-04-27 PROCEDURE — 85027 COMPLETE CBC AUTOMATED: CPT

## 2022-04-27 RX ORDER — TOPIRAMATE 25 MG/1
25 TABLET ORAL 2 TIMES DAILY
Status: CANCELLED | OUTPATIENT
Start: 2022-04-27

## 2022-04-27 RX ORDER — ALBUTEROL SULFATE 90 UG/1
6 AEROSOL, METERED RESPIRATORY (INHALATION) EVERY 6 HOURS PRN
Status: CANCELLED | OUTPATIENT
Start: 2022-04-27

## 2022-04-27 RX ORDER — DIVALPROEX SODIUM 500 MG/1
500 TABLET, EXTENDED RELEASE ORAL 2 TIMES DAILY
Status: CANCELLED | OUTPATIENT
Start: 2022-04-27

## 2022-04-27 RX ORDER — GABAPENTIN 300 MG/1
600 CAPSULE ORAL 3 TIMES DAILY
Status: CANCELLED | OUTPATIENT
Start: 2022-04-27

## 2022-04-27 RX ORDER — FENOFIBRATE 160 MG/1
160 TABLET ORAL DAILY
Status: CANCELLED | OUTPATIENT
Start: 2022-04-28

## 2022-04-27 RX ORDER — OXYCODONE HYDROCHLORIDE 5 MG/1
5 TABLET ORAL ONCE
Status: COMPLETED | OUTPATIENT
Start: 2022-04-27 | End: 2022-04-27

## 2022-04-27 RX ORDER — PANTOPRAZOLE SODIUM 40 MG/1
40 TABLET, DELAYED RELEASE ORAL
Status: CANCELLED | OUTPATIENT
Start: 2022-04-28

## 2022-04-27 RX ORDER — POTASSIUM CHLORIDE 20 MEQ/1
20 TABLET, EXTENDED RELEASE ORAL
Status: CANCELLED | OUTPATIENT
Start: 2022-04-28

## 2022-04-27 RX ORDER — INSULIN GLARGINE 100 [IU]/ML
20 INJECTION, SOLUTION SUBCUTANEOUS NIGHTLY
Status: CANCELLED | OUTPATIENT
Start: 2022-04-27

## 2022-04-27 RX ORDER — ATORVASTATIN CALCIUM 40 MG/1
40 TABLET, FILM COATED ORAL NIGHTLY
Status: CANCELLED | OUTPATIENT
Start: 2022-04-27

## 2022-04-27 RX ORDER — LEVETIRACETAM 500 MG/1
1000 TABLET ORAL 2 TIMES DAILY
Status: CANCELLED | OUTPATIENT
Start: 2022-04-27

## 2022-04-27 RX ORDER — INSULIN LISPRO 100 [IU]/ML
0-12 INJECTION, SOLUTION INTRAVENOUS; SUBCUTANEOUS
Status: CANCELLED | OUTPATIENT
Start: 2022-04-27

## 2022-04-27 RX ORDER — ENOXAPARIN SODIUM 100 MG/ML
40 INJECTION SUBCUTANEOUS DAILY
Status: CANCELLED | OUTPATIENT
Start: 2022-04-27

## 2022-04-27 RX ORDER — ACETAMINOPHEN 325 MG/1
650 TABLET ORAL EVERY 6 HOURS PRN
Status: CANCELLED | OUTPATIENT
Start: 2022-04-27

## 2022-04-27 RX ORDER — ONDANSETRON 4 MG/1
4 TABLET, ORALLY DISINTEGRATING ORAL EVERY 8 HOURS PRN
Status: CANCELLED | OUTPATIENT
Start: 2022-04-27

## 2022-04-27 RX ORDER — INSULIN LISPRO 100 [IU]/ML
0-6 INJECTION, SOLUTION INTRAVENOUS; SUBCUTANEOUS NIGHTLY
Status: CANCELLED | OUTPATIENT
Start: 2022-04-27

## 2022-04-27 RX ORDER — LABETALOL HYDROCHLORIDE 5 MG/ML
10 INJECTION, SOLUTION INTRAVENOUS EVERY 10 MIN PRN
Status: CANCELLED | OUTPATIENT
Start: 2022-04-27

## 2022-04-27 RX ORDER — ASPIRIN 81 MG/1
81 TABLET ORAL DAILY
Status: CANCELLED | OUTPATIENT
Start: 2022-04-27

## 2022-04-27 RX ORDER — CLOPIDOGREL BISULFATE 75 MG/1
75 TABLET ORAL DAILY
Status: CANCELLED | OUTPATIENT
Start: 2022-04-27

## 2022-04-27 RX ORDER — ONDANSETRON 2 MG/ML
4 INJECTION INTRAMUSCULAR; INTRAVENOUS EVERY 6 HOURS PRN
Status: CANCELLED | OUTPATIENT
Start: 2022-04-27

## 2022-04-27 RX ADMIN — LEVETIRACETAM 1000 MG: 500 TABLET, FILM COATED ORAL at 21:46

## 2022-04-27 RX ADMIN — INSULIN GLARGINE 20 UNITS: 100 INJECTION, SOLUTION SUBCUTANEOUS at 21:47

## 2022-04-27 RX ADMIN — GABAPENTIN 600 MG: 300 CAPSULE ORAL at 17:16

## 2022-04-27 RX ADMIN — DIVALPROEX SODIUM 500 MG: 500 TABLET, EXTENDED RELEASE ORAL at 08:04

## 2022-04-27 RX ADMIN — Medication 81 MG: at 08:04

## 2022-04-27 RX ADMIN — DESMOPRESSIN ACETATE 40 MG: 0.2 TABLET ORAL at 21:46

## 2022-04-27 RX ADMIN — GABAPENTIN 600 MG: 300 CAPSULE ORAL at 21:46

## 2022-04-27 RX ADMIN — LEVETIRACETAM 1000 MG: 500 TABLET, FILM COATED ORAL at 08:04

## 2022-04-27 RX ADMIN — DIVALPROEX SODIUM 500 MG: 500 TABLET, EXTENDED RELEASE ORAL at 21:46

## 2022-04-27 RX ADMIN — POTASSIUM CHLORIDE 20 MEQ: 1500 TABLET, EXTENDED RELEASE ORAL at 08:04

## 2022-04-27 RX ADMIN — GABAPENTIN 600 MG: 300 CAPSULE ORAL at 08:05

## 2022-04-27 RX ADMIN — INSULIN LISPRO 2 UNITS: 100 INJECTION, SOLUTION INTRAVENOUS; SUBCUTANEOUS at 21:47

## 2022-04-27 RX ADMIN — CLOPIDOGREL 75 MG: 75 TABLET, FILM COATED ORAL at 08:04

## 2022-04-27 RX ADMIN — FENOFIBRATE 160 MG: 160 TABLET ORAL at 08:04

## 2022-04-27 RX ADMIN — OXYCODONE 5 MG: 5 TABLET ORAL at 02:01

## 2022-04-27 RX ADMIN — PANTOPRAZOLE SODIUM 40 MG: 40 TABLET, DELAYED RELEASE ORAL at 08:04

## 2022-04-27 RX ADMIN — POTASSIUM CHLORIDE 40 MEQ: 20 TABLET, EXTENDED RELEASE ORAL at 08:11

## 2022-04-27 RX ADMIN — ENOXAPARIN SODIUM 40 MG: 100 INJECTION SUBCUTANEOUS at 08:05

## 2022-04-27 RX ADMIN — TOPIRAMATE 25 MG: 25 TABLET, FILM COATED ORAL at 08:05

## 2022-04-27 RX ADMIN — TOPIRAMATE 25 MG: 25 TABLET, FILM COATED ORAL at 21:46

## 2022-04-27 ASSESSMENT — PAIN SCALES - GENERAL
PAINLEVEL_OUTOF10: 8
PAINLEVEL_OUTOF10: 3
PAINLEVEL_OUTOF10: 0
PAINLEVEL_OUTOF10: 9
PAINLEVEL_OUTOF10: 0

## 2022-04-27 ASSESSMENT — PAIN DESCRIPTION - FREQUENCY: FREQUENCY: CONTINUOUS

## 2022-04-27 ASSESSMENT — PAIN DESCRIPTION - LOCATION
LOCATION: SHOULDER
LOCATION: SHOULDER

## 2022-04-27 ASSESSMENT — PAIN DESCRIPTION - ORIENTATION: ORIENTATION: MID

## 2022-04-27 ASSESSMENT — PAIN DESCRIPTION - DESCRIPTORS
DESCRIPTORS: ACHING;SORE
DESCRIPTORS: ACHING;SORE

## 2022-04-27 ASSESSMENT — PAIN DESCRIPTION - ONSET: ONSET: ON-GOING

## 2022-04-27 ASSESSMENT — PAIN DESCRIPTION - PAIN TYPE: TYPE: ACUTE PAIN

## 2022-04-27 NOTE — CARE COORDINATION
Transitional Planning     Spoke with patient regarding placement. Patient is frustrated that precert is still not obtained.  CM notified patient that transport is moved to 04/28/22 at 1200

## 2022-04-27 NOTE — PROGRESS NOTES
NEUROLOGY INPATIENT PROGRESS NOTE    4/27/2022         Current Exam:    Patient seen and examined bedside. No acute complaints overnight. Awaiting discharge to acute rehab. Pre-CERT pending. Discussed with RN. Brief History:    Carole Cevallos is a  80 y.o. female with H/O questionable seizure disorder, chronic right hemiparesis, numbness of right foot, migraines, who was admitted on 4/17/2022 via [facility] after being found altered, unable to follow commands or answer questions, with right-sided weakness and sensory loss, right-sided neglect and aphasia. NIH 21. Patient was started on IV tPA but then became obtunded and flaccid in all 4 extremities with concern for possible hemorrhagic conversion and tPA was held. She was started on a Cardene drip and admitted to the neuro ICU for admission. She had been taking dual antiplatelets at home. She arrived intubated and went directly to CT scan. CT head unremarkable, CTA head neck with no LVO and patient was restarted on tPA as well as loaded with IV Keppra for possible subclinical seizure. She was extubated on 4/19. Continues to report right-sided weakness worse than her baseline and was transferred to the neurology service on 4/20/2022. Her right-sided weakness has not been consistent on exam, fluctuates in nature. She also has inconsistencies in her sensory exam.  MRI cervical spine were done to rule out myelopathy; her weakness was felt to be more psychosomatic in nature. MRI cervical spine showing multilevel degenerative disc disease. No current facility-administered medications on file prior to encounter. Current Outpatient Medications on File Prior to Encounter   Medication Sig Dispense Refill    clopidogrel (PLAVIX) 75 MG tablet Take 75 mg by mouth nightly      gabapentin (NEURONTIN) 600 MG tablet Take 600 mg by mouth 3 times daily.       potassium chloride (KLOR-CON M) 20 MEQ extended release tablet Take 20 mEq by mouth 2 times daily  fenofibrate (TRIGLIDE) 160 MG tablet Take 160 mg by mouth daily      Pantoprazole Sodium (PROTONIX PO) Take  by mouth.  acetaminophen (TYLENOL) 160 MG/5ML solution Take 20.3 mLs by mouth every 4 hours as needed for Fever. 473 mL 3    albuterol (PROVENTIL HFA;VENTOLIN HFA) 108 (90 BASE) MCG/ACT inhaler Inhale 6 puffs into the lungs every 6 hours as needed for Wheezing. 1 Inhaler 3    glucagon, rDNA, 1 MG SOLR injection Inject 1 mg into the muscle as needed for Low blood sugar (Blood glucose less than 70 mg/dL and patient NOT ALERT or NPO and does not have IV access. ).  0    glucose (GLUTOSE) 40 % GEL Take 15 g by mouth as needed. 45 g 1    insulin glargine (LANTUS) 100 UNIT/ML injection vial Inject 10 Units into the skin nightly. 1 Pen 3    insulin lispro (HUMALOG) 100 UNIT/ML injection vial Inject 0-12 Units into the skin every 6 hours. 1 Pen 3    niacin (NIASPAN) 500 MG CR tablet Take 1 tablet by mouth nightly. 30 tablet 3    potassium chloride (KAYCIEL) 20 MEQ/15ML (10%) solution Take 15 mLs by mouth daily. Allergies: Charisse Scanlon has No Known Allergies. Past Medical History:   Diagnosis Date    CVA [facility]cerebral infarction) ([facility] [address]     Hyperlipidemia     Hypertension        Past Surgical History:   Procedure Laterality Date    APPENDECTOMY      BRONCHOSCOPY DIAGNOSTIC  7/24/2014         HYSTERECTOMY         Social History: Charisse Scanlon  reports that she has never smoked. She has never used smokeless tobacco. She reports that she does not drink alcohol and does not use drugs. No family history on file.     Objective:   /67   Pulse 72   Temp 96.1 °F (35.6 °C) (Oral)   Resp 17   Ht 5' 5\" (1.651 m)   Wt 133 lb 9.6 oz (60.6 kg)   SpO2 99%   BMI 22.23 kg/m²     Blood pressure range: Systolic (92RHF), JCU:842 , Min:98 , UNU:452   ; Diastolic (87QLI), RGK:71, Min:64, Max:83      Review of Systems:  Constitutional  Negative for fever and chills    HEENT  Negative for ear discharge, ear pain, nosebleed    Eyes  Negative for photophobia, pain and discharge    Respiratory  Negative for hemoptysis and sputum    Cardiovascular  Negative for orthopnea, claudication and PND    Gastrointestinal  Negative for abdominal pain, diarrhea, blood in stool    Musculoskeletal  Negative for joint pain, negative for myalgia. Right sided weakness   Skin  Negative for rash or itching    Endo/heme/allergies  Negative for polydipsia, environmental allergy    Psychiatric/behavioral  Negative for suicidal ideation.  Patient is not anxious        NEUROLOGIC EXAMINATION  GENERAL  Appears comfortable and in no distress   HEENT  NC/ AT   NECK  Supple   MENTAL STATUS:  Alert, oriented, intact memory, no confusion, normal speech, normal language, no hallucination or delusion   CRANIAL NERVES: II     -      Visual fields intact to confrontation  III,IV,VI -  EOMs full, no afferent defect, no JOE, no ptosis  V     -     Normal facial sensation  VII    -     Normal facial symmetry  VIII   -     Intact hearing  IX,X -     Symmetrical palate  XI    -     Symmetrical shoulder shrug  XII   -     Midline tongue, no atrophy    MOTOR FUNCTION:  Full strength to left side, unable to antigravity to RUE and RLE with poor effort given, R foot drop (chronic), with normal bulk, normal tone and no involuntary movements, no tremor   SENSORY FUNCTION:  Decreased sensation right arm and leg   CEREBELLAR FUNCTION:  Intact fine motor control over upper limbs   REFLEX FUNCTION:  Symmetric, no perverted reflex, no Babinski sign   STATION and GAIT  Not tested       Data:    Lab Results:   CBC:   Recent Labs     04/25/22 0443 04/26/22 0434 04/27/22  0149   WBC 5.6 6.4 6.9   HGB 11.0* 10.9* 10.7*    221 226     BMP:    Recent Labs     04/25/22 0443 04/26/22 0434 04/27/22  0149    138 139   K 3.6* 3.7 3.4*    108* 108*   CO2 20 19* 20   BUN 27* 27* 31*   CREATININE 0.67 0.65 0.92*   GLUCOSE 150* 119* 98         Lab Results   Component Value Date    CHOL 202 (H) 04/17/2022    LDLCHOLESTEROL 114 04/17/2022    HDL 55 04/17/2022    TRIG 164 (H) 04/17/2022    ALT 56 (H) 07/23/2014    AST 36 (H) 07/23/2014    TSH 0.65 07/19/2014    INR 1.0 04/17/2022    LABA1C 10.3 (H) 04/17/2022    [id number] 1096 (H) 07/26/2014           Diagnostic data reviewed:  CT HEAD (4/17/2022): No acute intracranial abnormality      MRI BRAIN W/WO (4/182022): Mild chronic microangiopathy     MRI C-SPINE: (4/21/22) -  Multilevel degenerative disc disease with associated uncovertebral and facet   hypertrophy with canal stenosis at C3-4, C4-5, and C5-6.       Bilateral foraminal narrowing as described above.          CTA HEAD & NECK (4/17/2022):   1. No acute intracranial abnormality. 2. No evidence of large vessel occlusion or hemodynamically significant   stenosis involving the head and neck arteries. ECHO (4/20/2022): EF 61%. LA moderately dilated. Bubble study attempted x 4. Unable to rule out intra-atrial shunt, due to technical limitations of study. No shunt seen by color Doppler. Mild MR & AI. Elevated RA filling pressure      EEG (4/21/22) -  This EEG was abnormal.  Occasional right frontocentral polymorphic delta slowing suggested underlying neuronal dysfunction. Right frontal sharp waves conferred an increased risk for focal onset seizures. Impression:  -Acute onset right hemiparesis of unclear etiology status post IV tPA with negative MRI brain; fluctuating exam while inpatient   -Reported history of chronic infarcts with residual right sided weakness but not clearly evident on brain imaging  -History of questionable seizures in the past    Plan:  -Continue aspirin, Plavix, Lipitor daily  -Continue home dose Keppra and Depakene  -Will plan for outpatient EMG/NCS  -DVT prophylaxis; on Lovenox  -Awaiting pre cert for IP rehab    Please note that this note was generated using a voice recognition dictation software.  Although every effort was made to ensure the accuracy of this automated transcription, some errors in transcription may have occurred.

## 2022-04-27 NOTE — PLAN OF CARE
Problem: HEMODYNAMIC STATUS  Goal: Patient has stable vital signs and fluid balance  Outcome: Progressing     Problem: Skin Integrity:  Goal: Will show no infection signs and symptoms  Description: Will show no infection signs and symptoms  Outcome: Progressing  Goal: Absence of new skin breakdown  Description: Absence of new skin breakdown  Outcome: Progressing     Problem: Falls - Risk of:  Goal: Will remain free from falls  Description: Will remain free from falls  Outcome: Progressing  Goal: Absence of physical injury  Description: Absence of physical injury  Outcome: Progressing     Problem: Discharge Planning  Goal: Discharge to home or other facility with appropriate resources  Outcome: Progressing     Problem: Pain  Goal: Verbalizes/displays adequate comfort level or baseline comfort level  Outcome: Progressing     Problem: Chronic Conditions and Co-morbidities  Goal: Patient's chronic conditions and co-morbidity symptoms are monitored and maintained or improved  Outcome: Progressing     Problem: ABCDS Injury Assessment  Goal: Absence of physical injury  Outcome: Progressing

## 2022-04-27 NOTE — PROGRESS NOTES
Speech Language Pathology  Speech Language Pathology  [facility]    Cognitive Treatment Note    Date: 4/27/2022  Patients Name: Dilshad Christianson  MRN: [id number]  Diagnosis:   Patient Active Problem List   Diagnosis Code    Altered mental status R41.82    Generalized nonconvulsive seizures ([facility] [address] G40.309    History of CVA (cerebrovascular accident) Z80.78    Noncompliance Z91.19    Hemiparesis ([facility] [address] G81.90    Respiratory failure ([address] J96.90    Upper respiratory infection J06.9    Acute respiratory failure ([address] J96.00    HTN (hypertension) I10    HLD (hyperlipidemia) E78.5    Hyperglycemia R73.9    Hypokalemia E87.6    Anemia due to acute blood loss D62    Right sided weakness R53.1    Cerebrovascular accident (CVA) ([facility] [address] I63.9    Tissue plasminogen activator (tPA) administered at other facility within 24 hours before current admission Z92.82     Pain: 0/10    Cognitive Treatment    Treatment time: 6149-2747    Subjective: [x] Alert [x] Cooperative     [] Confused     [] Agitated    [] Lethargic    Objective/Assessment:    Recall: Pt able to accurately recall breakfast, room number, LOS, hospital name, and d/c plan independently. Recall w/ Distractions, 3 unrelated units: 2/3 increased to 3/3 with min verbal cue (10 minute delay)      Organization:   Generative Naming Grid: 6/8 increased to 7/8 with mod verbal cues. Pt given option of writing answers in grid vs. Verbalizing answers d/t R-side weakness. Pt motivated to [employer] and requested to handwrite answers. Pt handwriting legible, however pt w/ difficulty staying within margins. Problem Solving/Reasoning:   Word Deductions: 8/10 increased to 10/10 w/ min-mod verbal cues and repetitions     Other: Pt pleasant and cooperative t/o. Pt motivated to begin rehab once d/c from hospital. Pt benefits form occasional repetitions of stimuli as pt [place] (R-side HA present).      Plan:  [x] Continue ST services    [] Discharge from [address] [place] [address] Dr:      Discharge recommendations: []  Further therapy recommended at discharge. The patient should be able to tolerate at least 3 hours of therapy per day over 5 days or 15 hours over 7 days. [x] Further therapy recommended at discharge. [] No therapy recommended at discharge.     Treatment completed by: Gayle Bagley, SLP

## 2022-04-27 NOTE — PROGRESS NOTES
Occupational Therapy  Facility/Department: [address] STEP DOWN  Occupational Therapy Daily Treatment Note    Name: Leo Hobbs  : 1950  MRN: [id number]  Date of Service: 2022    Discharge Recommendations: Further therapy recommended at discharge. The patient should be able to tolerate at least 3 hours of therapy per day over 5 days or 15 hours over 7 days. This patient may benefit from a Physical Medicine and Rehab consult. Patient would benefit from continued therapy after discharge  OT Equipment Recommendations  ADL Assistive Devices: Transfer Tub Bench;Long-handled Shoe Horn;Long-handled Sponge;Sock-Aid Hard;Reacher; Toileting - Drop Arm Commode, Heavy Duty Drop Arm Commode       Patient Diagnosis(es): The encounter diagnosis was Cerebrovascular accident (CVA), unspecified mechanism ([facility] [address] Past Medical History:  has a past medical history of CVA (cerebral infarction) ([facility] [address] Hyperlipidemia, and Hypertension. Past Surgical History:  has a past surgical history that includes Appendectomy; Hysterectomy; and Bronchoscopy diagnostic (2014). Treatment Diagnosis: CVA    Assessment   Performance deficits / Impairments: Decreased functional mobility ; Decreased safe awareness;Decreased balance;Decreased coordination;Decreased ADL status; Decreased ROM; Decreased endurance;Decreased high-level IADLs;Decreased strength;Decreased fine motor control;Decreased cognition  Assessment: Pt would benefit from continued acute care and post acute care OT to address above listed deficits. Treatment Diagnosis: CVA  Prognosis: Good  Activity Tolerance  Activity Tolerance: Patient Tolerated treatment well  Activity Tolerance: Unsteadiness and flacid RUE.         Plan   Plan  Times per Week: 4-5x/wk  Current Treatment Recommendations: Strengthening,ROM,Balance training,Functional mobility training,Endurance training,Safety education & training,Patient/Caregiver education & training,Equipment evaluation, education, & procurement,Self-Care / ADL,Coordination training,Neuromuscular re-education,Modalities     Restrictions  Restrictions/Precautions  Restrictions/Precautions: Fall Risk,General Precautions,Up as Tolerated  Required Braces or Orthoses?: No  Position Activity Restriction  Other position/activity restrictions: SBP <180    Subjective   General  Chart Reviewed: Yes  Patient assessed for rehabilitation services?: Yes  Family / Caregiver Present: No  General Comment  Comments: Pt and RN agreeable to therapy this day  Pain Assessment  Pain Assessment: None - Denies Pain  Pain Level: 0  Pre Treatment Pain Screening  Pain at present: 0  Scale Used: Numeric Score  Intervention List: Patient able to continue with treatment    Objective   Safety Devices  Type of Devices: Call light within reach; Chair alarm in place;Gait belt;Patient at risk for falls; Left in chair;Nurse notified  Restraints  Restraints Initially in Place: No  Balance  Sitting Balance: Stand by assistance  Standing Balance: Moderate assistance x2 w/RW  Standing Balance  Comment: Anterior/posterior swaying, pt unable to correct static standing. R knee buckling requiring tactile assist from therapist to keep in extension. Functional Mobility  Functional - Mobility Device: Rolling Walker  Assist Level: Moderate assistance  Functional Mobility Comments: Mod A x2 required d/t unsteadiness and R knee buckling. Assist needed with advancing RLE forward and for side stepping. Pt attempting to advance RLE with poor return, pt displayed very little control over RLE. Verbal/tactile cues for walker placement with fair return, pt pushing walker too far out in front. ADL  Feeding: Setup;Minimal assistance; Increased time to complete  Grooming: Setup; Increased time to complete;Stand by assistance;Verbal cueing      Pt in bed upon arrival. Transferred to EOB w/min assist. Pt able to maintain sitting balance without support of UE's. STS w/mod x2 using RW.  Pt having difficulty advancing RLE forward. Side stepped along EOB, around bed to chair on other side w/mod assist x2 to assist w/sliding right foot and RW management. Pt has fluctuating anterior posterior sway w/static and dynamic standing and func mob. Pt retired to recliner and setup at tray table for grooming (see above for LOF). Pt remained up in recliner, BLE elevated and all needs met. Pt on RA, increased fatigue with increased activity. Pt needs increased time and assist d/t RUE/RLE weakness and fatigue. Pt is very motivated to participate in therapy. Bed mobility  Rolling to Right: Minimal assistance  Supine to Sit: Minimal assistance  Scooting: Minimal assistance  Transfers  Stand Step Transfers: Moderate assistance;2 Person assistance  Stand Pivot Transfers: Moderate assistance  Sit to stand: Moderate assistance x2  Stand to sit: Moderate assistance x2  Transfer Comments: MOD A x2 w/RW     Cognition  Overall Cognitive Status: Exceptions  Arousal/Alertness: Appropriate responses to stimuli  Following Commands: Follows multistep commands with increased time  Attention Span: Appears intact  Memory: Decreased recall of recent events  Safety Judgement: Decreased awareness of need for safety;Decreased awareness of need for assistance  Problem Solving: Assistance required to identify errors made;Assistance required to correct errors made  Insights: Decreased awareness of deficits  Initiation: Requires cues for some  Sequencing: Requires cues for some     Education Given To: Patient  Education Provided: Role of Therapy;Precautions;Transfer Training  Education Provided Comments: OT POC, transfer/walker safety, importance of participation in therapy, ROM exercises RUE with fair/good return.   Education Method: Verbal  Barriers to Learning: None  Education Outcome: Demonstrated understanding;Verbalized understanding     AM-PAC Score  AM-[facility] Inpatient Daily Activity Raw Score: 17 (04/27/22 2984)  AM-PAC Inpatient ADL T-Scale Score : 37.26 (04/27/22 6415)  ADL Inpatient CMS 0-100% Score: 50.11 (04/27/22 0943)  ADL Inpatient CMS G-Code Modifier : CK (04/27/22 0943)    Goals  Short Term Goals  Time Frame for Short term goals: By discharge, pt will:  Short Term Goal 1: Demo UB ADLs with SUP and setup  Short Term Goal 2: Demo LB ADLs with CGA and setup  Short Term Goal 3: Demo bed mobility with SUP and HOB flat to mimic home setup  Short Term Goal 4: Demo functional sit<>stand transfers with CGA and LRD PRN  Short Term Goal 5: Demo functional mobility with Min. A and LRD PRN  Additional Goals?: Yes  Short Term Goal 6: Increase RUE strength by 1+ muscle grade for improved function and participation in meaningful occupations. Short Term Goal 7: Participate in 5+ min of AROM/PROM to RUE each visit to promote increased functional use throughout ADLs.      Therapy Time   Individual Concurrent Group Co-treatment   Time In 1055     1055   Time Out 1130     1107   Minutes 35     12   Timed Code Treatment Minutes: [facility] F AUNFYR, PERRY/L

## 2022-04-27 NOTE — PROGRESS NOTES
Physical Medicine & Rehabilitation  Progress Note    4/27/2022 9:23 AM     CC: Ambulatory and ADL dysfunction due to right hemiparesis    Subjective:   Feels well. No complaints, continue to be frustrated awaiting rehab. ROS:  Denies fevers, chills, sweats. No chest pain, palpitations, lightheadedness. Denies coughing, wheezing or shortness of breath. Denies abdominal pain, nausea, diarrhea or constipation. No new areas of joint pain. Denies new areas of numbness or weakness. Denies new anxiety or depression issues. No new skin problems. Rehabilitation:   PT:  Restrictions/Precautions: Fall Risk,General Precautions,Up as Tolerated  Other position/activity restrictions: SBP <180   Transfers  Sit to Stand: Minimal Assistance  Stand to sit: Moderate Assistance,2 Person Assistance  Bed to Chair: Moderate assistance,2 Person Assistance  Comment: MIN A to stand initially, then MOD A x2 to maintain due to poor standign balance  Ambulation  Surface: level tile  Device: Rolling Walker  Assistance: Moderate assistance,2 Person assistance  Quality of Gait: significant ant/post sway, ataxic, difficulty advancing R LE, knees buckling, unsteady  Gait Deviations: Slow Irene,Decreased step length,Decreased step height  Distance: 20 ft  Comments: MAX A to assist with R LE and RW advancement  More Ambulation?: No          OT:  ADL  Feeding: Setup,Supervision,Minimal assistance  Feeding Skilled Clinical Factors: Pt ate breakfast using RUE. Pt provided foam tubing for built-up utensils but did not utilize for feeding. Grooming: Setup,Increased time to complete,Stand by assistance,Verbal cueing  Grooming Skilled Clinical Factors:  Face washing facilitated with pt supine in bed  UE Bathing: Setup,Increased time to complete,Verbal cueing,Minimal assistance  LE Bathing: Minimal assistance,Setup,Increased time to complete,Verbal cueing  UE Dressing: Setup,Minimal assistance,Verbal cueing,Increased time to complete  LE Dressing: Maximum assistance,Setup,Increased time to complete,Verbal cueing  LE Dressing Skilled Clinical Factors: LB dressing facilitated seated unsupported long sitting in bed pt able to doff B socks req assist to thread and don. Toileting: Setup,Increased time to complete,Contact guard assistance  Additional Comments: Pt completed supine/sit transfer to seated EOB. Pt stated needing to use bathroom. Abdiel chavez utilized to transfer pt EOB/toilet. Pt able to complete pericare seated on toilet. Pt transferred to seated in chair to complete ADL care. Pt educated on incorporating RUE into daily activities to promote healing and independecne with ADL/IADLs with good return. Pt set up with lunch tray on tray table in front of pt at end of session. Pt required min A with cutting up food d/t flacid RUE. Balance  Sitting Balance: Stand by assistance (Seated EOB/toilet/chair.)  Standing Balance: Moderate assistance (x2 for safety.)   Standing Balance  Time: 30 seconds x2 and 1 minute in ginny stedy; 6 minutes using RW. Activity: Static standing in ginny stedy, static standing at chair using RW, functional mobiltiy through room. Comment: Anterior/posterior swaying, pt unable to correct static standing. R knee buckling requiring tactile assist from therapist to keep in extension. Functional Mobility  Functional - Mobility Device: Rolling Walker  Activity: Other  Assist Level: Moderate assistance (x2)  Functional Mobility Comments: Mod A x2 required d/t unsteadiness and R knee buckling. Manual assist from therapist to keep R knee in extension during ambulation and assist with advancing RLE forward. Pt attempting to advance RLE with poor return, pt displayed very little control over RLE. Verbal/tactile cues for walker placement with fair return, pt pushing walker too far out in front.      Bed mobility  Rolling to Left: Minimal assistance  Rolling to Right: Minimal assistance  Supine to Sit: Minimal assistance  Sit to Supine: Stand by assistance  Scooting: Minimal assistance  Comment: Pt up in chair upon arrival and exit  Transfers  Stand Step Transfers: Moderate assistance  Stand Pivot Transfers: Moderate assistance  Sit to stand: Moderate assistance  Stand to sit: Moderate assistance  Transfer Comments: MOD A x2 using RW; min A using ginny stedy. Toilet Transfers  Toilet - Technique:  (ginny stedy)  Equipment Used: Standard toilet  Toilet Transfer: Minimal assistance               ST:       Subjective: [x]? Alert     [x]? Cooperative     []? Confused     []? Agitated    []? Lethargic        Objective/Assessment:     Recall: Word List Retention:  4/5 improved to 5/5 with min verbal cues and repetitions    Delayed Recall with distraction Associated 3/3 independent X3     Problem Solving/Reasoning:   Opposites 8/8 independent  Word Deduction 8/10 improved to 10/10 with min verbal cues  Word associations 4/5 increased to 5/5 with repetition  Category Generation (first letter) 5 increased to 10 with mod cues     Other: Pt pleasant and cooperative throughout session. Pt. Reports she is frustrated with how long she has been here and is ready to go to rehab.         Objective:  /67   Pulse 72   Temp 96.1 °F (35.6 °C) (Oral)   Resp 17   Ht 5' 5\" (1.651 m)   Wt 133 lb 9.6 oz (60.6 kg)   SpO2 99%   BMI 22.23 kg/m²  I Body mass index is 22.23 kg/m². I   Wt Readings from Last 1 Encounters:   22 133 lb 9.6 oz (60.6 kg)      Temp (24hrs), Av.4 °F (36.3 °C), Min:96.1 °F (35.6 °C), Max:97.8 °F (36.6 °C)         GEN: well developed, well nourished, no acute distress  HEENT: Normocephalic atraumatic, EOMI, mucous membranes pink and moist  CV: RRR, no murmurs, rubs or gallops  PULM: CTAB, no rales or rhonchi. Respirations WNL and unlabored  ABD: soft, NT, ND, +BS and equal  NEURO: A&O x3. Sensation intact to light touch.    MSK: Today 1-2/5 right upper and lower extremity though  some giveaway weakness in the upper extremity, 4+/5 left upper lower extremity  EXTREMITIES: No calf tenderness to palpation bilaterally. No edema BLEs  SKIN: warm dry and intact with good turgor  PSYCH: appropriately interactive. Affect WNL. Medications   Scheduled Meds:   divalproex  500 mg Oral BID    potassium chloride  20 mEq Oral Daily with breakfast    pantoprazole  40 mg Oral QAM AC    fenofibrate  160 mg Oral Daily    levETIRAcetam  1,000 mg Oral BID    insulin glargine  20 Units SubCUTAneous Nightly    insulin lispro  0-12 Units SubCUTAneous TID WC    insulin lispro  0-6 Units SubCUTAneous Nightly    topiramate  25 mg Oral BID    LORazepam  1 mg IntraVENous Once    clopidogrel  75 mg Oral Daily    gabapentin  600 mg Oral TID    atorvastatin  40 mg Oral Nightly    enoxaparin  40 mg SubCUTAneous Daily    aspirin  81 mg Oral Daily    Or    aspirin  300 mg Rectal Daily     Continuous Infusions:   dextrose       PRN Meds:.glucose, dextrose, glucagon (rDNA), dextrose, potassium chloride **OR** potassium alternative oral replacement **OR** potassium chloride, acetaminophen, sodium chloride flush, albuterol sulfate HFA, ondansetron **OR** ondansetron, polyethylene glycol, perflutren lipid microspheres, labetalol     Diagnostics:     CBC:   Recent Labs     04/25/22 0443 04/26/22  0434 04/27/22  0149   WBC 5.6 6.4 6.9   RBC 3.51* 3.48* 3.43*   HGB 11.0* 10.9* 10.7*   HCT 33.0* 32.8* 31.8*   MCV 94.0 94.3 92.7   RDW 12.8 12.9 12.9    221 226     BMP:   Recent Labs     04/25/22 0443 04/26/22  0434 04/27/22  0149    138 139   K 3.6* 3.7 3.4*    108* 108*   CO2 20 19* 20   BUN 27* 27* 31*   CREATININE 0.67 0.65 0.92*     BNP: No results for input(s): BNP in the last 72 hours. PT/INR: No results for input(s): PROTIME, INR in the last 72 hours. APTT: No results for input(s): APTT in the last 72 hours. CARDIAC ENZYMES: No results for input(s): CKMB, CKMBINDEX, TROPONINT in the last 72 hours.     Invalid input(s): CKTOTAL;3  FASTING LIPID PANEL:  Lab Results   Component Value Date    CHOL 202 (H) 04/17/2022    HDL 55 04/17/2022    TRIG 164 (H) 04/17/2022     LIVER PROFILE: No results for input(s): AST, ALT, ALB, BILIDIR, BILITOT, ALKPHOS in the last 72 hours. I/O (24Hr): Intake/Output Summary (Last 24 hours) at 4/27/2022 0923  Last data filed at 4/27/2022 0819  Gross per 24 hour   Intake 480 ml   Output    Net 480 ml       Glu last 24 hour  Recent Labs     04/26/22  1155 04/26/22  1611 04/26/22  1959 04/27/22  0749   POCGLU 166* 168* 217* 77       No results for input(s): CLARITYU, COLORU, PHUR, SPECGRAV, PROTEINU, RBCUA, BLOODU, BACTERIA, NITRU, WBCUA, LEUKOCYTESUR, YEAST, GLUCOSEU, BILIRUBINUR in the last 72 hours. MRI CERVICAL SPINE WO CONTRAST    Result Date: 4/21/2022  Multilevel degenerative disc disease with associated uncovertebral and facet hypertrophy with canal stenosis at C3-4, C4-5, and C5-6. Bilateral foraminal narrowing as described above. MRI BRAIN W WO CONTRAST    Result Date: 4/18/2022  1. No acute intracranial abnormality. 2. Mild chronic white matter microvascular ischemic changes. 3. Left mastoid effusion.      Impression:     1. Right-sided weaknessunclear etiology, had tPA, negative MRI,aspirin Plavix Lipitor, right hemiparesis, possible psychosomatic per neurology, noted plan for outpatient EMG  2.  seizureon Keppra and Depakote  3. Cognitive impairment  4. Anemia10.7  5. Elevated hemoglobin A1cinsulin and sliding scale insulin  6. HTN/HLD  7. History CVA  8. GERDProtonix  9. Hypokalemia     Recommendations:     1. Diagnosis:  Right-sided weakness  2. Therapy: Has PT/OT/SLP needs  3. Medical Necessity: As above  4. Support: Lives alone informed her family can provide support on discharge  5. Rehab Recommendation:   Would benefit acute inpatient rehabilitation when medically/neurology clearedawaiting insurance approval  6.  DVT Prophylaxis: Lovenox    Discussed with patient, neurology,     Sebastien Calle MD       This note is created with the assistance of a speech recognition program.  While intending to generate a document that actually reflects the content of the visit, the document can still have some errors including those of syntax and sound a like substitutions which may escape proof reading.   In such instances, actual meaning can be extrapolated by contextual diversion

## 2022-04-27 NOTE — PROGRESS NOTES
Called by Dr. Rehana Mosher from [facility] for apparent peer to peer today at 4:40 PM, reviewed patient's functional, medical and neurologic status. Patient approved for acute inpatient rehabilitation informed final should come in later today or tomorrow morning.

## 2022-04-28 ENCOUNTER — HOSPITAL ENCOUNTER (INPATIENT)
Age: 72
LOS: 15 days | Discharge: HOME | DRG: 057 | End: 2022-05-13
Attending: PHYSICAL MEDICINE & REHABILITATION | Admitting: PHYSICAL MEDICINE & REHABILITATION
Payer: COMMERCIAL

## 2022-04-28 VITALS
TEMPERATURE: 97.8 F | SYSTOLIC BLOOD PRESSURE: 93 MMHG | OXYGEN SATURATION: 100 % | DIASTOLIC BLOOD PRESSURE: 64 MMHG | HEIGHT: 65 IN | BODY MASS INDEX: 22.26 KG/M2 | RESPIRATION RATE: 17 BRPM | WEIGHT: 133.6 LBS | HEART RATE: 75 BPM

## 2022-04-28 LAB
ANION GAP SERPL CALCULATED.3IONS-SCNC: 9 MMOL/L (ref 9–17)
BUN BLDV-MCNC: 22 MG/DL (ref 8–23)
CALCIUM SERPL-MCNC: 8.9 MG/DL (ref 8.6–10.4)
CHLORIDE BLD-SCNC: 108 MMOL/L (ref 98–107)
CO2: 20 MMOL/L (ref 20–31)
CREAT SERPL-MCNC: 0.71 MG/DL (ref 0.5–0.9)
GFR AFRICAN AMERICAN: >60 ML/MIN
GFR NON-AFRICAN AMERICAN: >60 ML/MIN
GFR SERPL CREATININE-BSD FRML MDRD: ABNORMAL ML/MIN/{1.73_M2}
GLUCOSE BLD-MCNC: 107 MG/DL (ref 70–99)
GLUCOSE BLD-MCNC: 124 MG/DL (ref 65–105)
GLUCOSE BLD-MCNC: 183 MG/DL (ref 65–105)
GLUCOSE BLD-MCNC: 75 MG/DL (ref 65–105)
HCT VFR BLD CALC: 31.7 % (ref 36.3–47.1)
HEMOGLOBIN: 10.6 G/DL (ref 11.9–15.1)
MAGNESIUM: 2.1 MG/DL (ref 1.6–2.6)
MCH RBC QN AUTO: 31.3 PG (ref 25.2–33.5)
MCHC RBC AUTO-ENTMCNC: 33.4 G/DL (ref 28.4–34.8)
MCV RBC AUTO: 93.5 FL (ref 82.6–102.9)
NRBC AUTOMATED: 0 PER 100 WBC
PDW BLD-RTO: 13 % (ref 11.8–14.4)
PLATELET # BLD: 225 K/UL (ref 138–453)
PMV BLD AUTO: 9.4 FL (ref 8.1–13.5)
POTASSIUM SERPL-SCNC: 3.4 MMOL/L (ref 3.7–5.3)
RBC # BLD: 3.39 M/UL (ref 3.95–5.11)
SODIUM BLD-SCNC: 137 MMOL/L (ref 135–144)
WBC # BLD: 5.7 K/UL (ref 3.5–11.3)

## 2022-04-28 PROCEDURE — 6370000000 HC RX 637 (ALT 250 FOR IP): Performed by: PHYSICAL MEDICINE & REHABILITATION

## 2022-04-28 PROCEDURE — 82947 ASSAY GLUCOSE BLOOD QUANT: CPT

## 2022-04-28 PROCEDURE — 36415 COLL VENOUS BLD VENIPUNCTURE: CPT

## 2022-04-28 PROCEDURE — 1180000000 HC REHAB R&B

## 2022-04-28 PROCEDURE — 83735 ASSAY OF MAGNESIUM: CPT

## 2022-04-28 PROCEDURE — 99238 HOSP IP/OBS DSCHRG MGMT 30/<: CPT | Performed by: PSYCHIATRY & NEUROLOGY

## 2022-04-28 PROCEDURE — 6370000000 HC RX 637 (ALT 250 FOR IP): Performed by: STUDENT IN AN ORGANIZED HEALTH CARE EDUCATION/TRAINING PROGRAM

## 2022-04-28 PROCEDURE — 80048 BASIC METABOLIC PNL TOTAL CA: CPT

## 2022-04-28 PROCEDURE — 92523 SPEECH SOUND LANG COMPREHEN: CPT

## 2022-04-28 PROCEDURE — 6370000000 HC RX 637 (ALT 250 FOR IP): Performed by: NURSE PRACTITIONER

## 2022-04-28 PROCEDURE — 99231 SBSQ HOSP IP/OBS SF/LOW 25: CPT | Performed by: PHYSICAL MEDICINE & REHABILITATION

## 2022-04-28 PROCEDURE — 85027 COMPLETE CBC AUTOMATED: CPT

## 2022-04-28 RX ORDER — BISACODYL 10 MG
10 SUPPOSITORY, RECTAL RECTAL DAILY PRN
Status: DISCONTINUED | OUTPATIENT
Start: 2022-04-28 | End: 2022-05-13 | Stop reason: HOSPADM

## 2022-04-28 RX ORDER — ONDANSETRON 2 MG/ML
4 INJECTION INTRAMUSCULAR; INTRAVENOUS EVERY 6 HOURS PRN
Status: DISCONTINUED | OUTPATIENT
Start: 2022-04-28 | End: 2022-04-28

## 2022-04-28 RX ORDER — ONDANSETRON 4 MG/1
4 TABLET, ORALLY DISINTEGRATING ORAL EVERY 8 HOURS PRN
Status: DISCONTINUED | OUTPATIENT
Start: 2022-04-28 | End: 2022-05-13 | Stop reason: HOSPADM

## 2022-04-28 RX ORDER — LEVETIRACETAM 500 MG/1
1000 TABLET ORAL 2 TIMES DAILY
Status: DISCONTINUED | OUTPATIENT
Start: 2022-04-28 | End: 2022-05-13 | Stop reason: HOSPADM

## 2022-04-28 RX ORDER — FENOFIBRATE 160 MG/1
160 TABLET ORAL DAILY
Status: DISCONTINUED | OUTPATIENT
Start: 2022-04-29 | End: 2022-05-13 | Stop reason: HOSPADM

## 2022-04-28 RX ORDER — ASPIRIN 81 MG/1
81 TABLET ORAL DAILY
Status: DISCONTINUED | OUTPATIENT
Start: 2022-04-29 | End: 2022-05-13 | Stop reason: HOSPADM

## 2022-04-28 RX ORDER — PANTOPRAZOLE SODIUM 40 MG/1
40 TABLET, DELAYED RELEASE ORAL
Status: DISCONTINUED | OUTPATIENT
Start: 2022-04-29 | End: 2022-05-13 | Stop reason: HOSPADM

## 2022-04-28 RX ORDER — ENOXAPARIN SODIUM 100 MG/ML
40 INJECTION SUBCUTANEOUS DAILY
Status: DISCONTINUED | OUTPATIENT
Start: 2022-04-29 | End: 2022-05-13 | Stop reason: HOSPADM

## 2022-04-28 RX ORDER — POTASSIUM CHLORIDE 20 MEQ/1
20 TABLET, EXTENDED RELEASE ORAL
Status: DISCONTINUED | OUTPATIENT
Start: 2022-04-29 | End: 2022-05-13 | Stop reason: HOSPADM

## 2022-04-28 RX ORDER — ATORVASTATIN CALCIUM 40 MG/1
40 TABLET, FILM COATED ORAL NIGHTLY
Status: DISCONTINUED | OUTPATIENT
Start: 2022-04-28 | End: 2022-05-13 | Stop reason: HOSPADM

## 2022-04-28 RX ORDER — CLOPIDOGREL BISULFATE 75 MG/1
75 TABLET ORAL DAILY
Status: DISCONTINUED | OUTPATIENT
Start: 2022-04-29 | End: 2022-05-13 | Stop reason: HOSPADM

## 2022-04-28 RX ORDER — POLYETHYLENE GLYCOL 3350 17 G/17G
17 POWDER, FOR SOLUTION ORAL DAILY
Status: DISCONTINUED | OUTPATIENT
Start: 2022-04-28 | End: 2022-05-13 | Stop reason: HOSPADM

## 2022-04-28 RX ORDER — ACETAMINOPHEN 325 MG/1
650 TABLET ORAL EVERY 4 HOURS PRN
Status: DISCONTINUED | OUTPATIENT
Start: 2022-04-28 | End: 2022-05-13 | Stop reason: HOSPADM

## 2022-04-28 RX ORDER — TOPIRAMATE 25 MG/1
25 TABLET ORAL 2 TIMES DAILY
Status: DISCONTINUED | OUTPATIENT
Start: 2022-04-28 | End: 2022-05-13 | Stop reason: HOSPADM

## 2022-04-28 RX ORDER — LABETALOL HYDROCHLORIDE 5 MG/ML
10 INJECTION, SOLUTION INTRAVENOUS EVERY 10 MIN PRN
Status: DISCONTINUED | OUTPATIENT
Start: 2022-04-28 | End: 2022-04-28

## 2022-04-28 RX ORDER — INSULIN LISPRO 100 [IU]/ML
0-12 INJECTION, SOLUTION INTRAVENOUS; SUBCUTANEOUS
Status: DISCONTINUED | OUTPATIENT
Start: 2022-04-28 | End: 2022-05-02

## 2022-04-28 RX ORDER — INSULIN LISPRO 100 [IU]/ML
0-6 INJECTION, SOLUTION INTRAVENOUS; SUBCUTANEOUS NIGHTLY
Status: DISCONTINUED | OUTPATIENT
Start: 2022-04-28 | End: 2022-05-02

## 2022-04-28 RX ORDER — GABAPENTIN 300 MG/1
600 CAPSULE ORAL 3 TIMES DAILY
Status: DISCONTINUED | OUTPATIENT
Start: 2022-04-28 | End: 2022-05-13 | Stop reason: HOSPADM

## 2022-04-28 RX ORDER — DIVALPROEX SODIUM 500 MG/1
500 TABLET, EXTENDED RELEASE ORAL 2 TIMES DAILY
Status: DISCONTINUED | OUTPATIENT
Start: 2022-04-28 | End: 2022-05-13 | Stop reason: HOSPADM

## 2022-04-28 RX ORDER — SENNA PLUS 8.6 MG/1
2 TABLET ORAL DAILY PRN
Status: DISCONTINUED | OUTPATIENT
Start: 2022-04-28 | End: 2022-05-13 | Stop reason: HOSPADM

## 2022-04-28 RX ORDER — ALBUTEROL SULFATE 90 UG/1
6 AEROSOL, METERED RESPIRATORY (INHALATION) EVERY 6 HOURS PRN
Status: DISCONTINUED | OUTPATIENT
Start: 2022-04-28 | End: 2022-05-13 | Stop reason: HOSPADM

## 2022-04-28 RX ORDER — INSULIN GLARGINE 100 [IU]/ML
20 INJECTION, SOLUTION SUBCUTANEOUS NIGHTLY
Status: DISCONTINUED | OUTPATIENT
Start: 2022-04-28 | End: 2022-05-01

## 2022-04-28 RX ADMIN — TOPIRAMATE 25 MG: 25 TABLET, FILM COATED ORAL at 21:45

## 2022-04-28 RX ADMIN — GABAPENTIN 600 MG: 300 CAPSULE ORAL at 08:21

## 2022-04-28 RX ADMIN — LEVETIRACETAM 1000 MG: 500 TABLET, FILM COATED ORAL at 21:45

## 2022-04-28 RX ADMIN — Medication 81 MG: at 08:21

## 2022-04-28 RX ADMIN — POTASSIUM CHLORIDE 20 MEQ: 1500 TABLET, EXTENDED RELEASE ORAL at 08:23

## 2022-04-28 RX ADMIN — GABAPENTIN 600 MG: 300 CAPSULE ORAL at 21:45

## 2022-04-28 RX ADMIN — INSULIN LISPRO 2 UNITS: 100 INJECTION, SOLUTION INTRAVENOUS; SUBCUTANEOUS at 18:17

## 2022-04-28 RX ADMIN — POTASSIUM CHLORIDE 40 MEQ: 20 TABLET, EXTENDED RELEASE ORAL at 08:19

## 2022-04-28 RX ADMIN — ATORVASTATIN CALCIUM 40 MG: 40 TABLET, FILM COATED ORAL at 21:45

## 2022-04-28 RX ADMIN — PANTOPRAZOLE SODIUM 40 MG: 40 TABLET, DELAYED RELEASE ORAL at 08:20

## 2022-04-28 RX ADMIN — LEVETIRACETAM 1000 MG: 500 TABLET, FILM COATED ORAL at 08:20

## 2022-04-28 RX ADMIN — FENOFIBRATE 160 MG: 160 TABLET ORAL at 08:21

## 2022-04-28 RX ADMIN — TOPIRAMATE 25 MG: 25 TABLET, FILM COATED ORAL at 08:21

## 2022-04-28 RX ADMIN — DIVALPROEX SODIUM 500 MG: 500 TABLET, EXTENDED RELEASE ORAL at 08:21

## 2022-04-28 RX ADMIN — CLOPIDOGREL 75 MG: 75 TABLET, FILM COATED ORAL at 08:21

## 2022-04-28 RX ADMIN — POLYETHYLENE GLYCOL 3350 17 G: 17 POWDER, FOR SOLUTION ORAL at 18:19

## 2022-04-28 RX ADMIN — INSULIN GLARGINE 20 UNITS: 100 INJECTION, SOLUTION SUBCUTANEOUS at 21:45

## 2022-04-28 RX ADMIN — DIVALPROEX SODIUM 500 MG: 500 TABLET, EXTENDED RELEASE ORAL at 21:45

## 2022-04-28 RX ADMIN — GABAPENTIN 600 MG: 300 CAPSULE ORAL at 17:59

## 2022-04-28 ASSESSMENT — PAIN SCALES - GENERAL: PAINLEVEL_OUTOF10: 0

## 2022-04-28 NOTE — PROGRESS NOTES
NEUROLOGY INPATIENT PROGRESS NOTE    4/28/2022         Current Exam:    Patient seen and examined bedside. No acute complaints overnight. Awaiting discharge to acute rehab. Pre-cert approved. Transport scheduled at noon. Brief History:    Meera Poon is a  80 y.o. female with H/O questionable seizure disorder, chronic right hemiparesis, numbness of right foot, migraines, who was admitted on 4/17/2022 via [facility] after being found altered, unable to follow commands or answer questions, with right-sided weakness and sensory loss, right-sided neglect and aphasia. NIH 21. Patient was started on IV tPA but then became obtunded and flaccid in all 4 extremities with concern for possible hemorrhagic conversion and tPA was held. She was started on a Cardene drip and admitted to the neuro ICU for admission. She had been taking dual antiplatelets at home. She arrived intubated and went directly to CT scan. CT head unremarkable, CTA head neck with no LVO and patient was restarted on tPA as well as loaded with IV Keppra for possible subclinical seizure. She was extubated on 4/19. Continues to report right-sided weakness worse than her baseline and was transferred to the neurology service on 4/20/2022. Her right-sided weakness has not been consistent on exam, fluctuates in nature. She also has inconsistencies in her sensory exam.  MRI cervical spine were done to rule out myelopathy; her weakness was felt to be more psychosomatic in nature. MRI cervical spine showing multilevel degenerative disc disease. No current facility-administered medications on file prior to encounter. Current Outpatient Medications on File Prior to Encounter   Medication Sig Dispense Refill    clopidogrel (PLAVIX) 75 MG tablet Take 75 mg by mouth nightly      gabapentin (NEURONTIN) 600 MG tablet Take 600 mg by mouth 3 times daily.       potassium chloride (KLOR-CON M) 20 MEQ extended release tablet Take 20 mEq by mouth 2 times daily      fenofibrate (TRIGLIDE) 160 MG tablet Take 160 mg by mouth daily      Pantoprazole Sodium (PROTONIX PO) Take  by mouth.  acetaminophen (TYLENOL) 160 MG/5ML solution Take 20.3 mLs by mouth every 4 hours as needed for Fever. 473 mL 3    albuterol (PROVENTIL HFA;VENTOLIN HFA) 108 (90 BASE) MCG/ACT inhaler Inhale 6 puffs into the lungs every 6 hours as needed for Wheezing. 1 Inhaler 3    glucagon, rDNA, 1 MG SOLR injection Inject 1 mg into the muscle as needed for Low blood sugar (Blood glucose less than 70 mg/dL and patient NOT ALERT or NPO and does not have IV access. ).  0    glucose (GLUTOSE) 40 % GEL Take 15 g by mouth as needed. 45 g 1    insulin glargine (LANTUS) 100 UNIT/ML injection vial Inject 10 Units into the skin nightly. 1 Pen 3    insulin lispro (HUMALOG) 100 UNIT/ML injection vial Inject 0-12 Units into the skin every 6 hours. 1 Pen 3    niacin (NIASPAN) 500 MG CR tablet Take 1 tablet by mouth nightly. 30 tablet 3    potassium chloride (KAYCIEL) 20 MEQ/15ML (10%) solution Take 15 mLs by mouth daily. Allergies: Meera Poon has No Known Allergies. Past Medical History:   Diagnosis Date    CVA (cerebral infarction) ([facility] [address]     Hyperlipidemia     Hypertension        Past Surgical History:   Procedure Laterality Date    APPENDECTOMY      BRONCHOSCOPY DIAGNOSTIC  2014         HYSTERECTOMY         Social History: Meera Poon  reports that she has never smoked. She has never used smokeless tobacco. She reports that she does not drink alcohol and does not use drugs. No family history on file.     Objective:   BP 93/64   Pulse 75   Temp 97.8 °F (36.6 °C) (Oral)   Resp 17   Ht 5' 5\" (1.651 m)   Wt 133 lb 9.6 oz (60.6 kg)   SpO2 100%   BMI 22.23 kg/m²     Blood pressure range: Systolic (84ZFQ), GRB:990 , Min:93 , RI   ; Diastolic (84MMQ), BA, Min:64, Max:77      Review of Systems:  Constitutional  Negative for fever and chills    HEENT Negative for ear discharge, ear pain, nosebleed    Eyes  Negative for photophobia, pain and discharge    Respiratory  Negative for hemoptysis and sputum    Cardiovascular  Negative for orthopnea, claudication and PND    Gastrointestinal  Negative for abdominal pain, diarrhea, blood in stool    Musculoskeletal  Negative for joint pain, negative for myalgia. Right sided weakness   Skin  Negative for rash or itching    Endo/heme/allergies  Negative for polydipsia, environmental allergy    Psychiatric/behavioral  Negative for suicidal ideation.  Patient is not anxious        NEUROLOGIC EXAMINATION  GENERAL  Appears comfortable and in no distress   HEENT  NC/ AT   NECK  Supple   MENTAL STATUS:  Alert, oriented, intact memory, no confusion, normal speech, normal language, no hallucination or delusion   CRANIAL NERVES: II     -      Visual fields intact to confrontation  III,IV,VI -  EOMs full, no afferent defect, no JOE, no ptosis  V     -     Normal facial sensation  VII    -     Normal facial symmetry  VIII   -     Intact hearing  IX,X -     Symmetrical palate  XI    -     Symmetrical shoulder shrug  XII   -     Midline tongue, no atrophy    MOTOR FUNCTION:  Full strength to left side, unable to antigravity to RUE and RLE with poor effort given, R foot drop (chronic), with normal bulk, normal tone and no involuntary movements, no tremor   SENSORY FUNCTION:  Decreased sensation right arm and leg   CEREBELLAR FUNCTION:  Intact fine motor control over upper limbs   REFLEX FUNCTION:  Symmetric, no perverted reflex, no Babinski sign   STATION and GAIT  Not tested       Data:    Lab Results:   CBC:   Recent Labs     04/26/22 0434 04/27/22 0149 04/28/22  0405   WBC 6.4 6.9 5.7   HGB 10.9* 10.7* 10.6*    226 225     BMP:    Recent Labs     04/26/22 0434 04/27/22  0149 04/28/22  0405    139 137   K 3.7 3.4* 3.4*   * 108* 108*   CO2 19* 20 20   BUN 27* 31* 22   CREATININE 0.65 0.92* 0.71   GLUCOSE 119* 98 107*         Lab Results   Component Value Date    CHOL 202 (H) 04/17/2022    LDLCHOLESTEROL 114 04/17/2022    HDL 55 04/17/2022    TRIG 164 (H) 04/17/2022    ALT 56 (H) 07/23/2014    AST 36 (H) 07/23/2014    TSH 0.65 07/19/2014    INR 1.0 04/17/2022    LABA1C 10.3 (H) 04/17/2022    [id number] 1096 (H) 07/26/2014           Diagnostic data reviewed:  CT HEAD (4/17/2022): No acute intracranial abnormality      MRI BRAIN W/WO (4/182022): Mild chronic microangiopathy     MRI C-SPINE: (4/21/22) -  Multilevel degenerative disc disease with associated uncovertebral and facet   hypertrophy with canal stenosis at C3-4, C4-5, and C5-6.       Bilateral foraminal narrowing as described above.          CTA HEAD & NECK (4/17/2022):   1. No acute intracranial abnormality. 2. No evidence of large vessel occlusion or hemodynamically significant   stenosis involving the head and neck arteries. ECHO (4/20/2022): EF 61%. LA moderately dilated. Bubble study attempted x 4. Unable to rule out intra-atrial shunt, due to technical limitations of study. No shunt seen by color Doppler. Mild MR & AI. Elevated RA filling pressure      EEG (4/21/22) -  This EEG was abnormal.  Occasional right frontocentral polymorphic delta slowing suggested underlying neuronal dysfunction. Right frontal sharp waves conferred an increased risk for focal onset seizures. Impression:  -Acute onset right hemiparesis of unclear etiology status post IV tPA with negative MRI brain; fluctuating exam while inpatient   -Reported history of chronic infarcts with residual right sided weakness but not clearly evident on brain imaging  -History of questionable seizures in the past    Plan:  -Continue aspirin, Plavix, Lipitor daily  -Continue home dose Keppra and Depakene  -Will plan for outpatient EMG/NCS  -DVT prophylaxis; on Lovenox  -Plan to discharge to [facility] today.  Transport setup for noon    Please note that this note was generated using a voice recognition dictation software. Although every effort was made to ensure the accuracy of this automated transcription, some errors in transcription may have occurred.

## 2022-04-28 NOTE — PROGRESS NOTES
[address]  Acute Inpatient Rehab Preadmission Assessment    Patient Name: Lisseth Ramon        MRN: [id number]    : 1950  (80 y.o.)  Gender: female     Admitted from:   [facility]  [x][facility]   [][facility]   [][facility]    []Outside Admission - Location:                                 [x]Initial         []Updated    Date of Onset / Admission to the acute hospital:  22    Inpatient Rehabilitation Admitting Diagnosis:  Right Sided Weakness    Did patient have surgery/procedures? [x]No  []Yes:      Physicians: Aleksandar Heaton (Neuro), Pedro (Neuro-CC)    Risk for clinical complications:  Mild to Moderate    Co-morbidities:      1. Cognitive impairment  2. Anemia  3. Elevated hemoglobin A1c  4. HTN  5. HLD  6.  History CVA      Financial Information  Primary insurance:  []Medicare [] Medicare HMO  [x]Commercial insurance    []Medicaid   [] Medicaid HMO []Workers Compensation   []Personal Pay    Secondary Insurance:  []Medicare [] Medicare HMO  []Commercial insurance    []Medicaid  []Medicaid HMO  []Workers Compensation [x]None    Precautions:   []Cardiac Precautions:    []Total hip precautions:    []Weight Bearing status:  [x]Safety Precautions/Concerns:  Fall Risk, General Precautions  [x]Visually impaired: Wears glasses at all times    [x]Hard of Hearing: Hard of hearing/hearing concerns, Right hearing aid    Isolation Precautions:         []Yes  [x]No  If Yes:  [] Droplet  []Contact []Airborne     []VRE     []MRSA     []C-diff         [] TB       [] ESBL [] MDRO          [] Other:        Physiatrist:  [x]   Dr. Celeste Lauren     []   Dr. Viviana Silva  []   Dr. Nadya Ventura  []   Dr. Cleone Kayser    Patients Occupation: Retired    Reviewed Lab and Diagnostic reports from Current Admission: Yes    Patients Prior Functional  Level: Prior Function  ADL Assistance: Independent  Homemaking Assistance: Independent  Ambulation Assistance: Independent  Transfer Assistance: Independent  Additional Comments: Pt reports daughter is able to provide prn assist upon discharge    History of current illness, per PM&R Consult:  Ena Mark is a 80 y.o. female with history of CVA, HTN, and HLD admitted to [facility] on 4/17/2022.       She initially presented with acute-onset right hemiparesis. NIHSS 21. She was given tPA by the mobile stroke unit, but infusion was stopped due to worsening of symptoms and concern for possible hemorrhagic conversion. She was intubated prior to arrival to the ED. CT head showed no acute intracranial abnormality, and tPA was restarted. She was also loaded with keppra due to concern for seizures. MRI brain showed no acute intracranial abnormality. Extubated 4/19/22. Plan is for LTME and MRI cervical spine.     Per notes, she had a similar episodes in 2014 and 2018.     She reports ongoing right-sided weakness. She also notes headache and blurred vision. She denies any numbness/tingling and changes in bladder or bowel control. Prognosis: Fair    Current functional status for upper extremity ADLs:  UE Bathing: Setup,Increased time to complete,Verbal cueing,Minimal assistance  UE Dressing: Setup,Minimal assistance,Verbal cueing,Increased time to complete    Current functional status for lower extremity ADLs:  LE Bathing: Minimal assistance,Setup,Increased time to complete,Verbal cueing  LE Dressing: Maximum assistance,Setup,Increased time to complete,Verbal cueing    Current functional status for bed, chair, wheelchair transfers:  Transfers  Sit to Stand: Minimal Assistance  Stand to sit: Moderate Assistance,2 Person Assistance  Bed to Chair: Moderate assistance,2 Person Assistance  Comment: MIN A to stand initially, then MOD A x2 to maintain due to poor standign balance    Current functional status for toilet transfers:   Toilet Transfers  Toilet - Technique:  (ginny chavez)  Equipment Used: Standard toilet  Toilet Transfer: Minimal assistance    Current functional status for locomotion:  Ambulation  Surface: level tile  Device: Rolling Walker  Assistance: Moderate assistance,2 Person assistance  Quality of Gait: significant ant/post sway, ataxic, difficulty advancing R LE, knees buckling, unsteady  Gait Deviations: Slow Irene,Decreased step length,Decreased step height  Distance: 20 ft  Comments: MAX A to assist with R LE and RW advancement  More Ambulation?: No    Current functional status for comprehension: Complete independence    Current functional status for expression: Complete independence    Current functional status for social interaction: Complete independence    Current functional status for problem solving: Moderate Assistance    Current functional status for memory: Minimal Assistance    Current Deficits R/T Impairment: Impaired Functional Mobility and Decreased ADLs    Required Therapy:   [x] Physical Therapy  [x] Occupational Therapy   [x] Speech Therapy, as appropriate    Additional Services:    [x]     [] Recreational Therapy, as appropriate    [x] Nutrition    [] Dialysis  [] Cultural Needs Identified  [] Special Equipment Needs  [] Other    Rehab Justification:  Needs 3 hrs therapy per day or 15 hours per week:  Yes  Identified Rehab Nursing needs: Yes  Intense Interdisciplinary need:  Yes  Need for 24 hr physician supervision:  Yes  Measurable improved quality of life:  Yes  Willingness to participate:  Yes  Medical Necessity:  Yes  Patient able to tolerate care proposed:  Yes    Expected Discharge Destination/Functional Level:  Home with assist  Expected length of time to achieve that level of improvement: 1-2 weeks  Expected Post Discharge Treatments: Home with possible Home Care    Other information relevant to patient's care needs:  N/A    Acute Inpatient Rehabilitation Disclosure Statement will be provided to patient upon admission to ARU with patient's verbalization of understanding.       I have reviewed and concur with the findings and results of the pre-admission screening assessment completed by the Inpatient Rehabilitation Admissions Coordinator.

## 2022-04-28 NOTE — PROGRESS NOTES
Rcv'd call from [facility] who states pt is approved for ARU. Notified Sneha Reeves, FARIDA CM, and requested d/c readmit be completed with continuation of DVT prophylaxis and report be called to [phone]. Admission Assessment completed and Dr Lottie Dhaliwal notified.

## 2022-04-28 NOTE — PLAN OF CARE
Problem: Skin Integrity:  Goal: Will show no infection signs and symptoms  Description: Will show no infection signs and symptoms  Outcome: Progressing     Problem: Falls - Risk of:  Goal: Will remain free from falls  Description: Will remain free from falls  Outcome: Progressing

## 2022-04-28 NOTE — CARE COORDINATION
Discharge [facility] Case Management Department  Written by: Cayla Sanchez RN    Patient Name: Niraj Hernandez  Attending Provider: Tam Leger DO  Admit Date: 2022  8:39 PM  MRN: [id number]  Account: [de-identified]                     : 1950  Discharge Date:  22        Disposition: IP Rehab; [facility] [place] via stretcher. Patient notified of discharge time. Report #  @ [id number] [facility]       22 0827   IMM Letter   IMM Letter given to Patient/Family/Significant other/Guardian/POA/by: Copy of 1st IMM serving as 2nd IMM notice provided to patient by Cayla Sanchez RN Case Manager. All questions answered. Patient agreeable to leave before 4 hours. Copy of 2nd IMM notice placed in patient's chart.    IMM Letter date given: 22   IMM Letter time given: 08

## 2022-04-28 NOTE — PROGRESS NOTES
Physical Medicine & Rehabilitation  Progress Note    4/28/2022 9:18 AM     CC: Ambulatory and ADL dysfunction due to right hemiparesis    Subjective:   Feels well. No complaints, happy with approval for inpatient rehab    ROS:  Denies fevers, chills, sweats. No chest pain, palpitations, lightheadedness. Denies coughing, wheezing or shortness of breath. Denies abdominal pain, nausea, diarrhea or constipation. No new areas of joint pain. Denies new areas of numbness or weakness. Denies new anxiety or depression issues. No new skin problems. Rehabilitation:   PT:  Restrictions/Precautions: Fall Risk,General Precautions,Up as Tolerated  Other position/activity restrictions: SBP <180   Transfers  Sit to Stand: Minimal Assistance  Stand to sit: Moderate Assistance,2 Person Assistance  Bed to Chair: Moderate assistance,2 Person Assistance  Comment: MIN A to stand initially, then MOD A x2 to maintain due to poor standign balance  Ambulation  Surface: level tile  Device: Rolling Walker  Assistance: Moderate assistance,2 Person assistance  Quality of Gait: significant ant/post sway, ataxic, difficulty advancing R LE, knees buckling, unsteady  Gait Deviations: Slow Irene,Decreased step length,Decreased step height  Distance: 20 ft  Comments: MAX A to assist with R LE and RW advancement  More Ambulation?: No          OT:  ADL  Feeding: Setup,Minimal assistance,Increased time to complete  Feeding Skilled Clinical Factors: Pt ate breakfast using RUE. Pt provided foam tubing for built-up utensils but did not utilize for feeding. Grooming: Setup,Increased time to complete,Stand by assistance,Verbal cueing  Grooming Skilled Clinical Factors:  Face washing facilitated with pt supine in bed  UE Bathing: Setup,Increased time to complete,Verbal cueing,Minimal assistance  LE Bathing: Minimal assistance,Setup,Increased time to complete,Verbal cueing  UE Dressing: Setup,Minimal assistance,Verbal cueing,Increased time to complete  LE Dressing: Maximum assistance,Setup,Increased time to complete,Verbal cueing  LE Dressing Skilled Clinical Factors: LB dressing facilitated seated unsupported long sitting in bed pt able to doff B socks req assist to thread and don. Toileting: Setup,Increased time to complete,Contact guard assistance  Additional Comments: Pt completed supine/sit transfer to seated EOB. Pt stated needing to use bathroom. Taryn Luz chavez utilized to transfer pt EOB/toilet. Pt able to complete pericare seated on toilet. Pt transferred to seated in chair to complete ADL care. Pt educated on incorporating RUE into daily activities to promote healing and independecne with ADL/IADLs with good return. Pt set up with lunch tray on tray table in front of pt at end of session. Pt required min A with cutting up food d/t flacid RUE. Balance  Sitting Balance: Stand by assistance  Standing Balance: Moderate assistance   Standing Balance  Time: 30 seconds x2 and 1 minute in ginny stedy; 6 minutes using RW. Activity: Static standing in ginny stedy, static standing at chair using RW, functional mobiltiy through room. Comment: Anterior/posterior swaying, pt unable to correct static standing. R knee buckling requiring tactile assist from therapist to keep in extension. Functional Mobility  Functional - Mobility Device: Rolling Walker  Activity: Other  Assist Level: Moderate assistance  Functional Mobility Comments: Mod A x2 required d/t unsteadiness and R knee buckling. Manual assist from therapist to keep R knee in extension during ambulation and assist with advancing RLE forward. Pt attempting to advance RLE with poor return, pt displayed very little control over RLE. Verbal/tactile cues for walker placement with fair return, pt pushing walker too far out in front.      Bed mobility  Rolling to Left: Minimal assistance  Rolling to Right: Minimal assistance  Supine to Sit: Minimal assistance  Sit to Supine: Stand by assistance  Scooting: Minimal assistance  Comment: Pt up in chair upon arrival and exit  Transfers  Stand Step Transfers: Moderate assistance,2 Person assistance  Stand Pivot Transfers: Moderate assistance  Sit to stand: Moderate assistance  Stand to sit: Moderate assistance  Transfer Comments: MOD A x2 using RW   Toilet Transfers  Toilet - Technique:  (ginny chavez)  Equipment Used: Standard toilet  Toilet Transfer: Minimal assistance               ST:       Treatment time: 2412-3978     Subjective: [x]? Alert     [x]? Cooperative     []? Confused     []? Agitated    []? Lethargic     Objective/Assessment:     Recall: Pt able to accurately recall breakfast, room number, LOS, hospital name, and d/c plan independently.      Recall w/ Distractions, 3 unrelated units: 2/3 increased to 3/3 with min verbal cue (10 minute delay)       Organization:   Generative Naming Grid: 6/8 increased to 7/8 with mod verbal cues. Pt given option of writing answers in grid vs. Verbalizing answers d/t R-side weakness. Pt motivated to [employer] and requested to handwrite answers. Pt handwriting legible, however pt w/ difficulty staying within margins.      Problem Solving/Reasoning:   Word Deductions: 8/10 increased to 10/10 w/ min-mod verbal cues and repetitions      Other: Pt pleasant and cooperative t/o. Pt motivated to begin rehab once d/c from hospital. Pt benefits form occasional repetitions of stimuli as pt Resighini (R-side HA present).          Objective:  BP 93/64   Pulse 75   Temp 97.8 °F (36.6 °C) (Oral)   Resp 17   Ht 5' 5\" (1.651 m)   Wt 133 lb 9.6 oz (60.6 kg)   SpO2 100%   BMI 22.23 kg/m²  I Body mass index is 22.23 kg/m².  I   Wt Readings from Last 1 Encounters:   22 133 lb 9.6 oz (60.6 kg)      Temp (24hrs), Av.9 °F (36.6 °C), Min:97.6 °F (36.4 °C), Max:98.3 °F (36.8 °C)         GEN: well developed, well nourished, no acute distress  HEENT: Normocephalic atraumatic, EOMI, mucous membranes pink and moist  CV: RRR, no murmurs, rubs or gallops  PULM: CTAB, no rales or rhonchi. Respirations WNL and unlabored  ABD: soft, NT, ND, +BS and equal  NEURO: A&O x3. Sensation intact to light touch. MSK: Today antigravity right upper and lower extremity, 4+/5 left upper lower extremity  EXTREMITIES: No calf tenderness to palpation bilaterally. No edema BLEs  SKIN: warm dry and intact with good turgor  PSYCH: appropriately interactive. Affect WNL. Medications   Scheduled Meds:   divalproex  500 mg Oral BID    potassium chloride  20 mEq Oral Daily with breakfast    pantoprazole  40 mg Oral QAM AC    fenofibrate  160 mg Oral Daily    levETIRAcetam  1,000 mg Oral BID    insulin glargine  20 Units SubCUTAneous Nightly    insulin lispro  0-12 Units SubCUTAneous TID WC    insulin lispro  0-6 Units SubCUTAneous Nightly    topiramate  25 mg Oral BID    LORazepam  1 mg IntraVENous Once    clopidogrel  75 mg Oral Daily    gabapentin  600 mg Oral TID    atorvastatin  40 mg Oral Nightly    enoxaparin  40 mg SubCUTAneous Daily    aspirin  81 mg Oral Daily     Continuous Infusions:   dextrose       PRN Meds:.glucose, dextrose, glucagon (rDNA), dextrose, potassium chloride **OR** potassium alternative oral replacement **OR** potassium chloride, acetaminophen, sodium chloride flush, albuterol sulfate HFA, ondansetron **OR** ondansetron, polyethylene glycol, perflutren lipid microspheres, labetalol     Diagnostics:     CBC:   Recent Labs     04/26/22  0434 04/27/22  0149 04/28/22  0405   WBC 6.4 6.9 5.7   RBC 3.48* 3.43* 3.39*   HGB 10.9* 10.7* 10.6*   HCT 32.8* 31.8* 31.7*   MCV 94.3 92.7 93.5   RDW 12.9 12.9 13.0    226 225     BMP:   Recent Labs     04/26/22  0434 04/27/22  0149 04/28/22  0405    139 137   K 3.7 3.4* 3.4*   * 108* 108*   CO2 19* 20 20   BUN 27* 31* 22   CREATININE 0.65 0.92* 0.71     BNP: No results for input(s): BNP in the last 72 hours.   PT/INR: No results for input(s): PROTIME, INR in the last 72 hours. APTT: No results for input(s): APTT in the last 72 hours. CARDIAC ENZYMES: No results for input(s): CKMB, CKMBINDEX, TROPONINT in the last 72 hours. Invalid input(s): CKTOTAL;3  FASTING LIPID PANEL:  Lab Results   Component Value Date    CHOL 202 (H) 04/17/2022    HDL 55 04/17/2022    TRIG 164 (H) 04/17/2022     LIVER PROFILE: No results for input(s): AST, ALT, ALB, BILIDIR, BILITOT, ALKPHOS in the last 72 hours. I/O (24Hr): No intake or output data in the 24 hours ending 04/28/22 0918    Glu last 24 hour  Recent Labs     04/27/22  1157 04/27/22  1525 04/27/22  2115 04/28/22  0745   POCGLU 119* 190* 227* 75       No results for input(s): CLARITYU, COLORU, PHUR, SPECGRAV, PROTEINU, RBCUA, BLOODU, BACTERIA, NITRU, WBCUA, LEUKOCYTESUR, YEAST, GLUCOSEU, BILIRUBINUR in the last 72 hours. MRI CERVICAL SPINE WO CONTRAST    Result Date: 4/21/2022  Multilevel degenerative disc disease with associated uncovertebral and facet hypertrophy with canal stenosis at C3-4, C4-5, and C5-6. Bilateral foraminal narrowing as described above. MRI BRAIN W WO CONTRAST    Result Date: 4/18/2022  1. No acute intracranial abnormality. 2. Mild chronic white matter microvascular ischemic changes. 3. Left mastoid effusion.      Impression:     1. Right-sided weaknessunclear etiology, had tPA, negative MRI,aspirin Plavix Lipitor, right hemiparesis, possible psychosomatic per neurology, noted plan for outpatient EMG  2.  seizureon Keppra and Depakote  3. Cognitive impairment  4. Anemia10.6  5. Elevated hemoglobin A1cinsulin and sliding scale insulin  6. HTN/HLD  7. History CVA  8. GERDProtonix  9. Hypokalemia potassium 3.4     Recommendations:     1. Diagnosis:  Right-sided weakness  2. Therapy: Has PT/OT/SLP needs  3. Medical Necessity: As above  4. Support: Lives alone informed her family can provide support on discharge  5.  Rehab Recommendation:   Would benefit acute inpatient rehabilitation when medically/neurology cleared [id number] completed, patient approved  6. DVT Prophylaxis: Lovenox    Discussed with patient, neurology,     Harjeet Dhaliwal MD       This note is created with the assistance of a speech recognition program.  While intending to generate a document that actually reflects the content of the visit, the document can still have some errors including those of syntax and sound a like substitutions which may escape proof reading.   In such instances, actual meaning can be extrapolated by contextual diversion

## 2022-04-28 NOTE — PROGRESS NOTES
ACUTE REHABILITATION ADMISSION    Patient admitted to the Inpatient Rehabilitation Unit via Stretcher at [phone]  to room # [phone]. Patient oriented to room, unit and fall prevention safety measures. Admitting medication orders reviewed with Acute Rehab PM&R Physician: Burt Crane MD    Wound care consult order needed for complex wounds or pressure injuries? No If yes, contact physician for order. Admission folder with the following documents provided:  1. \"[facility] [place] [place] [address] Individualized Disclosure Statement\"  2. \"Data Collection Information Summary for Patients in Inpatient Rehabilitation Facilities\"  3. \"Privacy Act Statement - Health Care Records\"  4.   \"Consent for Treatment, Payment, and Health Care Operations\", including [facility] Policy\"

## 2022-04-28 NOTE — PROGRESS NOTES
Speech Language Pathology  Facility/Department: [facility] ACUTE REHAB  Initial Speech/Language/Cognitive Assessment    NAME: Kentrell Mejia  : 1950   MRN: [id number]  ADMISSION DATE: 2022  ADMITTING DIAGNOSIS: has Altered mental status; Generalized nonconvulsive seizures ([address] History of CVA (cerebrovascular accident); Noncompliance; Hemiparesis ([address] Respiratory failure ([address] Upper respiratory infection; Acute respiratory failure (HCC); HTN (hypertension); HLD (hyperlipidemia); Hyperglycemia; Hypokalemia; Anemia due to acute blood loss; Right sided weakness; Cerebrovascular accident (CVA) ([address] and Tissue plasminogen activator (tPA) administered at other facility within 24 hours before current admission on their problem list.    Date of Eval: 2022   Evaluating Therapist: RIVAS Vivas    RECENT RESULTS  CT OF HEAD/MRI: MRI Brain (): Impression   1. No acute intracranial abnormality. 2. Mild chronic white matter microvascular ischemic changes. 3. Left mastoid effusion. Primary Complaint: Per PM&R consult:  Lauryn Grewal a 80 y.o. female with history of CVA, HTN, and HLD admitted to [facility] on 2022.       She initially presented with acute-onset right hemiparesis.  NIHSS 21.  She was given tPA by the mobile stroke unit, but infusion was stopped due to worsening of symptoms and concern for possible hemorrhagic conversion.  She was intubated prior to arrival to the ED.  CT head showed no acute intracranial abnormality, and tPA was restarted. Radha Cast was also loaded with keppra due to concern for seizures.  MRI brain showed no acute intracranial abnormality.  Extubated 22.  Plan is for LTME and MRI cervical spine.     Per notes, she had a similar episodes in  and .     She reports ongoing right-sided weakness.  She also notes headache and blurred vision.  She denies any numbness/tingling and changes in bladder or bowel control.     Pain:  Denies Assessment:  Cognitive Diagnosis: Pt. presents with mildly impaired reasoning, problem solving, memory, and responding to complex questions. Pt. required frequent repeition of directions and demonstrated occasional latency with responses -- benefited from repeition and additional time to generate answers. Pt. reports cognition is \"almost\" at baseline and that she is \"very independent\" at home (e.g., manages own medications and finances). Speech Diagnosis: Pt. speech clear and fluent, 100% intelligible, no dysarthria. No O/M deficits noted. Pt. did demonstrate difficulty with consonant clusters -- reports this is baseline following second CVA in past.  Pt. noted to reduce rate and self-correct articulation errors made with clusters independently. Diagnosis: ST recommended to target higher level cognition. Education provided. Recommendations:  Requires SLP Intervention: Yes     D/C Recommendations: Ongoing speech therapy is recommended during this hospitalization       Plan:   Goals:  Short-term Goals  Goal 1: Pt. will complete higher level reasoning (e.g., convergent categorization, numeric reasoning, mediation management) tasks with 90% accuracy  Goal 2: Pt. will complete problem solving tasks with 90% accuracy  Goal 3: Pt. will recall 3-5 units of information with or without distraction with 80% accuracy  Goal 4: Increase Pt. education re: compensatory strategies to facilitate recall to 100% returned demonstration   Patient/family involved in developing goals and treatment plan: Yes, Pt. Subjective:   Previous level of function and limitations: Independent   General  Chart Reviewed: Yes  Patient assessed for rehabilitation services?: Yes  Family / Caregiver Present: No  General Comment  Comments: Stroke-like symptoms  Subjective  Subjective: Pt. recalls working with ST at [facility], reports initially was unable to get words out and had facial droop.   Per Pt., slurred speech has resolved and cognition is \"almost\" back to baseline. Pt. [place], required inceased volume. Pt. requied repetition of directions t/o and demonstrated occasional long response latency. Vision  Vision: Impaired  Vision Exceptions: Wears glasses at all times  Hearing  Hearing: Exceptions to [facility]  Hearing Exceptions: Hard of hearing/hearing concerns;Right hearing aid           Objective:     Oral/Motor  Oral Hygiene: Clean    Auditory Comprehension  Comprehension: Exceptions  Complex Questions: Mild  One Step Commands: WFL (3/3)  Two Step Commands: WFL (3/3)  Conversation: Mild         Expression  Primary Mode of Expression: Verbal    Verbal Expression  Verbal Expression: Within functional limits  Automatic Speech: WFL  Convergent: Moderate (3/5 julienne, 5/5 cued)  Divergent: WFL (14 items in 60 seconds)  Responsive: WFL (3/3)              Pragmatics/Social Functioning  Pragmatics: Within functional limits    Cognition:      Orientation  Overall Orientation Status: Within Normal Limits  Memory  Memory: Exceptions to [facility]  Short-term Memory: Mild (delayed recall- 2/3, 3/3 c min cue)  Working Memory: Mild (paragraph recall- 4/5 julienne, 5/5 cued. mental manipulation- 5/6 julienne, 6/6 cued. immediate recall- 3/3 julienne.)  Problem Solving  Problem Solving: Exceptions to [facility]  Verbal Reasoning Skills: Mild  Sequencing: WFL  Managing Finances: Mild (\"I was never good at [employer]")  Managing Medications: Mild  Abstract Reasoning  Abstract Reasoning: Exceptions to [facility]  Convergent Thinking: Moderate (3/5 julienne, 5/5 cued)  Divergent Thinking: WFL (14 items in 60 seconds)    Additional Assessments:    Informal bedside swallow evaluation completed with snacks at bedside. Patient presents with probable safe swallow for Regular diet with thin liquids as evidenced by no overt s/s of aspiration noted with consistencies tested. Recommend small sips and bites, only feed when alert and awake and upright at 90 degrees for all PO intake.  Recommend close monitoring for overt/clinical s/s of aspiration and D/C PO intake and complete Modified Barium Swallow Study should they occur. Results and recommendations reported to RN.           Prognosis:  Individuals consulted  Consulted and agree with results and recommendations: Patient;RN  RN Name: Corey Medina    Education:  Patient Education: yes  Patient Education Response: Verbalizes understanding;Demonstrated understanding          Therapy Time:   Individual Concurrent Group Co-treatment   Time In 9124         Time Out 1503         Minutes [address], Refugio Osgood., Laurann Sly  4/28/2022 3:20 PM

## 2022-04-28 NOTE — PLAN OF CARE
Problem: Safety - Adult  Goal: Free from fall injury  Outcome: Progressing     Problem: ABCDS Injury Assessment  Goal: Absence of physical injury  Outcome: Progressing     Problem: Skin/Tissue Integrity  Goal: Absence of new skin breakdown  Outcome: Progressing     Problem: Chronic Conditions and Co-morbidities  Goal: Patient's chronic conditions and co-morbidity symptoms are monitored and maintained or improved  Outcome: Progressing

## 2022-04-28 NOTE — PROGRESS NOTES
Comprehensive Nutrition Assessment    Type and Reason for Visit:  Consult (Eval and treat)    Nutrition Recommendations/Plan:   1. Continue current diet     Malnutrition Assessment:  Malnutrition Status: At risk for malnutrition (Comment) (04/28/22 9820)    Context:  Acute Illness     Findings of the 6 clinical characteristics of malnutrition:  Energy Intake:  75% or less of estimated energy requirements for 7 or more days  Weight Loss:  No significant weight loss     Body Fat Loss:  No significant body fat loss     Muscle Mass Loss:  No significant muscle mass loss    Fluid Accumulation:  No significant fluid accumulation     Strength:  Not Performed    Nutrition Assessment:    Patient admission related to right sided weakness. Transfer from [facility]. V\"s for PT/OT and rehab. Ambulatory and ADL dysfunction due to right hemiparesis. Patient had bed side speech evaluation earlier today. Speech recommended Regular diet with thin liquids. Patient reports good appetite, eating % of meals, No nausea, vomiting, diarrhea or constipation. No chewing or swallowing problem. No weight loss. Patient reports weight gain since admission at [facility]. V's. Will continue current diet. Nutrition Related Findings:    No edema. Bowel sounds active. Labs and meds reviewed. Potassium 3.4, POC glucose 183         Current Nutrition Intake & Therapies:    Average Meal Intake: %,51-75%  Average Supplements Intake: None Ordered  ADULT DIET; Regular    Anthropometric Measures:  Height: 5' 5\" (165.1 cm)  Ideal Body Weight (IBW): 125 lbs (57 kg)    Admission Body Weight: 134 lb (60.8 kg)  Current Body Weight: 134 lb (60.8 kg), 107.2 % IBW.  Weight Source: Bed Scale  Current BMI (kg/m2): 22.3  Usual Body Weight: 130 lb (59 kg)  % Weight Change (Calculated): 3.1                    BMI Categories: Normal Weight (BMI 22.0 to 24.9) age over 72    Estimated Daily Nutrient Needs:  Energy Requirements Based On: Kcal/kg  Weight Used for Energy Requirements: Current  Energy (kcal/day):    Weight Used for Protein Requirements: Current  Protein (g/day): 73 gm       Nutrition Diagnosis:   · Inadequate oral intake related to other (comment) (current condition - right sided weakness) as evidenced by intake 51-75%      Nutrition Interventions:   Food and/or Nutrient Delivery: Continue Current Diet  Nutrition Education/Counseling: Education not indicated  Coordination of Nutrition Care: Continue to monitor while inpatient       Goals:     Goals: Meet at least 75% of estimated needs       Nutrition Monitoring and Evaluation:   Behavioral-Environmental Outcomes: None Identified  Food/Nutrient Intake Outcomes: Food and Nutrient Intake  Physical Signs/Symptoms Outcomes: Biochemical Data,GI Status,Skin,Weight    Discharge Planning:    Continue current diet     Virgilio Sullivan, [address] [place] [address], [address] [facility]

## 2022-04-29 LAB
ABSOLUTE EOS #: 0.06 K/UL (ref 0–0.4)
ABSOLUTE LYMPH #: 2.63 K/UL (ref 1–4.8)
ABSOLUTE MONO #: 0.5 K/UL (ref 0.1–1.3)
ANION GAP SERPL CALCULATED.3IONS-SCNC: 10 MMOL/L (ref 9–17)
BASOPHILS # BLD: 0 % (ref 0–2)
BASOPHILS ABSOLUTE: 0 K/UL (ref 0–0.2)
BUN BLDV-MCNC: 26 MG/DL (ref 8–23)
CALCIUM SERPL-MCNC: 9.2 MG/DL (ref 8.6–10.4)
CHLORIDE BLD-SCNC: 110 MMOL/L (ref 98–107)
CO2: 23 MMOL/L (ref 20–31)
CREAT SERPL-MCNC: 0.74 MG/DL (ref 0.5–0.9)
EOSINOPHILS RELATIVE PERCENT: 1 % (ref 0–4)
GFR AFRICAN AMERICAN: >60 ML/MIN
GFR NON-AFRICAN AMERICAN: >60 ML/MIN
GFR SERPL CREATININE-BSD FRML MDRD: ABNORMAL ML/MIN/{1.73_M2}
GLUCOSE BLD-MCNC: 130 MG/DL (ref 65–105)
GLUCOSE BLD-MCNC: 187 MG/DL (ref 65–105)
GLUCOSE BLD-MCNC: 216 MG/DL (ref 65–105)
GLUCOSE BLD-MCNC: 73 MG/DL (ref 65–105)
GLUCOSE BLD-MCNC: 96 MG/DL (ref 70–99)
HCT VFR BLD CALC: 33.6 % (ref 36–46)
HEMOGLOBIN: 11.5 G/DL (ref 12–16)
LYMPHOCYTES # BLD: 47 % (ref 24–44)
MCH RBC QN AUTO: 31.7 PG (ref 26–34)
MCHC RBC AUTO-ENTMCNC: 34.3 G/DL (ref 31–37)
MCV RBC AUTO: 92.6 FL (ref 80–100)
MONOCYTES # BLD: 9 % (ref 1–7)
MORPHOLOGY: NORMAL
PDW BLD-RTO: 13.8 % (ref 11.5–14.9)
PLATELET # BLD: 261 K/UL (ref 150–450)
PMV BLD AUTO: 6.6 FL (ref 6–12)
POTASSIUM SERPL-SCNC: 4.3 MMOL/L (ref 3.7–5.3)
RBC # BLD: 3.63 M/UL (ref 4–5.2)
SEG NEUTROPHILS: 43 % (ref 36–66)
SEGMENTED NEUTROPHILS ABSOLUTE COUNT: 2.41 K/UL (ref 1.3–9.1)
SODIUM BLD-SCNC: 143 MMOL/L (ref 135–144)
WBC # BLD: 5.6 K/UL (ref 3.5–11)

## 2022-04-29 PROCEDURE — 6370000000 HC RX 637 (ALT 250 FOR IP): Performed by: STUDENT IN AN ORGANIZED HEALTH CARE EDUCATION/TRAINING PROGRAM

## 2022-04-29 PROCEDURE — 97530 THERAPEUTIC ACTIVITIES: CPT

## 2022-04-29 PROCEDURE — 80048 BASIC METABOLIC PNL TOTAL CA: CPT

## 2022-04-29 PROCEDURE — 97116 GAIT TRAINING THERAPY: CPT

## 2022-04-29 PROCEDURE — 97130 THER IVNTJ EA ADDL 15 MIN: CPT

## 2022-04-29 PROCEDURE — 97163 PT EVAL HIGH COMPLEX 45 MIN: CPT

## 2022-04-29 PROCEDURE — 99222 1ST HOSP IP/OBS MODERATE 55: CPT | Performed by: INTERNAL MEDICINE

## 2022-04-29 PROCEDURE — 1180000000 HC REHAB R&B

## 2022-04-29 PROCEDURE — 97167 OT EVAL HIGH COMPLEX 60 MIN: CPT

## 2022-04-29 PROCEDURE — 85025 COMPLETE CBC W/AUTO DIFF WBC: CPT

## 2022-04-29 PROCEDURE — 6360000002 HC RX W HCPCS: Performed by: STUDENT IN AN ORGANIZED HEALTH CARE EDUCATION/TRAINING PROGRAM

## 2022-04-29 PROCEDURE — 99232 SBSQ HOSP IP/OBS MODERATE 35: CPT | Performed by: PHYSICAL MEDICINE & REHABILITATION

## 2022-04-29 PROCEDURE — 82947 ASSAY GLUCOSE BLOOD QUANT: CPT

## 2022-04-29 PROCEDURE — 97535 SELF CARE MNGMENT TRAINING: CPT

## 2022-04-29 PROCEDURE — 36415 COLL VENOUS BLD VENIPUNCTURE: CPT

## 2022-04-29 PROCEDURE — 97110 THERAPEUTIC EXERCISES: CPT

## 2022-04-29 PROCEDURE — 97129 THER IVNTJ 1ST 15 MIN: CPT

## 2022-04-29 RX ADMIN — ATORVASTATIN CALCIUM 40 MG: 40 TABLET, FILM COATED ORAL at 21:14

## 2022-04-29 RX ADMIN — GABAPENTIN 600 MG: 300 CAPSULE ORAL at 16:00

## 2022-04-29 RX ADMIN — LEVETIRACETAM 1000 MG: 500 TABLET, FILM COATED ORAL at 08:42

## 2022-04-29 RX ADMIN — POTASSIUM CHLORIDE 20 MEQ: 20 TABLET, EXTENDED RELEASE ORAL at 08:42

## 2022-04-29 RX ADMIN — CLOPIDOGREL BISULFATE 75 MG: 75 TABLET ORAL at 08:42

## 2022-04-29 RX ADMIN — GABAPENTIN 600 MG: 300 CAPSULE ORAL at 21:13

## 2022-04-29 RX ADMIN — GABAPENTIN 600 MG: 300 CAPSULE ORAL at 08:42

## 2022-04-29 RX ADMIN — ASPIRIN 81 MG: 81 TABLET, COATED ORAL at 08:42

## 2022-04-29 RX ADMIN — DIVALPROEX SODIUM 500 MG: 500 TABLET, EXTENDED RELEASE ORAL at 21:14

## 2022-04-29 RX ADMIN — TOPIRAMATE 25 MG: 25 TABLET, FILM COATED ORAL at 08:41

## 2022-04-29 RX ADMIN — PANTOPRAZOLE SODIUM 40 MG: 40 TABLET, DELAYED RELEASE ORAL at 06:24

## 2022-04-29 RX ADMIN — LEVETIRACETAM 1000 MG: 500 TABLET, FILM COATED ORAL at 21:14

## 2022-04-29 RX ADMIN — INSULIN LISPRO 4 UNITS: 100 INJECTION, SOLUTION INTRAVENOUS; SUBCUTANEOUS at 18:11

## 2022-04-29 RX ADMIN — ENOXAPARIN SODIUM 40 MG: 100 INJECTION SUBCUTANEOUS at 08:45

## 2022-04-29 RX ADMIN — INSULIN GLARGINE 20 UNITS: 100 INJECTION, SOLUTION SUBCUTANEOUS at 21:13

## 2022-04-29 RX ADMIN — INSULIN LISPRO 1 UNITS: 100 INJECTION, SOLUTION INTRAVENOUS; SUBCUTANEOUS at 21:13

## 2022-04-29 RX ADMIN — DIVALPROEX SODIUM 500 MG: 500 TABLET, EXTENDED RELEASE ORAL at 08:42

## 2022-04-29 RX ADMIN — FENOFIBRATE 160 MG: 160 TABLET ORAL at 08:42

## 2022-04-29 RX ADMIN — TOPIRAMATE 25 MG: 25 TABLET, FILM COATED ORAL at 21:14

## 2022-04-29 ASSESSMENT — PAIN SCALES - GENERAL
PAINLEVEL_OUTOF10: 0
PAINLEVEL_OUTOF10: 0

## 2022-04-29 ASSESSMENT — 9 HOLE PEG TEST
TESTTIME_SECONDS: 21
TEST_RESULT: NOT TESTED
TEST_RESULT: FUNCTIONAL

## 2022-04-29 NOTE — PROGRESS NOTES
Physical Therapy  Facility/Department: [facility] ACUTE REHAB    NAME: René Thornton  : 1950 (80 y.o.)  MRN: [id number]  CODE STATUS: Full Code    Date of Service: 22      Past Medical History:   Diagnosis Date    Cerebral artery occlusion with cerebral infarction ([facility] ,     CVA (cerebral infarction)     Diabetes mellitus ([facility]     Hyperlipidemia     Hypertension     Seizures ([facility] 2022     Past Surgical History:   Procedure Laterality Date    APPENDECTOMY      BRONCHOSCOPY DIAGNOSTIC  2014         ENDOSCOPY, COLON, DIAGNOSTIC      HYSTERECTOMY         Patient assessed for rehabilitation services?: Yes  Additional Pertinent Hx: René Thornton is a 80 y.o. female with history of CVA, HTN, and HLD admitted to [facility] on 2022. She initially presented with acute-onset right hemiparesis. NIHSS 21. She was given tPA by the mobile stroke unit, but infusion was stopped due to worsening of symptoms and concern for possible hemorrhagic conversion. She was intubated prior to arrival to the ED. CT head showed no acute intracranial abnormality, and tPA was restarted. She was also loaded with keppra due to concern for seizures. MRI brain showed no acute intracranial abnormality. Extubated 22. [place] Per notes, she had a similar episodes in  and . Per Neuro notes- Reported history of chronic infarcts with residual right sided weakness but not clearly evident on brain imaging. Neuro recommending EMG as outpatient. Pt admotted to rehab unit on 22. Family / Caregiver Present: No  Referral Date : 22  Diagnosis: Right side weakness    Restrictions:  Restrictions/Precautions: Fall Risk;General Precautions; Up as Tolerated       Prior Level of Function:  Social/Functional History  Lives With: Alone  Type of Home: House  Home Layout: One level  Home Access: Level entry  Bathroom Shower/Tub: Tub/Shower unit,Curtain  Bathroom Toilet: Standard (Sink counter on the right side)  Bathroom Equipment: Hand-held shower  Bathroom Accessibility: Wheelchair accessible  Home Equipment: Rollator,Cane  ADL Assistance: Independent  Homemaking Assistance: Independent  Homemaking Responsibilities: Yes  Ambulation Assistance: Independent (St cane for outside)  Transfer Assistance: Independent  Active : Yes  Mode of Transportation: Car  Occupation: Retired  Leisure & Hobbies: Knitting, puzzles  Additional Comments: Son works nights, daughter in law works days, can assist. Pt reports daughter is able to provide prn assist upon discharge      OBJECTIVE  Vision  Vision: Impaired  Vision Exceptions: Wears glasses at all times    Hearing  Hearing: Exceptions to [facility]  Hearing Exceptions: Hard of hearing/hearing concerns;Right hearing aid         Sensation  Overall Sensation Status: [facility]    Functional Mobility  Bed mobility  Rolling to Right: Minimal assistance  Supine to Sit: Minimal assistance  Sit to Supine: Moderate assistance  Scooting: Minimal assistance  Transfers  Sit to Stand: Moderate Assistance (Initially pt having trunkal ataxia noted. )  Stand to sit: Moderate Assistance;2 Person Assistance  Bed to Chair: Moderate assistance;2 Person Assistance  Stand Pivot Transfers: 2 Person Assistance;Maximum Assistance;Stand by assistance (2nd person SBA, transfers from strong side)  Comment: Minimal WB R LE due to weakness, therapist manually assist with locking R knee for stance position. Pt unabel to isolate muscle contractions when askedd to, but fucntionally able to stabilize with R LE some, indicating some muscle contractions at Hip/knee. Environmental Mobility  Ambulation  Surface: level tile  Device: Rolling Walker  Assistance:  Moderate assistance;2 Person assistance  Quality of Gait: significant ant/post sway, ataxic, difficulty advancing R LE, knees buckling unless therapist  assit locking R knee in extension, unsteady  Gait Deviations: Slow Irene;Decreased step length;Decreased step height  Distance: 15 ft twice in room. More Ambulation?: No  Stairs/Curb  Stairs?: No    PT Exercises  Circulation/Endurance Exercises: NuStep x 10 mins. Workload 3  Functional Mobility: Transfer training to strong side with education x 4    ASSESSMENT     Activity Tolerance  Activity Tolerance: Patient tolerated treatment well;Patient limited by endurance; Patient limited by fatigue    Assessment  Assessment: Pt presents with R side weakness, muscle contractions felt at bigger muscle groups during fucntional mobility, pt unable to move R LE on her own, Pt requires asssitnace for all fucntional tasks at this time, will continue POC to faciliate improvement in R LE strength, overall balance and fucntional mobility for safe DC home. Performance Deficits/Impairments: Decreased functional mobility ; Decreased tolerance to work activity; Decreased strength;Decreased safe awareness;Decreased endurance;Decreased balance;Decreased posture;Decreased ROM  Treatment Diagnosis: Weakness, difficulty walking  Therapy Prognosis: Good  Decision Making: High Complexity  Discharge Recommendations: Patient would benefit from continued therapy after discharge;Home with assist PRN  PT Equipment Recommendations  Equipment Needed:  (TBD as therapy progresses)      GOALS  Patient Goals   Patient goals : To get better  Short Term Goals  Time Frame for Short term goals: 10 days  Short term goal 1: Pt to perform bed mobility from flat surface independently  Short term goal 2: Demonstrate functional transfers min A  Short term goal 3: Pt to ambulate distance of 100 ft with rolling walker at mod A  Short term goal 4: Pt able to improve sitting balance at EOB/EOM to good to be alen to eat meals. Short term goal 5: Demonstrate dynamic standing balance of fiar - to decrease fall risk  Short Term Goal 6: Pt able to  propel w/c distance of 100 to 150 ft, CGA, level surfaces.   Long Term Goals  Time Frame for Long term goals : By DC  Long term goal 1: Pt able to perform transfers at mod-I  Long term goal 2: Pt able to ambulate distance of 100 to 150 ft with appropriate device, at min A. with assistive device. Long term goal 3: Pt able to perform a curb step with a rolling wwalker/UE support, mod A  Long term goal 4: Pt able to propel w/c distance of 150 ft , level surfaces, including turns, mod-I  Long term goal 5: Pt  able to improve standing balance with assistive device to fair to reduce fall risk  Long term goal 6: Improve 2MWT distance to 80 ft to improve function and gait speed. Long term goal 7: Improve PASS score to 25/36 to improve overall function. PLAN OF CARE    Plan  Plan:  minutes of therapy at least 5 out of 7 days a week (--)  Current Treatment Recommendations: Strengthening;ROM;Balance training;Functional mobility training;Gait training;Neuromuscular re-education;Transfer training; Endurance training; Wheelchair mobility training;Stair training;Home exercise program;Safety education & training;Patient/Caregiver education & training;Equipment evaluation, education, & procurement; Modalities (E-stim as needed to neuro-muscular faciliatation R LE as nee)  Safety Devices  Type of Devices: Call light within reach; Chair alarm in place;Gait belt;Patient at risk for falls; Left in chair;Nurse notified  Restraints  Restraints Initially in Place: No         Therapy Time   04/29/22 1550   PT Individual Minutes   Time In [facility]   Time Out 1630   Minutes [place], [place], 04/29/22 at 5:00 PM

## 2022-04-29 NOTE — PROGRESS NOTES
History  Lives With: Alone  Type of Home: House  Home Layout: One level  Home Access: Level entry  Bathroom Shower/Tub: Tub/Shower unit,Curtain  Bathroom Toilet: Standard (Sink counter on the right side)  Bathroom Equipment: Hand-held shower  Bathroom Accessibility: Wheelchair accessible  Home Equipment: Drew Acampo  Has the patient had two or more falls in the past year or any fall with injury in the past year?: Yes (fell and broke R ankle, last August (wore boot))  ADL Assistance: [address] [place] [address] Avenue: Independent  Homemaking Responsibilities: Yes  Ambulation Assistance: Independent (St cane for outside)  Transfer Assistance: Independent  Active : Yes  Mode of Transportation: Car  Occupation: Retired  Leisure & Hobbies: Knitting, puzzles  Additional Comments: Son works nights, daughter works days, can assist. Pt reports daughter is able to provide prn assist upon discharge      OBJECTIVE  Vision  Vision: Impaired  Vision Exceptions: Wears glasses at all times    Hearing  Hearing: Exceptions to [facility]  Hearing Exceptions: Hard of hearing/hearing concerns;Right hearing aid    Cognition  Overall Cognitive Status: Exceptions  Arousal/Alertness: Appropriate responses to stimuli  Following Commands:  Follows multistep commands with increased time  Attention Span: Appears intact  Memory: Decreased recall of recent events  Safety Judgement: Decreased awareness of need for safety;Decreased awareness of need for assistance  Problem Solving: Assistance required to identify errors made;Assistance required to correct errors made  Insights: Decreased awareness of deficits  Initiation: Requires cues for some  Sequencing: Requires cues for some    ROM  PROM RLE (degrees)  RLE PROM: WFL  AROM RLE (degrees)  RLE General AROM: Pt unable to move R LE actively on her own, wiggles toes slightly  AROM LLE (degrees)  LLE AROM : WFL  Joint Mobility  ROM RLE: PROM WFL  ROM LLE: WFL  ROM RUE: PROM WFL  ROM LUE: [facility]    Strength  Strength RLE  Strength RLE: Exception  Comment: Poor effort from pt noted during MMT. R Hip Flexion: 2-/5  R Hip Extension: 2-/5  R Knee Flexion: 2-/5  R Knee Extension: 1/5  R Ankle Dorsiflexion: 0/5 (pt reports a history of R drop foot from prior CVA. Wiggles )  R Ankle Plantar flexion: 0/5  Strength LLE  Strength LLE: Exception  L Hip Flexion: 4/5  L Knee Flexion: 4/5  L Knee Extension: 4/5  L Ankle Dorsiflexion: 4/5  L Ankle Plantar Flexion: 4/5  Strength RUE  Strength RUE: Exception  Comment: See OT assessment for full evaluation. Strength LUE  Strength LUE: [facility]    Quality of Movement       Sensation  Overall Sensation Status: WFL    Functional Mobility  Bed mobility  Rolling to Left: Moderate assistance  Rolling to Right: Minimal assistance  Supine to Sit: Minimal assistance  Sit to Supine: Moderate assistance  Scooting: Minimal assistance  Transfers  Sit to Stand: Moderate Assistance (Initially pt having trunkal ataxia noted. )  Stand to sit: Moderate Assistance;2 Person Assistance  Bed to Chair: Moderate assistance;2 Person Assistance  Stand Pivot Transfers: 2 Person Assistance;Maximum Assistance;Stand by assistance (2nd person SBA, transfers from strong side)  Comment: Minimal WB R LE due to weakness, therapist manually assist with locking R knee for stance position. Pt unabel to isolate muscle contractions when askedd to, but fucntionally able to stabilize with R LE some, indicating some muscle contractions at Hip/knee. Balance  Posture: Fair  Sitting - Static: Fair;-  Sitting - Dynamic: Poor;+  Standing - Static: Poor  Standing - Dynamic: Poor;-  Comments: Standing with rolling walker. Environmental Mobility  Ambulation  Surface: level tile  Device: Rolling Walker  Assistance:  Moderate assistance;2 Person assistance  Quality of Gait: significant ant/post sway, ataxic, difficulty advancing R LE, knees buckling unless therapist  assit locking R knee in extension, unsteady  Gait Deviations: Slow Irene;Decreased step length;Decreased step height  Distance: 16 ft (2MWT)  Comments: MAX A to assist with R LE and RW advancement  More Ambulation?: No  Stairs/Curb  Stairs?: No       Outcome measures;   2MWT 16 ft with rolling walker, mod a x 2    Postural Assessment Scale for Stroke Patients   (PASS) Scoring Form     Give the subject instructions for each item as written below. When scoring the item, record the lowest response category that applies for each item. Maintaining a Posture  1. Sitting Without Support  Examiner: Have the subject sit on a bench/mat without support and with feet flat on the floor. 2 Can sit for more than 10 seconds without support  2. Standing with Support Examiner: Have the subject stand, providing support as needed. Evaluate only the ability to stand with or without support. Do not consider the quality of the stance. 1     Can stand with strong support of 2 people   3. Standing Without Support  Examiner:  Have the subject stand without support. Evaluate only the ability to stand with or without support. Do not consider the quality of the stance. 0  Cannot stand without support  4. Standing on Non-paretic Leg  Examiner: Have the subject stand on the non-paretic leg. Evaluate only the ability to bear weight entirely on the non-paretic leg. Do not consider how the subject accomplishes the task. 0  Cannot stand on non-paretic leg    5. Standing on Paretic Leg  Examiner: Have the subject stand on the paretic leg. Evaluate only the ability to bear weight entirely on the paretic leg. Do not consider how the subject accomplishes the task. 0  Cannot stand of paretic leg     Maintaining Posture SUBTOTAL     3/15    Changing a Posture  6. Supine to Paretic Side Lateral  Examiner:  Begin with the subject in supine on a treatment mat. Instruct the subject to roll to the paretic side (lateral movement). Assist as necessary.  Evaluated the subject's performance on the amount of help required. Do not consider the quality of performance. 2  Can perform with little help  7. Supine to Non-paretic Side Lateral  Examiner:  Begin with the subject in supine on a treatment mat. Instruct the subject to roll to the non-paretic side (lateral movement). Assist as necessary. Evaluate the subject's Performance on the amount of help required. Do not consider the quality of performance. 1  Can perform with much help   8. Supine to Sitting up on the Edge of the Mat   Examiner:  Begin with the subject in supine on a treatment mat. Instruct the subject to come to sitting on the edge of the mat. Assist as necessary. Evaluate the subject's performance on the amount of help required. Do not considered the quality performance. 1  Can perform with much help    9. Sitting on the Edge of the Mat to Supine  Examiner: Begin with the subject sitting on the edge of a treatment mat. Instruct the subject to return to supine. Assist as necessary. Evaluate the subjects performance on the amount of help required. Co not consider the quality of performance. 1  Can perform with much help   10. Sitting to Standing Up  Examiner:  Begin with the subject sitting on the edge of a treatment mat. Instruct the subject to stand up without support. Assist if necessary. Evaluated the subject's performance on the amount ot help required. Do not consider the quality of the performance. 1  Can perform with much help   11. Standing Up to Sitting Down  Examiner:  Begin with the subject standing by the edge of a treatment mat. Instruct the subject to sit on the edge of mat without support. Assist if necessary. Evaluated the subject's performance on the amount of help required. Do not consider the quality of the performance. 1  Can perform with much help   12 . Standing, Picking up a Pencil from the Floor  Examiner:  Begin with the subject standing.   Instruct the subject to pick up a pencil from the floor without support. Assist if necessary. Evaluate the subject's performance on the amount of help required. Do Not consider the quality of the performance. 0  Cannot perform    Changing Posture SUBTOTAL   7/21    PASS TOTAL 10/36     ASSESSMENT  Vitals  BP Location: Right upper arm  O2 Device: None (Room air)    Activity Tolerance  Activity Tolerance: Patient tolerated treatment well;Patient limited by endurance; Patient limited by fatigue    Assessment  Assessment: Pt presents with R side weakness, muscle contractions felt at bigger muscle groups during fucntional mobility, pt unable to move R LE on her own, Pt requires asssitnace for all fucntional tasks at this time, will continue POC to faciliate improvement in R LE strength, overall balance and fucntional mobility for safe DC home. Performance Deficits/Impairments: Decreased functional mobility ; Decreased tolerance to work activity; Decreased strength;Decreased safe awareness;Decreased endurance;Decreased balance;Decreased posture;Decreased ROM  Treatment Diagnosis: Weakness, difficulty walking  Therapy Prognosis: Good  Decision Making: High Complexity  Discharge Recommendations: Patient would benefit from continued therapy after discharge;Home with assist PRN  PT Equipment Recommendations  Equipment Needed:  (TBD as therapy progresses)    CLINICAL IMPRESSION   Pt presents with R side weakness, muscle contractions felt at bigger muscle groups during fucntional mobility, pt unable to move R LE on her own, Pt requires asssitnace for all fucntional tasks at this time, will continue POC to faciliate improvement in R LE strength, overall balance and fucntional mobility for safe DC home. GOALS  Patient Goals   Patient goals :  To get better  Short Term Goals  Time Frame for Short term goals: 10 days  Short term goal 1: Pt to perform bed mobility from flat surface independently  Short term goal 2: Demonstrate functional transfers min A  Short term goal 3: Pt to ambulate distance of 100 ft with rolling walker at mod A  Short term goal 4: Pt able to improve sitting balance at EOB/EOM to good to be alen to eat meals. Short term goal 5: Demonstrate dynamic standing balance of fiar - to decrease fall risk  Short Term Goal 6: Pt able to  propel w/c distance of 100 to 150 ft, CGA, level surfaces. Long Term Goals  Time Frame for Long term goals : By DC  Long term goal 1: Pt able to perform transfers at mod-I  Long term goal 2: Pt able to ambulate distance of 100 to 150 ft with appropriate device, at min A. with assistive device. Long term goal 3: Pt able to perform a curb step with a rolling wwalker/UE support, mod A  Long term goal 4: Pt able to propel w/c distance of 150 ft , level surfaces, including turns, mod-I  Long term goal 5: Pt  able to improve standing balance with assistive device to fair to reduce fall risk  Long term goal 6: Improve 2MWT distance to 80 ft to improve function and gait speed. Long term goal 7: Improve PASS score to 25/36 to improve overall function. PLAN OF CARE    Plan  Plan:  minutes of therapy at least 5 out of 7 days a week (--)  Current Treatment Recommendations: Strengthening;ROM;Balance training;Functional mobility training;Gait training;Neuromuscular re-education;Transfer training; Endurance training; Wheelchair mobility training;Stair training;Home exercise program;Safety education & training;Patient/Caregiver education & training;Equipment evaluation, education, & procurement; Modalities (E-stim as needed to neuro-muscular faciliatation R LE as nee)  Safety Devices  Type of Devices: Call light within reach; Chair alarm in place;Gait belt;Patient at risk for falls; Left in chair;Nurse notified  Restraints  Restraints Initially in Place: No    ELOS:          Therapy Time   Individual Concurrent Group Co-treatment   Time In 0828         Time Out 0928         Minutes 60           Timed Code Treatment Minutes: 3475 JESSIKA Orr, PT, 04/29/22 at 1:41 PM

## 2022-04-29 NOTE — PLAN OF CARE
Problem: Discharge Planning  Goal: Discharge to home or other facility with appropriate resources  4/29/2022 0448 by Lubna Agustin RN  Outcome: Progressing     Problem: Safety - Adult  Goal: Free from fall injury  4/29/2022 0448 by Lubna Agustin RN  Outcome: Progressing     Problem: ABCDS Injury Assessment  Goal: Absence of physical injury  4/29/2022 0448 by Lubna Agustin RN  Outcome: Progressing     Problem: Skin/Tissue Integrity  Goal: Absence of new skin breakdown  Description: 1. Monitor for areas of redness and/or skin breakdown  2. Assess vascular access sites hourly  3. Every 4-6 hours minimum:  Change oxygen saturation probe site  4. Every 4-6 hours:  If on nasal continuous positive airway pressure, respiratory therapy assess nares and determine need for appliance change or resting period.   4/29/2022 0448 by Lubna Agustin RN  Outcome: Progressing

## 2022-04-29 NOTE — FLOWSHEET NOTE
04/29/22 1243   Encounter Summary   Encounter Overview/Reason  Volunteer Encounter   Service Provided For: Patient   Referral/Consult From: Armaan   Last Encounter  04/29/22  (V)   Complexity of Encounter Low   Spiritual/Emotional needs   Type Spiritual Support   Assessment/Intervention/Outcome   Intervention Prayer (assurance of)/[place]

## 2022-04-29 NOTE — CARE COORDINATION
Patton Collet, RN   Registered Nurse   Case Management   Progress Notes       Signed   Date of Service:  2022 11:32 AM         Related encounter: ED to Hosp-Admission (Discharged) from 2022 in [address]  Acute Inpatient Rehab Preadmission Assessment     Patient Name: Almita Chaves        MRN:   [id number]    : 1950  (80 y.o.)  Gender: female      Admitted from:   []?[facility]  [x]? [address]   []? [address]   []? [facility]   []? Outside Admission - Location:                                 [x]? Initial         []? Updated     Date of Onset / Admission to the acute hospital:  22     Inpatient Rehabilitation Admitting Diagnosis:  Right Sided Weakness     Did patient have surgery/procedures? [x]? No  []? Yes:       Physicians: Claudette Christianson (Neuro), Pedro (Neuro-CC)     Risk for clinical complications:  Mild to Moderate     Co-morbidities:       1. Cognitive impairment  2. Anemia  3. Elevated hemoglobin A1c  4. HTN  5. HLD  6. History CVA        Financial Information  Primary insurance:  []? Medicare     [x]? Medicare HMO      []? [employer]    []? Medicaid      []? Medicaid HMO       []? Workers Compensation        []? Personal Pay     Secondary Insurance:  []? Medicare     []? Medicare HMO      []? [employer]    []? Medicaid      []? Medicaid HMO        []? Workers Compensation      [x]? None     Precautions:   []? Cardiac Precautions:            []? Total hip precautions:           []? Weight Bearing status:  [x]? Safety Precautions/Concerns:  Fall Risk, General Precautions  [x]? Visually impaired: Wears glasses at all times                      [x]? Hard of Hearing: Hard of hearing/hearing concerns, Right hearing aid     Isolation Precautions:         []? Yes              [x]? No  If Yes:   []? Droplet  []? Contact           []? Airborne     []? VRE     []? MRSA        []? C-diff         []? TB             []? ESBL         []? MDRO          []?  Other: Physiatrist:  [x]? Dr. Carola Heredia     []? Dr. Judith Saleh  []? Dr. Amanuel Coyne  []? Dr. Zoey Patrick     Patients Occupation: Retired     Reviewed Lab and Diagnostic reports from Current Admission: Yes     Patients Prior Functional  Level: Prior Function  ADL Assistance: Independent  Homemaking Assistance: Independent  Ambulation Assistance: Independent  Transfer Assistance: Independent  Additional Comments: Pt reports daughter is able to provide prn assist upon discharge     History of current illness, per PM&R Consult:  Fabrizio Mueller is a 80 y.o. female with history of CVA, HTN, and HLD admitted to [address] [place] [address] 4/17/2022.       She initially presented with acute-onset right hemiparesis.   NIHSS 21.  She was given tPA by the mobile stroke unit, but infusion was stopped due to worsening of symptoms and concern for possible hemorrhagic conversion.  She was intubated prior to arrival to the ED.  CT head showed no acute intracranial abnormality, and tPA was restarted. [facility] was also loaded with keppra due to concern for seizures.  MRI brain showed no acute intracranial abnormality.  Extubated 4/19/22.  Plan is for LTME and MRI cervical spine.     Per notes, she had a similar episodes in 2014 and 2018.     She reports ongoing right-sided weakness.  She also notes headache and blurred vision.  She denies any numbness/tingling and changes in bladder or bowel control.     Prognosis: Fair     Current functional status for upper extremity ADLs:  UE Bathing: Setup,Increased time to complete,Verbal cueing,Minimal assistance  UE Dressing: Setup,Minimal assistance,Verbal cueing,Increased time to complete     Current functional status for lower extremity ADLs:  LE Bathing: Minimal assistance,Setup,Increased time to complete,Verbal cueing  LE Dressing: Maximum assistance,Setup,Increased time to complete,Verbal cueing     Current functional status for bed, chair, wheelchair transfers:  Transfers  Sit to Stand: Minimal Assistance  Stand to sit: Moderate Assistance,2 Person Assistance  Bed to Chair: Moderate assistance,2 Person Assistance  Comment: MIN A to stand initially, then MOD A x2 to maintain due to poor standign balance     Current functional status for toilet transfers: Toilet Transfers  Toilet - Technique:  (ginny chavez)  Equipment Used: Standard toilet  Toilet Transfer: Minimal assistance     Current functional status for locomotion:  Ambulation  Surface: level tile  Device: Rolling Walker  Assistance: Moderate assistance,2 Person assistance  Quality of Gait: significant ant/post sway, ataxic, difficulty advancing R LE, knees buckling, unsteady  Gait Deviations: Slow Irene,Decreased step length,Decreased step height  Distance: 20 ft  Comments: MAX A to assist with R LE and RW advancement  More Ambulation?: No     Current functional status for comprehension: Complete independence     Current functional status for expression: Complete independence     Current functional status for social interaction: Complete independence     Current functional status for problem solving: Moderate Assistance     Current functional status for memory: Minimal Assistance     Current Deficits R/T Impairment: Impaired Functional Mobility and Decreased ADLs     Required Therapy:   [x]? Physical Therapy  [x]? Occupational Therapy   [x]? Speech Therapy, as appropriate     Additional Services:    [x]?     []? Recreational Therapy, as appropriate    [x]? Nutrition    []? Dialysis  []? Cultural Needs Identified  []? Special Equipment Needs  []? Other     Rehab Justification:  Needs 3 hrs therapy per day or 15 hours per week:  Yes  Identified Rehab Nursing needs: Yes  Intense Interdisciplinary need:  Yes  Need for 24 hr physician supervision:  Yes  Measurable improved quality of life:  Yes  Willingness to participate:  Yes  Medical Necessity:  Yes  Patient able to tolerate care proposed:   Yes     Expected Discharge Destination/Functional Level:  Home with assist  Expected length of time to achieve that level of improvement: 1-2 weeks  Expected Post Discharge Treatments: Home with possible Home Care     Other information relevant to patient's care needs:  N/A     Acute Inpatient Rehabilitation Disclosure Statement will be provided to patient upon admission to ARU with patient's verbalization of understanding.       I have reviewed and concur with the findings and results of the pre-admission screening assessment completed by the Inpatient Rehabilitation Admissions Coordinator.                  Cosigned by: Autumn Arrington MD at 4/28/2022 12:31 PM

## 2022-04-29 NOTE — PROGRESS NOTES
Speech Language Pathology  Speech Language Pathology  [facility]    Cognitive Treatment Note    Date: 4/29/2022  Patients Name: Eliceo Jones  MRN: [id number]  Diagnosis:   Patient Active Problem List   Diagnosis Code    Altered mental status R41.82    Generalized nonconvulsive seizures ([facility] G40.309    History of CVA (cerebrovascular accident) Z80.78    Noncompliance Z91.19    Hemiparesis ([facility] G81.90    Respiratory failure ([address] J96.90    Upper respiratory infection J06.9    Acute respiratory failure ([address] J96.00    HTN (hypertension) I10    HLD (hyperlipidemia) E78.5    Hyperglycemia R73.9    Hypokalemia E87.6    Anemia due to acute blood loss D62    Right sided weakness R53.1    Cerebrovascular accident (CVA) ([facility] I63.9    Tissue plasminogen activator (tPA) administered at other facility within 24 hours before current admission Z92.82       Pain: 0/10    Cognitive Treatment    Treatment time: 7345-1563      Subjective: [x] Alert [x] Cooperative     [] Confused     [] Agitated    [] Lethargic      Objective/Assessment:  Attention: Sustained throughout, distractions minimized. Orientation: Oriented x4. Recall: Not addressed directly. Pt. recalling writer/discipline from yesterday's evaluation independently. Organization: Convergent categorization, ID category of 3 similar items (concrete)- 90% accuracy julienne, 100% cued. Divergent thinking, add 1 to list of 3 similar items (concrete)- 90% accuracy julienne, 100% cued. Pt. demonstrated anomia during session and stating this happens often in conversation. Education provided re: compensatory word retrieval strategies (e.g., circumlocution). Pt. verbalized understanding and attempting to facilitate strategies during session with arnold LENNON. Problem Solving/Reasoning: Problem solving, ID solutions to situational problems- 90% accuracy julienne, 100% cued.       Other: Pt. discussed overcoming hardships following passing of  and son from years ago. Emotional support provided by writer. Improved performance on cognitive tasks today from initial evaluation. Plan:  [x] Continue ST services    [] Discharge from ST:      Discharge recommendations: [] Inpatient Rehab   [] [place]   [] Outpatient Therapy  [] Follow up at trauma clinic   [] Other:       Treatment completed by:  Emma Cook M.A., CCC-SLP

## 2022-04-29 NOTE — PLAN OF CARE
Problem: Discharge Planning  Goal: Discharge to home or other facility with appropriate resources  4/29/2022 1029 by Barney Mckeon LPN  Outcome: Progressing  4/29/2022 0448 by Bettie Flor RN  Outcome: Progressing     Problem: Safety - Adult  Goal: Free from fall injury  4/29/2022 1029 by Barney Mckeon LPN  Outcome: Progressing  4/29/2022 0448 by Bettie Flor RN  Outcome: Progressing     Problem: ABCDS Injury Assessment  Goal: Absence of physical injury  4/29/2022 1029 by Barney Mckeon LPN  Outcome: Progressing  4/29/2022 0448 by Bettie Flor RN  Outcome: Progressing     Problem: Skin/Tissue Integrity  Goal: Absence of new skin breakdown  Description: 1. Monitor for areas of redness and/or skin breakdown  2. Assess vascular access sites hourly  3. Every 4-6 hours minimum:  Change oxygen saturation probe site  4. Every 4-6 hours:  If on nasal continuous positive airway pressure, respiratory therapy assess nares and determine need for appliance change or resting period.   4/29/2022 1029 by Barney Mckeon LPN  Outcome: Progressing  4/29/2022 0448 by Bettie Flor RN  Outcome: Progressing     Problem: Chronic Conditions and Co-morbidities  Goal: Patient's chronic conditions and co-morbidity symptoms are monitored and maintained or improved  Outcome: Progressing     Problem: Nutrition Deficit:  Goal: Optimize nutritional status  Outcome: Progressing

## 2022-04-29 NOTE — H&P
Physical Medicine & Rehabilitation History and Physical  [facility] Acute Rehabilitation Unit     Primary care provider: Michelle Luque MD     Chief Complaint and Reason for Rehabilitation Admission:   ADL and Mobility deficits secondary to R sided weakness of unknown etiology    History of Present Illness:  Radha Montgomery  is a 80 y.o. right-handed female admitted to the [address] [place] [address] unit on 4/28/2022. She was originally admitted to [address] [place] [address] on 4/17/22 for R sided weakness. She initially presented with acute-onset right hemiparesis. NIHSS 21. She was given tPA by the mobile stroke unit, but infusion was stopped due to worsening of symptoms and concern for possible hemorrhagic conversion. She was intubated prior to arrival to the ED. CT head showed no acute intracranial abnormality, and tPA was restarted. She was also loaded with keppra due to concern for seizures. MRI brain showed no acute intracranial abnormality. Extubated 4/19/22. Plan is for LTME and MRI cervical spine. She is currently requiring assistance for self-care activities and mobility prompting this admission. Review of Systems:  CONSTITUTIONAL:  Denies fevers, chills, sweats or fatigue. EYES:  Denies diplopia, blind spots, blurring. HEENT:  Denies hearing loss, trouble chewing or swallowing. RESPIRATORY:  No wheezing, coughing, shortness of breath. CARDIOVASCULAR:  Denies chest pain, palpitations, lightheadedness. GASTROINTESTINAL:  Denies heartburn, nausea, constipation, diarrhea, abdominal pain. GENITOURINARY:  No urgency, frequency, incontinence, dysuria. ENDOCRINE:  Denies hot or cold intolerance. MUSCULOSKELETAL:  Denies focal joint pain, back pain, neck pain. NEUROLOGICAL:  Denies focal numbness, tingling, balance loss, headache. BEHAVIOR/PSYCH:  Denies depression, anxiety, memory loss, insomnia. SKIN:  No ulcers, rash, bruises.       Premorbid function:  Modified Independent    Current Function:  PT:  Restrictions/Precautions: Fall Risk,General Precautions,Up as Tolerated   Transfers  Sit to Stand: Moderate Assistance (Initially pt having trunkal ataxia noted. )  Stand to sit: Moderate Assistance,2 Person Assistance  Bed to Chair: Moderate assistance,2 Person Assistance  Stand Pivot Transfers: 2 Person Assistance,Maximum Assistance,Stand by assistance (2nd person SBA, transfers from strong side)  Comment: Minimal WB R LE due to weakness, therapist manually assist with locking R knee for stance position. Pt unabel to isolate muscle contractions when askedd to, but fucntionally able to stabilize with R LE some, indicating some muscle contractions at Hip/knee. Ambulation  Surface: level tile  Device: Rolling Walker  Assistance: Moderate assistance,2 Person assistance  Quality of Gait: significant ant/post sway, ataxic, difficulty advancing R LE, knees buckling unless therapist  assit locking R knee in extension, unsteady  Gait Deviations: Slow Irene,Decreased step length,Decreased step height  Distance: 16 ft (2MWT)  Comments: MAX A to assist with R LE and RW advancement  More Ambulation?: No    Transfers  Sit to Stand: Moderate Assistance (Initially pt having trunkal ataxia noted. )  Stand to sit: Moderate Assistance,2 Person Assistance  Bed to Chair: Moderate assistance,2 Person Assistance  Stand Pivot Transfers: 2 Person Assistance,Maximum Assistance,Stand by assistance (2nd person SBA, transfers from strong side)  Comment: Minimal WB R LE due to weakness, therapist manually assist with locking R knee for stance position. Pt unabel to isolate muscle contractions when askedd to, but fucntionally able to stabilize with R LE some, indicating some muscle contractions at Hip/knee. Ambulation  Surface: level tile  Device: Rolling Walker  Assistance:  Moderate assistance,2 Person assistance  Quality of Gait: significant ant/post sway, ataxic, difficulty advancing R LE, knees buckling unless therapist  assit locking R knee in extension, unsteady  Gait Deviations: Slow Irene,Decreased step length,Decreased step height  Distance: 16 ft (2MWT)  Comments: MAX A to assist with R LE and RW advancement  More Ambulation?: No    Surface: level tile  Ambulation  Surface: level tile  Device: Rolling Walker  Assistance: Moderate assistance,2 Person assistance  Quality of Gait: significant ant/post sway, ataxic, difficulty advancing R LE, knees buckling unless therapist  assit locking R knee in extension, unsteady  Gait Deviations: Slow Irene,Decreased step length,Decreased step height  Distance: 16 ft (2MWT)  Comments: MAX A to assist with R LE and RW advancement  More Ambulation?: No      OT:   ADL  Feeding: Setup  Feeding Skilled Clinical Factors: Patient reports use of non-dominant L hand for self-feeding currently                      Bed mobility  Rolling to Left: Moderate assistance  Rolling to Right: Minimal assistance  Supine to Sit: Minimal assistance  Sit to Supine: Moderate assistance  Scooting: Minimal assistance                    SPEECH:  Cognitive Diagnosis: Pt. presents with mildly impaired reasoning, problem solving, memory, and responding to complex questions. Pt. required frequent repeition of directions and demonstrated occasional latency with responses -- benefited from repeition and additional time to generate answers. Pt. reports cognition is \"almost\" at baseline and that she is \"very independent\" at home (e.g., manages own medications and finances). Speech Diagnosis: Pt. speech clear and fluent, 100% intelligible, no dysarthria. No O/M deficits noted. Pt. did demonstrate difficulty with consonant clusters -- reports this is baseline following second CVA in past.  Pt. noted to reduce rate and self-correct articulation errors made with clusters independently. Diagnosis: ST recommended to target higher level cognition. Education provided.     Past Medical History: Diagnosis Date    Cerebral artery occlusion with cerebral infarction ([facility] 2008, 2014    CVA (cerebral infarction)     Diabetes mellitus ([facility]     Hyperlipidemia     Hypertension     Seizures ([facility] 04/2022       Past Surgical History:      Procedure Laterality Date    APPENDECTOMY      BRONCHOSCOPY DIAGNOSTIC  7/24/2014         ENDOSCOPY, COLON, DIAGNOSTIC      HYSTERECTOMY         Allergies:    Patient has no known allergies.     Medications   Scheduled Meds:   enoxaparin  40 mg SubCUTAneous Daily    aspirin  81 mg Oral Daily    atorvastatin  40 mg Oral Nightly    clopidogrel  75 mg Oral Daily    divalproex  500 mg Oral BID    fenofibrate  160 mg Oral Daily    gabapentin  600 mg Oral TID    insulin glargine  20 Units SubCUTAneous Nightly    insulin lispro  0-12 Units SubCUTAneous TID WC    insulin lispro  0-6 Units SubCUTAneous Nightly    levETIRAcetam  1,000 mg Oral BID    pantoprazole  40 mg Oral QAM AC    potassium chloride  20 mEq Oral Daily with breakfast    topiramate  25 mg Oral BID    polyethylene glycol  17 g Oral Daily     Continuous Infusions:  PRN Meds:.ondansetron **OR** [DISCONTINUED] ondansetron, albuterol sulfate HFA, acetaminophen, senna, bisacodyl     Social History:  Lives With: Alone  Type of Home: House  Home Layout: One level  Home Access: Level entry  Bathroom Shower/Tub: Tub/Shower unit  Bathroom Toilet: Standard  Bathroom Equipment: Grab bars in shower  Home Equipment: Rollator,Cane  ADL Assistance: Independent  Homemaking Assistance: Independent  Homemaking Responsibilities: Yes  Ambulation Assistance: Independent  Transfer Assistance: Independent  Active : Yes  Mode of Transportation: Car  Occupation: Retired  Leisure & Hobbies: Knitting, puzzles  Additional Comments: Pt reports daughter is able to provide prn assist upon discharge  Social History     Socioeconomic History    Marital status:      Spouse name: None    Number of children: None    Years of education: None    Highest education level: None   Occupational History    None   Tobacco Use    Smoking status: Never Smoker    Smokeless tobacco: Never Used   Substance and Sexual Activity    Alcohol use: No    Drug use: No    Sexual activity: None   Other Topics Concern    None   Social History Narrative    None     Social Determinants of Health     Financial Resource Strain:     Difficulty of Paying Living Expenses: Not on file   Food Insecurity:     Worried About Running Out of Food in the Last Year: Not on file    Tammy of Food in the Last Year: Not on file   Transportation Needs:     Lack of Transportation (Medical): Not on file    Lack of Transportation (Non-Medical): Not on file   Physical Activity:     Days of Exercise per Week: Not on file    Minutes of Exercise per Session: Not on file   Stress:     Feeling of Stress : Not on file   Social Connections:     Frequency of Communication with Friends and Family: Not on file    Frequency of Social Gatherings with Friends and Family: Not on file    Attends Congregation Services: Not on file    Active Member of [address] [place] [address] or Organizations: Not on file    Attends Club or Organization Meetings: Not on file    Marital Status: Not on file   Intimate Partner Violence:     Fear of Current or Ex-Partner: Not on file    Emotionally Abused: Not on file    Physically Abused: Not on file    Sexually Abused: Not on file   Housing Stability:     Unable to Pay for Housing in the Last Year: Not on file    Number of [place] in the Last Year: Not on file    Unstable Housing in the Last Year: Not on file       Family History:   History reviewed. No pertinent family history.     Diagnostics:     CBC:   Recent Labs     04/27/22  0149 04/28/22  0405 04/29/22  0719   WBC 6.9 5.7 5.6   RBC 3.43* 3.39* 3.63*   HGB 10.7* 10.6* 11.5*   HCT 31.8* 31.7* 33.6*   MCV 92.7 93.5 92.6   RDW 12.9 13.0 13.8    225 261     BMP:    Recent Labs 04/27/22  0149 04/28/22  0405 04/29/22  0719   GLUCOSE 98 107* 96   BUN 31* 22 26*   CREATININE 0.92* 0.71 0.74   CALCIUM 8.8 8.9 9.2    137 143   K 3.4* 3.4* 4.3   * 108* 110*   CO2 20 20 23   ANIONGAP 11 9 10   LABGLOM >60 >60 >60   GFRAA >60 >60 >60   GFR                    HbA1c:   Lab Results   Component Value Date    LABA1C 10.3 (H) 04/17/2022     BNP: No results for input(s): BNP in the last 72 hours. PT/INR: No results for input(s): PROTIME, INR in the last 72 hours. APTT: No results for input(s): APTT in the last 72 hours. CARDIAC ENZYMES:   Recent Labs     04/27/22  0149   TROPHS 7      FASTING LIPID PANEL:  Lab Results   Component Value Date    CHOL 202 (H) 04/17/2022    HDL 55 04/17/2022    TRIG 164 (H) 04/17/2022     LIVER PROFILE: No results for input(s): AST, ALT, ALB, BILIDIR, BILITOT, ALKPHOS in the last 72 hours. Radiology:    MRI BRAIN W WO CONTRAST  4/18/22   Final Result   1. No acute intracranial abnormality. 2. Mild chronic white matter microvascular ischemic changes. 3. Left mastoid effusion.           XR CHEST PORTABLE   Final Result   An enteric tube has been placed. The tip is in the fundus.       No evidence of edema or pneumonia.       Minimal left basilar atelectasis or scarring.           XR ABDOMEN FOR NG/OG/NE TUBE PLACEMENT   Final Result   Gastric tube with the tip in the proximal aspect of the stomach.           XR CHEST PORTABLE   Final Result   Status post ET tube placement in good position with mild left basilar   atelectasis           CTA HEAD NECK W CONTRAST   Final Result   1. No acute intracranial abnormality. 2. No evidence of large vessel occlusion or hemodynamically significant   stenosis involving the head and neck arteries.       RECOMMENDATIONS:   Unavailable           CT HEAD WO CONTRAST   Final Result   1. No acute intracranial abnormality.    2. No evidence of large vessel occlusion or hemodynamically significant   stenosis involving the head and neck arteries.       RECOMMENDATIONS:   Unavailable           CT HEAD WO CONTRAST   Final Result   No acute intracranial abnormality.       Results were reported to Dr. Felecia Nagel at 7:50 p.m. on April 17, 2022.           MRI CERVICAL SPINE WO CONTRAST     Result Date: 4/21/2022  Multilevel degenerative disc disease with associated uncovertebral and facet hypertrophy with canal stenosis at C3-4, C4-5, and C5-6. Bilateral foraminal narrowing as described above. Physical Exam:  BP (!) 91/56   Pulse 65   Temp 97.3 °F (36.3 °C)   Resp 16   Ht 5' 5\" (1.651 m)   Wt 134 lb (60.8 kg)   SpO2 99%   BMI 22.30 kg/m²     GEN: Well developed, well nourished, in NAD  HEENT:  NCAT. PERRL. EOMI. Mucous membranes pink and moist.   PULM:  Clear to ausculation. No rales or rhonchi. Respirations WNL and unlabored. CV:  Regular rate rhythm. No murmurs or gallops. GI:  Abdomen soft. Nontender. Non-distended. BS + and equal.    NEUROLOGICAL: A&O x3. Sensation intact to light touch. DTRs 2+. MSK:  Functional ROM LUE and LLE. Impaired AROM RUE and RLE. Motor testing 5/5 key muscles LUE and LLE. 3/5 R shoulder flexion and elbow flexion, 4/5 R . 2/5 R knee extension. Saul Arguelles SKIN: Warm dry and intact. Good turgor. EXTREMITIES:  No calf tenderness to palpation. No edema BLEs. PSYCH: Mood WNL. Appropriately interactive. Affect WNL. Principal Diagnosis/plan:  The patient is a 80y.o. year old with ADL and Mobility deficits secondary to R sided weakness    She will require close medical monitoring for the comorbidities listed below. She will benefit from intensive interdisciplinary therapies and rehab nursing care and is appropriate for inpatient rehabilitation. The post admission physician evaluation (GONSALO) is consistent with the pre-admission assessment. See above findings to reflect the elements required in the GONSALO.   Patient's admitting condition is consistent with the findings of the preadmission assessment by the rehabilitation admissions coordinator. Diagnoses/plan:    1. R sided weakness:  Unknown etiology - possibly psychosomatic. PT/OT for gait, mobility, strengthening, endurance, ADLs, and self care. Plan for outpatient EMG. On ASA Plavix. On gabapentin  2. Cognitive impairment  3. Anemia: Hb low but stable. Will monitor  4. Hyperglycemia: Elevated Hb A1c. On Lantus and sliding scale  5. HTN/HLD: on atorvastatin, fenofibrate  6. History CVA  7. Wheezing: has albuterol prn  8. Seizure: on Keppra and Depakote  9. GERD: on pantoprazole  10. Hypokalemia: Serum K low but stable. On KCl repletion. Will monitor. 11. Bowel Management: Miralax daily, senokot prn, dulcolax prn. 12. DVT Prophylaxis:  low molecular weight heparin, SCD's while in bed and ALLIE's while in bed  13. Internal medicine for medical management      Estimated Length of Stay:  2 weeks. Prognosis  fair    Goals    Home at Independent  Supervision at Discharge: None      Luberta Osler, MD     This note is created with the assistance of a speech recognition program.  While intending to generate a document that actually reflects the content of the visit, the document can still have some errors including those of syntax and sound a like substitutions which may escape proof reading. In such instances, actual meaning can be extrapolated by contextual diversion.

## 2022-04-29 NOTE — CARE COORDINATION
CASE MANAGEMENT NOTE:    Admission Date:  4/28/2022 Zaina Rivers is a 80 y.o.  female    Admitted for : Right sided weakness [R53.1]    Met with:  Patient and records    PCP:  Dr Suyapa Tong:  Yousif Cesar      Is patient alert and oriented at time of discussion:  Yes    Current Residence/ Living Arrangements:  independently at home           Does patient have 24 hour assistance at home:  Yes, either her daughter will stay with her or the pt will stay with the dtr. Current Services PTA:  No    Does patient go to outpatient dialysis: No  If yes, location and chair time: na    Is patient agreeable to VNS/Outpatient therapy Yes    [place] of choice provided:  Yes    List of Home Care Agencies/Outpatient therapy provided: No    VNS/Outpatient therapy chosen:  No    DME:  other glucometer    Home Oxygen: No    Nebulizer: No    CPAP/BIPAP: No    Supplier: N/A    Handicap Placard: No    Potential Assistance Needed: Yes    Pharmacy:  [facility]     Does Patient want to use MEDS to BEDS? No    Is patient currently receiving oral anticoagulation therapy? No    Family Members/Caregivers that pt would like involved in their care:    Yes her children    If yes, list name here:  Nguyễn Marsh eleanor hsui    Transportation Provider:  Family             Discharge Plan:  Discharge home to either her home or her daughters.  dme ordered as needed,refer to vns if needed               Electronically signed by: WILMA Norman, EVELINA on 4/29/2022 at 1:07 PM

## 2022-04-29 NOTE — PROGRESS NOTES
concerns;Right hearing aid  Vision - Basic Assessment  Prior Vision: Wears glasses all the time  Cognition  Overall Cognitive Status: Exceptions  Arousal/Alertness: Appropriate responses to stimuli  Following Commands: Follows multistep commands with increased time  Attention Span: Appears intact  Memory: Decreased recall of recent events  Safety Judgement: Decreased awareness of need for safety;Decreased awareness of need for assistance  Problem Solving: Assistance required to identify errors made;Assistance required to correct errors made  Insights: Decreased awareness of deficits  Initiation: Requires cues for some  Sequencing: Requires cues for some  Orientation  Overall Orientation Status: Within Functional Limits  Orientation Level: Oriented X4   Perception  Overall Perceptual Status: WFL  Sensation  Overall Sensation Status: WFL (denies)       ROM  LUE AROM (degrees)  LUE AROM : WFL  Left Hand AROM (degrees)  Left Hand AROM: WFL  RUE PROM (degrees)  RUE PROM: WFL  RUE AROM (degrees)  RUE AROM : Exceptions  RUE General AROM: Patient demonstrates shoulder shrug this date. OT assesses PROM, during PROM muscle contraction felt during shoulder flexion, shoulder abduction, elbow flexion, and elbow extension. However, patient unable to initiate AROM against gravity or in gravity eliminated position. Right Hand PROM (degrees)  Right Hand PROM: WFL  Right Hand AROM (degrees)  Right Hand AROM: Exceptions  Right Hand General AROM: Twitching/minimal movement noted in 2nd-5th digits. ~50% of thumb flexion noted. LUE Strength  Gross LUE Strength: WFL  L Hand General: 4/5  LUE Strength Comment: Grossly 4/5  RUE Strength  Gross RUE Strength: Exceptions to [facility]  R Hand General: 1/5           Fine Motor Skills  Coordination  Movements Are Fluid And Coordinated: No  Coordination and Movement Description: Right UE;Gross motor impairments; Fine motor impairments (Minimal movement noted in R UE)             Hand Assessment  Hand Dominance  Hand Dominance: Right  Right Hand Strength -  (lbs)  Handle Setting 2: Not assessed this date. Coordination  Movements Are Fluid And Coordinated: No  Coordination and Movement Description: Right UE;Gross motor impairments; Fine motor impairments (Minimal movement noted in R UE)  Left Hand Strength -  (lbs)  Handle Setting 2: 33.67# (30#, 37#, 34#; norms: 32-54#)        Right Hand Strength -  (lbs)  Handle Setting 2: Not assessed this date. Fine Motor Skills  Left 9-Hole Peg Test: Functional  Left 9 Hole Peg Test Time (secs): 21 (21-30 seconds)  Right 9-Hole Peg Test: Not Tested  Fine Motor Comment: Unable to test R hand this date due to minimal movement noted in R hand. Functional Mobility  Balance  Sitting Balance: Stand by assistance  Standing Balance: Minimal assistance  Transfers  Stand Pivot Transfers: 2 Person assistance; Moderate assistance  Sit to stand: Moderate assistance  Stand to sit: Moderate assistance  Transfer Comments: with Minimal verbal cues for hand placement and safety  Toilet Transfers  Toilet - Technique: Stand pivot; To right; To left  Equipment Used: Raised toilet seat with rails  Toilet Transfer: 2 Person assistance; Moderate assistance  Toilet Transfers Comments: with Minimal verbal cues for hand placement and safety  Shower Transfers  Shower - Transfer From: Wheelchair  Shower - Transfer Type: To and From  Shower - Transfer To: Transfer tub bench  Shower - Technique: Stand pivot; To right; To left  Shower Transfers: 2 Person assistance; Moderate assistance           ADL  Feeding: Setup  Feeding Skilled Clinical Factors: Patient reports use of non-dominant L hand for self-feeding currently  Grooming: Stand by assistance  Grooming Skilled Clinical Factors: While seated in w/c at sink  UE Bathing:  Moderate assistance  UE Bathing Skilled Clinical Factors: Assist for L UE and R UE positioning while seated on tub transfer bench in shower  LE Bathing: Moderate assistance  LE Bathing Skilled Clinical Factors: Assist for buttocks and perineal area; Minimal assist for standing balance  UE Dressing: Minimal assistance  UE Dressing Skilled Clinical Factors: Assist bra fastening and threading L UE  LE Dressing: Maximum assistance  LE Dressing Skilled Clinical Factors: Assist to thread R LE, don TEDs & pull pants over hips  Toileting: Maximum assistance  Toileting Skilled Clinical Factors: Assist for clothing management; personal hygiene while seated  Additional Comments: OT facilitated patient engagement in showering routine including bathing, dressing, toileting, and grooming tasks. Patient demonstrates Fair compensatory strategies for self-care with further education warranted to maximize safety and IND. OT scores   Eating  Assistance Needed: Setup or clean-up assistance  CARE Score: 5  Discharge Goal: Independent  Oral Hygiene  Assistance Needed: Supervision or touching assistance  CARE Score: 4  Discharge Goal: [address] needed: Substantial/maximal assistance  CARE Score: 2  Discharge Goal: Independent  Shower/Bathe Self  Assistance Needed: Partial/moderate assistance  CARE Score: 3  Discharge Goal: Independent  Upper Body Dressing  Assistance Needed: Partial/moderate assistance  CARE Score: 3  Discharge Goal: Independent  Lower Body Dressing  Assistance Needed: Substantial/maximal assistance  CARE Score: 2  Discharge Goal: Independent  Putting On/Taking Off Footwear  Assistance Needed: Dependent  CARE Score: 1  Discharge Goal: Independent  Toilet Transfer  Assistance needed: Dependent  CARE Score: 1  Discharge Goal: Independent      Goals  Patient Goals   Patient goals : \"To end up gonig home and walk out of here and be able to be as normal as possible\"  Short Term Goals  Time Frame for Short term goals: One week  Short Term Goal 1: Patient will perform upper body bathing and dressing with SUP.   Short Term Goal 2: Patient will perform lower body bathing and dressing with Minimal assist.  Short Term Goal 3: Patient will perform toileting tasks with Minimal assist.  Short Term Goal 4: Patient will perform stand pivot transfers during self-care with Minimal assist and Good safety. Short Term Goal 5: Patient will verbalize/demonstrate Good understanding of assistive equipment/durable medical equipment/modified techniques for increased safety and IND with self-care. Short Term Goal 6: Patient will verbalize/demonstrate Good understanding of modified techniques for R UE during self-care. Short Term Goal 7: Patient will actively participate in 30+ minutes of therapeutic exercise/functional activities to promote increased IND with self-care and mobility. Long Term Goals  Time Frame for Long term goals : By discharge  Long Term Goal 1: Patient will perform BADLs with modified IND and Good safety. Long Term Goal 2: Patient will perform stand pivot transfers during self-care with modified IND and Good safety. Long Term Goal 3: Patient will perform functional mobility during self-care at w/c level with modified IND. Long Term Goal 4: Patient will verbalize/demonstrate Good understanding of Fall Prevention strategies during self-care to maximize safety and IND. Long Term Goal 5: Patient will verbalize/demonstrate Good understanding of home exercise program for R UE ROM, strengthening, and coordination as appropriate. Long Term Goal 6: 9 hole peg test and dynamometer to be assessed for R UE as appropriate    Assessment  Performance deficits / Impairments: Decreased functional mobility ; Decreased safe awareness;Decreased balance;Decreased coordination;Decreased ADL status; Decreased ROM; Decreased endurance;Decreased high-level IADLs;Decreased strength;Decreased fine motor control;Decreased cognition  Treatment Diagnosis: Impaired self-care status.   Prognosis: Good  Decision Making: High Complexity  Discharge Recommendations: Home with assist PRN;Home with Home health OT  Plan  Times per Week: 5-7  Times per Day: Twice a day            Nurys Bueno OT           04/29/22 1010 04/29/22 1358   OT Individual Minutes   Time In 9746 1525   [facility] [id number]   Minutes 90 15   Time Code Minutes    Timed Code Treatment Minutes 75 Minutes 15 Minutes

## 2022-04-29 NOTE — FLOWSHEET NOTE
04/29/22 1952   Encounter Summary   Encounter Overview/Reason  Volunteer Encounter   Service Provided For: Patient   Referral/Consult From: Armaan   Last Encounter    (4/29/2022)   Complexity of Encounter Low   Spiritual/Emotional needs   Type Spiritual Support   Assessment/Intervention/Outcome   Assessment Hopeful   Intervention Active listening;Nurtured Hope   Outcome Expressed Gratitude

## 2022-04-29 NOTE — CONSULTS
[facility] Internal Medicine    CONSULTATION / HISTORY AND PHYSICAL EXAMINATION            Date:   4/29/2022  Patient name:  Vincent Khoury  Date of admission:  4/28/2022 12:10 PM  MRN:   [id number]  Account:  [de-identified]  YOB: 1950  PCP:    Khanh Pan MD  Room:   5260/9235-47  Code Status:    Full Code    Physician Requesting Consult: Casa Nguyen MD    Reason for Consult:  Medical management    Chief Complaint:     No chief complaint on file. Right-sided weakness    History Obtained From:     Patient, EMR, nursing staff    History of Present Illness:     73-year-old female with history of questionable seizure disorder, chronic right hemiparesis numbness of right leg migraines admitted on 4/17 after being found altered, concern for stroke  Started on IV tPA but then became obtunded and flaccid and tPA held due to concern for hemorrhagic conversion  She was intubated, stat CT done which was unremarkable subsequently tPA was resumed and completed. MRI brain negative for stroke  Episode of flaccidity concerning for seizure hence loaded with IV Keppra  Extubated 4/19  Persistent right-sided weakness, fluctuating, neurology considering psychosomatic symptoms  Patient is on aspirin Plavix    Diabetes   Diagnosed within last year  Modifying factors on med:   Severity:controlled   Associated signs and symtoms: neuropathy/ckd/ CAD.    aggravated with sugar diet and better with low sugar diet    Past Medical History:     Past Medical History:   Diagnosis Date    Cerebral artery occlusion with cerebral infarction ([address] 2008, 2014    CVA (cerebral infarction)     Diabetes mellitus ([address]     Hyperlipidemia     Hypertension     Seizures ([address] 04/2022        Past Surgical History:     Past Surgical History:   Procedure Laterality Date    APPENDECTOMY      BRONCHOSCOPY DIAGNOSTIC  7/24/2014         ENDOSCOPY, COLON, DIAGNOSTIC      HYSTERECTOMY Medications Prior to Admission:     Prior to Admission medications    Medication Sig Start Date End Date Taking? Authorizing Provider   divalproex (DEPAKOTE ER) 500 MG extended release tablet Take 1 tablet by mouth in the morning and at bedtime 4/26/22   Sabrina Vail MD   levETIRAcetam (KEPPRA) 1000 MG tablet Take 1 tablet by mouth 2 times daily 4/21/22   Stefania Lauren MD   topiramate (TOPAMAX) 25 MG tablet Take 1 tablet by mouth 2 times daily 4/21/22   Stefania Lauren MD   clopidogrel (PLAVIX) 75 MG tablet Take 75 mg by mouth nightly    Historical Provider, MD   gabapentin (NEURONTIN) 600 MG tablet Take 600 mg by mouth 3 times daily. Historical Provider, MD   potassium chloride (KLOR-CON M) 20 MEQ extended release tablet Take 20 mEq by mouth 2 times daily    Historical Provider, MD   fenofibrate (TRIGLIDE) 160 MG tablet Take 160 mg by mouth daily    Historical Provider, MD   Pantoprazole Sodium (PROTONIX PO) Take  by mouth. Historical Provider, MD   acetaminophen (TYLENOL) 160 MG/5ML solution Take 20.3 mLs by mouth every 4 hours as needed for Fever. 7/28/14   Sherrie Higgins, DO   albuterol (PROVENTIL HFA;VENTOLIN HFA) 108 (90 BASE) MCG/ACT inhaler Inhale 6 puffs into the lungs every 6 hours as needed for Wheezing. 7/28/14   Sherrie iHggins, DO   glucagon, rDNA, 1 MG SOLR injection Inject 1 mg into the muscle as needed for Low blood sugar (Blood glucose less than 70 mg/dL and patient NOT ALERT or NPO and does not have IV access. ). 7/28/14   Sherrie Higgins, DO   glucose (GLUTOSE) 40 % GEL Take 15 g by mouth as needed. 7/28/14   Sherrie Higgins, DO   insulin glargine (LANTUS) 100 UNIT/ML injection vial Inject 10 Units into the skin nightly. 7/28/14   Sherrie Higgins, DO   insulin lispro (HUMALOG) 100 UNIT/ML injection vial Inject 0-12 Units into the skin every 6 hours. 7/28/14   Sherrie Higgins, DO   niacin (NIASPAN) 500 MG CR tablet Take 1 tablet by mouth nightly.  7/28/14   Sherrie Higgins, DO potassium chloride (KAYCIEL) 20 MEQ/15ML (10%) solution Take 15 mLs by mouth daily. 14   Sloane Cruz DO        Allergies:     Patient has no known allergies. Social History:     Tobacco:    reports that she has never smoked. She has never used smokeless tobacco.  Alcohol:      reports no history of alcohol use. Drug Use:  reports no history of drug use. Family History:     History reviewed. No pertinent family history. Review of Systems:     Positive and Negative as described in HPI. CONSTITUTIONAL:  negative for fevers, chills, sweats, fatigue, weight loss  HEENT:  negative for vision, hearing changes, runny nose, throat pain  RESPIRATORY:  negative for shortness of breath, cough, congestion, wheezing. CARDIOVASCULAR:  negative for chest pain, palpitations.   GASTROINTESTINAL:  negative for nausea, vomiting, diarrhea, constipation, change in bowel habits, abdominal pain   GENITOURINARY:  negative for difficulty of urination, burning with urination, frequency   INTEGUMENT:  negative for rash, skin lesions, easy bruising   HEMATOLOGIC/LYMPHATIC:  negative for swelling/edema   ALLERGIC/IMMUNOLOGIC:  negative for urticaria , itching  ENDOCRINE:  negative increase in drinking, increase in urination, hot or cold intolerance  MUSCULOSKELETAL:  negative joint pains, muscle aches, swelling of joints  NEUROLOGICAL:  Positive for right sided weakness, negative for headaches, dizziness, lightheadedness, numbness, pain, tingling extremities      Physical Exam:     BP (!) 91/56   Pulse 65   Temp 97.3 °F (36.3 °C)   Resp 16   Ht 5' 5\" (1.651 m)   Wt 134 lb (60.8 kg)   SpO2 99%   BMI 22.30 kg/m²   Temp (24hrs), Av.9 °F (36.6 °C), Min:97.3 °F (36.3 °C), Max:98.4 °F (36.9 °C)    Recent Labs     22  0745 22  1610 22  0502   POCGLU 75 183* 124* 73     No intake or output data in the 24 hours ending 22 1058    General Appearance:  alert, well appearing, and in no acute distress  Head:  normocephalic, atraumatic. Eye: no icterus, redness, pupils equal and reactive, extraocular eye movements intact, conjunctiva clear  Ear: normal external ear, no discharge, hearing intact  Nose:  no drainage noted  Mouth: mucous membranes moist  Neck: supple, no carotid bruits, thyroid not palpable  Lungs: Bilateral equal air entry, clear to ausculation, no wheezing, rales or rhonchi, normal effort  Cardiovascular: normal rate, regular rhythm, no murmur, gallop, rub.   Abdomen: Soft, nontender, nondistended, normal bowel sounds, no hepatomegaly or splenomegaly  Neurologic:  Right UE and Lower extremity power 2/5, There are no new focal motor or sensory deficits, normal muscle tone and bulk, no abnormal sensation, normal speech, cranial nerves II through XII grossly intact  Skin: No gross lesions, rashes, bruising or bleeding on exposed skin area  Extremities:  peripheral pulses palpable, no pedal edema or calf pain with palpation  Psych:normal affect    Investigations:      Laboratory Testing:  Recent Results (from the past 24 hour(s))   POC Glucose Fingerstick    Collection Time: 04/28/22  4:10 PM   Result Value Ref Range    POC Glucose 183 (H) 65 - 105 mg/dL   POC Glucose Fingerstick    Collection Time: 04/28/22  8:07 PM   Result Value Ref Range    POC Glucose 124 (H) 65 - 105 mg/dL   POC Glucose Fingerstick    Collection Time: 04/29/22  5:02 AM   Result Value Ref Range    POC Glucose 73 65 - 105 mg/dL   Basic Metabolic Panel w/ Reflex to MG    Collection Time: 04/29/22  7:19 AM   Result Value Ref Range    Glucose 96 70 - 99 mg/dL    BUN 26 (H) 8 - 23 mg/dL    CREATININE 0.74 0.50 - 0.90 mg/dL    Calcium 9.2 8.6 - 10.4 mg/dL    Sodium 143 135 - 144 mmol/L    Potassium 4.3 3.7 - 5.3 mmol/L    Chloride 110 (H) 98 - 107 mmol/L    CO2 23 20 - 31 mmol/L    Anion Gap 10 9 - 17 mmol/L    GFR Non-African American >60 >60 mL/min    GFR African American >60 >60 mL/min    GFR Comment         CBC auto differential    Collection Time: 04/29/22  7:19 AM   Result Value Ref Range    WBC 5.6 3.5 - 11.0 k/uL    RBC 3.63 (L) 4.0 - 5.2 m/uL    Hemoglobin 11.5 (L) 12.0 - 16.0 g/dL    Hematocrit 33.6 (L) 36 - 46 %    MCV 92.6 80 - 100 fL    MCH 31.7 26 - 34 pg    MCHC 34.3 31 - 37 g/dL    RDW 13.8 11.5 - 14.9 %    Platelets 919 469 - 660 k/uL    MPV 6.6 6.0 - 12.0 fL    Seg Neutrophils 43 36 - 66 %    Lymphocytes 47 (H) 24 - 44 %    Monocytes 9 (H) 1 - 7 %    Eosinophils % 1 0 - 4 %    Basophils 0 0 - 2 %    Segs Absolute 2.41 1.3 - 9.1 k/uL    Absolute Lymph # 2.63 1.0 - 4.8 k/uL    Absolute Mono # 0.50 0.1 - 1.3 k/uL    Absolute Eos # 0.06 0.0 - 0.4 k/uL    Basophils Absolute 0.00 0.0 - 0.2 k/uL    Morphology Normal        Imaging/Diagonstics:  Recent data reviewed    Assessment :      Primary Problem  Right sided weakness    Active Hospital Problems    Diagnosis Date Noted    Right sided weakness [R53.1] 04/17/2022       Plan:     1. Right-sided weakness MRI brain negative for stroke- PT OT  2. Type 2 diabetes- continue Lantus, sliding scale  3. History of prior strokes-continue aspirin, Plavix, statin  4. History of seizure disorder- continue Keppra, Topamax, Depakote  5. Hyperlipidemia-patient on statin, fenofibrate  6. H/o Hypertension-currently controlled without meds  7. Recent extubation on 4/19    DVT prophylaxis-Lovenox    Consultations:   Rhina Tong MD  4/29/2022  10:58 AM    Copy sent to Dr. Nissa Milian MD    Please note that this chart was generated using voice recognition Dragon dictation software. Although every effort was made to ensure the accuracy of this automated transcription, some errors in transcription may have occurred.

## 2022-04-30 LAB
ABSOLUTE EOS #: 0 K/UL (ref 0–0.4)
ABSOLUTE LYMPH #: 2.4 K/UL (ref 1–4.8)
ABSOLUTE MONO #: 0.6 K/UL (ref 0.1–1.3)
BASOPHILS # BLD: 0 % (ref 0–2)
BASOPHILS ABSOLUTE: 0 K/UL (ref 0–0.2)
EOSINOPHILS RELATIVE PERCENT: 1 % (ref 0–4)
GLUCOSE BLD-MCNC: 107 MG/DL (ref 65–105)
GLUCOSE BLD-MCNC: 126 MG/DL (ref 65–105)
GLUCOSE BLD-MCNC: 131 MG/DL (ref 65–105)
GLUCOSE BLD-MCNC: 133 MG/DL (ref 65–105)
GLUCOSE BLD-MCNC: 242 MG/DL (ref 65–105)
HCT VFR BLD CALC: 30 % (ref 36–46)
HEMOGLOBIN: 10.2 G/DL (ref 12–16)
LYMPHOCYTES # BLD: 42 % (ref 24–44)
MCH RBC QN AUTO: 31.7 PG (ref 26–34)
MCHC RBC AUTO-ENTMCNC: 34.1 G/DL (ref 31–37)
MCV RBC AUTO: 93.1 FL (ref 80–100)
MONOCYTES # BLD: 10 % (ref 1–7)
PDW BLD-RTO: 13.5 % (ref 11.5–14.9)
PLATELET # BLD: 222 K/UL (ref 150–450)
PMV BLD AUTO: 6.8 FL (ref 6–12)
RBC # BLD: 3.22 M/UL (ref 4–5.2)
SEG NEUTROPHILS: 47 % (ref 36–66)
SEGMENTED NEUTROPHILS ABSOLUTE COUNT: 2.6 K/UL (ref 1.3–9.1)
WBC # BLD: 5.7 K/UL (ref 3.5–11)

## 2022-04-30 PROCEDURE — 97110 THERAPEUTIC EXERCISES: CPT

## 2022-04-30 PROCEDURE — 6370000000 HC RX 637 (ALT 250 FOR IP): Performed by: STUDENT IN AN ORGANIZED HEALTH CARE EDUCATION/TRAINING PROGRAM

## 2022-04-30 PROCEDURE — 99232 SBSQ HOSP IP/OBS MODERATE 35: CPT | Performed by: INTERNAL MEDICINE

## 2022-04-30 PROCEDURE — 97116 GAIT TRAINING THERAPY: CPT

## 2022-04-30 PROCEDURE — 97112 NEUROMUSCULAR REEDUCATION: CPT

## 2022-04-30 PROCEDURE — 85025 COMPLETE CBC W/AUTO DIFF WBC: CPT

## 2022-04-30 PROCEDURE — 6370000000 HC RX 637 (ALT 250 FOR IP): Performed by: PHYSICAL MEDICINE & REHABILITATION

## 2022-04-30 PROCEDURE — 1180000000 HC REHAB R&B

## 2022-04-30 PROCEDURE — 97535 SELF CARE MNGMENT TRAINING: CPT

## 2022-04-30 PROCEDURE — 36415 COLL VENOUS BLD VENIPUNCTURE: CPT

## 2022-04-30 PROCEDURE — 97530 THERAPEUTIC ACTIVITIES: CPT

## 2022-04-30 PROCEDURE — 6360000002 HC RX W HCPCS: Performed by: STUDENT IN AN ORGANIZED HEALTH CARE EDUCATION/TRAINING PROGRAM

## 2022-04-30 PROCEDURE — 99232 SBSQ HOSP IP/OBS MODERATE 35: CPT | Performed by: STUDENT IN AN ORGANIZED HEALTH CARE EDUCATION/TRAINING PROGRAM

## 2022-04-30 RX ADMIN — ENOXAPARIN SODIUM 40 MG: 100 INJECTION SUBCUTANEOUS at 08:34

## 2022-04-30 RX ADMIN — POLYETHYLENE GLYCOL 3350 17 G: 17 POWDER, FOR SOLUTION ORAL at 08:34

## 2022-04-30 RX ADMIN — INSULIN GLARGINE 20 UNITS: 100 INJECTION, SOLUTION SUBCUTANEOUS at 20:42

## 2022-04-30 RX ADMIN — ASPIRIN 81 MG: 81 TABLET, COATED ORAL at 08:34

## 2022-04-30 RX ADMIN — GABAPENTIN 600 MG: 300 CAPSULE ORAL at 08:34

## 2022-04-30 RX ADMIN — ATORVASTATIN CALCIUM 40 MG: 40 TABLET, FILM COATED ORAL at 20:41

## 2022-04-30 RX ADMIN — DIVALPROEX SODIUM 500 MG: 500 TABLET, EXTENDED RELEASE ORAL at 08:35

## 2022-04-30 RX ADMIN — LEVETIRACETAM 1000 MG: 500 TABLET, FILM COATED ORAL at 08:34

## 2022-04-30 RX ADMIN — GABAPENTIN 600 MG: 300 CAPSULE ORAL at 20:41

## 2022-04-30 RX ADMIN — DIVALPROEX SODIUM 500 MG: 500 TABLET, EXTENDED RELEASE ORAL at 20:41

## 2022-04-30 RX ADMIN — TOPIRAMATE 25 MG: 25 TABLET, FILM COATED ORAL at 08:35

## 2022-04-30 RX ADMIN — CLOPIDOGREL BISULFATE 75 MG: 75 TABLET ORAL at 08:34

## 2022-04-30 RX ADMIN — INSULIN LISPRO 4 UNITS: 100 INJECTION, SOLUTION INTRAVENOUS; SUBCUTANEOUS at 12:24

## 2022-04-30 RX ADMIN — FENOFIBRATE 160 MG: 160 TABLET ORAL at 08:34

## 2022-04-30 RX ADMIN — PANTOPRAZOLE SODIUM 40 MG: 40 TABLET, DELAYED RELEASE ORAL at 05:37

## 2022-04-30 RX ADMIN — GABAPENTIN 600 MG: 300 CAPSULE ORAL at 12:21

## 2022-04-30 RX ADMIN — ACETAMINOPHEN 650 MG: 325 TABLET ORAL at 20:47

## 2022-04-30 RX ADMIN — POTASSIUM CHLORIDE 20 MEQ: 20 TABLET, EXTENDED RELEASE ORAL at 08:34

## 2022-04-30 RX ADMIN — TOPIRAMATE 25 MG: 25 TABLET, FILM COATED ORAL at 20:41

## 2022-04-30 RX ADMIN — LEVETIRACETAM 1000 MG: 500 TABLET, FILM COATED ORAL at 20:41

## 2022-04-30 ASSESSMENT — PAIN SCALES - GENERAL
PAINLEVEL_OUTOF10: 0
PAINLEVEL_OUTOF10: 5

## 2022-04-30 ASSESSMENT — PAIN DESCRIPTION - PAIN TYPE: TYPE: ACUTE PAIN

## 2022-04-30 ASSESSMENT — PAIN DESCRIPTION - ORIENTATION: ORIENTATION: RIGHT

## 2022-04-30 ASSESSMENT — PAIN DESCRIPTION - DESCRIPTORS: DESCRIPTORS: THROBBING

## 2022-04-30 ASSESSMENT — PAIN DESCRIPTION - ONSET: ONSET: ON-GOING

## 2022-04-30 ASSESSMENT — PAIN DESCRIPTION - LOCATION: LOCATION: ARM

## 2022-04-30 ASSESSMENT — PAIN DESCRIPTION - FREQUENCY: FREQUENCY: CONTINUOUS

## 2022-04-30 NOTE — PROGRESS NOTES
[facility]  Speech Language Pathology    Date: 4/30/2022  Patient Name: Dilshad Christianson  YOB: 1950   AGE: 80 y.o. MRN: [id number]        Patient Not Available for Speech Therapy     Due to:  [] Testing  [] Hemodialysis  [] Cancelled by RN  [] Surgery   [] Intubation/Sedation/Pain Medication  [] Medical instability  [x] Other: ST made multiple attempts to engage Pt in therapy however Pt was sleeping very soundly. Pt participating in PT/OT at later attempts.     Next scheduled treatment: 5/1  Completed by: Erica Richards M.S., CCC-SLP

## 2022-04-30 NOTE — PROGRESS NOTES
Physical Therapy  Facility/Department: [facility]  Rehabilitation Physical Therapy Initial Assessment    NAME: Shai Anderson  : 1950 (80 y.o.)  MRN: [id number]  CODE STATUS: Full Code    Date of Service: 22        Additional Pertinent Hx: Shai Anderson is a 80 y.o. female with history of CVA, HTN, and HLD admitted to [facility] on 2022. She initially presented with acute-onset right hemiparesis. NIHSS 21. She was given tPA by the mobile stroke unit, but infusion was stopped due to worsening of symptoms and concern for possible hemorrhagic conversion. She was intubated prior to arrival to the ED. CT head showed no acute intracranial abnormality, and tPA was restarted. She was also loaded with keppra due to concern for seizures. MRI brain showed no acute intracranial abnormality. Extubated 22. Saul Arguelles Per notes, she had a similar episodes in  and . Per Neuro notes- Reported history of chronic infarcts with residual right sided weakness but not clearly evident on brain imaging. Neuro recommending EMG as outpatient. Pt admotted to rehab unit on 22. Family / Caregiver Present: No  Diagnosis: Right side weakness    Restrictions:  Restrictions/Precautions: Fall Risk;General Precautions; Up as Tolerated     SUBJECTIVE  Subjective: \"I can't use my R ight side, my finger curls, I use a ball in right hand and try to squeeze\". Post Treatment Pain Screening     Prior Level of Function:     OBJECTIVE  Vision  Vision: Impaired  Vision Exceptions: Wears glasses at all times    Hearing  Hearing: Exceptions to [facility]  Hearing Exceptions: Hard of hearing/hearing concerns;Right hearing aid    Functional Mobility  Bed mobility  Supine to Sit: Stand by assistance (long sit method head of bed elevated)  Sit to Supine: Minimal assistance (left LE assists right fom left side bed )  Transfers  Sit to Stand:  Moderate Assistance (dependant right UE position due to poor )  Stand to sit: by endurance; Patient limited by fatigue    Assessment  Performance Deficits/Impairments: Decreased functional mobility ; Decreased tolerance to work activity; Decreased strength;Decreased safe awareness;Decreased endurance;Decreased balance;Decreased posture;Decreased ROM  Treatment Diagnosis: Weakness, difficulty walking  Therapy Prognosis: Good  Decision Making: High Complexity  Discharge Recommendations: Patient would benefit from continued therapy after discharge;Home with assist PRN  PT Equipment Recommendations  Equipment Needed:  (TBD as therapy progresses)    CLINICAL IMPRESSION        GOALS  Patient Goals   Patient goals : To get better  Short Term Goals  Time Frame for Short term goals: 10 days  Short term goal 1: Pt to perform bed mobility from flat surface independently  Short term goal 2: Demonstrate functional transfers min A  Short term goal 3: Pt to ambulate distance of 100 ft with rolling walker at mod A  Short term goal 4: Pt able to improve sitting balance at EOB/EOM to good to be alen to eat meals. Short term goal 5: Demonstrate dynamic standing balance of fiar - to decrease fall risk  Short Term Goal 6: Pt able to  propel w/c distance of 100 to 150 ft, CGA, level surfaces. Long Term Goals  Time Frame for Long term goals : By DC  Long term goal 1: Pt able to perform transfers at mod-I  Long term goal 2: Pt able to ambulate distance of 100 to 150 ft with appropriate device, at min A. with assistive device. Long term goal 3: Pt able to perform a curb step with a rolling wwalker/UE support, mod A  Long term goal 4: Pt able to propel w/c distance of 150 ft , level surfaces, including turns, mod-I  Long term goal 5: Pt  able to improve standing balance with assistive device to fair to reduce fall risk  Long term goal 6: Improve 2MWT distance to 80 ft to improve function and gait speed. Long term goal 7: Improve PASS score to 25/36 to improve overall function.      PLAN OF CARE    Plan  Plan:  minutes of therapy at least 5 out of 7 days a week (--)  Current Treatment Recommendations: Strengthening;ROM;Balance training;Functional mobility training;Gait training;Neuromuscular re-education;Transfer training; Endurance training; Wheelchair mobility training;Stair training;Home exercise program;Safety education & training;Patient/Caregiver education & training;Equipment evaluation, education, & procurement; Modalities (E-stim as needed to neuro-muscular faciliatation R LE as nee)  Safety Devices  Type of Devices: Call light within reach;Gait belt;Patient at risk for falls; Bed alarm in place; All fall risk precautions in place         04/30/22 1220 04/30/22 1601   PT Individual Minutes   Time In 1102 1415   Time Out 1159 1448   Minutes 57 42 [facility], 04/30/22 at 4:05 PM

## 2022-04-30 NOTE — PROGRESS NOTES
Physical Medicine & Rehabilitation  Progress Note      Subjective:      Dina Aly is a 80 y.o. female with right-sided weakness of unclear etiology. She reports doing okay today. She thinks that right-sided weakness is improving. She also notes numbness in the right upper and lower limbs. She denies any other acute concerns. ROS:  Denies fevers, chills, sweats. No chest pain, palpitations, lightheadedness. Denies coughing, wheezing or shortness of breath. Denies abdominal pain, nausea, diarrhea or constipation. No new areas of joint pain. Denies new areas of numbness or weakness. Denies new anxiety or depression issues. No new skin problems. Rehabilitation:   Progressing in therapies. PT:  Restrictions/Precautions: Fall Risk,General Precautions,Up as Tolerated  Other position/activity restrictions: SBP <180   Transfers  Sit to Stand: Moderate Assistance (dependant right UE position due to poor )  Stand to sit: Moderate Assistance (dependant right UE position due to poor )  Bed to Chair: Moderate assistance (to left)  Stand Pivot Transfers: Moderate Assistance,2 Person Assistance (WC<>cammode assist right LE to advance and block )  Comment: delayed hip/knee extension sit to stand , some right ankle supination    Ambulation  Surface: level tile  Device: Parallel Bars  Assistance: Maximum assistance (right LE and UE advancement )  Quality of Gait: significant ant/post sway, ataxic, difficulty advancing R LE, knees buckling unless therapist  assit locking R knee in extension, unsteady  Gait Deviations: Slow Irene,Decreased step length,Decreased step height  Distance: 5 ft x 2  Comments: pt instructed and visual in decreased trunk sway , stabilization COG over single THOMAS   More Ambulation?: No    Transfers  Sit to Stand:  Moderate Assistance (dependant right UE position due to poor )  Stand to sit: Moderate Assistance (dependant right UE position due to poor )  Bed to Chair: Moderate assistance (to left)  Stand Pivot Transfers: Moderate Assistance,2 Person Assistance (WC<>cammode assist right LE to advance and block )  Comment: delayed hip/knee extension sit to stand , some right ankle supination       Ambulation  Surface: level tile  Device: Parallel Bars  Assistance: Maximum assistance (right LE and UE advancement )  Quality of Gait: significant ant/post sway, ataxic, difficulty advancing R LE, knees buckling unless therapist  assit locking R knee in extension, unsteady  Gait Deviations: Slow Irene,Decreased step length,Decreased step height  Distance: 5 ft x 2  Comments: pt instructed and visual in decreased trunk sway , stabilization COG over single THOMAS   More Ambulation?: No    Surface: level tile  Ambulation  Surface: level tile  Device: Parallel Bars  Assistance: Maximum assistance (right LE and UE advancement )  Quality of Gait: significant ant/post sway, ataxic, difficulty advancing R LE, knees buckling unless therapist  assit locking R knee in extension, unsteady  Gait Deviations: Slow Irene,Decreased step length,Decreased step height  Distance: 5 ft x 2  Comments: pt instructed and visual in decreased trunk sway , stabilization COG over single THOMAS   More Ambulation?: No    OT:  ADL  Feeding: Setup  Feeding Skilled Clinical Factors: Patient reports use of non-dominant L hand for self-feeding currently  Grooming: Stand by assistance  Grooming Skilled Clinical Factors: While seated in w/c at sink  UE Bathing: Moderate assistance  UE Bathing Skilled Clinical Factors: Assist for L UE and R UE positioning while seated on tub transfer bench in shower  LE Bathing: Moderate assistance  LE Bathing Skilled Clinical Factors: Assist for buttocks and perineal area;  Minimal assist for standing balance  UE Dressing: Minimal assistance  UE Dressing Skilled Clinical Factors: Assist bra fastening and threading L UE  LE Dressing: Maximum assistance  LE Dressing Skilled Clinical Factors: Assist to thread R MARIO, liban Infante & pull pants over hips  Toileting: Maximum assistance  Toileting Skilled Clinical Factors: Assist for clothing management; personal hygiene while seated  Additional Comments: OT facilitated patient engagement in showering routine including bathing, dressing, toileting, and grooming tasks. Patient demonstrates Fair compensatory strategies for self-care with further education warranted to maximize safety and IND. Balance  Sitting Balance: Stand by assistance  Standing Balance: Minimal assistance            Bed mobility  Rolling to Left: Moderate assistance  Rolling to Right: Minimal assistance  Supine to Sit: Stand by assistance (long sit method head of bed elevated)  Sit to Supine: Minimal assistance (left LE assists right fom left side bed )  Scooting: Minimal assistance  Comment: Patient seated in recliner at start and end of sessions. Transfers  Stand Pivot Transfers: 2 Person assistance,Moderate assistance  Sit to stand: Moderate assistance  Stand to sit: Moderate assistance  Transfer Comments: with Minimal verbal cues for hand placement and safety   Toilet Transfers  Toilet - Technique: Stand pivot,To right,To left  Equipment Used: Raised toilet seat with rails  Toilet Transfer: 2 Person assistance,Moderate assistance  Toilet Transfers Comments: with Minimal verbal cues for hand placement and safety     Shower Transfers  Shower - Transfer From: Wheelchair  Shower - Transfer Type: To and From  Shower - Transfer To:  Transfer tub bench  Shower - Technique: Stand pivot,To right,To left  Shower Transfers: 2 Person assistance,Moderate assistance       SPEECH:      Current Medications:   Current Facility-Administered Medications: enoxaparin (LOVENOX) injection 40 mg, 40 mg, SubCUTAneous, Daily  ondansetron (ZOFRAN-ODT) disintegrating tablet 4 mg, 4 mg, Oral, Q8H PRN **OR** [DISCONTINUED] ondansetron (ZOFRAN) injection 4 mg, 4 mg, IntraVENous, Q6H PRN  aspirin EC tablet 81 mg, 81 mg, Oral, Daily  atorvastatin (LIPITOR) tablet 40 mg, 40 mg, Oral, Nightly  albuterol sulfate  (90 Base) MCG/ACT inhaler 6 puff, 6 puff, Inhalation, Q6H PRN  clopidogrel (PLAVIX) tablet 75 mg, 75 mg, Oral, Daily  divalproex (DEPAKOTE ER) extended release tablet 500 mg, 500 mg, Oral, BID  fenofibrate (TRIGLIDE) tablet 160 mg, 160 mg, Oral, Daily  gabapentin (NEURONTIN) capsule 600 mg, 600 mg, Oral, TID  insulin glargine (LANTUS) injection vial 20 Units, 20 Units, SubCUTAneous, Nightly  insulin lispro (HUMALOG) injection vial 0-12 Units, 0-12 Units, SubCUTAneous, TID WC  insulin lispro (HUMALOG) injection vial 0-6 Units, 0-6 Units, SubCUTAneous, Nightly  levETIRAcetam (KEPPRA) tablet 1,000 mg, 1,000 mg, Oral, BID  pantoprazole (PROTONIX) tablet 40 mg, 40 mg, Oral, QAM AC  potassium chloride (KLOR-CON M) extended release tablet 20 mEq, 20 mEq, Oral, Daily with breakfast  topiramate (TOPAMAX) tablet 25 mg, 25 mg, Oral, BID  acetaminophen (TYLENOL) tablet 650 mg, 650 mg, Oral, Q4H PRN  polyethylene glycol (GLYCOLAX) packet 17 g, 17 g, Oral, Daily  senna (SENOKOT) tablet 17.2 mg, 2 tablet, Oral, Daily PRN  bisacodyl (DULCOLAX) suppository 10 mg, 10 mg, Rectal, Daily PRN      Objective:  /70   Pulse 77   Temp 97.9 °F (36.6 °C) (Oral)   Resp 18   Ht 5' 5\" (1.651 m)   Wt 134 lb (60.8 kg)   SpO2 99%   BMI 22.30 kg/m²       GEN: Well developed, well nourished, no acute distress  HEENT: NCAT. EOM grossly intact. Hearing grossly intact. Mucous membranes pink and moist.  RESP: Normal breath sounds with no wheezing, rales, or rhonchi. Respirations WNL and unlabored. CV: Regular rate and rhythm. No murmurs, rubs, or gallops. ABD: Soft, non-distended, BS+ and equal.  NEURO: Alert. Speech fluent. Asymmetrical shoulder shrug with left greater than right. Sensation to light touch intact. MSK:  Muscle bulk is normal bilaterally. Strength 4+/5 in left upper limb. Strength 3/5 with right .   Able to dorsiflex and plantarflex the left ankle against gravity. No movement noted in right lower limb  LIMBS: No edema in bilateral lower limbs. SKIN: Warm and dry with good turgor. PSYCH: Mood WNL. Affect WNL. Appropriately interactive. Diagnostics:     CBC:   Recent Labs     04/28/22  0405 04/29/22  0719 04/30/22  0759   WBC 5.7 5.6 5.7   RBC 3.39* 3.63* 3.22*   HGB 10.6* 11.5* 10.2*   HCT 31.7* 33.6* 30.0*   MCV 93.5 92.6 93.1   RDW 13.0 13.8 13.5    261 222     BMP:   Recent Labs     04/28/22  0405 04/29/22  0719    143   K 3.4* 4.3   * 110*   CO2 20 23   BUN 22 26*   CREATININE 0.71 0.74   GLUCOSE 107* 96     BNP: No results for input(s): BNP in the last 72 hours. PT/INR: No results for input(s): PROTIME, INR in the last 72 hours. APTT: No results for input(s): APTT in the last 72 hours. CARDIAC ENZYMES: No results for input(s): CKMB, CKMBINDEX, TROPONINT in the last 72 hours. Invalid input(s): CKTOTAL;3  FASTING LIPID PANEL:  Lab Results   Component Value Date    CHOL 202 (H) 04/17/2022    HDL 55 04/17/2022    TRIG 164 (H) 04/17/2022     LIVER PROFILE: No results for input(s): AST, ALT, ALB, BILIDIR, BILITOT, ALKPHOS in the last 72 hours. Impression/Plan:   Impaired ADLs, gait, and mobility due to:    1. Right-sided weakness:  Unknown etiology - possibly psychosomatic. PT/OT for gait, mobility, strengthening, endurance, ADLs, and self care. Plan for outpatient EMG. On aspirin, plavix. On gabapentin. 2. Cognitive impairment:  SLP following. 3. Anemia: Hemoglobin 10.2 on 4/30, stable. Monitoring. 4. Type 2 Diabetes:  Hemoglobin A1c 10.3 on 4/17. On Lantus nightly and sliding scale  5. HTN:  Not currently on medication  6. HLD: On atorvastatin, fenofibrate  7. History of CVA:  On aspirin, plavix, atorvastatin, fenofibrate  8. Seizure: On Keppra and Depakote  9. GERD: On pantoprazole  10. Hypokalemia:  Improved. On KCl repletion daily. Monitoring.    11. Bowel Management: Miralax daily, senokot prn, dulcolax prn. 12. DVT Prophylaxis:  low molecular weight heparin, SCD's while in bed and ALLIE's while in bed  13.  Internal medicine for medical management      Electronically signed by Hernandez Roman MD on 4/30/2022 at 11:13 PM

## 2022-04-30 NOTE — PLAN OF CARE
Problem: Discharge Planning  Goal: Discharge to home or other facility with appropriate resources  Outcome: Progressing     Problem: Safety - Adult  Goal: Free from fall injury  Outcome: Progressing     Problem: ABCDS Injury Assessment  Goal: Absence of physical injury  Outcome: Progressing     Problem: Skin/Tissue Integrity  Goal: Absence of new skin breakdown  Description: 1. Monitor for areas of redness and/or skin breakdown  2. Assess vascular access sites hourly  3. Every 4-6 hours minimum:  Change oxygen saturation probe site  4. Every 4-6 hours:  If on nasal continuous positive airway pressure, respiratory therapy assess nares and determine need for appliance change or resting period.   Outcome: Progressing     Problem: Chronic Conditions and Co-morbidities  Goal: Patient's chronic conditions and co-morbidity symptoms are monitored and maintained or improved  Outcome: Progressing     Problem: Nutrition Deficit:  Goal: Optimize nutritional status  Outcome: Progressing

## 2022-04-30 NOTE — DISCHARGE SUMMARY
[facility]     Department of Neurology    INPATIENT DISCHARGE SUMMARY        Patient Identification:  Mata Armenta is a 80 y.o. female. :  1950  MRN: [id number]     Acct: [de-identified]   Admit Date:  2022  Discharge date and time: 2022 11:55 AM   Attending Provider: No att. providers found                                     Admission Diagnoses:   Hemiparesis of right dominant side due to acute cerebrovascular disease ([facility] [I67.89, G81.91]    Discharge Diagnoses:   Principal Problem:    Right sided weakness  Active Problems:    Tissue plasminogen activator (tPA) administered at other facility within 24 hours before current admission  Resolved Problems:    Migraine with aura and without status migrainosus, not intractable    Seizure-like activity ([facility]       Consults:   PM&R    Brief Inpatient course: Mata Armenta is a  80 y.o. female with H/O questionable seizure disorder, chronic right hemiparesis, numbness of right foot, migraines, who was admitted on 2022 via [facility] after being found altered, unable to follow commands or answer questions, with right-sided weakness and sensory loss, right-sided neglect and aphasia. NIH 21. Patient was started on IV tPA but then became obtunded and flaccid in all 4 extremities with concern for possible hemorrhagic conversion and tPA was held. She was started on a Cardene drip and admitted to the neuro ICU for admission. She had been taking dual antiplatelets at home. She arrived intubated and went directly to CT scan. CT head unremarkable, CTA head neck with no LVO and patient was restarted on tPA as well as loaded with IV Keppra for possible subclinical seizure. She was extubated on . Continues to report right-sided weakness worse than her baseline and was transferred to the neurology service on 2022. Her right-sided weakness has not been consistent on exam, fluctuates in nature.   She also has inconsistencies in her sensory exam.  MRI cervical spine were done to rule out myelopathy; her weakness was felt to be more psychosomatic in nature. MRI cervical spine showing multilevel degenerative disc disease. Patient was continued on aspirin, Plavix, Lipitor. Patient was also continued on her Keppra and Depakene. Patient's weakness in the right side has been improving. PMNR was consulted and recommended further therapy after discharge. Patient is being discharged to [facility] rehab. Patient is stable from neurological standpoint to be discharged. Procedures:  tPA    Any Hospital Acquired Infections: none    Discharge Functional Status:  stable    Disposition: ARU        Discharge Medications:  Discharge Medication List as of 4/28/2022 12:00 PM      START taking these medications    Details   divalproex (DEPAKOTE ER) 500 MG extended release tablet Take 1 tablet by mouth in the morning and at bedtime, Disp-30 tablet, R-3Normal      topiramate (TOPAMAX) 25 MG tablet Take 1 tablet by mouth 2 times daily, Disp-60 tablet, R-3Normal         CONTINUE these medications which have CHANGED    Details   levETIRAcetam (KEPPRA) 1000 MG tablet Take 1 tablet by mouth 2 times daily, Disp-60 tablet, R-3Normal         CONTINUE these medications which have NOT CHANGED    Details   clopidogrel (PLAVIX) 75 MG tablet Take 75 mg by mouth nightlyHistorical Med      gabapentin (NEURONTIN) 600 MG tablet Take 600 mg by mouth 3 times daily. Historical Med      potassium chloride (KLOR-CON M) 20 MEQ extended release tablet Take 20 mEq by mouth 2 times dailyHistorical Med      fenofibrate (TRIGLIDE) 160 MG tablet Take 160 mg by mouth dailyHistorical Med      Pantoprazole Sodium (PROTONIX PO) Take  by mouth.       acetaminophen (TYLENOL) 160 MG/5ML solution Take 20.3 mLs by mouth every 4 hours as needed for Fever., Disp-473 mL, R-3      albuterol (PROVENTIL HFA;VENTOLIN HFA) 108 (90 BASE) MCG/ACT inhaler Inhale 6 puffs into the lungs every 6 hours as needed for Wheezing., Disp-1 Inhaler, R-3      glucagon, rDNA, 1 MG SOLR injection Inject 1 mg into the muscle as needed for Low blood sugar (Blood glucose less than 70 mg/dL and patient NOT ALERT or NPO and does not have IV access. )., R-0      glucose (GLUTOSE) 40 % GEL Take 15 g by mouth as needed. , Disp-45 g, R-1      insulin glargine (LANTUS) 100 UNIT/ML injection vial Inject 10 Units into the skin nightly., Disp-1 Pen, R-3      insulin lispro (HUMALOG) 100 UNIT/ML injection vial Inject 0-12 Units into the skin every 6 hours. , Disp-1 Pen, R-3      niacin (NIASPAN) 500 MG CR tablet Take 1 tablet by mouth nightly., Disp-30 tablet, R-3      potassium chloride (KAYCIEL) 20 MEQ/15ML (10%) solution Take 15 mLs by mouth daily.          STOP taking these medications       valproate (DEPAKENE) 250 MG/5ML syrup Comments:   Reason for Stopping:               Activity: activity as tolerated      Diet: regular diet    Follow-up:    Divya Geiger MD  [address]  [phone]    Schedule an appointment as soon as possible for a visit        Follow up labs: None    Follow up imaging: None    Note that over 30 minutes was spent in preparing discharge papers, discussing discharge with patient, medication review, etc.      Arleth Lantigua MD,  Department of Neurology  [address] [place] [address]  4/30/2022, 11:47 AM

## 2022-04-30 NOTE — CONSULTS
[facility]    CONSULTATION / HISTORY AND PHYSICAL EXAMINATION            Date:   4/30/2022  Patient name:  Ketan Triplett  Date of admission:  4/28/2022 12:10 PM  MRN:   [id number]  Account:  [de-identified]  YOB: 1950  PCP:    Dulce Maria Olivera MD  Room:   6115/7120-54  Code Status:    Full Code    Physician Requesting Consult: Alexandra Reid MD    Reason for Consult:  Medical management    Chief Complaint:     No chief complaint on file. Right-sided weakness    History Obtained From:     Patient, EMR, nursing staff    History of Present Illness:     56-year-old female with history of questionable seizure disorder, chronic right hemiparesis numbness of right leg migraines admitted on 4/17 after being found altered, concern for stroke  Started on IV tPA but then became obtunded and flaccid and tPA held due to concern for hemorrhagic conversion  She was intubated, stat CT done which was unremarkable subsequently tPA was resumed and completed. MRI brain negative for stroke  Episode of flaccidity concerning for seizure hence loaded with IV Keppra  Extubated 4/19  Persistent right-sided weakness, fluctuating, neurology considering psychosomatic symptoms  Patient is on aspirin Plavix    Diabetes   Diagnosed within last year  Modifying factors on med:   Severity:controlled   Associated signs and symtoms: neuropathy/ckd/ CAD.    aggravated with sugar diet and better with low sugar diet    Past Medical History:     Past Medical History:   Diagnosis Date    Cerebral artery occlusion with cerebral infarction ([address] 2008, 2014    CVA (cerebral infarction)     Diabetes mellitus ([address]     Hyperlipidemia     Hypertension     Seizures ([address] 04/2022        Past Surgical History:     Past Surgical History:   Procedure Laterality Date    APPENDECTOMY      BRONCHOSCOPY DIAGNOSTIC  7/24/2014         ENDOSCOPY, COLON, DIAGNOSTIC      HYSTERECTOMY Medications Prior to Admission:     Prior to Admission medications    Medication Sig Start Date End Date Taking? Authorizing Provider   divalproex (DEPAKOTE ER) 500 MG extended release tablet Take 1 tablet by mouth in the morning and at bedtime 4/26/22   Nerissa Wilder MD   levETIRAcetam (KEPPRA) 1000 MG tablet Take 1 tablet by mouth 2 times daily 4/21/22   William Holland MD   topiramate (TOPAMAX) 25 MG tablet Take 1 tablet by mouth 2 times daily 4/21/22   William Holland MD   clopidogrel (PLAVIX) 75 MG tablet Take 75 mg by mouth nightly    Historical Provider, MD   gabapentin (NEURONTIN) 600 MG tablet Take 600 mg by mouth 3 times daily. Historical Provider, MD   potassium chloride (KLOR-CON M) 20 MEQ extended release tablet Take 20 mEq by mouth 2 times daily    Historical Provider, MD   fenofibrate (TRIGLIDE) 160 MG tablet Take 160 mg by mouth daily    Historical Provider, MD   Pantoprazole Sodium (PROTONIX PO) Take  by mouth. Historical Provider, MD   acetaminophen (TYLENOL) 160 MG/5ML solution Take 20.3 mLs by mouth every 4 hours as needed for Fever. 7/28/14   Shashank Cosme DO   albuterol (PROVENTIL HFA;VENTOLIN HFA) 108 (90 BASE) MCG/ACT inhaler Inhale 6 puffs into the lungs every 6 hours as needed for Wheezing. 7/28/14   Shashank Cosme DO   glucagon, rDNA, 1 MG SOLR injection Inject 1 mg into the muscle as needed for Low blood sugar (Blood glucose less than 70 mg/dL and patient NOT ALERT or NPO and does not have IV access. ). 7/28/14   Shashank Cosme DO   glucose (GLUTOSE) 40 % GEL Take 15 g by mouth as needed. 7/28/14   Shashank Cosme DO   insulin glargine (LANTUS) 100 UNIT/ML injection vial Inject 10 Units into the skin nightly. 7/28/14   Shashank Cosme DO   insulin lispro (HUMALOG) 100 UNIT/ML injection vial Inject 0-12 Units into the skin every 6 hours. 7/28/14   Shashank Cosme DO   niacin (NIASPAN) 500 MG CR tablet Take 1 tablet by mouth nightly.  7/28/14   Shashank Cosme DO potassium chloride (KAYCIEL) 20 MEQ/15ML (10%) solution Take 15 mLs by mouth daily. 14   Beata Ch DO        Allergies:     Patient has no known allergies. Social History:     Tobacco:    reports that she has never smoked. She has never used smokeless tobacco.  Alcohol:      reports no history of alcohol use. Drug Use:  reports no history of drug use. Family History:     History reviewed. No pertinent family history. Review of Systems:     Positive and Negative as described in HPI. CONSTITUTIONAL:  negative for fevers, chills, sweats, fatigue, weight loss  HEENT:  negative for vision, hearing changes, runny nose, throat pain  RESPIRATORY:  negative for shortness of breath, cough, congestion, wheezing. CARDIOVASCULAR:  negative for chest pain, palpitations.   GASTROINTESTINAL:  negative for nausea, vomiting, diarrhea, constipation, change in bowel habits, abdominal pain   GENITOURINARY:  negative for difficulty of urination, burning with urination, frequency   INTEGUMENT:  negative for rash, skin lesions, easy bruising   HEMATOLOGIC/LYMPHATIC:  negative for swelling/edema   ALLERGIC/IMMUNOLOGIC:  negative for urticaria , itching  ENDOCRINE:  negative increase in drinking, increase in urination, hot or cold intolerance  MUSCULOSKELETAL:  negative joint pains, muscle aches, swelling of joints  NEUROLOGICAL:  Positive for right sided weakness, negative for headaches, dizziness, lightheadedness, numbness, pain, tingling extremities      Physical Exam:     BP 95/61   Pulse 74   Temp 98.1 °F (36.7 °C)   Resp 16   Ht 5' 5\" (1.651 m)   Wt 134 lb (60.8 kg)   SpO2 97%   BMI 22.30 kg/m²   Temp (24hrs), Av.3 °F (36.8 °C), Min:98.1 °F (36.7 °C), Max:98.4 °F (36.9 °C)    Recent Labs     22  1627 22  1953 22  0506 22  1210   POCGLU 216* 187* 126* 242*       Intake/Output Summary (Last 24 hours) at 2022 1553  Last data filed at 2022 1930  Gross per 24 hour Intake 480 ml   Output --   Net 480 ml       General Appearance:  alert, well appearing, and in no acute distress  Head:  normocephalic, atraumatic. Eye: no icterus, redness, pupils equal and reactive, extraocular eye movements intact, conjunctiva clear  Ear: normal external ear, no discharge, hearing intact  Nose:  no drainage noted  Mouth: mucous membranes moist  Neck: supple, no carotid bruits, thyroid not palpable  Lungs: Bilateral equal air entry, clear to ausculation, no wheezing, rales or rhonchi, normal effort  Cardiovascular: normal rate, regular rhythm, no murmur, gallop, rub.   Abdomen: Soft, nontender, nondistended, normal bowel sounds, no hepatomegaly or splenomegaly  Neurologic:  Right UE and Lower extremity power 2/5, There are no new focal motor or sensory deficits, normal muscle tone and bulk, no abnormal sensation, normal speech, cranial nerves II through XII grossly intact  Skin: No gross lesions, rashes, bruising or bleeding on exposed skin area  Extremities:  peripheral pulses palpable, no pedal edema or calf pain with palpation  Psych:normal affect    Investigations:      Laboratory Testing:  Recent Results (from the past 24 hour(s))   POC Glucose Fingerstick    Collection Time: 04/29/22  4:27 PM   Result Value Ref Range    POC Glucose 216 (H) 65 - 105 mg/dL   POC Glucose Fingerstick    Collection Time: 04/29/22  7:53 PM   Result Value Ref Range    POC Glucose 187 (H) 65 - 105 mg/dL   POC Glucose Fingerstick    Collection Time: 04/30/22  5:06 AM   Result Value Ref Range    POC Glucose 126 (H) 65 - 105 mg/dL   CBC auto differential    Collection Time: 04/30/22  7:59 AM   Result Value Ref Range    WBC 5.7 3.5 - 11.0 k/uL    RBC 3.22 (L) 4.0 - 5.2 m/uL    Hemoglobin 10.2 (L) 12.0 - 16.0 g/dL    Hematocrit 30.0 (L) 36 - 46 %    MCV 93.1 80 - 100 fL    MCH 31.7 26 - 34 pg    MCHC 34.1 31 - 37 g/dL    RDW 13.5 11.5 - 14.9 %    Platelets 072 674 - 492 k/uL    MPV 6.8 6.0 - 12.0 fL    Seg Neutrophils 47 36 - 66 %    Lymphocytes 42 24 - 44 %    Monocytes 10 (H) 1 - 7 %    Eosinophils % 1 0 - 4 %    Basophils 0 0 - 2 %    Segs Absolute 2.60 1.3 - 9.1 k/uL    Absolute Lymph # 2.40 1.0 - 4.8 k/uL    Absolute Mono # 0.60 0.1 - 1.3 k/uL    Absolute Eos # 0.00 0.0 - 0.4 k/uL    Basophils Absolute 0.00 0.0 - 0.2 k/uL   POC Glucose Fingerstick    Collection Time: 04/30/22 12:10 PM   Result Value Ref Range    POC Glucose 242 (H) 65 - 105 mg/dL       Imaging/Diagonstics:  Recent data reviewed    Assessment :      Primary Problem  Right sided weakness    Active Hospital Problems    Diagnosis Date Noted    Right sided weakness [R53.1] 04/17/2022    HTN (hypertension) [I10] 07/19/2014       Plan:     1. Right-sided weakness MRI brain negative for stroke- PT OT  2. Type 2 diabetes- continue Lantus, sliding scale  3. History of prior strokes-continue aspirin, Plavix, statin  4. History of seizure disorder- continue Keppra, Topamax, Depakote  5. Hyperlipidemia-patient on statin, fenofibrate  6. H/o Hypertension-currently controlled without meds  7. Recent extubation on 4/19    DVT prophylaxis-Lovenox    Consultations:   IP CONSULT TO DIETITIAN  IP CONSULT TO SOCIAL WORK  IP CONSULT TO INTERNAL MEDICINE      Power Jaime MD  4/30/2022  3:53 PM    Copy sent to Dr. Melisa Orta MD    Please note that this chart was generated using voice recognition Dragon dictation software. Although every effort was made to ensure the accuracy of this automated transcription, some errors in transcription may have occurred.

## 2022-04-30 NOTE — PLAN OF CARE
Problem: Safety - Adult  Goal: Free from fall injury  Outcome: Progressing  Note: Patient remains free from falls so far this shift. Will continue to round at least hourly, place bed in lowest position with call light within reach, and alarm activated. Problem: Skin/Tissue Integrity  Goal: Absence of new skin breakdown  Description: 1. Monitor for areas of redness and/or skin breakdown  2. Assess vascular access sites hourly  3. Every 4-6 hours minimum:  Change oxygen saturation probe site  4. Every 4-6 hours:  If on nasal continuous positive airway pressure, respiratory therapy assess nares and determine need for appliance change or resting period. Outcome: Progressing  Note: There are no new skin issues so far this shift. Will continue to assist patient with repositioning at least every two hours.

## 2022-04-30 NOTE — PROGRESS NOTES
Occupational Therapy  Facility/Department: [facility] ACUTE REHAB  Rehabilitation Occupational Therapy Daily Treatment Note    Date: 22  Patient Name: Niraj Hernandez       Room: 7111/0751-18  MRN: [id number]  Account: [de-identified]   : 1950  (70 y.o.) Gender: female                    Past Medical History:  has a past medical history of Cerebral artery occlusion with cerebral infarction [facility]), CVA (cerebral infarction), Diabetes mellitus ([facility] Hyperlipidemia, Hypertension, and Seizures ([facility] Past Surgical History:   has a past surgical history that includes Appendectomy; Hysterectomy; Bronchoscopy diagnostic (2014); and Endoscopy, colon, diagnostic. Restrictions  Restrictions/Precautions: Fall Risk;General Precautions; Up as Tolerated  Other position/activity restrictions: SBP <180  Required Braces or Orthoses?: No    Subjective  Subjective: \"weak side first.\" pt repeated during dressing tasks   Restrictions/Precautions: Fall Risk;General Precautions; Up as Tolerated        Pain Assessment  Pain Assessment: None - Denies Pain    Objective     Cognition  Overall Cognitive Status: Exceptions  Arousal/Alertness: Appropriate responses to stimuli  Following Commands: Follows multistep commands with increased time  Attention Span: Appears intact  Memory: Decreased recall of recent events  Safety Judgement: Decreased awareness of need for safety;Decreased awareness of need for assistance  Problem Solving: Assistance required to identify errors made;Assistance required to correct errors made  Insights: Decreased awareness of deficits  Initiation: Requires cues for some  Sequencing: Requires cues for some  Orientation  Overall Orientation Status: Within Functional Limits  Orientation Level: Oriented X4         ADL  Grooming/Oral Hygiene  Assistance Level: Stand by assist;Set-up  Skilled Clinical Factors: seated in w/c sinklelevel     Upper Extremity Bathing  Assistance Level: Minimal assistance;Set-up; Verbal cues;Increased time to complete  Skilled Clinical Factors: PERRY elicited RUE Jackson for washing LUE and axilla area to promote functional movements during ADLs. Completed seated in w/c sinklevel     Lower Extremity Bathing  Assistance Level: Moderate assistance;Verbal cues; Set-up  Skilled Clinical Factors: VC and demonstration for figure 4 tech for completing footlevel care and washing BLEs for increased ease and independence in ADLs, pt able to redemo with 100% accuracy. MIN/MOD A x1 for balance maintenence while standing for lenore care. Writer blocking RLE due to buckling. No LOB noted    Upper Extremity Dressing  Assistance Level: Minimal assistance;Set-up; Verbal cues  Skilled Clinical Factors: Pt demo'd G use of estee tech. Pt able to don/doff pull over shirt, sweater, and  twist and unclasp bra, req A for clasping bra only this date     Lower Extremity Dressing  Assistance Level: Moderate assistance;Minimal assistance;Set-up; Verbal cues  Skilled Clinical Factors: Pt flucuates between MIN A/MOD A for balance maintenance using sink for 0-1 UE support, TA for TEDs. PERRY provided edcuation and demonstration on figure 4 tech to increase ease in LE dressing. Pt able to redemo figure 4 tech to  don/doff footies/underwear/pants with 100% accrucay this date     Putting On/Taking Off Footwear  Assistance Level: Stand by assist;Set-up; Verbal cues  Skilled Clinical Factors: TA for TEDs, able to don/doff footies with figure 4 tech     Functional Mobility  Device: Wheelchair  Activity: To/From bathroom; To/From therapy gym  Assistance Level: Dependent  Skilled Clinical Factors: did not self propel     Supine  Assistance Level: Moderate assistance  Skilled Clinical Factors: sit>supine; assist for BLEs    Sit to Stand  Assistance Level: Moderate assistance; Requires x 2 assistance (MOD A x1 and CGA x1 for safety )  Skilled Clinical Factors: R LE buckling     Stand to Sit  Assistance Level: Moderate assistance; Requires x 2 assistance  Skilled Clinical Factors: MOD A x1 and CGA x1 for safety     Stand Pivot  Assistance Level: Moderate assistance; Requires x 2 assistance  Skilled Clinical Factors: second person present for safety; MOD A 1 and CGA x1 req vc for hand placement and sequencing of transfer, no LOB noted      OT Exercises  PROM Exercises: PROM excersises (elbow flex/ext, wrist flex/ext, and finger flex/ext) for 5 reps facilitated to increase ROM needed to participate in functional tasks. A/AROM Exercises: AAROM exercises with completed seated in chair RUE 1 set x 5 reps in all planes to prevent contractures and promote independence with ADL/IADLs. Intermittent RB req due to increased fatigue   Resistive Exercises: LUE exercises with 1# free for 15 reps facilitated this date in all planes of motion to increase strength needed to participate in functional transfers and mobility safely and indepednelty. RB req due to fatigue and weakeness in LUE      Assessment  Assessment  Activity Tolerance: Patient tolerated treatment well;Patient limited by endurance; Patient limited by fatigue  Discharge Recommendations: Home with assist PRN;Home with Home health OT  Safety Devices  Safety Devices in place: Yes  Type of devices: Patient at risk for falls;Call light within reach;Gait belt;Left in chair; Left in bed     Patient Education  Education  Education Given To: Patient  Education Provided: Role of Therapy;Plan of Care;ADL Function; Safety;Transfer Training;Home Exercise Program  Education Method: Verbal;Demonstration  Education Outcome: Continued education needed    Plan  Plan  Times per Week: 5-7  Times per Day: Twice a day  Current Treatment Recommendations: Strengthening;ROM;Balance training;Functional mobility training; Endurance training; Safety education & training;Patient/Caregiver education & training;Equipment evaluation, education, & procurement;Self-Care / ADL; Coordination training;Neuromuscular re-education;Modalities    Goals  Patient Goals   Patient goals : \"To end up gonig home and walk out of here and be able to be as normal as possible\"  Short Term Goals  Time Frame for Short term goals: One week  Short Term Goal 1: Patient will perform upper body bathing and dressing with SUP. Short Term Goal 2: Patient will perform lower body bathing and dressing with Minimal assist.  Short Term Goal 3: Patient will perform toileting tasks with Minimal assist.  Short Term Goal 4: Patient will perform stand pivot transfers during self-care with Minimal assist and Good safety. Short Term Goal 5: Patient will verbalize/demonstrate Good understanding of assistive equipment/durable medical equipment/modified techniques for increased safety and IND with self-care. Short Term Goal 6: Patient will verbalize/demonstrate Good understanding of modified techniques for R UE during self-care. Short Term Goal 7: Patient will actively participate in 30+ minutes of therapeutic exercise/functional activities to promote increased IND with self-care and mobility. Long Term Goals  Time Frame for Long term goals : By discharge  Long Term Goal 1: Patient will perform BADLs with modified IND and Good safety. Long Term Goal 2: Patient will perform stand pivot transfers during self-care with modified IND and Good safety. Long Term Goal 3: Patient will perform functional mobility during self-care at w/c level with modified IND. Long Term Goal 4: Patient will verbalize/demonstrate Good understanding of Fall Prevention strategies during self-care to maximize safety and IND. Long Term Goal 5: Patient will verbalize/demonstrate Good understanding of home exercise program for R UE ROM, strengthening, and coordination as appropriate.   Long Term Goal 6: 9 hole peg test and dynamometer to be assessed for R UE as appropriate     04/30/22 0904 04/30/22 1449   OT Individual Minutes   Time In 2064 [id number]   Time Out 1004 1521   Minutes 60 32 Alie Castillo, [place]

## 2022-05-01 LAB
GLUCOSE BLD-MCNC: 192 MG/DL (ref 65–105)
GLUCOSE BLD-MCNC: 66 MG/DL (ref 65–105)
GLUCOSE BLD-MCNC: 89 MG/DL (ref 65–105)
GLUCOSE BLD-MCNC: 96 MG/DL (ref 65–105)

## 2022-05-01 PROCEDURE — 6360000002 HC RX W HCPCS: Performed by: STUDENT IN AN ORGANIZED HEALTH CARE EDUCATION/TRAINING PROGRAM

## 2022-05-01 PROCEDURE — 97530 THERAPEUTIC ACTIVITIES: CPT

## 2022-05-01 PROCEDURE — 6370000000 HC RX 637 (ALT 250 FOR IP): Performed by: STUDENT IN AN ORGANIZED HEALTH CARE EDUCATION/TRAINING PROGRAM

## 2022-05-01 PROCEDURE — 97130 THER IVNTJ EA ADDL 15 MIN: CPT

## 2022-05-01 PROCEDURE — 6370000000 HC RX 637 (ALT 250 FOR IP): Performed by: PHYSICAL MEDICINE & REHABILITATION

## 2022-05-01 PROCEDURE — 97110 THERAPEUTIC EXERCISES: CPT

## 2022-05-01 PROCEDURE — 99232 SBSQ HOSP IP/OBS MODERATE 35: CPT | Performed by: INTERNAL MEDICINE

## 2022-05-01 PROCEDURE — 82947 ASSAY GLUCOSE BLOOD QUANT: CPT

## 2022-05-01 PROCEDURE — 6370000000 HC RX 637 (ALT 250 FOR IP): Performed by: INTERNAL MEDICINE

## 2022-05-01 PROCEDURE — 1180000000 HC REHAB R&B

## 2022-05-01 PROCEDURE — 97116 GAIT TRAINING THERAPY: CPT

## 2022-05-01 PROCEDURE — 99231 SBSQ HOSP IP/OBS SF/LOW 25: CPT | Performed by: STUDENT IN AN ORGANIZED HEALTH CARE EDUCATION/TRAINING PROGRAM

## 2022-05-01 PROCEDURE — 97129 THER IVNTJ 1ST 15 MIN: CPT

## 2022-05-01 PROCEDURE — 97535 SELF CARE MNGMENT TRAINING: CPT

## 2022-05-01 RX ORDER — INSULIN GLARGINE 100 [IU]/ML
15 INJECTION, SOLUTION SUBCUTANEOUS NIGHTLY
Status: DISCONTINUED | OUTPATIENT
Start: 2022-05-01 | End: 2022-05-02

## 2022-05-01 RX ADMIN — DIVALPROEX SODIUM 500 MG: 500 TABLET, EXTENDED RELEASE ORAL at 07:44

## 2022-05-01 RX ADMIN — GABAPENTIN 600 MG: 300 CAPSULE ORAL at 19:54

## 2022-05-01 RX ADMIN — LEVETIRACETAM 1000 MG: 500 TABLET, FILM COATED ORAL at 07:43

## 2022-05-01 RX ADMIN — CLOPIDOGREL BISULFATE 75 MG: 75 TABLET ORAL at 07:43

## 2022-05-01 RX ADMIN — INSULIN LISPRO 1 UNITS: 100 INJECTION, SOLUTION INTRAVENOUS; SUBCUTANEOUS at 20:05

## 2022-05-01 RX ADMIN — GABAPENTIN 600 MG: 300 CAPSULE ORAL at 16:59

## 2022-05-01 RX ADMIN — INSULIN GLARGINE 15 UNITS: 100 INJECTION, SOLUTION SUBCUTANEOUS at 20:06

## 2022-05-01 RX ADMIN — ATORVASTATIN CALCIUM 40 MG: 40 TABLET, FILM COATED ORAL at 19:54

## 2022-05-01 RX ADMIN — POTASSIUM CHLORIDE 20 MEQ: 20 TABLET, EXTENDED RELEASE ORAL at 07:43

## 2022-05-01 RX ADMIN — ASPIRIN 81 MG: 81 TABLET, COATED ORAL at 07:43

## 2022-05-01 RX ADMIN — FENOFIBRATE 160 MG: 160 TABLET ORAL at 07:43

## 2022-05-01 RX ADMIN — LEVETIRACETAM 1000 MG: 500 TABLET, FILM COATED ORAL at 19:54

## 2022-05-01 RX ADMIN — PANTOPRAZOLE SODIUM 40 MG: 40 TABLET, DELAYED RELEASE ORAL at 06:15

## 2022-05-01 RX ADMIN — TOPIRAMATE 25 MG: 25 TABLET, FILM COATED ORAL at 07:44

## 2022-05-01 RX ADMIN — ENOXAPARIN SODIUM 40 MG: 100 INJECTION SUBCUTANEOUS at 07:43

## 2022-05-01 RX ADMIN — DIVALPROEX SODIUM 500 MG: 500 TABLET, EXTENDED RELEASE ORAL at 20:00

## 2022-05-01 RX ADMIN — POLYETHYLENE GLYCOL 3350 17 G: 17 POWDER, FOR SOLUTION ORAL at 07:43

## 2022-05-01 RX ADMIN — GABAPENTIN 600 MG: 300 CAPSULE ORAL at 07:43

## 2022-05-01 RX ADMIN — TOPIRAMATE 25 MG: 25 TABLET, FILM COATED ORAL at 20:00

## 2022-05-01 NOTE — PROGRESS NOTES
Physical Medicine & Rehabilitation  Progress Note      Subjective:      Ketan Triplett is a 80 y.o. female with right-sided weakness of unclear etiology. She reports doing fine today. She feels like she is getting better overall. She states that she worked on balance in therapy. She denies any acute concerns. ROS:  Denies fevers, chills, sweats. No chest pain, palpitations, lightheadedness. Denies coughing, wheezing or shortness of breath. Denies abdominal pain, nausea, diarrhea or constipation. No new areas of joint pain. Denies new areas of numbness or weakness. Denies new anxiety or depression issues. No new skin problems. Rehabilitation:   Progressing in therapies.     PT:  Restrictions/Precautions: Fall Risk,General Precautions,Up as Tolerated  Other position/activity restrictions: SBP <180   Transfers  Sit to Stand: Minimal Assistance  Stand to sit: Minimal Assistance  Bed to Chair: Moderate assistance (to left)  Stand Pivot Transfers: Minimal Assistance  Comment: verbal cues to scoot forward in chair/on bed and place feet underneath her  Ambulation  Surface: level tile  Device: Parallel Bars  Assistance: Maximum assistance (right LE and UE advancement )  Quality of Gait: significant ant/post sway, ataxic, difficulty advancing R LE, knees buckling unless therapist  assit locking R knee in extension, unsteady  Gait Deviations: Slow Irene,Decreased step length,Decreased step height  Distance: 8 ft  Comments: pt instructed and visual in decreased trunk sway , stabilization COG over single THOMAS   More Ambulation?: Yes    Transfers  Sit to Stand: Minimal Assistance  Stand to sit: Minimal Assistance  Bed to Chair: Moderate assistance (to left)  Stand Pivot Transfers: Minimal Assistance  Comment: verbal cues to scoot forward in chair/on bed and place feet underneath her     Ambulation  Surface: level tile  Device: Parallel Bars  Assistance: Maximum assistance (right LE and UE advancement )  Quality of Gait: significant ant/post sway, ataxic, difficulty advancing R LE, knees buckling unless therapist  assit locking R knee in extension, unsteady  Gait Deviations: Slow Irene,Decreased step length,Decreased step height  Distance: 8 ft  Comments: pt instructed and visual in decreased trunk sway , stabilization COG over single THOMAS   More Ambulation?: Yes    Surface: level tile  Ambulation  Surface: level tile  Device: Parallel Bars  Assistance: Maximum assistance (right LE and UE advancement )  Quality of Gait: significant ant/post sway, ataxic, difficulty advancing R LE, knees buckling unless therapist  assit locking R knee in extension, unsteady  Gait Deviations: Slow Irene,Decreased step length,Decreased step height  Distance: 8 ft  Comments: pt instructed and visual in decreased trunk sway , stabilization COG over single THOMAS   More Ambulation?: Yes    OT:  ADL  Feeding: Setup  Feeding Skilled Clinical Factors: Patient reports use of non-dominant L hand for self-feeding currently  Grooming: Stand by assistance  Grooming Skilled Clinical Factors: While seated in w/c at sink  UE Bathing: Moderate assistance  UE Bathing Skilled Clinical Factors: Assist for L UE and R UE positioning while seated on tub transfer bench in shower  LE Bathing: Moderate assistance  LE Bathing Skilled Clinical Factors: Assist for buttocks and perineal area; Minimal assist for standing balance  UE Dressing: Minimal assistance  UE Dressing Skilled Clinical Factors: Assist bra fastening and threading L UE  LE Dressing: Maximum assistance  LE Dressing Skilled Clinical Factors: Assist to thread R LE, don TEDs & pull pants over hips  Toileting: Maximum assistance  Toileting Skilled Clinical Factors: Assist for clothing management; personal hygiene while seated  Additional Comments: OT facilitated patient engagement in showering routine including bathing, dressing, toileting, and grooming tasks.  Patient demonstrates Ezra Cantu compensatory strategies for self-care with further education warranted to maximize safety and IND. Balance  Sitting Balance: Stand by assistance  Standing Balance: Minimal assistance            Bed mobility  Rolling to Left: Moderate assistance  Rolling to Right: Minimal assistance  Supine to Sit: Stand by assistance (long sit method head of bed elevated)  Sit to Supine: Minimal assistance (left LE assists right fom left side bed )  Scooting: Minimal assistance  Comment: Patient seated in recliner at start and end of sessions. Transfers  Stand Pivot Transfers: 2 Person assistance,Moderate assistance  Sit to stand: Moderate assistance  Stand to sit: Moderate assistance  Transfer Comments: with Minimal verbal cues for hand placement and safety   Toilet Transfers  Toilet - Technique: Stand pivot,To right,To left  Equipment Used: Raised toilet seat with rails  Toilet Transfer: 2 Person assistance,Moderate assistance  Toilet Transfers Comments: with Minimal verbal cues for hand placement and safety     Shower Transfers  Shower - Transfer From: Wheelchair  Shower - Transfer Type: To and From  Shower - Transfer To:  Transfer tub bench  Shower - Technique: Stand pivot,To right,To left  Shower Transfers: 2 Person assistance,Moderate assistance       SPEECH:      Current Medications:   Current Facility-Administered Medications: insulin glargine (LANTUS) injection vial 15 Units, 15 Units, SubCUTAneous, Nightly  enoxaparin (LOVENOX) injection 40 mg, 40 mg, SubCUTAneous, Daily  ondansetron (ZOFRAN-ODT) disintegrating tablet 4 mg, 4 mg, Oral, Q8H PRN **OR** [DISCONTINUED] ondansetron (ZOFRAN) injection 4 mg, 4 mg, IntraVENous, Q6H PRN  aspirin EC tablet 81 mg, 81 mg, Oral, Daily  atorvastatin (LIPITOR) tablet 40 mg, 40 mg, Oral, Nightly  albuterol sulfate  (90 Base) MCG/ACT inhaler 6 puff, 6 puff, Inhalation, Q6H PRN  clopidogrel (PLAVIX) tablet 75 mg, 75 mg, Oral, Daily  divalproex (DEPAKOTE ER) extended release tablet 500 mg, 500 mg, Oral, BID  fenofibrate (TRIGLIDE) tablet 160 mg, 160 mg, Oral, Daily  gabapentin (NEURONTIN) capsule 600 mg, 600 mg, Oral, TID  insulin lispro (HUMALOG) injection vial 0-12 Units, 0-12 Units, SubCUTAneous, TID WC  insulin lispro (HUMALOG) injection vial 0-6 Units, 0-6 Units, SubCUTAneous, Nightly  levETIRAcetam (KEPPRA) tablet 1,000 mg, 1,000 mg, Oral, BID  pantoprazole (PROTONIX) tablet 40 mg, 40 mg, Oral, QAM AC  potassium chloride (KLOR-CON M) extended release tablet 20 mEq, 20 mEq, Oral, Daily with breakfast  topiramate (TOPAMAX) tablet 25 mg, 25 mg, Oral, BID  acetaminophen (TYLENOL) tablet 650 mg, 650 mg, Oral, Q4H PRN  polyethylene glycol (GLYCOLAX) packet 17 g, 17 g, Oral, Daily  senna (SENOKOT) tablet 17.2 mg, 2 tablet, Oral, Daily PRN  bisacodyl (DULCOLAX) suppository 10 mg, 10 mg, Rectal, Daily PRN      Objective:  /75   Pulse 87   Temp 97.7 °F (36.5 °C) (Oral)   Resp 18   Ht 5' 5\" (1.651 m)   Wt 134 lb (60.8 kg)   SpO2 97%   BMI 22.30 kg/m²       GEN: Well developed, well nourished, no acute distress  HEENT: NCAT. EOM grossly intact. Hearing grossly intact. Mucous membranes pink and moist.  RESP: Normal breath sounds with no wheezing, rales, or rhonchi. Respirations WNL and unlabored. CV: Regular rate and rhythm. No murmurs, rubs, or gallops. ABD: Soft, non-distended, BS+ and equal.  NEURO: Alert. Speech fluent. MSK:  Muscle bulk is normal bilaterally. Strength 3/5 with right . Strength 4+/5 with left ankle dorsiflexion and plantarflexion. No movement noted in right lower limb  LIMBS: No edema in bilateral lower limbs. SKIN: Warm and dry with good turgor. PSYCH: Mood WNL. Affect WNL. Appropriately interactive.     Diagnostics:     CBC:   Recent Labs     04/29/22  0719 04/30/22  0759   WBC 5.6 5.7   RBC 3.63* 3.22*   HGB 11.5* 10.2*   HCT 33.6* 30.0*   MCV 92.6 93.1   RDW 13.8 13.5    222     BMP:   Recent Labs     04/29/22  0719      K 4.3 *   CO2 23   BUN 26*   CREATININE 0.74   GLUCOSE 96     BNP: No results for input(s): BNP in the last 72 hours. PT/INR: No results for input(s): PROTIME, INR in the last 72 hours. APTT: No results for input(s): APTT in the last 72 hours. CARDIAC ENZYMES: No results for input(s): CKMB, CKMBINDEX, TROPONINT in the last 72 hours. Invalid input(s): CKTOTAL;3  FASTING LIPID PANEL:  Lab Results   Component Value Date    CHOL 202 (H) 04/17/2022    HDL 55 04/17/2022    TRIG 164 (H) 04/17/2022     LIVER PROFILE: No results for input(s): AST, ALT, ALB, BILIDIR, BILITOT, ALKPHOS in the last 72 hours. Impression/Plan:   Impaired ADLs, gait, and mobility due to:    1. Right-sided weakness:  Unknown etiology - possibly psychosomatic. PT/OT for gait, mobility, strengthening, endurance, ADLs, and self care. Plan for outpatient EMG. On aspirin, plavix. On gabapentin. 2. Cognitive impairment:  SLP following. 3. Anemia: Hemoglobin 10.2 on 4/30, stable. Monitoring. 4. Type 2 Diabetes:  Hemoglobin A1c 10.3 on 4/17. On Lantus nightly - IM decreased 5/1 - and sliding scale  5. HTN:  Not currently on medication  6. HLD: On atorvastatin, fenofibrate  7. History of CVA:  On aspirin, plavix, atorvastatin, fenofibrate  8. Seizure: On Keppra and Depakote  9. GERD: On pantoprazole  10. Hypokalemia:  Improved. On KCl repletion daily. Monitoring. 11. Bowel Management: Miralax daily, senokot prn, dulcolax prn. 12. DVT Prophylaxis:  low molecular weight heparin, SCD's while in bed and ALLIE's while in bed  13.  Internal medicine for medical management      Electronically signed by Robyn Gibson MD on 5/1/2022 at 8:50 PM

## 2022-05-01 NOTE — PLAN OF CARE
Problem: Skin/Tissue Integrity  Goal: Absence of new skin breakdown  Description: 1. Monitor for areas of redness and/or skin breakdown  2. Assess vascular access sites hourly  3. Every 4-6 hours minimum:  Change oxygen saturation probe site  4. Every 4-6 hours:  If on nasal continuous positive airway pressure, respiratory therapy assess nares and determine need for appliance change or resting period. 5/1/2022 1633 by Anna Atkins RN  Outcome: Progressing  Note: There are no new skin issues so far this shift. Will continue to assist patient with repositioning at least every two hours. Problem: Pain  Goal: Verbalizes/displays adequate comfort level or baseline comfort level  5/1/2022 1633 by Anna Atkins RN  Outcome: Progressing  Note: Patient denies pain. Will continue to assess.

## 2022-05-01 NOTE — PROGRESS NOTES
Physical Therapy  Facility/Department: [facility] ACUTE REHAB  Physical Therapy  Name: Ela Shine  : 1950  MRN: [id number]  Date of Service: 2022    Discharge Recommendations:  Patient would benefit from continued therapy after discharge,Home with assist PRN   PT Equipment Recommendations  Equipment Needed:  (TBD as therapy progresses)      Patient Diagnosis(es): There were no encounter diagnoses. Past Medical History:  has a past medical history of Cerebral artery occlusion with cerebral infarction [facility]), CVA (cerebral infarction), Diabetes mellitus ([facility] [address] Hyperlipidemia, Hypertension, and Seizures ([facility] Past Surgical History:  has a past surgical history that includes Appendectomy; Hysterectomy; Bronchoscopy diagnostic (2014); and Endoscopy, colon, diagnostic. Assessment   Body Structures, Functions, Activity Limitations Requiring Skilled Therapeutic Intervention: Decreased functional mobility ; Decreased endurance;Decreased balance;Decreased ROM; Decreased strength;Decreased safe awareness;Decreased coordination  Treatment Diagnosis: Weakness, difficulty walking  Requires PT Follow-Up: Yes  Activity Tolerance  Activity Tolerance: Patient tolerated evaluation without incident     Plan   Plan  Plan:  minutes of therapy at least 5 out of 7 days a week (--)  Current Treatment Recommendations: Strengthening,ROM,Balance training,Functional mobility training,Gait training,Neuromuscular re-education,Transfer training,Endurance training,Wheelchair mobility training,Stair training,Home exercise program,Safety education & training,Patient/Caregiver education & training,Equipment evaluation, education, & procurement,Modalities (E-stim as needed to neuro-muscular faciliatation R LE as nee)  Safety Devices  Type of Devices: Call light within reach,Gait belt,Patient at risk for falls,Bed alarm in place,All fall risk precautions in place  Restraints  Restraints Initially in Place: No Restrictions  Restrictions/Precautions  Restrictions/Precautions: Fall Risk,General Precautions,Up as Tolerated  Required Braces or Orthoses?: No  Position Activity Restriction  Other position/activity restrictions: SBP <180     Subjective   Pain: pt denies pain  General  Additional Pertinent Hx: Candelario Quiroga is a 80 y.o. female with history of CVA, HTN, and HLD admitted to [facility] on 4/17/2022. She initially presented with acute-onset right hemiparesis. NIHSS 21. She was given tPA by the mobile stroke unit, but infusion was stopped due to worsening of symptoms and concern for possible hemorrhagic conversion. She was intubated prior to arrival to the ED. CT head showed no acute intracranial abnormality, and tPA was restarted. She was also loaded with keppra due to concern for seizures. MRI brain showed no acute intracranial abnormality. Extubated 4/19/22. Viviana Andres Per notes, she had a similar episodes in 2014 and 2018. Per Neuro notes- Reported history of chronic infarcts with residual right sided weakness but not clearly evident on brain imaging. Neuro recommending EMG as outpatient. Pt admotted to rehab unit on 4/28/22.    Family / Caregiver Present: No  Diagnosis: Right side weakness  Follows Commands: Within Functional Limits  Subjective  Subjective: pt pleasant and cooperative         Objective      Bed mobility  Supine to Sit: Stand by assistance (long sit method head of bed elevated)  Sit to Supine: Minimal assistance (left LE assists right fom left side bed )  Transfers  Sit to Stand: Minimal Assistance  Stand to sit: Minimal Assistance  Stand Pivot Transfers: Minimal Assistance  Comment: verbal cues to scoot forward in chair/on bed and place feet underneath her  Ambulation  Surface: level tile  Device: Parallel Bars  Assistance: Maximum assistance (right LE and UE advancement )  Quality of Gait: significant ant/post sway, ataxic, difficulty advancing R LE, knees buckling unless therapist  assit locking R knee in extension, unsteady  Gait Deviations: Slow Irene;Decreased step length;Decreased step height  Distance: 8 ft  Comments: pt instructed and visual in decreased trunk sway , stabilization COG over single THOMAS   More Ambulation?: Yes  Ambulation 2  Surface - 2: level tile  Device 2:  (platform walker)  Assistance 2: Maximum assistance;2 Person assistance (right LE for advancement , possible tone PF and inversion )  Gait Deviations: Decreased step height;Decreased step length; Slow Irene  Distance: 25 ft   Comments: marked difficulty advancing R LE  Stairs/Curb  Stairs?: No        Exercise Treatment: seated bilateral x 15 right A/AROM  (reciprical pattern )  PROM Exercises: PROM right seated hamstring / gastrocnemius   A/AROM Exercises: supine B LE A/AAROM  Functional Mobility Circuit Training: sit to stand WC <> parallel bars  (instruction in sequencing muscle and decreased trunk sway )  Static Standing Balance Exercises: static stand parallel bars 2 minute bouts   Exercise Equipment: Left of the [employer] L3 x 10min        Goals  Short Term Goals  Time Frame for Short term goals: 10 days  Short term goal 1: Pt to perform bed mobility from flat surface independently  Short term goal 2: Demonstrate functional transfers min A  Short term goal 3: Pt to ambulate distance of 100 ft with rolling walker at mod A  Short term goal 4: Pt able to improve sitting balance at EOB/EOM to good to be alen to eat meals. Short term goal 5: Demonstrate dynamic standing balance of fiar - to decrease fall risk  Short Term Goal 6: Pt able to  propel w/c distance of 100 to 150 ft, CGA, level surfaces. Long Term Goals  Time Frame for Long term goals : By DC  Long term goal 1: Pt able to perform transfers at mod-I  Long term goal 2: Pt able to ambulate distance of 100 to 150 ft with appropriate device, at min A. with assistive device.   Long term goal 3: Pt able to perform a curb step with a rolling wwalker/UE support, mod A  Long term goal 4: Pt able to propel w/c distance of 150 ft , level surfaces, including turns, mod-I  Long term goal 5: Pt  able to improve standing balance with assistive device to fair to reduce fall risk  Long term goal 6: Improve 2MWT distance to 80 ft to improve function and gait speed. Long term goal 7: Improve PASS score to 25/36 to improve overall function. Patient Goals   Patient goals :  To get better          Maye Duron, PT

## 2022-05-01 NOTE — CARE COORDINATION
Patient Health Questionnaire-9 (PHQ-9)    Over the last 2 weeks, how often have you been bothered by any of the following problems? 1. Little Interest or pleasure in doing things? [x] Not at all  [] Several Days  [] More than half the day  []  Nearly every day    2. Feeling down, depressed or hopeless? [x] Not at all  [] Several Days  [] More than half the day  []  Nearly every day    3. Trouble falling or staying asleep, or sleeping too much? [] Not at all  [] Several Days  [] More than half the day  []  Nearly every day    4. Feeling tired or having little energy? [x] Not at all  [] Several Days  [] More than half the day  []  Nearly every day    5. Poor apettite or overeating? [x] Not at all  [] Several Days  [] More than half the day  []  Nearly every day    6. Feeling bad about yourself-or that you are a failure or have let yourself or your family down? [x] Not at all  [] Several Days  [] More than half the day  []  Nearly every day    7. Trouble concentrating on things, such as reading the newspaper or watching television? [x] Not at all  [] Several Days  [] More than half the day  []  Nearly every day    8. Moving or speaking so slowly that other people could have noticed? Or the opposite-being so fidgety or restless that you have been moving around a lot more than usual?   [x] Not at all  [] Several Days  [] More than half the day  []  Nearly every day    9. Thoughts that you would be better off dead or of hurting yourself in some way? [x] Not at all  [] Several Days  [] More than half the day  []  Nearly every day    Total Score: 0    If you checked off any problems, how difficult have these problems made it for you to do your work, take care of things at home, or get along with other people?    [x] Not difficult at all  [] Somewhat Difficult  [] Very Difficult  []  Extremely Difficult

## 2022-05-01 NOTE — CONSULTS
[facility] Internal Medicine    CONSULTATION / HISTORY AND PHYSICAL EXAMINATION            Date:   5/1/2022  Patient name:  Candelario Quiroga  Date of admission:  4/28/2022 12:10 PM  MRN:   [id number]  Account:  [de-identified]  YOB: 1950  PCP:    Marlon Mayorga MD  Room:   1257/4413-22  Code Status:    Full Code    Physician Requesting Consult: Tammie Pena MD    Reason for Consult:  Medical management    Chief Complaint:     No chief complaint on file. Right-sided weakness    History Obtained From:     Patient, EMR, nursing staff    History of Present Illness:     81-year-old female with history of questionable seizure disorder, chronic right hemiparesis numbness of right leg migraines admitted on 4/17 after being found altered, concern for stroke  Started on IV tPA but then became obtunded and flaccid and tPA held due to concern for hemorrhagic conversion  She was intubated, stat CT done which was unremarkable subsequently tPA was resumed and completed. MRI brain negative for stroke  Episode of flaccidity concerning for seizure hence loaded with IV Keppra  Extubated 4/19  Persistent right-sided weakness, fluctuating, neurology considering psychosomatic symptoms  Patient is on aspirin Plavix    Diabetes   Diagnosed within last year  Modifying factors on med:   Severity:controlled   Associated signs and symtoms: neuropathy/ckd/ CAD.    aggravated with sugar diet and better with low sugar diet    Past Medical History:     Past Medical History:   Diagnosis Date    Cerebral artery occlusion with cerebral infarction ([address] 2008, 2014    CVA (cerebral infarction)     Diabetes mellitus ([address]     Hyperlipidemia     Hypertension     Seizures ([address] 04/2022        Past Surgical History:     Past Surgical History:   Procedure Laterality Date    APPENDECTOMY      BRONCHOSCOPY DIAGNOSTIC  7/24/2014         ENDOSCOPY, COLON, DIAGNOSTIC      HYSTERECTOMY Medications Prior to Admission:     Prior to Admission medications    Medication Sig Start Date End Date Taking? Authorizing Provider   divalproex (DEPAKOTE ER) 500 MG extended release tablet Take 1 tablet by mouth in the morning and at bedtime 4/26/22   Dandre Miranda MD   levETIRAcetam (KEPPRA) 1000 MG tablet Take 1 tablet by mouth 2 times daily 4/21/22   Douglas Dhillon MD   topiramate (TOPAMAX) 25 MG tablet Take 1 tablet by mouth 2 times daily 4/21/22   Douglas Dhillon MD   clopidogrel (PLAVIX) 75 MG tablet Take 75 mg by mouth nightly    Historical Provider, MD   gabapentin (NEURONTIN) 600 MG tablet Take 600 mg by mouth 3 times daily. Historical Provider, MD   potassium chloride (KLOR-CON M) 20 MEQ extended release tablet Take 20 mEq by mouth 2 times daily    Historical Provider, MD   fenofibrate (TRIGLIDE) 160 MG tablet Take 160 mg by mouth daily    Historical Provider, MD   Pantoprazole Sodium (PROTONIX PO) Take  by mouth. Historical Provider, MD   acetaminophen (TYLENOL) 160 MG/5ML solution Take 20.3 mLs by mouth every 4 hours as needed for Fever. 7/28/14   Sloane Cruz, DO   albuterol (PROVENTIL HFA;VENTOLIN HFA) 108 (90 BASE) MCG/ACT inhaler Inhale 6 puffs into the lungs every 6 hours as needed for Wheezing. 7/28/14   Sloane Cruz, DO   glucagon, rDNA, 1 MG SOLR injection Inject 1 mg into the muscle as needed for Low blood sugar (Blood glucose less than 70 mg/dL and patient NOT ALERT or NPO and does not have IV access. ). 7/28/14   Sloane Cruz DO   glucose (GLUTOSE) 40 % GEL Take 15 g by mouth as needed. 7/28/14   Sloane Cruz, DO   insulin glargine (LANTUS) 100 UNIT/ML injection vial Inject 10 Units into the skin nightly. 7/28/14   Sloane Cruz, DO   insulin lispro (HUMALOG) 100 UNIT/ML injection vial Inject 0-12 Units into the skin every 6 hours. 7/28/14   Sloane Cruz, DO   niacin (NIASPAN) 500 MG CR tablet Take 1 tablet by mouth nightly.  7/28/14   Sloane Cruz DO potassium chloride (KAYCIEL) 20 MEQ/15ML (10%) solution Take 15 mLs by mouth daily. 14   Shira Joel,         Allergies:     Patient has no known allergies. Social History:     Tobacco:    reports that she has never smoked. She has never used smokeless tobacco.  Alcohol:      reports no history of alcohol use. Drug Use:  reports no history of drug use. Family History:     History reviewed. No pertinent family history. Review of Systems:     Positive and Negative as described in HPI. CONSTITUTIONAL:  negative for fevers, chills, sweats, fatigue, weight loss  HEENT:  negative for vision, hearing changes, runny nose, throat pain  RESPIRATORY:  negative for shortness of breath, cough, congestion, wheezing. CARDIOVASCULAR:  negative for chest pain, palpitations.   GASTROINTESTINAL:  negative for nausea, vomiting, diarrhea, constipation, change in bowel habits, abdominal pain   GENITOURINARY:  negative for difficulty of urination, burning with urination, frequency   INTEGUMENT:  negative for rash, skin lesions, easy bruising   HEMATOLOGIC/LYMPHATIC:  negative for swelling/edema   ALLERGIC/IMMUNOLOGIC:  negative for urticaria , itching  ENDOCRINE:  negative increase in drinking, increase in urination, hot or cold intolerance  MUSCULOSKELETAL:  negative joint pains, muscle aches, swelling of joints  NEUROLOGICAL:  Positive for right sided weakness, negative for headaches, dizziness, lightheadedness, numbness, pain, tingling extremities      Physical Exam:     BP 93/64   Pulse 64   Temp 97.9 °F (36.6 °C)   Resp 15   Ht 5' 5\" (1.651 m)   Wt 134 lb (60.8 kg)   SpO2 99%   BMI 22.30 kg/m²   Temp (24hrs), Av.9 °F (36.6 °C), Min:97.9 °F (36.6 °C), Max:97.9 °F (36.6 °C)    Recent Labs     22  1613 22  0613 22  1121   POCGLU 107* 133* 66 89     No intake or output data in the 24 hours ending 22 1443    General Appearance:  alert, well appearing, and in no acute distress  Head:  normocephalic, atraumatic. Eye: no icterus, redness, pupils equal and reactive, extraocular eye movements intact, conjunctiva clear  Ear: normal external ear, no discharge, hearing intact  Nose:  no drainage noted  Mouth: mucous membranes moist  Neck: supple, no carotid bruits, thyroid not palpable  Lungs: Bilateral equal air entry, clear to ausculation, no wheezing, rales or rhonchi, normal effort  Cardiovascular: normal rate, regular rhythm, no murmur, gallop, rub. Abdomen: Soft, nontender, nondistended, normal bowel sounds, no hepatomegaly or splenomegaly  Neurologic:  Right UE and Lower extremity power 2/5, There are no new focal motor or sensory deficits, normal muscle tone and bulk, no abnormal sensation, normal speech, cranial nerves II through XII grossly intact  Skin: No gross lesions, rashes, bruising or bleeding on exposed skin area  Extremities:  peripheral pulses palpable, no pedal edema or calf pain with palpation  Psych:normal affect    Investigations:      Laboratory Testing:  Recent Results (from the past 24 hour(s))   POC Glucose Fingerstick    Collection Time: 04/30/22  4:13 PM   Result Value Ref Range    POC Glucose 107 (H) 65 - 105 mg/dL   POC Glucose Fingerstick    Collection Time: 04/30/22  8:01 PM   Result Value Ref Range    POC Glucose 133 (H) 65 - 105 mg/dL   POC Glucose Fingerstick    Collection Time: 05/01/22  6:13 AM   Result Value Ref Range    POC Glucose 66 65 - 105 mg/dL   POC Glucose Fingerstick    Collection Time: 05/01/22 11:21 AM   Result Value Ref Range    POC Glucose 89 65 - 105 mg/dL       Imaging/Diagonstics:  Recent data reviewed    Assessment :      Primary Problem  Right sided weakness    Active Hospital Problems    Diagnosis Date Noted    Right sided weakness [R53.1] 04/17/2022    HTN (hypertension) [I10] 07/19/2014       Plan:     1. Right-sided weakness MRI brain negative for stroke- PT OT  2.  Type 2 diabetes- continue Lantus, sliding

## 2022-05-01 NOTE — PROGRESS NOTES
Occupational Therapy  Facility/Department: [facility] ACUTE REHAB  Rehabilitation Occupational Therapy Daily Treatment Note    Date: 22  Patient Name: Rm Gurrola       Room: 4876/5564-59  MRN: [id number]  Account: [de-identified]   : 1950  (70 y.o.) Gender: female                    Past Medical History:  has a past medical history of Cerebral artery occlusion with cerebral infarction [facility]), CVA (cerebral infarction), Diabetes mellitus ([facility] Hyperlipidemia, Hypertension, and Seizures ([facility] Past Surgical History:   has a past surgical history that includes Appendectomy; Hysterectomy; Bronchoscopy diagnostic (2014); and Endoscopy, colon, diagnostic. Restrictions  Restrictions/Precautions: Fall Risk;General Precautions; Up as Tolerated  Other position/activity restrictions: SBP <180  Required Braces or Orthoses?: No    Subjective  Subjective: \"My thumb is moving a little more today. \"  Restrictions/Precautions: Fall Risk;General Precautions; Up as Tolerated        Pain Assessment  Pain Assessment: None - Denies Pain    Objective     Cognition  Overall Cognitive Status: Exceptions  Arousal/Alertness: Appropriate responses to stimuli  Following Commands: Follows multistep commands with increased time  Attention Span: Appears intact  Memory: Decreased recall of recent events  Safety Judgement: Decreased awareness of need for safety;Decreased awareness of need for assistance  Problem Solving: Assistance required to identify errors made;Assistance required to correct errors made  Insights: Decreased awareness of deficits  Initiation: Requires cues for some  Sequencing: Requires cues for some  Orientation  Overall Orientation Status: Within Functional Limits  Orientation Level: Oriented X4         ADL  Feeding  Assistance Level: Modified independent  Skilled Clinical Factors: per pt report   Grooming/Oral Hygiene  Assistance Level: Supervision;Set-up; Verbal cues  Skilled Clinical Factors: seated for oral care sinklevel, seated tub transfer bench for washing face   Upper Extremity Bathing  Assistance Level: Minimal assistance;Set-up; Verbal cues; Increased time to complete  Skilled Clinical Factors: PERRY elicited RUE [place] for washing LUE and axilla area to promote functional movements during ADLs. Completed onb tub transfer bench   Lower Extremity Bathing  Assistance Level: Stand by assist;Set-up; Verbal cues; Moderate assistance  Skilled Clinical Factors: seated weight shift R<>L fir per care, able to reach footlevel displaying increased trunk flexion. MOD A standing for drying buttocks, blocking RLE standing with pt using shower GB for UE support   Upper Extremity Dressing  Assistance Level: Contact guard assist;Set-up; Verbal cues; Increased time to complete  Skilled Clinical Factors: able to stablize bra with R thumb web space in order to clasp bra this date, increased movement in fingers this date. able to don/doff bra with twisting method and pull over shirt with G carryover on estee tech   Lower Extremity Dressing  Assistance Level: Moderate assistance  Skilled Clinical Factors: MOD A for balance maintenance using shower GB  for 0-1 UE support, TA for TEDs. Putting On/Taking Off Footwear  Assistance Level: Stand by assist;Set-up  Skilled Clinical Factors: TA for TEDs and able to demo figure 4 tech donning footies  Toileting  Assistance Level: Moderate assistance  Skilled Clinical Factors: seated weight shift for lenore hygine; MOD A x1 and SBA x1 for standing during clothing mgt  req increased time for completion   Toilet Transfers  Technique: Stand pivot; To the left; To the right  Equipment: Standard toilet;Grab bars  Additional Factors: Verbal cues;Cues for hand placement; With handrails  Assistance Level: Moderate assistance  Skilled Clinical Factors: A x1 vc for hand placement no LOB noted   Tub/Shower Transfers  Type: Shower  Transfer From: Wheelchair  Transfer To: Tub transfer bench  Additional Factors:  With handrails;Verbal cues;Cues for hand placement  Assistance Level: Moderate assistance  Skilled Clinical Factors: MOD A x1 and SBA x1      Functional Mobility  Device: Wheelchair  Activity: To/From bathroom; To/From therapy gym  Assistance Level: Dependent  Skilled Clinical Factors: did not self propel   Roll Left  Assistance Level: Stand by assist  Skilled Clinical Factors: vc for R UE awareness during mobility   Supine to Sit  Assistance Level: Contact guard assist  Skilled Clinical Factors: Pt able to self manually position R LE throughout mobility   Scooting  Assistance Level: Contact guard assist  Skilled Clinical Factors: to EOB   Stand to sit   Assistance Level: Moderate assistance ( (vc for foot/hand placement for increased safety)  Sit to Stand  Assistance Level: Moderate assistance (vc for foot/hand placement for increased safety)  Bed To/From Chair  Technique: Stand pivot  Assistance Level: Minimal assistance  Skilled Clinical Factors: A x1 vc for hand and foot placement prior to transfer  Stand Pivot  Assistance Level: Moderate assistance; Requires x 2 assistance  Skilled Clinical Factors: MOD A x1 and CGA x1, no LOB    OT Exercises  Static Sitting Balance Exercises: PM: Pt engaged in static standing trials to support ability to self correct should balance be lost, tolerance and endurance needed to participate in functional tasks safely and independently. Pt MOD A sit<>Standing from w/c to RW for UE support. Pt demo'd decreased trunk support fluctuating between retro lean and anterior lean. Pt req vc for posture correction while standing ith P return. pt req vc to lock R knee to prevent buckling, p carryover . No LOB noted. Able to tolerate standing 1-2 mins x5 req seated RB due to fatigue.       Assessment  Assessment  Activity Tolerance: Patient tolerated evaluation without incident  Discharge Recommendations: Home with assist PRN;Home with Home health OT  Safety Devices  Safety Devices in place: Yes  Type of devices: Patient at risk for falls;Call light within reach;Gait belt;Left in chair (AM: Left with PT Agatha Marrero )    Patient Education  Education  Education Given To: Patient  Education Provided: Role of Therapy;Plan of Care;ADL Function; Safety;Transfer Training  Education Method: Verbal;Demonstration  Education Outcome: Continued education needed    Plan  Plan  Times per Week: 5-7  Times per Day: Twice a day  Current Treatment Recommendations: Strengthening;ROM;Balance training;Functional mobility training; Endurance training; Safety education & training;Patient/Caregiver education & training;Equipment evaluation, education, & procurement;Self-Care / ADL; Coordination training;Neuromuscular re-education;Modalities    Goals  Patient Goals   Patient goals : \"To end up gonig home and walk out of here and be able to be as normal as possible\"  Short Term Goals  Time Frame for Short term goals: One week  Short Term Goal 1: Patient will perform upper body bathing and dressing with SUP. Short Term Goal 2: Patient will perform lower body bathing and dressing with Minimal assist.  Short Term Goal 3: Patient will perform toileting tasks with Minimal assist.  Short Term Goal 4: Patient will perform stand pivot transfers during self-care with Minimal assist and Good safety. Short Term Goal 5: Patient will verbalize/demonstrate Good understanding of assistive equipment/durable medical equipment/modified techniques for increased safety and IND with self-care. Short Term Goal 6: Patient will verbalize/demonstrate Good understanding of modified techniques for R UE during self-care. Short Term Goal 7: Patient will actively participate in 30+ minutes of therapeutic exercise/functional activities to promote increased IND with self-care and mobility. Long Term Goals  Time Frame for Long term goals : By discharge  Long Term Goal 1: Patient will perform BADLs with modified IND and Good safety.   Long Term Goal 2: Patient will perform stand pivot transfers during self-care with modified IND and Good safety. Long Term Goal 3: Patient will perform functional mobility during self-care at w/c level with modified IND. Long Term Goal 4: Patient will verbalize/demonstrate Good understanding of Fall Prevention strategies during self-care to maximize safety and IND. Long Term Goal 5: Patient will verbalize/demonstrate Good understanding of home exercise program for R UE ROM, strengthening, and coordination as appropriate.   Long Term Goal 6: 9 hole peg test and dynamometer to be assessed for R UE as appropriate       05/01/22 0800 05/01/22 1300   OT Individual Minutes   Time In 0800 1300   Time Out 0859 1331   Minutes [address], [place]

## 2022-05-01 NOTE — PROGRESS NOTES
Speech Language Pathology  Speech Language Pathology  [address] [place] [address]    Cognitive Treatment Note    Date: 5/1/2022  Patients Name: René Thornton  MRN: [id number]  Diagnosis:   Patient Active Problem List   Diagnosis Code    Altered mental status R41.82    Generalized nonconvulsive seizures ([facility] G40.309    History of CVA (cerebrovascular accident) Z80.78    Noncompliance Z91.19    Hemiparesis ([facility] G81.90    Respiratory failure ([address] J96.90    Upper respiratory infection J06.9    Acute respiratory failure ([address] J96.00    HTN (hypertension) I10    HLD (hyperlipidemia) E78.5    Hyperglycemia R73.9    Hypokalemia E87.6    Anemia due to acute blood loss D62    Right sided weakness R53.1    Cerebrovascular accident (CVA) ([facility] I63.9    Tissue plasminogen activator (tPA) administered at other facility within 24 hours before current admission Z92.82       Pain: 0/10    Cognitive Treatment    Treatment time: 4088-5826      Subjective: [x] Alert [x] Cooperative     [] Confused     [] Agitated    [] Lethargic      Objective/Assessment:  Attention: Sustained throughout, distractions minimized. Orientation: Oriented x4. Recall: Pt recalled daily events with moderate level of detail. Additional cues to expand not effective. Organization: Naming by category/initial letter (word grid) with 50% (I) improving to 75% given mod cues    Problem Solving/Reasoning: Problem solving, identified multiple causes for problem scenarios with 40% accuracy (I), improving to 80% with mod A/extended time. Other: Pt demo occasional word retrieval deficits in conversation; able to utilize circumlocution with verbal cues from [address] [place] [address]         Plan:  [x] Continue  services    [] Discharge from :      Discharge recommendations: [] Inpatient Rehab   [] [place]   [] Outpatient Therapy  [] Follow up at trauma clinic   [] Other:       Treatment completed by: Lazara Scales M.S., [place]

## 2022-05-01 NOTE — PLAN OF CARE
Problem: Skin/Tissue Integrity  Goal: Absence of new skin breakdown  Description: 1. Monitor for areas of redness and/or skin breakdown  2. Assess vascular access sites hourly  3. Every 4-6 hours minimum:  Change oxygen saturation probe site  4. Every 4-6 hours:  If on nasal continuous positive airway pressure, respiratory therapy assess nares and determine need for appliance change or resting period. 5/1/2022 0741 by Nasir Beasley RN  Outcome: Progressing  Note:  Skin assessment completed this shift. Nutrition and Hydration status assessed with adequate intake. Harrison Score as charted. Bilateral heels remain elevated on pillows throughout the shift. Patient tolerates repositioning by staff at least every 2 hours. Patient able to reposition self for comfort and to prevent breakdown. Patient verbalizes understanding of pressure ulcer prevention measures. Skin integrity maintained. No new skin breakdown noted. Skin to high risk pressure areas including coccyx and heels are clear. Problem: Pain  Goal: Verbalizes/displays adequate comfort level or baseline comfort level  Outcome: Progressing  Flowsheets (Taken 5/1/2022 0741)  Verbalizes/displays adequate comfort level or baseline comfort level:   Encourage patient to monitor pain and request assistance   Administer analgesics based on type and severity of pain and evaluate response   Consider cultural and social influences on pain and pain management   Assess pain using appropriate pain scale   Implement non-pharmacological measures as appropriate and evaluate response  Note: Pain assessment completed. Pt. able to rest.  Patient medicated with Tylenol for pain this shift as ordered. Patient relates a tolerable level of discomfort with the current medication. Pt. Repositions per self for comfort. Nonverbal cues indicate pain relief. Pt. Rests quietly with eyes closed after pain medication administration.  Respirations easy and unlabored. Appears free from distress.

## 2022-05-01 NOTE — PLAN OF CARE
Individualized Plan of Care  [facility]    Rehabilitation physician: Dr. Jay Ely Date: 4/28/2022     Rehabilitation Diagnosis: Right sided weakness [R53.1]     Rehabilitation impairments: self care, mobility, motor dysfunction and cognitive function    Factors facilitating achievement of predicted outcomes: Family support, Motivated, Cooperative and Pleasant  Barriers to the achievement of predicted outcomes: Cognitive deficit, Upper extremity weakness, Lower extremity weakness, Skin Care and Medication managment    Patient Goals: Improve independence with mobility, Improvement of mobility at a wheelchair level, Increase overall strength and endurance, Increase balance, Increase endurance, Increase independence with activities of daily living, Improve cognition, Assure adequate nutritional option for discharge and Provide appropriate patient and family education      NURSING:  Nursing goals for Vincent Khoury while on the rehabilitation unit will include:  Adequate number of bowel movements, Urinate with no urinary retention >300ml in bladder, Maintain O2 SATs at an acceptable level during stay, Absence of skin breakdown while on the rehabilitation unit, Avoidance of any hospital acquired infections, Freedom from injury during hospitalization and Complete education with patient/family with understanding demonstrated regarding disease process and resultant impairment     In order to achieve these goals, nursing interventions may include bowel/bladder training, education for medical assistive devices, medication education, O2 saturation management, energy conservation, stress management techniques, fall prevention, alarms protocol, seating and positioning, skin/wound care, pressure relief instruction, dressing changes, infection protection, DVT prophylaxis, assistance with safe transfers , and/or assistance with bathroom activities and hygiene.        PHYSICAL THERAPY:  Goals: Short Term Goals  Time Frame for Short term goals: 10 days  Short term goal 1: Pt to perform bed mobility from flat surface independently  Short term goal 2: Demonstrate functional transfers min A  Short term goal 3: Pt to ambulate distance of 100 ft with rolling walker at mod A  Short term goal 4: Pt able to improve sitting balance at EOB/EOM to good to be alen to eat meals. Short term goal 5: Demonstrate dynamic standing balance of fiar - to decrease fall risk  Short Term Goal 6: Pt able to  propel w/c distance of 100 to 150 ft, CGA, level surfaces. Long Term Goals  Time Frame for Long term goals : By DC  Long term goal 1: Pt able to perform transfers at mod-I  Long term goal 2: Pt able to ambulate distance of 100 to 150 ft with appropriate device, at min A. with assistive device. Long term goal 3: Pt able to perform a curb step with a rolling wwalker/UE support, mod A  Long term goal 4: Pt able to propel w/c distance of 150 ft , level surfaces, including turns, mod-I  Long term goal 5: Pt  able to improve standing balance with assistive device to fair to reduce fall risk  Long term goal 6: Improve 2MWT distance to 80 ft to improve function and gait speed. Long term goal 7: Improve PASS score to 25/36 to improve overall function. Plan of Care: Pt to be seen by physical therapy services 1 Hour 30 Minutes per day at least 5 out of 7 days per week     Anticipated interventions may include therapeutic exercises, gait training, neuromuscular re-ed, transfer training, community reintegration, bed mobility, w/c mobility and training. OCCUPATIONAL THERAPY:  Goals:             Short Term Goals  Time Frame for Short term goals: One week  Short Term Goal 1: Patient will perform upper body bathing and dressing with SUP.   Short Term Goal 2: Patient will perform lower body bathing and dressing with Minimal assist.  Short Term Goal 3: Patient will perform toileting tasks with Minimal assist.  Short Term Goal 4: Patient will perform stand pivot transfers during self-care with Minimal assist and Good safety. Short Term Goal 5: Patient will verbalize/demonstrate Good understanding of assistive equipment/durable medical equipment/modified techniques for increased safety and IND with self-care. Short Term Goal 6: Patient will verbalize/demonstrate Good understanding of modified techniques for R UE during self-care. Short Term Goal 7: Patient will actively participate in 30+ minutes of therapeutic exercise/functional activities to promote increased IND with self-care and mobility. Long Term Goals  Time Frame for Long term goals : By discharge  Long Term Goal 1: Patient will perform BADLs with modified IND and Good safety. Long Term Goal 2: Patient will perform stand pivot transfers during self-care with modified IND and Good safety. Long Term Goal 3: Patient will perform functional mobility during self-care at w/c level with modified IND. Long Term Goal 4: Patient will verbalize/demonstrate Good understanding of Fall Prevention strategies during self-care to maximize safety and IND. Long Term Goal 5: Patient will verbalize/demonstrate Good understanding of home exercise program for R UE ROM, strengthening, and coordination as appropriate. Long Term Goal 6: 9 hole peg test and dynamometer to be assessed for R UE as appropriate    Plan of Care: Patient to be seen by occupational therapy services 1 Hour 30 Minutes per day at least 5 out of 7 days per week     Anticipated interventions may include ADL and IADL retraining, strengthening, safety education and training, patient/caregiver education and training, equipment evaluation/ training/procurement, neuromuscular reeducation, wheelchair mobility training.       SPEECH THERAPY:   Goals:             Short-term Goals  Goal 1: Pt. will complete higher level reasoning (e.g., convergent categorization, numeric reasoning, mediation management) tasks with 90% accuracy  Goal 2: Pt. will complete problem solving tasks with 90% accuracy  Goal 3: Pt. will recall 3-5 units of information with or without distraction with 80% accuracy  Goal 4: Increase Pt. education re: compensatory strategies to facilitate recall to 100% returned demonstration        Plan of Care: Pt to be seen by speech therapy services 1 Hour per day at least 5 out of 7 days per week    Anticipated interventions may include speech/language/communication therapy, cognitive training, group therapy, education, and/or dysphagia therapy based on the above goals. CASE MANAGEMENT:  Goals:   Assist patient/family with discharge planning, patient/family counseling,  and coordination with insurance during the inpatient rehabilitation stay. Other members of the multidisciplinary rehabilitation team that will be involved in the patient's plan of care include dietary, respiratory therapy. Medical issues being managed closely and that require 24 hour availability of a physician:  Bowel/Bladder function, Pain management, Infection protection, DVT prophylaxis, Fall precautions, Fluid/Electrolyte balance, Nutritional status, Respiratory needs and Anemia                                           Physician anticipated functional outcomes: Improved independence with functional measures   Estimated length of stay for this admission:  2 weeks  Medical Prognosis: Fair  Anticipated disposition: Home. The potential to achieve the above medical and rehabilitative goals is fair. This plan of care has been developed with the assistance and input of the multidisciplinary rehabilitation team.  The plan was reviewed with the patient on 4/30/2022. The patient has had the opportunity to provide input to the therapy team.    I have reviewed this Individualized Plan of Care and agree with its contents.   Above documentation has been expanded, modified, adjusted to reflect the findings of my evaluations and goals for the patient.     Physician:  Electronically signed by Popeye Ann MD on 4/30/22 at 11:29 PM EDT

## 2022-05-02 LAB
GLUCOSE BLD-MCNC: 115 MG/DL (ref 65–105)
GLUCOSE BLD-MCNC: 155 MG/DL (ref 65–105)
GLUCOSE BLD-MCNC: 167 MG/DL (ref 65–105)
GLUCOSE BLD-MCNC: 68 MG/DL (ref 65–105)
GLUCOSE BLD-MCNC: 95 MG/DL (ref 65–105)

## 2022-05-02 PROCEDURE — 82947 ASSAY GLUCOSE BLOOD QUANT: CPT

## 2022-05-02 PROCEDURE — 6360000002 HC RX W HCPCS: Performed by: STUDENT IN AN ORGANIZED HEALTH CARE EDUCATION/TRAINING PROGRAM

## 2022-05-02 PROCEDURE — 6370000000 HC RX 637 (ALT 250 FOR IP): Performed by: STUDENT IN AN ORGANIZED HEALTH CARE EDUCATION/TRAINING PROGRAM

## 2022-05-02 PROCEDURE — 97112 NEUROMUSCULAR REEDUCATION: CPT

## 2022-05-02 PROCEDURE — 97110 THERAPEUTIC EXERCISES: CPT

## 2022-05-02 PROCEDURE — 97530 THERAPEUTIC ACTIVITIES: CPT

## 2022-05-02 PROCEDURE — 99231 SBSQ HOSP IP/OBS SF/LOW 25: CPT | Performed by: STUDENT IN AN ORGANIZED HEALTH CARE EDUCATION/TRAINING PROGRAM

## 2022-05-02 PROCEDURE — 97129 THER IVNTJ 1ST 15 MIN: CPT

## 2022-05-02 PROCEDURE — 99232 SBSQ HOSP IP/OBS MODERATE 35: CPT | Performed by: INTERNAL MEDICINE

## 2022-05-02 PROCEDURE — 97116 GAIT TRAINING THERAPY: CPT

## 2022-05-02 PROCEDURE — 97535 SELF CARE MNGMENT TRAINING: CPT

## 2022-05-02 PROCEDURE — 1180000000 HC REHAB R&B

## 2022-05-02 PROCEDURE — 97130 THER IVNTJ EA ADDL 15 MIN: CPT

## 2022-05-02 PROCEDURE — 6370000000 HC RX 637 (ALT 250 FOR IP): Performed by: INTERNAL MEDICINE

## 2022-05-02 RX ORDER — INSULIN GLARGINE 100 [IU]/ML
10 INJECTION, SOLUTION SUBCUTANEOUS NIGHTLY
Status: DISCONTINUED | OUTPATIENT
Start: 2022-05-02 | End: 2022-05-10

## 2022-05-02 RX ORDER — INSULIN LISPRO 100 [IU]/ML
0-3 INJECTION, SOLUTION INTRAVENOUS; SUBCUTANEOUS NIGHTLY
Status: DISCONTINUED | OUTPATIENT
Start: 2022-05-02 | End: 2022-05-11

## 2022-05-02 RX ORDER — INSULIN LISPRO 100 [IU]/ML
0-6 INJECTION, SOLUTION INTRAVENOUS; SUBCUTANEOUS
Status: DISCONTINUED | OUTPATIENT
Start: 2022-05-02 | End: 2022-05-11

## 2022-05-02 RX ADMIN — FENOFIBRATE 160 MG: 160 TABLET ORAL at 07:42

## 2022-05-02 RX ADMIN — TOPIRAMATE 25 MG: 25 TABLET, FILM COATED ORAL at 10:13

## 2022-05-02 RX ADMIN — INSULIN LISPRO 2 UNITS: 100 INJECTION, SOLUTION INTRAVENOUS; SUBCUTANEOUS at 16:45

## 2022-05-02 RX ADMIN — LEVETIRACETAM 1000 MG: 500 TABLET, FILM COATED ORAL at 20:26

## 2022-05-02 RX ADMIN — POTASSIUM CHLORIDE 20 MEQ: 20 TABLET, EXTENDED RELEASE ORAL at 07:42

## 2022-05-02 RX ADMIN — GABAPENTIN 600 MG: 300 CAPSULE ORAL at 13:31

## 2022-05-02 RX ADMIN — INSULIN LISPRO 1 UNITS: 100 INJECTION, SOLUTION INTRAVENOUS; SUBCUTANEOUS at 21:47

## 2022-05-02 RX ADMIN — TOPIRAMATE 25 MG: 25 TABLET, FILM COATED ORAL at 20:26

## 2022-05-02 RX ADMIN — DIVALPROEX SODIUM 500 MG: 500 TABLET, EXTENDED RELEASE ORAL at 20:27

## 2022-05-02 RX ADMIN — GABAPENTIN 600 MG: 300 CAPSULE ORAL at 07:42

## 2022-05-02 RX ADMIN — ENOXAPARIN SODIUM 40 MG: 100 INJECTION SUBCUTANEOUS at 07:43

## 2022-05-02 RX ADMIN — LEVETIRACETAM 1000 MG: 500 TABLET, FILM COATED ORAL at 07:42

## 2022-05-02 RX ADMIN — ASPIRIN 81 MG: 81 TABLET, COATED ORAL at 07:41

## 2022-05-02 RX ADMIN — INSULIN GLARGINE 10 UNITS: 100 INJECTION, SOLUTION SUBCUTANEOUS at 21:47

## 2022-05-02 RX ADMIN — DIVALPROEX SODIUM 500 MG: 500 TABLET, EXTENDED RELEASE ORAL at 10:13

## 2022-05-02 RX ADMIN — CLOPIDOGREL BISULFATE 75 MG: 75 TABLET ORAL at 07:42

## 2022-05-02 RX ADMIN — PANTOPRAZOLE SODIUM 40 MG: 40 TABLET, DELAYED RELEASE ORAL at 05:41

## 2022-05-02 RX ADMIN — ATORVASTATIN CALCIUM 40 MG: 40 TABLET, FILM COATED ORAL at 20:26

## 2022-05-02 RX ADMIN — GABAPENTIN 600 MG: 300 CAPSULE ORAL at 20:25

## 2022-05-02 ASSESSMENT — PAIN SCALES - GENERAL: PAINLEVEL_OUTOF10: 0

## 2022-05-02 NOTE — PROGRESS NOTES
Physical Medicine & Rehabilitation  Progress Note      Subjective:      Silvia Farias is a 80 y.o. female with right-sided weakness of unclear etiology. She reports doing fine today. She continues to note improvement in right-sided weakness. She states that she is sleeping fine. She denies any other acute concerns. ROS:  Denies fevers, chills, sweats. No chest pain, palpitations, lightheadedness. Denies coughing, wheezing or shortness of breath. Denies abdominal pain, nausea, diarrhea or constipation. No new areas of joint pain. Denies new areas of numbness or weakness. Denies new anxiety or depression issues. No new skin problems. Rehabilitation:   Progressing in therapies. PT:  Restrictions/Precautions: Fall Risk,General Precautions,Up as Tolerated  Other position/activity restrictions: SBP <180   Transfers  Sit to Stand: Minimal Assistance  Stand to sit: Minimal Assistance  Bed to Chair: Moderate assistance,Minimal assistance (to left)  Stand Pivot Transfers: Minimal Assistance,Moderate Assistance  Comment: verbal cues to scoot forward in chair/on bed and place feet underneath her. Transfers completed from EOB and W/C this AM with and without RW. Pt is able to complete a stand pivot x1 to left side from bed to w/c  and x2 from w/c<>Pt mat, cues required for technique. Ambulation  Surface: level tile  Device: Parallel Bars  Other Apparatus:  (RLE blue surgical shoe cover. )  Assistance: Maximum assistance,Moderate assistance (right LE advancement )  Quality of Gait: Ataxia noted. Pt demos difficulty advancing RLE, Mario knees buckling randomly. Manual assist require to acheive RLE knee extension. Completed F/R amb x2 in // bars with tennis shoes on and x2 with tennis shoes and blue sugical shoe cover on RLE foot. Pt demos improvements with blue surgical shoe on, however still unable to improve step height.    Gait Deviations: Slow Irene,Decreased step length,Decreased step height  Distance: 8 ft x4 total   Comments: pt instructed and visual in decreased trunk sway , stabilization COG over single THOMAS. Mirror utilized for visual feedback. More Ambulation?: Yes    Transfers  Sit to Stand: Minimal Assistance  Stand to sit: Minimal Assistance  Bed to Chair: Moderate assistance,Minimal assistance (to left)  Stand Pivot Transfers: Minimal Assistance,Moderate Assistance  Comment: verbal cues to scoot forward in chair/on bed and place feet underneath her. Transfers completed from EOB and W/C this AM with and without RW. Pt is able to complete a stand pivot x1 to left side from bed to w/c  and x2 from w/c<>Pt mat, cues required for technique. Ambulation  Surface: level tile  Device: Parallel Bars  Other Apparatus:  (RLE blue surgical shoe cover. )  Assistance: Maximum assistance,Moderate assistance (right LE advancement )  Quality of Gait: Ataxia noted. Pt demos difficulty advancing RLE, Mario knees buckling randomly. Manual assist require to acheive RLE knee extension. Completed F/R amb x2 in // bars with tennis shoes on and x2 with tennis shoes and blue sugical shoe cover on RLE foot. Pt demos improvements with blue surgical shoe on, however still unable to improve step height. Gait Deviations: Slow Irene,Decreased step length,Decreased step height  Distance: 8 ft x4 total   Comments: pt instructed and visual in decreased trunk sway , stabilization COG over single THOMAS. Mirror utilized for visual feedback. More Ambulation?: Yes    Surface: level tile  Ambulation  Surface: level tile  Device: Parallel Bars  Other Apparatus:  (RLE blue surgical shoe cover. )  Assistance: Maximum assistance,Moderate assistance (right LE advancement )  Quality of Gait: Ataxia noted. Pt demos difficulty advancing RLE, Mario knees buckling randomly. Manual assist require to acheive RLE knee extension.  Completed F/R amb x2 in // bars with tennis shoes on and x2 with tennis shoes and blue sugical shoe cover on RLE foot. Pt demos improvements with blue surgical shoe on, however still unable to improve step height. Gait Deviations: Slow Irene,Decreased step length,Decreased step height  Distance: 8 ft x4 total   Comments: pt instructed and visual in decreased trunk sway , stabilization COG over single THOMAS. Mirror utilized for visual feedback. More Ambulation?: Yes    OT:  ADL  Feeding: Setup  Feeding Skilled Clinical Factors: Patient reports use of non-dominant L hand for self-feeding currently  Grooming: Stand by assistance  Grooming Skilled Clinical Factors: While seated in w/c at sink  UE Bathing: Moderate assistance  UE Bathing Skilled Clinical Factors: Assist for L UE and R UE positioning while seated on tub transfer bench in shower  LE Bathing: Moderate assistance  LE Bathing Skilled Clinical Factors: Assist for buttocks and perineal area; Minimal assist for standing balance  UE Dressing: Minimal assistance  UE Dressing Skilled Clinical Factors: Assist bra fastening and threading L UE  LE Dressing: Maximum assistance  LE Dressing Skilled Clinical Factors: Assist to thread R LE, don TEDs & pull pants over hips  Toileting: Maximum assistance  Toileting Skilled Clinical Factors: Assist for clothing management; personal hygiene while seated  Additional Comments: OT facilitated patient engagement in showering routine including bathing, dressing, toileting, and grooming tasks. Patient demonstrates Fair compensatory strategies for self-care with further education warranted to maximize safety and IND. Balance  Sitting Balance: Stand by assistance  Standing Balance: Minimal assistance            Bed mobility  Rolling to Left: Moderate assistance  Rolling to Right: Minimal assistance  Supine to Sit: Stand by assistance (long sit method head of bed elevated)  Sit to Supine: Minimal assistance (left LE assists right fom left side bed )  Scooting: Minimal assistance  Comment: PT mat, wedge 2 pillows. Transfers  Stand Pivot Transfers: 2 Person assistance,Moderate assistance  Sit to stand: Moderate assistance  Stand to sit: Moderate assistance  Transfer Comments: with Minimal verbal cues for hand placement and safety   Toilet Transfers  Toilet - Technique: Stand pivot,To right,To left  Equipment Used: Raised toilet seat with rails  Toilet Transfer: 2 Person assistance,Moderate assistance  Toilet Transfers Comments: with Minimal verbal cues for hand placement and safety     Shower Transfers  Shower - Transfer From: Wheelchair  Shower - Transfer Type: To and From  Shower - Transfer To: Transfer tub bench  Shower - Technique: Stand pivot,To right,To left  Shower Transfers: 2 Person assistance,Moderate assistance       SPEECH:  Subjective: [x]? Alert     [x]? Cooperative     []? Confused     []? Agitated    []? Lethargic        Objective/Assessment:  Attention: Sustained throughout, distractions minimized.      Orientation: Oriented x4.      Recall: not addressed directly     Organization: Naming by category/initial letter (word grid) with 67% (I) improving to 83% with mod A (continued from previous session.      Problem Solving/Reasoning: Reasoning, identified multiple uses for objects with 60% accuracy (I), improving to 90% with mod A/extended time.     Other: Pt demo occasional word retrieval deficits in conversation; able to utilize circumlocution with verbal cues from ST.        Current Medications:   Current Facility-Administered Medications: insulin glargine (LANTUS) injection vial 10 Units, 10 Units, SubCUTAneous, Nightly  insulin lispro (HUMALOG) injection vial 0-6 Units, 0-6 Units, SubCUTAneous, TID WC  insulin lispro (HUMALOG) injection vial 0-3 Units, 0-3 Units, SubCUTAneous, Nightly  enoxaparin (LOVENOX) injection 40 mg, 40 mg, SubCUTAneous, Daily  ondansetron (ZOFRAN-ODT) disintegrating tablet 4 mg, 4 mg, Oral, Q8H PRN **OR** [DISCONTINUED] ondansetron (ZOFRAN) injection 4 mg, 4 mg, IntraVENous, Q6H PRN  aspirin EC tablet 81 mg, 81 mg, Oral, Daily  atorvastatin (LIPITOR) tablet 40 mg, 40 mg, Oral, Nightly  albuterol sulfate  (90 Base) MCG/ACT inhaler 6 puff, 6 puff, Inhalation, Q6H PRN  clopidogrel (PLAVIX) tablet 75 mg, 75 mg, Oral, Daily  divalproex (DEPAKOTE ER) extended release tablet 500 mg, 500 mg, Oral, BID  fenofibrate (TRIGLIDE) tablet 160 mg, 160 mg, Oral, Daily  gabapentin (NEURONTIN) capsule 600 mg, 600 mg, Oral, TID  levETIRAcetam (KEPPRA) tablet 1,000 mg, 1,000 mg, Oral, BID  pantoprazole (PROTONIX) tablet 40 mg, 40 mg, Oral, QAM AC  potassium chloride (KLOR-CON M) extended release tablet 20 mEq, 20 mEq, Oral, Daily with breakfast  topiramate (TOPAMAX) tablet 25 mg, 25 mg, Oral, BID  acetaminophen (TYLENOL) tablet 650 mg, 650 mg, Oral, Q4H PRN  polyethylene glycol (GLYCOLAX) packet 17 g, 17 g, Oral, Daily  senna (SENOKOT) tablet 17.2 mg, 2 tablet, Oral, Daily PRN  bisacodyl (DULCOLAX) suppository 10 mg, 10 mg, Rectal, Daily PRN      Objective:  /68   Pulse 77   Temp 98.1 °F (36.7 °C) (Oral)   Resp 18   Ht 5' 5\" (1.651 m)   Wt 134 lb (60.8 kg)   SpO2 99%   BMI 22.30 kg/m²       GEN: Well developed, well nourished, no acute distress  HEENT: NCAT. EOM grossly intact. Hearing grossly intact. Mucous membranes pink and moist.  RESP: Normal breath sounds with no wheezing, rales, or rhonchi. Respirations WNL and unlabored. CV: Regular rate and rhythm. No murmurs, rubs, or gallops. ABD: Soft, non-distended, BS+ and equal.  NEURO: Alert. Speech fluent. Asymmetrical shoulder shrug with left greater than right. Sensation to light touch decreased in right upper and lower limbs compared to left. MSK:  Muscle bulk is normal bilaterally. Minimal movement of the right toes noted. Has at least 2/5 strength with right hip adduction. Has some activation of right knee extensor musculature. LIMBS: No edema in bilateral lower limbs. SKIN: Warm and dry with good turgor. PSYCH: Mood WNL. Affect WNL. Appropriately interactive. Diagnostics:     CBC:   Recent Labs     04/30/22  0759   WBC 5.7   RBC 3.22*   HGB 10.2*   HCT 30.0*   MCV 93.1   RDW 13.5        BMP:   No results for input(s): NA, K, CL, CO2, PHOS, BUN, CREATININE, CA, GLUCOSE in the last 72 hours. BNP: No results for input(s): BNP in the last 72 hours. PT/INR: No results for input(s): PROTIME, INR in the last 72 hours. APTT: No results for input(s): APTT in the last 72 hours. CARDIAC ENZYMES: No results for input(s): CKMB, CKMBINDEX, TROPONINT in the last 72 hours. Invalid input(s): CKTOTAL;3  FASTING LIPID PANEL:  Lab Results   Component Value Date    CHOL 202 (H) 04/17/2022    HDL 55 04/17/2022    TRIG 164 (H) 04/17/2022     LIVER PROFILE: No results for input(s): AST, ALT, ALB, BILIDIR, BILITOT, ALKPHOS in the last 72 hours. Impression/Plan:   Impaired ADLs, gait, and mobility due to:    1. Right-sided weakness:  Unknown etiology - possibly psychosomatic. PT/OT for gait, mobility, strengthening, endurance, ADLs, and self care. Plan for outpatient EMG. On aspirin, plavix. On gabapentin. 2. Cognitive impairment:  SLP following. 3. Anemia: Hemoglobin 10.2 on 4/30, stable. Monitoring. 4. Type 2 Diabetes:  Hemoglobin A1c 10.3 on 4/17. On Lantus nightly - IM decreased 5/2 - and sliding scale (decreased 5/2)  5. HTN:  Not currently on medication  6. HLD: On atorvastatin, fenofibrate  7. History of CVA:  On aspirin, plavix, atorvastatin, fenofibrate  8. Seizure: On Keppra and Depakote  9. GERD: On pantoprazole  10. Hypokalemia:  Improved. On KCl repletion daily. Monitoring. 11. Bowel Management: Miralax daily, senokot prn, dulcolax prn. 12. DVT Prophylaxis:  low molecular weight heparin, SCD's while in bed and ALLIE's while in bed  13.  Internal medicine for medical management      Electronically signed by Nakul Ivy MD on 5/2/2022 at 10:18 PM

## 2022-05-02 NOTE — PROGRESS NOTES
[facility]    CONSULTATION / HISTORY AND PHYSICAL EXAMINATION            Date:   5/2/2022  Patient name:  Almita Chaves  Date of admission:  4/28/2022 12:10 PM  MRN:   [id number]  Account:  [de-identified]  YOB: 1950  PCP:    Jasmeet Horton MD  Room:   [id number]  Code Status:    Full Code    Physician Requesting Consult: Claudette Bump, MD    Reason for Consult:  Medical management    Chief Complaint:     No chief complaint on file. Right-sided weakness    History Obtained From:     Patient, EMR, nursing staff    History of Present Illness:     13-year-old female with history of questionable seizure disorder, chronic right hemiparesis numbness of right leg migraines admitted on 4/17 after being found altered, concern for stroke  Started on IV tPA but then became obtunded and flaccid and tPA held due to concern for hemorrhagic conversion  She was intubated, stat CT done which was unremarkable subsequently tPA was resumed and completed. MRI brain negative for stroke  Episode of flaccidity concerning for seizure hence loaded with IV Keppra  Extubated 4/19  Persistent right-sided weakness, fluctuating, neurology considering psychosomatic symptoms  Patient is on aspirin Plavix    Diabetes   Diagnosed within last year  Modifying factors on med:   Severity:controlled   Associated signs and symtoms: neuropathy/ckd/ CAD.    aggravated with sugar diet and better with low sugar diet      5/2   Patient again had episode of hypoglycemia in the morning, orange juice was provided  Blood sugar went to 68      Past Medical History:     Past Medical History:   Diagnosis Date    Cerebral artery occlusion with cerebral infarction ([facility] 2008, 2014    CVA (cerebral infarction)     Diabetes mellitus ([facility]     Hyperlipidemia     Hypertension     Seizures ([facility] 04/2022        Past Surgical History:     Past Surgical History:   Procedure Laterality Date    APPENDECTOMY      BRONCHOSCOPY DIAGNOSTIC  7/24/2014         ENDOSCOPY, COLON, DIAGNOSTIC      HYSTERECTOMY          Medications Prior to Admission:     Prior to Admission medications    Medication Sig Start Date End Date Taking? Authorizing Provider   divalproex (DEPAKOTE ER) 500 MG extended release tablet Take 1 tablet by mouth in the morning and at bedtime 4/26/22   Crissy Puentes MD   levETIRAcetam (KEPPRA) 1000 MG tablet Take 1 tablet by mouth 2 times daily 4/21/22   Jamie Sacks, MD   topiramate (TOPAMAX) 25 MG tablet Take 1 tablet by mouth 2 times daily 4/21/22   Jamie Sacks, MD   clopidogrel (PLAVIX) 75 MG tablet Take 75 mg by mouth nightly    Historical Provider, MD   gabapentin (NEURONTIN) 600 MG tablet Take 600 mg by mouth 3 times daily. Historical Provider, MD   potassium chloride (KLOR-CON M) 20 MEQ extended release tablet Take 20 mEq by mouth 2 times daily    Historical Provider, MD   fenofibrate (TRIGLIDE) 160 MG tablet Take 160 mg by mouth daily    Historical Provider, MD   Pantoprazole Sodium (PROTONIX PO) Take  by mouth. Historical Provider, MD   acetaminophen (TYLENOL) 160 MG/5ML solution Take 20.3 mLs by mouth every 4 hours as needed for Fever. 7/28/14   Donaldo Xavier, DO   albuterol (PROVENTIL HFA;VENTOLIN HFA) 108 (90 BASE) MCG/ACT inhaler Inhale 6 puffs into the lungs every 6 hours as needed for Wheezing. 7/28/14   Donaldo Lunar, DO   glucagon, rDNA, 1 MG SOLR injection Inject 1 mg into the muscle as needed for Low blood sugar (Blood glucose less than 70 mg/dL and patient NOT ALERT or NPO and does not have IV access. ). 7/28/14   Donaldo Xavier, DO   glucose (GLUTOSE) 40 % GEL Take 15 g by mouth as needed. 7/28/14   Lawrence Duc, DO   insulin glargine (LANTUS) 100 UNIT/ML injection vial Inject 10 Units into the skin nightly. 7/28/14   Lawrence Duc, DO   insulin lispro (HUMALOG) 100 UNIT/ML injection vial Inject 0-12 Units into the skin every 6 hours.  7/28/14   Jana Xie DO Corin   niacin (NIASPAN) 500 MG CR tablet Take 1 tablet by mouth nightly. 14   Yosi Mabry DO   potassium chloride (KAYCIEL) 20 MEQ/15ML (10%) solution Take 15 mLs by mouth daily. 14   Yosi Mabry DO        Allergies:     Patient has no known allergies. Social History:     Tobacco:    reports that she has never smoked. She has never used smokeless tobacco.  Alcohol:      reports no history of alcohol use. Drug Use:  reports no history of drug use. Family History:     History reviewed. No pertinent family history. Review of Systems:     Positive and Negative as described in HPI. CONSTITUTIONAL:  negative for fevers, chills, sweats, fatigue, weight loss  HEENT:  negative for vision, hearing changes, runny nose, throat pain  RESPIRATORY:  negative for shortness of breath, cough, congestion, wheezing. CARDIOVASCULAR:  negative for chest pain, palpitations.   GASTROINTESTINAL:  negative for nausea, vomiting, diarrhea, constipation, change in bowel habits, abdominal pain   GENITOURINARY:  negative for difficulty of urination, burning with urination, frequency   INTEGUMENT:  negative for rash, skin lesions, easy bruising   HEMATOLOGIC/LYMPHATIC:  negative for swelling/edema   ALLERGIC/IMMUNOLOGIC:  negative for urticaria , itching  ENDOCRINE:  negative increase in drinking, increase in urination, hot or cold intolerance  MUSCULOSKELETAL:  negative joint pains, muscle aches, swelling of joints  NEUROLOGICAL:  Positive for right sided weakness, negative for headaches, dizziness, lightheadedness, numbness, pain, tingling extremities      Physical Exam:     BP 90/60   Pulse 67   Temp 97.9 °F (36.6 °C) (Oral)   Resp 16   Ht 5' 5\" (1.651 m)   Wt 134 lb (60.8 kg)   SpO2 97%   BMI 22.30 kg/m²   Temp (24hrs), Av.7 °F (36.5 °C), Min:97.5 °F (36.4 °C), Max:97.9 °F (36.6 °C)    Recent Labs     22  0506 22  0640 22  1159 22  1558   POCGLU 68 95 115* 155*     No intake or output data in the 24 hours ending 05/02/22 1710    General Appearance:  alert, well appearing, and in no acute distress  Head:  normocephalic, atraumatic. Eye: no icterus, redness, pupils equal and reactive, extraocular eye movements intact, conjunctiva clear  Ear: normal external ear, no discharge, hearing intact  Nose:  no drainage noted  Mouth: mucous membranes moist  Neck: supple, no carotid bruits, thyroid not palpable  Lungs: Bilateral equal air entry, clear to ausculation, no wheezing, rales or rhonchi, normal effort  Cardiovascular: normal rate, regular rhythm, no murmur, gallop, rub.   Abdomen: Soft, nontender, nondistended, normal bowel sounds, no hepatomegaly or splenomegaly  Neurologic:  Right UE and Lower extremity power 2/5, There are no new focal motor or sensory deficits, normal muscle tone and bulk, no abnormal sensation, normal speech, cranial nerves II through XII grossly intact  Skin: No gross lesions, rashes, bruising or bleeding on exposed skin area  Extremities:  peripheral pulses palpable, no pedal edema or calf pain with palpation  Psych:normal affect    Investigations:      Laboratory Testing:  Recent Results (from the past 24 hour(s))   POC Glucose Fingerstick    Collection Time: 05/01/22  8:02 PM   Result Value Ref Range    POC Glucose 192 (H) 65 - 105 mg/dL   POC Glucose Fingerstick    Collection Time: 05/02/22  5:06 AM   Result Value Ref Range    POC Glucose 68 65 - 105 mg/dL   POC Glucose Fingerstick    Collection Time: 05/02/22  6:40 AM   Result Value Ref Range    POC Glucose 95 65 - 105 mg/dL   POC Glucose Fingerstick    Collection Time: 05/02/22 11:59 AM   Result Value Ref Range    POC Glucose 115 (H) 65 - 105 mg/dL   POC Glucose Fingerstick    Collection Time: 05/02/22  3:58 PM   Result Value Ref Range    POC Glucose 155 (H) 65 - 105 mg/dL       Imaging/Diagonstics:  Recent data reviewed    Assessment :      Primary Problem  Right sided weakness    Active Hospital Problems    Diagnosis Date Noted    Right sided weakness [R53.1] 04/17/2022    HTN (hypertension) [I10] 07/19/2014       Plan:     1. Right-sided weakness MRI brain negative for stroke- PT OT  2. Type 2 diabetes- continue Lantus, sliding scale  3. History of prior strokes-continue aspirin, Plavix, statin  4. History of seizure disorder- continue Keppra, Topamax, Depakote  5. Hyperlipidemia-patient on statin, fenofibrate  6. H/o Hypertension-currently controlled without meds  7. Recent extubation on 4/19    DVT prophylaxis-Lovenox    May 1  Hypoglycemia noted  Reduce lantus to 15 qhs  5/2   Reducing dose of insulin to 10 units, reducing sliding scale to low-dose  Blood pressure is controlled, off antihypertensives  Consultations:   [facility] TO SOCIAL WORK  IP CONSULT TO INTERNAL MEDICINE      Linda Owens MD  5/2/2022  5:10 PM    Copy sent to Dr. Mami Fang MD    Please note that this chart was generated using voice recognition Dragon dictation software. Although every effort was made to ensure the accuracy of this automated transcription, some errors in transcription may have occurred.

## 2022-05-02 NOTE — PROGRESS NOTES
Physical Therapy  Facility/Department: [facility] ACUTE REHAB  Rehabilitation Physical Therapy Daily Treatment Note    NAME: Vincent Khoury  : 1950 (80 y.o.)  MRN: [id number]  CODE STATUS: Full Code    Date of Service: 22    Patient assessed for rehabilitation services?: Yes  Additional Pertinent Hx: Vincent Khoury is a 80 y.o. female with history of CVA, HTN, and HLD admitted to [facility] on 2022. She initially presented with acute-onset right hemiparesis. NIHSS 21. She was given tPA by the mobile stroke unit, but infusion was stopped due to worsening of symptoms and concern for possible hemorrhagic conversion. She was intubated prior to arrival to the ED. CT head showed no acute intracranial abnormality, and tPA was restarted. She was also loaded with keppra due to concern for seizures. MRI brain showed no acute intracranial abnormality. Extubated 22. Cecy Goodwin Per notes, she had a similar episodes in  and . Per Neuro notes- Reported history of chronic infarcts with residual right sided weakness but not clearly evident on brain imaging. Neuro recommending EMG as outpatient. Pt admotted to rehab unit on 22. Family / Caregiver Present: No  Referral Date : 22  Diagnosis: Right side weakness    Restrictions:  Restrictions/Precautions: Fall Risk;General Precautions; Up as Tolerated  Position Activity Restriction  Other position/activity restrictions: SBP <180     SUBJECTIVE  Subjective: Pt would prefer to have OT prior to PT. Pt is agreeable to PT this date. Pain: pt denies pain         OBJECTIVE  Vision  Vision: Impaired  Vision Exceptions: Wears glasses at all times    Hearing  Hearing: Exceptions to [facility]  Hearing Exceptions: Hard of hearing/hearing concerns;Right hearing aid    Cognition  Overall Cognitive Status: Exceptions  Arousal/Alertness: Appropriate responses to stimuli  Following Commands:  Follows multistep commands with increased time  Attention Span: Appears intact  Memory: Decreased recall of recent events  Safety Judgement: Decreased awareness of need for safety;Decreased awareness of need for assistance  Problem Solving: Assistance required to identify errors made;Assistance required to correct errors made  Insights: Decreased awareness of deficits  Initiation: Requires cues for some  Sequencing: Requires cues for some  Sensation  Overall Sensation Status: [facility]    Functional Mobility  Bed mobility  Supine to Sit: Stand by assistance (long sit method head of bed elevated)  Sit to Supine: Minimal assistance (left LE assists right fom left side bed )  Scooting: Minimal assistance  Comment: PT mat, wedge 2 pillows. Transfers  Sit to Stand: Minimal Assistance  Stand to sit: Minimal Assistance  Bed to Chair: Moderate assistance;Minimal assistance (to left)  Stand Pivot Transfers: Minimal Assistance; Moderate Assistance  Comment: verbal cues to scoot forward in chair/on bed and place feet underneath her. Transfers completed from EOB and W/C this AM with and without RW. Pt is able to complete a stand pivot x1 to left side from bed to w/c  and x2 from w/c<>Pt mat, cues required for technique. Balance  Posture: Fair  Sitting - Static: Fair;-  Sitting - Dynamic: Poor;+  Standing - Static: Poor  Standing - Dynamic: Poor;-  Comments: Standing with rolling walker. Environmental Mobility  Ambulation  Surface: level tile  Device: Parallel Bars  Other Apparatus:  (RLE blue surgical shoe cover. )  Assistance: Maximum assistance; Moderate assistance (right LE advancement )  Quality of Gait: Ataxia noted. Pt demos difficulty advancing RLE, Mario knees buckling randomly. Manual assist require to acheive RLE knee extension. Completed F/R amb x2 in // bars with tennis shoes on and x2 with tennis shoes and blue sugical shoe cover on RLE foot. Pt demos improvements with blue surgical shoe on, however still unable to improve step height.    Gait Deviations: Slow Irene;Decreased step length;Decreased step height  Distance: 8 ft x4 total   Comments: pt instructed and visual in decreased trunk sway , stabilization COG over single THOMAS. Mirror utilized for visual feedback. Stairs/Curb  Stairs?: No    PT Exercises  Exercise Treatment: seated bilateral x 15 right A/AROM and LLE 1.5#. (reciprical pattern )  PROM Exercises: PROM right seated hamstring / gastrocnemius   A/AROM Exercises: supine B LE A/AAROM with vibration to RLE quads and DF's with no movement noted with DF's and quads. Pt is however able to hold RLE in extension for a few seconds and requires AAROM with eccentric control. Functional Mobility Circuit Training: sit to stand WC <> parallel bars  (instruction in sequencing muscle and decreased trunk sway )  Static Standing Balance Exercises: static stand parallel bars   Dynamic Standing Balance Exercises: BLE ex's in // bars. Reps: 15 each. Writer assisting with RLE knee extension. ASSESSMENT  Vitals  O2 Device: None (Room air)    Activity Tolerance  Activity Tolerance: Patient tolerated treatment well    Assessment  Assessment: Pt presents with R side weakness, muscle contractions felt at bigger muscle groups during fucntional mobility, pt unable to move R LE on her own, Pt requires asssitnace for all fucntional tasks at this time, will continue POC to faciliate improvement in R LE strength, overall balance and fucntional mobility for safe DC home. Performance Deficits/Impairments: Decreased functional mobility ; Decreased tolerance to work activity; Decreased strength;Decreased safe awareness;Decreased endurance;Decreased balance;Decreased posture;Decreased ROM  Treatment Diagnosis: Weakness, difficulty walking  Therapy Prognosis: Good  Decision Making: High Complexity  Discharge Recommendations: Patient would benefit from continued therapy after discharge;Home with assist PRN  PT Equipment Recommendations  Equipment Needed:  (TBD as therapy progresses)    CLINICAL IMPRESSION Pt presents with R side weakness, muscle contractions felt at bigger muscle groups during fucntional mobility, pt unable to move R LE on her own, Pt requires asssitnace for all fucntional tasks at this time, will continue POC to faciliate improvement in R LE strength, overall balance and fucntional mobility for safe DC home. GOALS  Patient Goals   Patient goals : To get better  Short Term Goals  Time Frame for Short term goals: 10 days  Short term goal 1: Pt to perform bed mobility from flat surface independently  Short term goal 2: Demonstrate functional transfers min A  Short term goal 3: Pt to ambulate distance of 100 ft with rolling walker at mod A  Short term goal 4: Pt able to improve sitting balance at EOB/EOM to good to be alen to eat meals. Short term goal 5: Demonstrate dynamic standing balance of fiar - to decrease fall risk  Short Term Goal 6: Pt able to  propel w/c distance of 100 to 150 ft, CGA, level surfaces. Long Term Goals  Time Frame for Long term goals : By DC  Long term goal 1: Pt able to perform transfers at mod-I  Long term goal 2: Pt able to ambulate distance of 100 to 150 ft with appropriate device, at min A. with assistive device. Long term goal 3: Pt able to perform a curb step with a rolling wwalker/UE support, mod A  Long term goal 4: Pt able to propel w/c distance of 150 ft , level surfaces, including turns, mod-I  Long term goal 5: Pt  able to improve standing balance with assistive device to fair to reduce fall risk  Long term goal 6: Improve 2MWT distance to 80 ft to improve function and gait speed. Long term goal 7: Improve PASS score to 25/36 to improve overall function. PLAN OF CARE    Plan  Plan:  minutes of therapy at least 5 out of 7 days a week (--)  Current Treatment Recommendations: Strengthening;ROM;Balance training;Functional mobility training;Gait training;Neuromuscular re-education;Transfer training; Endurance training; Wheelchair mobility training;Stair training;Home exercise program;Safety education & training;Patient/Caregiver education & training;Equipment evaluation, education, & procurement; Modalities (E-stim as needed to neuro-muscular faciliatation R LE as nee)  Safety Devices  Type of Devices: Call light within reach;Gait belt;Patient at risk for falls; Bed alarm in place; All fall risk precautions in place  Restraints  Restraints Initially in Place: No    Therapy Time     05/02/22 1001 05/02/22 1415   PT Individual Minutes   Time In 1000 1415   Time Out 1102 1451   Minutes 62 [address], PTA, 05/02/22 at 3:10 PM

## 2022-05-02 NOTE — PROGRESS NOTES
Speech Language Pathology  Speech Language Pathology  [facility]    Cognitive Treatment Note    Date: 5/2/2022  Patients Name: Caleb Schneider  MRN: [id number]  Diagnosis:   Patient Active Problem List   Diagnosis Code    Altered mental status R41.82    Generalized nonconvulsive seizures ([facility] G40.309    History of CVA (cerebrovascular accident) Z80.78    Noncompliance Z91.19    Hemiparesis ([facility] G81.90    Respiratory failure ([address] J96.90    Upper respiratory infection J06.9    Acute respiratory failure ([address] J96.00    HTN (hypertension) I10    HLD (hyperlipidemia) E78.5    Hyperglycemia R73.9    Hypokalemia E87.6    Anemia due to acute blood loss D62    Right sided weakness R53.1    Cerebrovascular accident (CVA) ([facility] [address] I63.9    Tissue plasminogen activator (tPA) administered at other facility within 24 hours before current admission Z92.82       Pain: 0/10    Cognitive Treatment    Treatment time: 3249-9089      Subjective: [x] Alert [x] Cooperative     [] Confused     [] Agitated    [] Lethargic      Objective/Assessment:  Attention: Sustained throughout, distractions minimized. Orientation: Oriented x4. Recall: not addressed directly    Organization: Naming by category/initial letter (word grid) with 67% (I) improving to 83% with mod A (continued from previous session. Problem Solving/Reasoning: Reasoning, identified multiple uses for objects with 60% accuracy (I), improving to 90% with mod A/extended time. Other: Pt demo occasional word retrieval deficits in conversation; able to utilize circumlocution with verbal cues from [address] [place] [address]         Plan:  [x] Continue  services    [] Discharge from :      Discharge recommendations: [] Inpatient Rehab   [] [place]   [] Outpatient Therapy  [] Follow up at trauma clinic   [] Other:       Treatment completed by: Lenin Murguia M.S., [id number] [facility]

## 2022-05-02 NOTE — DISCHARGE INSTR - COC
Continuity of Care Form    Patient Name: Caleb Schneider   :  1950  MRN:  [id number]    Admit date:  2022  Discharge date:  ***    Code Status Order: Full Code   Advance Directives:      Admitting Physician:  Mj Damon MD  PCP: Carmelita Awan MD    Discharging Nurse: [facility] Unit/Room#: 6455/4514-93  Discharging Unit Phone Number: ***    Emergency Contact:   Extended Emergency Contact Information  Primary Emergency Contact: [address] Phone: [phone]  Work Phone: [phone]  Mobile Phone: [phone]  Relation: Child  Secondary Emergency Contact: [facility] Phone: [phone]  Work Phone: [phone]  Mobile Phone: [phone]  Relation: Child    Past Surgical History:  Past Surgical History:   Procedure Laterality Date    APPENDECTOMY      BRONCHOSCOPY DIAGNOSTIC  2014         ENDOSCOPY, COLON, DIAGNOSTIC      HYSTERECTOMY         Immunization History:   Immunization History   Administered Date(s) Administered    COVID-19, [place] [facility], Primary or Immunocompromised, PF, 100mcg/0.5mL 2021, 2021       Active Problems:  Patient Active Problem List   Diagnosis Code    Altered mental status R41.82    Generalized nonconvulsive seizures ([address] G40.309    History of CVA (cerebrovascular accident) Z86.73    Noncompliance Z91.19    Hemiparesis ([address] G81.90    Respiratory failure ([address] J96.90    Upper respiratory infection J06.9    Acute respiratory failure (HCC) J96.00    HTN (hypertension) I10    HLD (hyperlipidemia) E78.5    Hyperglycemia R73.9    Hypokalemia E87.6    Anemia due to acute blood loss D62    Right sided weakness R53.1    Cerebrovascular accident (CVA) ([address] I63.9    Tissue plasminogen activator (tPA) administered at other facility within 24 hours before current admission Z92.82       Isolation/Infection:   Isolation            No Isolation          Patient Infection Status       None to display            Nurse Assessment:  Last Vital Signs: BP 97/67   Pulse 63   Temp 97.5 °F (36.4 °C)   Resp 16   Ht 5' 5\" (1.651 m)   Wt 134 lb (60.8 kg)   SpO2 97%   BMI 22.30 kg/m²     Last documented pain score (0-10 scale): Pain Level: 0  Last Weight:   Wt Readings from Last 1 Encounters:   04/28/22 134 lb (60.8 kg)     Mental Status:  alert    IV Access:  { MICHAEL IV ACCESS:793902415}    Nursing Mobility/ADLs:  Walking   {CHP DME AOFE:179036357}  Transfer  {CHP DME SUPZ:571955398}  Bathing  {CHP DME IZTI:940119568}  Dressing  {CHP DME XJKX:343335963}  Toileting  {CHP DME VEQU:315755815}  Feeding  {CHP DME TSBP:081130835}  Med Admin  {P DME QIHB:084886216}  Med Delivery   { MICHAEL MED Delivery:059511859}    Wound Care Documentation and Therapy:        Elimination:  Continence: Bowel: {YES / BD:77320}  Bladder: {YES / XE:61543}  Urinary Catheter: {Urinary Catheter:888717919}   Colostomy/Ileostomy/Ileal Conduit: {YES / KI:11727}       Date of Last BM: ***  No intake or output data in the 24 hours ending 05/02/22 1536  No intake/output data recorded. Safety Concerns:     [employer] Safety Concerns:280227706}    Impairments/Disabilities:      [employer] Impairments/Disabilities:748254890}    Nutrition Therapy:  Current Nutrition Therapy:   [employer] Diet List:292553250}    Routes of Feeding: {CHP DME Other Feedings:721493209}  Liquids: {Slp liquid thickness:62797}  Daily Fluid Restriction: {CHP DME Yes amt example:097619381}  Last Modified Barium Swallow with Video (Video Swallowing Test): {Done Not Done JOSE M:566412277}    Treatments at the Time of Hospital Discharge:   Respiratory Treatments: ***  Oxygen Therapy:  {Therapy; copd oxygen:13465}  Ventilator:    { CC Vent PQHD:294174403}    Rehab Therapies: Physical Therapy, Occupational Therapy,    Weight Bearing Status/Restrictions: No weight bearing restrictions  Other Medical Equipment (for information only, NOT a DME order):     Other Treatments: skilled nursing assessment, medication education and monitoring     Patient's personal belongings (please select all that are sent with patient):  {[facility] DME Belongings:574269624}    RN SIGNATURE:      CASE MANAGEMENT/SOCIAL WORK SECTION    Inpatient Status Date: 04/28/22    Readmission Risk Assessment Score:  Readmission Risk              Risk of Unplanned Readmission:  19           Discharging to Facility/ [address] [place] [address]  [phone]  Fax [phone]      Dialysis Facility (if applicable)     / signature: Electronically signed by Sweta Escamilla RN on 5/12/22 at 9:47 AM EDT    PHYSICIAN SECTION    Prognosis: Fair    Condition at Discharge: Stable    Rehab Potential (if transferring to Rehab): Fair    Recommended Labs or Other Treatments After Discharge: PT/OT to eval and treat. Nursing for medication management. Physician Certification: I certify the above information and transfer of Heddy Sacks  is necessary for the continuing treatment of the diagnosis listed and that she requires [address] for less 30 days.      Update Admission H&P: No change in H&P    PHYSICIAN SIGNATURE:  Electronically signed by Elo Cooney MD on 5/12/22 at 9:53 AM EDT

## 2022-05-02 NOTE — PATIENT CARE CONFERENCE
walker)  Assistance 2: Maximum assistance;2 Person assistance (right LE for advancement , possible tone PF and inversion )  Gait Deviations: Decreased step height;Decreased step length; Slow Irene  Distance: 25 ft   Comments: marked difficulty advancing R LE            Pt presents with R side weakness, muscle contractions felt at bigger muscle groups during fucntional mobility, pt unable to move R LE on her own, Pt requires asssitnace for all fucntional tasks at this time, will continue POC to faciliate improvement in R LE strength, overall balance and fucntional mobility for safe DC home. Goals  Time Frame for Short term goals: 10 days  Short term goal 1: Pt to perform bed mobility from flat surface independently  Short term goal 2: Demonstrate functional transfers min A  Short term goal 3: Pt to ambulate distance of 100 ft with rolling walker at mod A  Short term goal 4: Pt able to improve sitting balance at EOB/EOM to good to be alen to eat meals. Short term goal 5: Demonstrate dynamic standing balance of fiar - to decrease fall risk  Short Term Goal 6: Pt able to  propel w/c distance of 100 to 150 ft, CGA, level surfaces. OCCUPATIONAL THERAPY  SELF CARE     Eating      Feeding  Assistance Level: Modified independent  Skilled Clinical Factors: per pt report        Oral Hygiene      Grooming/Oral Hygiene  Assistance Level: Supervision,Minimal assistance  Skilled Clinical Factors: MIN A for styling hair otherwise SUP at seated in w/c sinklevel        Shower/Bathe Self     Upper Extremity Bathing  Assistance Level: Minimal assistance,Set-up,Verbal cues,Increased time to complete    Lower Extremity Bathing  Assistance Level:  Moderate assistance      Upper Body Dressing     Upper Extremity Dressing  Assistance Level: Stand by assist,Set-up,Verbal cues,Increased time to complete  Skilled Clinical Factors: able to stablize bra with R thumb web space in order to clasp bra this date, increased movement in fingers this date. able to don/doff bra with twisting method and pull over shirt with G carryover on estee tech        Lower Body Dressing     Lower Extremity Dressing  Assistance Level: Moderate assistance  Skilled Clinical Factors: MOD A for balance maintenance using shower GB  for 0-1 UE support       Putting On/Taking Off Footwear     Putting On/Taking Off Footwear  Assistance Level: Stand by assist,Set-up  Skilled Clinical Factors: able to doff shoes, and don/doff footies, TA for [employer] Transfer     Toileting  Assistance Level: Moderate assistance  Skilled Clinical Factors: seated weight shift for lenore hygine; MOD A x1 and SBA x1 for standing during clothing mgt  req increased time for completion        Toileting Hygiene     Toilet Transfers  Technique: Stand pivot,To the left,To the right  Equipment: Standard toilet,Grab bars  Additional Factors: Verbal cues,Cues for hand placement,With handrails  Assistance Level: Moderate assistance  Skilled Clinical Factors: A x1 vc for hand placement no LOB noted          Short Term Goals  Time Frame for Short term goals: One week  Short Term Goal 1: Patient will perform upper body bathing and dressing with SUP. Short Term Goal 2: Patient will perform lower body bathing and dressing with Minimal assist.  Short Term Goal 3: Patient will perform toileting tasks with Minimal assist.  Short Term Goal 4: Patient will perform stand pivot transfers during self-care with Minimal assist and Good safety. Short Term Goal 5: Patient will verbalize/demonstrate Good understanding of assistive equipment/durable medical equipment/modified techniques for increased safety and IND with self-care. Short Term Goal 6: Patient will verbalize/demonstrate Good understanding of modified techniques for R UE during self-care.   Short Term Goal 7: Patient will actively participate in 30+ minutes of therapeutic exercise/functional activities to promote increased IND with self-care and mobility. SPEECH THERAPY  Current: Mod I: Comprehension, Expression  Min A: Memory, Problem solving    Short Term Goal:   Independent : Comprehension, Expression  SUP: Memory, Problem solving    NUTRITION  Weight: 134 lb (60.8 kg) / Body mass index is 22.3 kg/m². Diet Rx: 4 Carbohydrate Choices per meal (initiated 5/2). PO intake appears fairly adequate with % intake of meals. A1C: 10.3 (4/17). Ref. Range 5/1/2022 06:13 5/1/2022 11:21 5/1/2022 16:38 5/1/2022 20:02 5/2/2022 05:06 5/2/2022 06:40 5/2/2022 11:59   POC Glucose Latest Ref Range: 65 - 105 mg/dL 66 89 96 192 (H) 68 95 115 (H)   Please see nutrition note for details.     SOCIAL WORK ASSESSMENT  Assessment:  Home w daiughter         Pre-Admission Status:  Lives With: Alone  Type of Home: House  Home Layout: One level  Home Access: Level entry  Bathroom Shower/Tub: Tub/Shower unit,Curtain  Bathroom Toilet: Standard (Sink counter on the right side)  Bathroom Equipment: Hand-held shower  Bathroom Accessibility: Wheelchair accessible  Home Equipment: Drew Courtney  Has the patient had two or more falls in the past year or any fall with injury in the past year?: Yes (fell and broke R ankle, last August (wore boot))  ADL Assistance: [address] [place] [address] Avenue: Independent  Homemaking Responsibilities: Yes  Ambulation Assistance: Independent (St cane for outside)  Transfer Assistance: Independent  Active : Yes  Mode of Transportation: Car  Occupation: Retired  Leisure & Hobbies: Knitting, puzzles  Additional Comments: Son works nights, daughter in law works days, can assist. Pt reports daughter is able to provide prn assist upon discharge     Family Education: Need to make contact with family to initiate education    Percentage Risk for Readmission: Moderate 19% - 27%   Readmission Risk              Risk of Unplanned Readmission:  19       %    Critical Items: None       Problem / Barrier Intervention / Plan  Results   Impaired functional  2* R sided weakness. Strengthening, functional mobility training    Altered ability to care for self Remediation and training in modified care strategies                               Total Self Care Score    Total Mobility Score  Admission Score:  20      Admission Score:  21  Goal:  42/42         Goal:  58/90   `  Discharge Plan   Estimated Discharge Date: 5/13/22  Home evaluation needed? Home Evaluation Indication (NO, Requires ReEval, YES/Date): No home evaluation need indicated for patient at this time  Overnight or Day Pass: No  Factors facilitating achievement of predicted outcomes: Family support, Motivated, Cooperative and Pleasant  Barriers to the achievement of predicted outcomes: Decreased endurance, Upper extremity weakness, Lower extremity weakness, Skin Care and Medication managment    Functional Goals at discharge:  Predicted Outcome: Home with familyPATIENT'S LEVEL OF ASSISTANCE: 24 hour Supervision  and Minimal Assistance  Discharge therapy goals:  PT: Long Term Goals  Time Frame for Long term goals : By DC  Long term goal 1: Pt able to perform transfers at mod-I  Long term goal 2: Pt able to ambulate distance of 100 to 150 ft with appropriate device, at min A. with assistive device. Long term goal 3: Pt able to perform a curb step with a rolling wwalker/UE support, mod A  Long term goal 4: Pt able to propel w/c distance of 150 ft , level surfaces, including turns, mod-I  Long term goal 5: Pt  able to improve standing balance with assistive device to fair to reduce fall risk  Long term goal 6: Improve 2MWT distance to 80 ft to improve function and gait speed. Long term goal 7: Improve PASS score to 25/36 to improve overall function. OT:Long Term Goals  Time Frame for Long term goals : By discharge  Long Term Goal 1: Patient will perform BADLs with modified IND and Good safety.   Long Term Goal 2: Patient will perform stand pivot transfers during self-care with modified IND and Good safety. Long Term Goal 3: Patient will perform functional mobility during self-care at w/c level with modified IND. Long Term Goal 4: Patient will verbalize/demonstrate Good understanding of Fall Prevention strategies during self-care to maximize safety and IND. Long Term Goal 5: Patient will verbalize/demonstrate Good understanding of home exercise program for R UE ROM, strengthening, and coordination as appropriate. Long Term Goal 6: 9 hole peg test and dynamometer to be assessed for R UE as appropriate  ST: Short-term Goals  Goal 1: Pt. will complete higher level reasoning (e.g., convergent categorization, numeric reasoning, mediation management) tasks with 90% accuracy  Goal 2: Pt. will complete problem solving tasks with 90% accuracy  Goal 3: Pt. will recall 3-5 units of information with or without distraction with 80% accuracy  Goal 4: Increase Pt. education re: compensatory strategies to facilitate recall to 100% returned demonstration     Participating Team Members:  /:  Shanice Faulkner RN  Occupational Therapist: Triston Walker OT    Physical Therapist: Jillian Deshpande PT  Speech Therapist: Dmitry Kilpatrick M.S., [address] [facility]  Nurse: Monse Boyle RN    Dietary/Nutrition: Bonita Larkin RD, LD  Pastoral Care: Carito Hager  CMG: Jaswinder Sumner RN    I approve the established interdisciplinary plan of care as documented within the medical record of Ena Mark.     Drucie Sicard, MD

## 2022-05-02 NOTE — PROGRESS NOTES
Occupational Therapy  Facility/Department: [facility] ACUTE REHAB  Rehabilitation Occupational Therapy Daily Treatment Note    Date: 22  Patient Name: Carole Cevallos       Room: 7202/2772-16  MRN: [id number]  Account: [de-identified]   : 1950  (70 y.o.) Gender: female                    Past Medical History:  has a past medical history of Cerebral artery occlusion with cerebral infarction [facility]), CVA (cerebral infarction), Diabetes mellitus ([facility] Hyperlipidemia, Hypertension, and Seizures ([facility] Past Surgical History:   has a past surgical history that includes Appendectomy; Hysterectomy; Bronchoscopy diagnostic (2014); and Endoscopy, colon, diagnostic. Restrictions  Restrictions/Precautions: Fall Risk;General Precautions; Up as Tolerated  Other position/activity restrictions: SBP <180  Required Braces or Orthoses?: No    Subjective  Subjective: \"I just got washed up yesterday. Pt declines full shower/bathing at sink   Restrictions/Precautions: Fall Risk;General Precautions; Up as Tolerated        Pain Assessment  Pain Assessment: None - Denies Pain    Objective     Cognition  Overall Cognitive Status: Exceptions  Arousal/Alertness: Appropriate responses to stimuli  Following Commands:  Follows multistep commands with increased time  Attention Span: Appears intact  Memory: Decreased recall of recent events  Safety Judgement: Decreased awareness of need for safety;Decreased awareness of need for assistance  Problem Solving: Assistance required to identify errors made;Assistance required to correct errors made  Insights: Decreased awareness of deficits  Initiation: Requires cues for some  Sequencing: Requires cues for some  Orientation  Overall Orientation Status: Within Functional Limits  Orientation Level: Oriented X4         ADL  Feeding  Assistance Level: Modified independent    Grooming/Oral Hygiene  Assistance Level: Supervision;Minimal assistance  Skilled Clinical Factors: MIN A for styling hair otherwise SUP at seated in w/c sinklevel     Upper Extremity Bathing  Skilled Clinical Factors: declines    Lower Extremity Bathing  Skilled Clinical Factors: declines    Upper Extremity Dressing  Assistance Level: Stand by assist;Set-up; Verbal cues; Increased time to complete  Skilled Clinical Factors: able to stablize bra with R thumb web space in order to clasp bra this date, increased movement in fingers this date. able to don/doff bra with twisting method and pull over shirt with G carryover on estee tech     Lower Extremity Dressing  Assistance Level: Moderate assistance  Skilled Clinical Factors: MOD A for balance maintenance using shower GB  for 0-1 UE support    Putting On/Taking Off Footwear  Assistance Level: Stand by assist;Set-up  Skilled Clinical Factors: able to doff shoes, and don/doff footies, TA for TEDs    Toileting  Assistance Level: Moderate assistance  Skilled Clinical Factors: seated weight shift for lenore hygine; MOD A x1 and SBA x1 for standing during clothing mgt  req increased time for completion     Toilet Transfers  Technique: Stand pivot; To the left; To the right  Equipment: Standard toilet;Grab bars  Additional Factors: Verbal cues;Cues for hand placement; With handrails  Assistance Level: Moderate assistance  Skilled Clinical Factors: A x1 vc for hand placement no LOB noted      Functional Mobility  Device: Wheelchair  Activity: To/From bathroom; To/From therapy gym  Assistance Level: Dependent  Skilled Clinical Factors: did not self propel     Sit to Stand  Assistance Level: Moderate assistance  Skilled Clinical Factors: R LE buckling, no LOB      Stand to Sit  Assistance Level:  Moderate assistance;Minimal assistance  Skilled Clinical Factors: fluctuates with Assitance, vc for hand/foot placement      OT exercises  Resistive Exercises: LUE exercises with 1# free for 15 reps facilitated this date in all planes of motion to increase strength needed to participate in functional transfers and mobility safely and indepednelty. RB req due to fatigue and weakeness in LUE     Neuromuscular Education  Neuromuscular education: Yes  NDT Treatment: Upper extremity; Sitting  Vibration: Vibrations applied to RUE from shoulder to fingers folllowed by AAROM x10 reps in all planes of motion to support functional movement to increased RUE engagement during daily tasks to increase safety and independence     Assessment  Assessment  Activity Tolerance: Patient tolerated treatment well  Discharge Recommendations: Home with assist PRN;Home with [address] [place] [address] Avenue in place: Yes  Type of devices: Patient at risk for falls;Call light within reach;Gait belt;Left in chair ((PM left with PTA))    Patient Education  Education  Education Given To: Patient  Education Provided: Role of Therapy;Plan of Care;ADL Function; Safety;Transfer Training  Education Method: Verbal;Demonstration  Education Outcome: Continued education needed    Plan  Plan  Times per Week: 5-7  Times per Day: Twice a day  Current Treatment Recommendations: Strengthening;ROM;Balance training;Functional mobility training; Endurance training; Safety education & training;Patient/Caregiver education & training;Equipment evaluation, education, & procurement;Self-Care / ADL; Coordination training;Neuromuscular re-education;Modalities    Goals  Patient Goals   Patient goals : \"To end up gonig home and walk out of here and be able to be as normal as possible\"  Short Term Goals  Time Frame for Short term goals: One week  Short Term Goal 1: Patient will perform upper body bathing and dressing with SUP. Short Term Goal 2: Patient will perform lower body bathing and dressing with Minimal assist.  Short Term Goal 3: Patient will perform toileting tasks with Minimal assist.  Short Term Goal 4: Patient will perform stand pivot transfers during self-care with Minimal assist and Good safety.   Short Term Goal 5: Patient will verbalize/demonstrate Good understanding of assistive equipment/durable medical equipment/modified techniques for increased safety and IND with self-care. Short Term Goal 6: Patient will verbalize/demonstrate Good understanding of modified techniques for R UE during self-care. Short Term Goal 7: Patient will actively participate in 30+ minutes of therapeutic exercise/functional activities to promote increased IND with self-care and mobility. Long Term Goals  Time Frame for Long term goals : By discharge  Long Term Goal 1: Patient will perform BADLs with modified IND and Good safety. Long Term Goal 2: Patient will perform stand pivot transfers during self-care with modified IND and Good safety. Long Term Goal 3: Patient will perform functional mobility during self-care at w/c level with modified IND. Long Term Goal 4: Patient will verbalize/demonstrate Good understanding of Fall Prevention strategies during self-care to maximize safety and IND. Long Term Goal 5: Patient will verbalize/demonstrate Good understanding of home exercise program for R UE ROM, strengthening, and coordination as appropriate.   Long Term Goal 6: 9 hole peg test and dynamometer to be assessed for R UE as appropriate       05/02/22 1103 05/02/22 1342   OT Individual Minutes   Time In 1103 1342   Time Out 1200 1415   Minutes 57 100 [facility] OJ Eid

## 2022-05-03 LAB
GLUCOSE BLD-MCNC: 132 MG/DL (ref 65–105)
GLUCOSE BLD-MCNC: 141 MG/DL (ref 65–105)
GLUCOSE BLD-MCNC: 156 MG/DL (ref 65–105)
GLUCOSE BLD-MCNC: 93 MG/DL (ref 65–105)

## 2022-05-03 PROCEDURE — 6370000000 HC RX 637 (ALT 250 FOR IP): Performed by: STUDENT IN AN ORGANIZED HEALTH CARE EDUCATION/TRAINING PROGRAM

## 2022-05-03 PROCEDURE — 1180000000 HC REHAB R&B

## 2022-05-03 PROCEDURE — 82947 ASSAY GLUCOSE BLOOD QUANT: CPT

## 2022-05-03 PROCEDURE — 97530 THERAPEUTIC ACTIVITIES: CPT

## 2022-05-03 PROCEDURE — 6370000000 HC RX 637 (ALT 250 FOR IP): Performed by: INTERNAL MEDICINE

## 2022-05-03 PROCEDURE — 6370000000 HC RX 637 (ALT 250 FOR IP): Performed by: PHYSICAL MEDICINE & REHABILITATION

## 2022-05-03 PROCEDURE — 99231 SBSQ HOSP IP/OBS SF/LOW 25: CPT | Performed by: STUDENT IN AN ORGANIZED HEALTH CARE EDUCATION/TRAINING PROGRAM

## 2022-05-03 PROCEDURE — 99232 SBSQ HOSP IP/OBS MODERATE 35: CPT | Performed by: INTERNAL MEDICINE

## 2022-05-03 PROCEDURE — 6360000002 HC RX W HCPCS: Performed by: STUDENT IN AN ORGANIZED HEALTH CARE EDUCATION/TRAINING PROGRAM

## 2022-05-03 PROCEDURE — 97110 THERAPEUTIC EXERCISES: CPT

## 2022-05-03 PROCEDURE — 97116 GAIT TRAINING THERAPY: CPT

## 2022-05-03 PROCEDURE — 97535 SELF CARE MNGMENT TRAINING: CPT

## 2022-05-03 RX ADMIN — INSULIN LISPRO 1 UNITS: 100 INJECTION, SOLUTION INTRAVENOUS; SUBCUTANEOUS at 18:16

## 2022-05-03 RX ADMIN — LEVETIRACETAM 1000 MG: 500 TABLET, FILM COATED ORAL at 08:09

## 2022-05-03 RX ADMIN — DIVALPROEX SODIUM 500 MG: 500 TABLET, EXTENDED RELEASE ORAL at 08:07

## 2022-05-03 RX ADMIN — GABAPENTIN 600 MG: 300 CAPSULE ORAL at 14:49

## 2022-05-03 RX ADMIN — POLYETHYLENE GLYCOL 3350 17 G: 17 POWDER, FOR SOLUTION ORAL at 08:10

## 2022-05-03 RX ADMIN — FENOFIBRATE 160 MG: 160 TABLET ORAL at 08:08

## 2022-05-03 RX ADMIN — GABAPENTIN 600 MG: 300 CAPSULE ORAL at 08:08

## 2022-05-03 RX ADMIN — TOPIRAMATE 25 MG: 25 TABLET, FILM COATED ORAL at 08:09

## 2022-05-03 RX ADMIN — POTASSIUM CHLORIDE 20 MEQ: 20 TABLET, EXTENDED RELEASE ORAL at 08:09

## 2022-05-03 RX ADMIN — LEVETIRACETAM 1000 MG: 500 TABLET, FILM COATED ORAL at 20:34

## 2022-05-03 RX ADMIN — ENOXAPARIN SODIUM 40 MG: 100 INJECTION SUBCUTANEOUS at 08:08

## 2022-05-03 RX ADMIN — ACETAMINOPHEN 650 MG: 325 TABLET ORAL at 20:37

## 2022-05-03 RX ADMIN — DIVALPROEX SODIUM 500 MG: 500 TABLET, EXTENDED RELEASE ORAL at 20:35

## 2022-05-03 RX ADMIN — INSULIN GLARGINE 10 UNITS: 100 INJECTION, SOLUTION SUBCUTANEOUS at 20:35

## 2022-05-03 RX ADMIN — GABAPENTIN 600 MG: 300 CAPSULE ORAL at 20:34

## 2022-05-03 RX ADMIN — CLOPIDOGREL BISULFATE 75 MG: 75 TABLET ORAL at 08:07

## 2022-05-03 RX ADMIN — INSULIN LISPRO 1 UNITS: 100 INJECTION, SOLUTION INTRAVENOUS; SUBCUTANEOUS at 12:31

## 2022-05-03 RX ADMIN — PANTOPRAZOLE SODIUM 40 MG: 40 TABLET, DELAYED RELEASE ORAL at 06:01

## 2022-05-03 RX ADMIN — TOPIRAMATE 25 MG: 25 TABLET, FILM COATED ORAL at 20:35

## 2022-05-03 RX ADMIN — ATORVASTATIN CALCIUM 40 MG: 40 TABLET, FILM COATED ORAL at 20:34

## 2022-05-03 RX ADMIN — ASPIRIN 81 MG: 81 TABLET, COATED ORAL at 08:07

## 2022-05-03 ASSESSMENT — PAIN DESCRIPTION - LOCATION: LOCATION: SHOULDER

## 2022-05-03 ASSESSMENT — PAIN DESCRIPTION - ORIENTATION: ORIENTATION: RIGHT

## 2022-05-03 ASSESSMENT — PAIN SCALES - GENERAL
PAINLEVEL_OUTOF10: 0
PAINLEVEL_OUTOF10: 0
PAINLEVEL_OUTOF10: 7
PAINLEVEL_OUTOF10: 0

## 2022-05-03 ASSESSMENT — PAIN - FUNCTIONAL ASSESSMENT: PAIN_FUNCTIONAL_ASSESSMENT: ACTIVITIES ARE NOT PREVENTED

## 2022-05-03 ASSESSMENT — PAIN DESCRIPTION - DESCRIPTORS: DESCRIPTORS: ACHING;DULL;JABBING

## 2022-05-03 NOTE — PROGRESS NOTES
Physical Medicine & Rehabilitation  Progress Note      Subjective:      Mata Armenta is a 80 y.o. female with right-sided weakness of unclear etiology. She reports feeling pretty well today. She notes that therapies continue to go well. She does report some soreness in the right upper limb but that it improves with tylenol. She denies any other acute concerns. ROS:  Denies fevers, chills, sweats. No chest pain, palpitations, lightheadedness. Denies coughing, wheezing or shortness of breath. Denies abdominal pain, nausea, diarrhea or constipation. No new areas of joint pain. Denies new areas of numbness or weakness. Denies new anxiety or depression issues. No new skin problems. Rehabilitation:   Progressing in therapies. PT:  Restrictions/Precautions: Fall Risk,General Precautions,Up as Tolerated  Other position/activity restrictions: SBP <180   Transfers  Sit to Stand: Minimal Assistance  Stand to sit: Minimal Assistance  Bed to Chair: Moderate assistance,Minimal assistance (Min to strong side;  mod to weak side.)  Stand Pivot Transfers: Minimal Assistance,Moderate Assistance  Squat Pivot Transfers: Minimal Assistance  Comment: Several transfers completed in AM and PM.  Ambulation  Surface: level tile  Device: Parallel Bars  Other Apparatus:  (RLE blue surgical shoe cover. )  Assistance: Maximum assistance,Moderate assistance (right LE advancement )  Quality of Gait: Patient did not have to wear surgical shoe cover on this date. Writer facilitated hip flexion with tapping and provided assist with terminal knee extention. No buckling on this date during amb. or standing. Patient able to amb. fwd. and retro. Gait Deviations: Slow Irene,Decreased step length,Decreased step height  Distance: 8 ft x4 total  forward and retro. Comments: pt instructed and visual in decreased trunk sway , stabilization COG over single THOMAS. Mirror utilized for visual feedback.    More Ambulation?: Yes    Transfers  Sit to Stand: Minimal Assistance  Stand to sit: Minimal Assistance  Bed to Chair: Moderate assistance,Minimal assistance (Min to strong side;  mod to weak side.)  Stand Pivot Transfers: Minimal Assistance,Moderate Assistance  Squat Pivot Transfers: Minimal Assistance  Comment: Several transfers completed in AM and PM.     Ambulation  Surface: level tile  Device: Parallel Bars  Other Apparatus:  (RLE blue surgical shoe cover. )  Assistance: Maximum assistance,Moderate assistance (right LE advancement )  Quality of Gait: Patient did not have to wear surgical shoe cover on this date. Writer facilitated hip flexion with tapping and provided assist with terminal knee extention. No buckling on this date during amb. or standing. Patient able to amb. fwd. and retro. Gait Deviations: Slow Irene,Decreased step length,Decreased step height  Distance: 8 ft x4 total  forward and retro. Comments: pt instructed and visual in decreased trunk sway , stabilization COG over single THOMAS. Mirror utilized for visual feedback. More Ambulation?: Yes    Surface: level tile  Ambulation  Surface: level tile  Device: Parallel Bars  Other Apparatus:  (RLE blue surgical shoe cover. )  Assistance: Maximum assistance,Moderate assistance (right LE advancement )  Quality of Gait: Patient did not have to wear surgical shoe cover on this date. Writer facilitated hip flexion with tapping and provided assist with terminal knee extention. No buckling on this date during amb. or standing. Patient able to amb. fwd. and retro. Gait Deviations: Slow Irene,Decreased step length,Decreased step height  Distance: 8 ft x4 total  forward and retro. Comments: pt instructed and visual in decreased trunk sway , stabilization COG over single THOMAS. Mirror utilized for visual feedback.    More Ambulation?: Yes    OT:  ADL  Feeding: Setup  Feeding Skilled Clinical Factors: Patient reports use of non-dominant L hand for self-feeding currently  Grooming: Stand by assistance  Grooming Skilled Clinical Factors: While seated in w/c at sink  UE Bathing: Moderate assistance  UE Bathing Skilled Clinical Factors: Assist for L UE and R UE positioning while seated on tub transfer bench in shower  LE Bathing: Moderate assistance  LE Bathing Skilled Clinical Factors: Assist for buttocks and perineal area; Minimal assist for standing balance  UE Dressing: Minimal assistance  UE Dressing Skilled Clinical Factors: Assist bra fastening and threading L UE  LE Dressing: Maximum assistance  LE Dressing Skilled Clinical Factors: Assist to thread R LE, don TEDs & pull pants over hips  Toileting: Maximum assistance  Toileting Skilled Clinical Factors: Assist for clothing management; personal hygiene while seated  Additional Comments: OT facilitated patient engagement in showering routine including bathing, dressing, toileting, and grooming tasks. Patient demonstrates Fair compensatory strategies for self-care with further education warranted to maximize safety and IND. Balance  Sitting Balance: Stand by assistance  Standing Balance: Minimal assistance            Bed mobility  Rolling to Left: Moderate assistance  Rolling to Right: Minimal assistance  Supine to Sit: Stand by assistance (long sit method head of bed elevated)  Sit to Supine: Stand by assistance (left LE assists right fom left side bed )  Scooting: Minimal assistance  Comment: PT mat, wedge 2 pillows. Transfers  Stand Pivot Transfers: 2 Person assistance,Moderate assistance  Sit to stand:  Moderate assistance  Stand to sit: Moderate assistance  Transfer Comments: with Minimal verbal cues for hand placement and safety   Toilet Transfers  Toilet - Technique: Stand pivot,To right,To left  Equipment Used: Raised toilet seat with rails  Toilet Transfer: 2 Person assistance,Moderate assistance  Toilet Transfers Comments: with Minimal verbal cues for hand placement and safety     Shower Transfers  Shower - Transfer From: Wheelchair  Shower - Transfer Type: To and From  Shower - Transfer To:  Transfer tub bench  Shower - Technique: Stand pivot,To right,To left  Shower Transfers: 2 Person assistance,Moderate assistance       SPEECH:      Current Medications:   Current Facility-Administered Medications: insulin glargine (LANTUS) injection vial 10 Units, 10 Units, SubCUTAneous, Nightly  insulin lispro (HUMALOG) injection vial 0-6 Units, 0-6 Units, SubCUTAneous, TID WC  insulin lispro (HUMALOG) injection vial 0-3 Units, 0-3 Units, SubCUTAneous, Nightly  enoxaparin (LOVENOX) injection 40 mg, 40 mg, SubCUTAneous, Daily  ondansetron (ZOFRAN-ODT) disintegrating tablet 4 mg, 4 mg, Oral, Q8H PRN **OR** [DISCONTINUED] ondansetron (ZOFRAN) injection 4 mg, 4 mg, IntraVENous, Q6H PRN  aspirin EC tablet 81 mg, 81 mg, Oral, Daily  atorvastatin (LIPITOR) tablet 40 mg, 40 mg, Oral, Nightly  albuterol sulfate  (90 Base) MCG/ACT inhaler 6 puff, 6 puff, Inhalation, Q6H PRN  clopidogrel (PLAVIX) tablet 75 mg, 75 mg, Oral, Daily  divalproex (DEPAKOTE ER) extended release tablet 500 mg, 500 mg, Oral, BID  fenofibrate (TRIGLIDE) tablet 160 mg, 160 mg, Oral, Daily  gabapentin (NEURONTIN) capsule 600 mg, 600 mg, Oral, TID  levETIRAcetam (KEPPRA) tablet 1,000 mg, 1,000 mg, Oral, BID  pantoprazole (PROTONIX) tablet 40 mg, 40 mg, Oral, QAM AC  potassium chloride (KLOR-CON M) extended release tablet 20 mEq, 20 mEq, Oral, Daily with breakfast  topiramate (TOPAMAX) tablet 25 mg, 25 mg, Oral, BID  acetaminophen (TYLENOL) tablet 650 mg, 650 mg, Oral, Q4H PRN  polyethylene glycol (GLYCOLAX) packet 17 g, 17 g, Oral, Daily  senna (SENOKOT) tablet 17.2 mg, 2 tablet, Oral, Daily PRN  bisacodyl (DULCOLAX) suppository 10 mg, 10 mg, Rectal, Daily PRN      Objective:  /71   Pulse 79   Temp 98.4 °F (36.9 °C) (Oral)   Resp 18   Ht 5' 5\" (1.651 m)   Wt 134 lb (60.8 kg)   SpO2 98%   BMI 22.30 kg/m²       GEN: Well developed, well nourished, no acute distress  HEENT: NCAT. EOM grossly intact. Hearing grossly intact. Mucous membranes pink and moist.  RESP: Normal breath sounds with no wheezing, rales, or rhonchi. Respirations WNL and unlabored. CV: Regular rate and rhythm. No murmurs, rubs, or gallops. ABD: Soft, non-distended, BS+ and equal.  NEURO: Alert. Speech fluent. Sensation to light touch decreased in right upper and lower limbs compared to left. MSK:  Muscle bulk is normal bilaterally. Able to flex the right elbow with at least antigravity strength. Right hand  strength 3/5. Minimal movement of the right toes noted. LIMBS: No edema in bilateral lower limbs. SKIN: Warm and dry with good turgor. PSYCH: Mood WNL. Affect WNL. Appropriately interactive. Diagnostics:     CBC:   No results for input(s): WBC, RBC, HGB, HCT, MCV, RDW, PLT in the last 72 hours. BMP:   No results for input(s): NA, K, CL, CO2, PHOS, BUN, CREATININE, CA, GLUCOSE in the last 72 hours. BNP: No results for input(s): BNP in the last 72 hours. PT/INR: No results for input(s): PROTIME, INR in the last 72 hours. APTT: No results for input(s): APTT in the last 72 hours. CARDIAC ENZYMES: No results for input(s): CKMB, CKMBINDEX, TROPONINT in the last 72 hours. Invalid input(s): CKTOTAL;3  FASTING LIPID PANEL:  Lab Results   Component Value Date    CHOL 202 (H) 04/17/2022    HDL 55 04/17/2022    TRIG 164 (H) 04/17/2022     LIVER PROFILE: No results for input(s): AST, ALT, ALB, BILIDIR, BILITOT, ALKPHOS in the last 72 hours. Impression/Plan:   Impaired ADLs, gait, and mobility due to:    1. Right-sided weakness:  Unknown etiology - possibly psychosomatic. PT/OT for gait, mobility, strengthening, endurance, ADLs, and self care. Plan for outpatient EMG. On aspirin, plavix. On gabapentin. 2. Cognitive impairment:  SLP following. 3. Anemia: Hemoglobin 10.2 on 4/30, stable. Monitoring. 4. Type 2 Diabetes:  Hemoglobin A1c 10.3 on 4/17. On Lantus nightly and sliding scale (IM decreased both medications on 5/2)  5. HTN:  Not currently on medication  6. HLD: On atorvastatin, fenofibrate  7. History of CVA:  On aspirin, plavix, atorvastatin, fenofibrate  8. Seizure: On Keppra and Depakote  9. GERD: On pantoprazole  10. Hypokalemia:  Improved. On KCl repletion daily. Monitoring. 11. Bowel Management: Miralax daily, senokot prn, dulcolax prn. 12. DVT Prophylaxis:  low molecular weight heparin, SCD's while in bed and ALLIE's while in bed  13.  Internal medicine for medical management      Electronically signed by Gloria Leblanc MD on 5/4/2022 at 12:33 AM

## 2022-05-03 NOTE — PLAN OF CARE
Problem: Discharge Planning  Goal: Discharge to home or other facility with appropriate resources  Outcome: Progressing     Problem: Safety - Adult  Goal: Free from fall injury  Outcome: Progressing     Problem: ABCDS Injury Assessment  Goal: Absence of physical injury  Outcome: Progressing     Problem: Skin/Tissue Integrity  Goal: Absence of new skin breakdown  Description: 1. Monitor for areas of redness and/or skin breakdown  2. Assess vascular access sites hourly  3. Every 4-6 hours minimum:  Change oxygen saturation probe site  4. Every 4-6 hours:  If on nasal continuous positive airway pressure, respiratory therapy assess nares and determine need for appliance change or resting period.   Outcome: Progressing     Problem: Chronic Conditions and Co-morbidities  Goal: Patient's chronic conditions and co-morbidity symptoms are monitored and maintained or improved  Outcome: Progressing     Problem: Nutrition Deficit:  Goal: Optimize nutritional status  Outcome: Progressing     Problem: Nutrition Deficit:  Goal: Optimize nutritional status  Outcome: Progressing     Problem: Pain  Goal: Verbalizes/displays adequate comfort level or baseline comfort level  Outcome: Progressing

## 2022-05-03 NOTE — PROGRESS NOTES
Physical Therapy  Facility/Department: [facility] ACUTE REHAB      NAME: Juan Marie  : 1950 (80 y.o.)  MRN: [id number]  CODE STATUS: Full Code    Date of Service: 5/3/22      Past Medical History:   Diagnosis Date    Cerebral artery occlusion with cerebral infarction ([facility] [address] ,     CVA (cerebral infarction)     Diabetes mellitus ([facility] [address] [facility]     Hyperlipidemia     Hypertension     Seizures ([facility] [address] 2022     Past Surgical History:   Procedure Laterality Date    APPENDECTOMY      BRONCHOSCOPY DIAGNOSTIC  2014         ENDOSCOPY, COLON, DIAGNOSTIC      HYSTERECTOMY         Patient assessed for rehabilitation services?: Yes  Additional Pertinent Hx: Juan Marie is a 80 y.o. female with history of CVA, HTN, and HLD admitted to [facility] on 2022. She initially presented with acute-onset right hemiparesis. NIHSS 21. She was given tPA by the mobile stroke unit, but infusion was stopped due to worsening of symptoms and concern for possible hemorrhagic conversion. She was intubated prior to arrival to the ED. CT head showed no acute intracranial abnormality, and tPA was restarted. She was also loaded with keppra due to concern for seizures. MRI brain showed no acute intracranial abnormality. Extubated 22. Marv Burks Per notes, she had a similar episodes in  and . Per Neuro notes- Reported history of chronic infarcts with residual right sided weakness but not clearly evident on brain imaging. Neuro recommending EMG as outpatient. Pt admotted to rehab unit on 22. Family / Caregiver Present: No  Referral Date : 22  Diagnosis: Right side weakness    Restrictions:  Restrictions/Precautions: Fall Risk;General Precautions; Up as Tolerated  Position Activity Restriction  Other position/activity restrictions: SBP <180     SUBJECTIVE  Subjective: Pt would prefer to have OT prior to PT. Pt is agreeable to PT this date. Pain: Patient denies pain.        Prior Level of Function:  Social/Functional History  Lives With: Alone  Type of Home: House  Home Layout: One level  Home Access: Level entry  Bathroom Shower/Tub: Tub/Shower unit,Curtain  Bathroom Toilet: Standard (Sink counter on the right side)  Bathroom Equipment: Hand-held shower  Bathroom Accessibility: Wheelchair accessible  Home Equipment: Rollator,Cane  ADL Assistance: Independent  Homemaking Assistance: Independent  Homemaking Responsibilities: Yes  Ambulation Assistance: Independent (St cane for outside)  Transfer Assistance: Independent  Active : Yes  Mode of Transportation: Car  Occupation: Retired  Leisure & Hobbies: Knitting, puzzles  Additional Comments: Son works nights, daughter in law works days, can assist. Pt reports daughter is able to provide prn assist upon discharge      OBJECTIVE  Vision  Vision: Impaired  Vision Exceptions: Wears glasses at all times    Hearing  Hearing: Exceptions to [facility]  Hearing Exceptions: Hard of hearing/hearing concerns;Right hearing aid    Sensation  Overall Sensation Status: WFL    Functional Mobility  Bed mobility  Supine to Sit: Stand by assistance (long sit method head of bed elevated)  Sit to Supine: Stand by assistance (left LE assists right fom left side bed )  Scooting: Minimal assistance  Transfers  Sit to Stand: Minimal Assistance  Stand to sit: Minimal Assistance  Bed to Chair: Moderate assistance;Minimal assistance (Min to strong side;  mod to weak side.)  Stand Pivot Transfers: Minimal Assistance; Moderate Assistance  Squat Pivot Transfers: Minimal Assistance  Comment: Several transfers completed in AM and PM.  Balance  Posture: Fair  Sitting - Static: Fair;-  Sitting - Dynamic: Poor;+  Standing - Static: Poor  Standing - Dynamic: Poor;-  Comments: Standing with rolling walker. Environmental Mobility  Ambulation  Surface: level tile  Device: Parallel Bars  Assistance: Maximum assistance; Moderate assistance (right LE advancement )  Quality of Gait: Patient did not have to wear surgical shoe cover on this date. Writer facilitated hip flexion with tapping and provided assist with terminal knee extention. No buckling on this date during amb. or standing. Patient able to amb. fwd. and retro. Gait Deviations: Slow Irene;Decreased step length;Decreased step height  Distance: 8 ft x4 total  forward and retro. Comments: pt instructed and visual in decreased trunk sway , stabilization COG over single THOMAS. Mirror utilized for visual feedback. More Ambulation?: Yes  Stairs/Curb  Stairs?: No    PT Exercises  Exercise Treatment: seated bilateral x 15 right A/AROM and LLE 1.5#. PROM Exercises: PROM right seated hamstring / gastrocnemius   Functional Mobility Circuit Training: sit to stand WC <> parallel bars . Also WC to RW. Pre gait ex. for wt. shifting. (instruction in sequencing muscle and decreased trunk sway )  Dynamic Standing Balance Exercises: BLE ex's in // bars. Reps: 15 each. Writer assisting with RLE knee extension. Exercise Equipment: NuStep L3 x 15 min    ASSESSMENT  Vitals  O2 Device: None (Room air)    Activity Tolerance  Activity Tolerance: Patient tolerated treatment well    Assessment  Performance Deficits/Impairments: Decreased functional mobility ; Decreased tolerance to work activity; Decreased strength;Decreased safe awareness;Decreased endurance;Decreased balance;Decreased posture;Decreased ROM  Treatment Diagnosis: Weakness, difficulty walking  Therapy Prognosis: Good  Discharge Recommendations: Patient would benefit from continued therapy after discharge;Home with assist PRN  PT Equipment Recommendations  Equipment Needed:  (TBD as therapy progresses)      GOALS  Patient Goals   Patient goals :  To get better  Short Term Goals  Time Frame for Short term goals: 10 days  Short term goal 1: Pt to perform bed mobility from flat surface independently  Short term goal 2: Demonstrate functional transfers min A  Short term goal 3: Pt to ambulate distance of 100 ft with rolling walker at mod A  Short term goal 4: Pt able to improve sitting balance at EOB/EOM to good to be alen to eat meals. Short term goal 5: Demonstrate dynamic standing balance of fiar - to decrease fall risk  Short Term Goal 6: Pt able to  propel w/c distance of 100 to 150 ft, CGA, level surfaces. Long Term Goals  Time Frame for Long term goals : By DC  Long term goal 1: Pt able to perform transfers at mod-I  Long term goal 2: Pt able to ambulate distance of 100 to 150 ft with appropriate device, at min A. with assistive device. Long term goal 3: Pt able to perform a curb step with a rolling wwalker/UE support, mod A  Long term goal 4: Pt able to propel w/c distance of 150 ft , level surfaces, including turns, mod-I  Long term goal 5: Pt  able to improve standing balance with assistive device to fair to reduce fall risk  Long term goal 6: Improve 2MWT distance to 80 ft to improve function and gait speed. Long term goal 7: Improve PASS score to 25/36 to improve overall function. PLAN OF CARE  Plan  Plan:  minutes of therapy at least 5 out of 7 days a week (--)  Current Treatment Recommendations: Strengthening;ROM;Balance training;Functional mobility training;Gait training;Neuromuscular re-education;Transfer training; Endurance training; Wheelchair mobility training;Stair training;Home exercise program;Safety education & training;Patient/Caregiver education & training;Equipment evaluation, education, & procurement; Modalities (E-stim as needed to neuro-muscular faciliatation R LE as nee)  Safety Devices  Type of Devices: Call light within reach;Gait belt;Patient at risk for falls; Bed alarm in place; All fall risk precautions in place  Restraints  Restraints Initially in Place: No    Therapy Time     05/03/22 1015 05/03/22 1335   PT Individual Minutes   Time In 1015 1335   Time Out 1100 1420   Minutes 39 [address] [place], [place], 05/03/22 at 5:06 PM

## 2022-05-03 NOTE — PROGRESS NOTES
[place] [address] Internal Medicine    CONSULTATION / HISTORY AND PHYSICAL EXAMINATION            Date:   5/3/2022  Patient name:  Raya Kramer  Date of admission:  4/28/2022 12:10 PM  MRN:   [id number]  Account:  [de-identified]  YOB: 1950  PCP:    Gabriel Dubois MD  Room:   9190/5681-80  Code Status:    Full Code    Physician Requesting Consult: Ann Estevez MD    Reason for Consult:  Medical management    Chief Complaint:     No chief complaint on file. Right-sided weakness    History Obtained From:     Patient, EMR, nursing staff    History of Present Illness:     69-year-old female with history of questionable seizure disorder, chronic right hemiparesis numbness of right leg migraines admitted on 4/17 after being found altered, concern for stroke  Started on IV tPA but then became obtunded and flaccid and tPA held due to concern for hemorrhagic conversion  She was intubated, stat CT done which was unremarkable subsequently tPA was resumed and completed. MRI brain negative for stroke  Episode of flaccidity concerning for seizure hence loaded with IV Keppra  Extubated 4/19  Persistent right-sided weakness, fluctuating, neurology considering psychosomatic symptoms  Patient is on aspirin Plavix    Diabetes   Diagnosed within last year  Modifying factors on med:   Severity:controlled   Associated signs and symtoms: neuropathy/ckd/ CAD.    aggravated with sugar diet and better with low sugar diet      5/2   Patient again had episode of hypoglycemia in the morning, orange juice was provided  Blood sugar went to 68      Past Medical History:     Past Medical History:   Diagnosis Date    Cerebral artery occlusion with cerebral infarction ([address] 2008, 2014    CVA (cerebral infarction)     Diabetes mellitus ([address]     Hyperlipidemia     Hypertension     Seizures ([address] 04/2022        Past Surgical History:     Past Surgical History:   Procedure Laterality Date    APPENDECTOMY      BRONCHOSCOPY DIAGNOSTIC  7/24/2014         ENDOSCOPY, COLON, DIAGNOSTIC      HYSTERECTOMY          Medications Prior to Admission:     Prior to Admission medications    Medication Sig Start Date End Date Taking? Authorizing Provider   divalproex (DEPAKOTE ER) 500 MG extended release tablet Take 1 tablet by mouth in the morning and at bedtime 4/26/22   Elli Rai MD   levETIRAcetam (KEPPRA) 1000 MG tablet Take 1 tablet by mouth 2 times daily 4/21/22   Cresenciano Heimlich, MD   topiramate (TOPAMAX) 25 MG tablet Take 1 tablet by mouth 2 times daily 4/21/22   Cresenciano Heimlich, MD   clopidogrel (PLAVIX) 75 MG tablet Take 75 mg by mouth nightly    Historical Provider, MD   gabapentin (NEURONTIN) 600 MG tablet Take 600 mg by mouth 3 times daily. Historical Provider, MD   potassium chloride (KLOR-CON M) 20 MEQ extended release tablet Take 20 mEq by mouth 2 times daily    Historical Provider, MD   fenofibrate (TRIGLIDE) 160 MG tablet Take 160 mg by mouth daily    Historical Provider, MD   Pantoprazole Sodium (PROTONIX PO) Take  by mouth. Historical Provider, MD   acetaminophen (TYLENOL) 160 MG/5ML solution Take 20.3 mLs by mouth every 4 hours as needed for Fever. 7/28/14   Ashley Rodriguez DO   albuterol (PROVENTIL HFA;VENTOLIN HFA) 108 (90 BASE) MCG/ACT inhaler Inhale 6 puffs into the lungs every 6 hours as needed for Wheezing. 7/28/14   Ashley Rodriguez DO   glucagon, rDNA, 1 MG SOLR injection Inject 1 mg into the muscle as needed for Low blood sugar (Blood glucose less than 70 mg/dL and patient NOT ALERT or NPO and does not have IV access. ). 7/28/14   Ashley Rodriguez DO   glucose (GLUTOSE) 40 % GEL Take 15 g by mouth as needed. 7/28/14   Ashely Rodriguez DO   insulin glargine (LANTUS) 100 UNIT/ML injection vial Inject 10 Units into the skin nightly. 7/28/14   Ashley Rodriguez DO   insulin lispro (HUMALOG) 100 UNIT/ML injection vial Inject 0-12 Units into the skin every 6 hours.  7/28/14   Miranda Metz DO Corin   niacin (NIASPAN) 500 MG CR tablet Take 1 tablet by mouth nightly. 14   Brian Llanos DO   potassium chloride (KAYCIEL) 20 MEQ/15ML (10%) solution Take 15 mLs by mouth daily. 14   Brian Llanos DO        Allergies:     Patient has no known allergies. Social History:     Tobacco:    reports that she has never smoked. She has never used smokeless tobacco.  Alcohol:      reports no history of alcohol use. Drug Use:  reports no history of drug use. Family History:     History reviewed. No pertinent family history. Review of Systems:     Positive and Negative as described in HPI. CONSTITUTIONAL:  negative for fevers, chills, sweats, fatigue, weight loss  HEENT:  negative for vision, hearing changes, runny nose, throat pain  RESPIRATORY:  negative for shortness of breath, cough, congestion, wheezing. CARDIOVASCULAR:  negative for chest pain, palpitations.   GASTROINTESTINAL:  negative for nausea, vomiting, diarrhea, constipation, change in bowel habits, abdominal pain   GENITOURINARY:  negative for difficulty of urination, burning with urination, frequency   INTEGUMENT:  negative for rash, skin lesions, easy bruising   HEMATOLOGIC/LYMPHATIC:  negative for swelling/edema   ALLERGIC/IMMUNOLOGIC:  negative for urticaria , itching  ENDOCRINE:  negative increase in drinking, increase in urination, hot or cold intolerance  MUSCULOSKELETAL:  negative joint pains, muscle aches, swelling of joints  NEUROLOGICAL:  Positive for right sided weakness, negative for headaches, dizziness, lightheadedness, numbness, pain, tingling extremities      Physical Exam:     /69   Pulse 68   Temp 97.9 °F (36.6 °C)   Resp 19   Ht 5' 5\" (1.651 m)   Wt 134 lb (60.8 kg)   SpO2 99%   BMI 22.30 kg/m²   Temp (24hrs), Av °F (36.7 °C), Min:97.9 °F (36.6 °C), Max:98.1 °F (36.7 °C)    Recent Labs     22  1558 22  0606 22  1101   POCGLU 155* 167* 93 141* Intake/Output Summary (Last 24 hours) at 5/3/2022 1320  Last data filed at 5/2/2022 1900  Gross per 24 hour   Intake 480 ml   Output --   Net 480 ml       General Appearance:  alert, well appearing, and in no acute distress  Head:  normocephalic, atraumatic. Eye: no icterus, redness, pupils equal and reactive, extraocular eye movements intact, conjunctiva clear  Ear: normal external ear, no discharge, hearing intact  Nose:  no drainage noted  Mouth: mucous membranes moist  Neck: supple, no carotid bruits, thyroid not palpable  Lungs: Bilateral equal air entry, clear to ausculation, no wheezing, rales or rhonchi, normal effort  Cardiovascular: normal rate, regular rhythm, no murmur, gallop, rub.   Abdomen: Soft, nontender, nondistended, normal bowel sounds, no hepatomegaly or splenomegaly  Neurologic:  Right UE and Lower extremity power 2/5, There are no new focal motor or sensory deficits, normal muscle tone and bulk, no abnormal sensation, normal speech, cranial nerves II through XII grossly intact  Skin: No gross lesions, rashes, bruising or bleeding on exposed skin area  Extremities:  peripheral pulses palpable, no pedal edema or calf pain with palpation  Psych:normal affect    Investigations:      Laboratory Testing:  Recent Results (from the past 24 hour(s))   POC Glucose Fingerstick    Collection Time: 05/02/22  3:58 PM   Result Value Ref Range    POC Glucose 155 (H) 65 - 105 mg/dL   POC Glucose Fingerstick    Collection Time: 05/02/22  8:15 PM   Result Value Ref Range    POC Glucose 167 (H) 65 - 105 mg/dL   POC Glucose Fingerstick    Collection Time: 05/03/22  6:06 AM   Result Value Ref Range    POC Glucose 93 65 - 105 mg/dL   POC Glucose Fingerstick    Collection Time: 05/03/22 11:01 AM   Result Value Ref Range    POC Glucose 141 (H) 65 - 105 mg/dL       Imaging/Diagonstics:  Recent data reviewed    Assessment :      Primary Problem  Right sided weakness    Active Hospital Problems    Diagnosis Date Noted    Right sided weakness [R53.1] 04/17/2022    HTN (hypertension) [I10] 07/19/2014       Plan:     1. Right-sided weakness MRI brain negative for stroke- PT OT  2. Type 2 diabetes- continue Lantus, sliding scale  3. History of prior strokes-continue aspirin, Plavix, statin  4. History of seizure disorder- continue Keppra, Topamax, Depakote  5. Hyperlipidemia-patient on statin, fenofibrate  6. H/o Hypertension-currently controlled without meds  7. Recent extubation on 4/19    DVT prophylaxis-Lovenox    May 1  Hypoglycemia noted  Reduce lantus to 15 qhs  5/2   Reducing dose of insulin to 10 units, reducing sliding scale to low-dose  Blood pressure is controlled, off antihypertensives    5/3   Patient blood pressure better controlled after adjusting insulin  No new complaints  Consultations:   Caty Parker TO SOCIAL WORK  IP CONSULT TO INTERNAL MEDICINE      Joselin Sylvester MD  5/3/2022  1:20 PM    Copy sent to Dr. Stoney Portillo MD    Please note that this chart was generated using voice recognition Dragon dictation software. Although every effort was made to ensure the accuracy of this automated transcription, some errors in transcription may have occurred.

## 2022-05-03 NOTE — PROGRESS NOTES
Occupational Therapy  Facility/Department: [facility]  Rehabilitation Occupational Therapy Daily Treatment Note    Date: 5/3/22  Patient Name: Heddy Sacks       Room: 6874/6417-59  MRN: [id number]  Account: [de-identified]   : 1950  (70 y.o.) Gender: female                    Past Medical History:  has a past medical history of Cerebral artery occlusion with cerebral infarction [facility]), CVA (cerebral infarction), Diabetes mellitus ([facility] Hyperlipidemia, Hypertension, and Seizures ([facility] Past Surgical History:   has a past surgical history that includes Appendectomy; Hysterectomy; Bronchoscopy diagnostic (2014); and Endoscopy, colon, diagnostic. Restrictions  Restrictions/Precautions: Fall Risk;General Precautions; Up as Tolerated  Required Braces or Orthoses?: No    Subjective  Subjective: pleasant and agreeable to OT tx this date   Restrictions/Precautions: Fall Risk;General Precautions; Up as Tolerated        Pain Assessment  Pain Assessment: None - Denies Pain    Objective     Cognition  Overall Cognitive Status: Exceptions  Arousal/Alertness: Appropriate responses to stimuli  Following Commands:  Follows multistep commands with increased time  Attention Span: Appears intact  Memory: Decreased recall of recent events  Safety Judgement: Decreased awareness of need for safety;Decreased awareness of need for assistance  Problem Solving: Assistance required to identify errors made;Assistance required to correct errors made  Insights: Decreased awareness of deficits  Initiation: Requires cues for some  Sequencing: Requires cues for some  Orientation  Overall Orientation Status: Within Functional Limits  Orientation Level: Oriented X4         ADL  Feeding  Assistance Level: Modified independent    Grooming/Oral Hygiene  Assistance Level: Supervision;Minimal assistance  Skilled Clinical Factors: MIN A for styling hair otherwise SUP at seated in w/c sinklevel     Upper Extremity Bathing  Assistance Level: Stand by assist;Set-up; Verbal cues; Increased time to complete  Skilled Clinical Factors: Pt able to wash LUE with RUE stablized and also washing upper body displaying  [place] to promote functional movement with RUE. Increased RUE participation noted     Lower Extremity Bathing  Assistance Level: Moderate assistance;Set-up; Verbal cues; Increased time to complete  Skilled Clinical Factors: MOD A for standing balance for lenore care and seated to wash BLEs using figure 4 tech     Upper Extremity Dressing  Assistance Level: Stand by assist;Set-up; Verbal cues; Increased time to complete  Skilled Clinical Factors: able to stablize bra with R thumb web space in order to clasp bra this date, increased movement in fingers this date. able to don/doff bra with twisting method and pull over shirt with G carryover on estee tech     Lower Extremity Dressing  Assistance Level: Moderate assistance  Skilled Clinical Factors: MOD A for balance maintenance using RW and sink  for 0-1 UE support, able to thread pants and underwear BLEs and pull pants/underwear up over hips    Putting On/Taking Off Footwear  Assistance Level: Stand by assist;Set-up  Skilled Clinical Factors: TA for ORLANDO Infante provided education and recommendation on elastic shoe laces to increase ease and independence in dressing task. Pt agreeable to trail laces. Pt able to don shoes using figure 4 tech req assist to tie laces    Toileting  Assistance Level: Moderate assistance  Skilled Clinical Factors: seated weight shift for lenore hygine; MOD A x1  for standing during clothing mgt  req increased time for completion     Toilet Transfers  Technique: Stand pivot; To the left; To the right  Equipment: Standard toilet;Grab bars  Additional Factors: Verbal cues;Cues for hand placement; With handrails  Assistance Level: Minimal assistance  Skilled Clinical Factors: A x1 vc for hand placement no LOB noted      Functional Mobility  Device: Wheelchair  Activity: To/From bathroom; To/From therapy gym  Assistance Level: Dependent  Skilled Clinical Factors: did not self propel     Roll Left  Assistance Level: Stand by assist  Skilled Clinical Factors: vc for R UE awareness during mobility     Supine to Sit  Assistance Level: Stand by assist  Skilled Clinical Factors: Pt able to position R LE throughout mobility with HOB elevated     Scooting  Assistance Level: Stand by assist  Skilled Clinical Factors: to EOB     Stand to Sit  Assistance Level: Moderate assistance;Minimal assistance  Skilled Clinical Factors: fluctuates with Assitance, vc for hand/foot placement     Bed To/From Chair  Technique: Stand pivot  Assistance Level: Minimal assistance  Skilled Clinical Factors: A x1 vc for hand and foot placement prior to transfer    Stand Pivot  Assistance Level: Moderate assistance  Skilled Clinical Factors: MOD A x1 , no LOB      OT Exercises  Exercise Treatment: PERRY facilitated pt engagement in arm skate exercises (vertical, figure 8,horizontal, small/medium/large circullar movements to support functional movements in RUE for optimal independence in daily tasks. 1 RB req between sets with G tolerance noted. (x15 reps x2)     Additional activities: PM: PERRY facilitated pt engagment in seated table top task, instructing pt to weave RUE using arm skate between 3 cones to support RUE functional movements and control to increase RUE participation in daily tasks for optimal independence. Pt able to weave RUE between cones with increased control only hitting cones ~2 times. G tolerance noted   Assessment  Assessment  Activity Tolerance: Patient tolerated treatment well  Discharge Recommendations: Home with assist PRN;Home with Home health OT  Safety Devices  Safety Devices in place: Yes  Type of devices: Patient at risk for falls;Call light within reach;Gait belt;Left in chair    Patient Education  Education  Education Given To: Patient  Education Provided: Role of Therapy;Plan of Care; ADL Function; Safety;Transfer Training;Home Exercise Program  Education Method: Verbal;Demonstration  Education Outcome: Continued education needed    Plan  Plan  Times per Week: 5-7  Times per Day: Twice a day  Current Treatment Recommendations: Strengthening;ROM;Balance training;Functional mobility training; Endurance training; Safety education & training;Patient/Caregiver education & training;Equipment evaluation, education, & procurement;Self-Care / ADL; Coordination training;Neuromuscular re-education;Modalities    Goals  Patient Goals   Patient goals : \"To end up gonig home and walk out of here and be able to be as normal as possible\"  Short Term Goals  Time Frame for Short term goals: One week  Short Term Goal 1: Patient will perform upper body bathing and dressing with SUP. Short Term Goal 2: Patient will perform lower body bathing and dressing with Minimal assist.  Short Term Goal 3: Patient will perform toileting tasks with Minimal assist.  Short Term Goal 4: Patient will perform stand pivot transfers during self-care with Minimal assist and Good safety. Short Term Goal 5: Patient will verbalize/demonstrate Good understanding of assistive equipment/durable medical equipment/modified techniques for increased safety and IND with self-care. Short Term Goal 6: Patient will verbalize/demonstrate Good understanding of modified techniques for R UE during self-care. Short Term Goal 7: Patient will actively participate in 30+ minutes of therapeutic exercise/functional activities to promote increased IND with self-care and mobility. Long Term Goals  Time Frame for Long term goals : By discharge  Long Term Goal 1: Patient will perform BADLs with modified IND and Good safety. Long Term Goal 2: Patient will perform stand pivot transfers during self-care with modified IND and Good safety. Long Term Goal 3: Patient will perform functional mobility during self-care at w/c level with modified IND.   Long Term Goal 4: Patient will verbalize/demonstrate Good understanding of Fall Prevention strategies during self-care to maximize safety and IND. Long Term Goal 5: Patient will verbalize/demonstrate Good understanding of home exercise program for R UE ROM, strengthening, and coordination as appropriate.        05/03/22 1119 05/03/22 1300   OT Individual Minutes   Time In 1119 1300   Time Out 1212 1337   Minutes 53 107 [facility]

## 2022-05-04 LAB
GLUCOSE BLD-MCNC: 108 MG/DL (ref 65–105)
GLUCOSE BLD-MCNC: 132 MG/DL (ref 65–105)
GLUCOSE BLD-MCNC: 252 MG/DL (ref 65–105)
GLUCOSE BLD-MCNC: 94 MG/DL (ref 65–105)

## 2022-05-04 PROCEDURE — 6370000000 HC RX 637 (ALT 250 FOR IP): Performed by: STUDENT IN AN ORGANIZED HEALTH CARE EDUCATION/TRAINING PROGRAM

## 2022-05-04 PROCEDURE — 97530 THERAPEUTIC ACTIVITIES: CPT

## 2022-05-04 PROCEDURE — 97535 SELF CARE MNGMENT TRAINING: CPT

## 2022-05-04 PROCEDURE — 6370000000 HC RX 637 (ALT 250 FOR IP): Performed by: PHYSICAL MEDICINE & REHABILITATION

## 2022-05-04 PROCEDURE — 97116 GAIT TRAINING THERAPY: CPT

## 2022-05-04 PROCEDURE — 6360000002 HC RX W HCPCS: Performed by: STUDENT IN AN ORGANIZED HEALTH CARE EDUCATION/TRAINING PROGRAM

## 2022-05-04 PROCEDURE — 82947 ASSAY GLUCOSE BLOOD QUANT: CPT

## 2022-05-04 PROCEDURE — 97130 THER IVNTJ EA ADDL 15 MIN: CPT

## 2022-05-04 PROCEDURE — 97110 THERAPEUTIC EXERCISES: CPT

## 2022-05-04 PROCEDURE — 6370000000 HC RX 637 (ALT 250 FOR IP): Performed by: INTERNAL MEDICINE

## 2022-05-04 PROCEDURE — 1180000000 HC REHAB R&B

## 2022-05-04 PROCEDURE — 99232 SBSQ HOSP IP/OBS MODERATE 35: CPT | Performed by: INTERNAL MEDICINE

## 2022-05-04 PROCEDURE — 97129 THER IVNTJ 1ST 15 MIN: CPT

## 2022-05-04 RX ADMIN — DIVALPROEX SODIUM 500 MG: 500 TABLET, EXTENDED RELEASE ORAL at 20:43

## 2022-05-04 RX ADMIN — LEVETIRACETAM 1000 MG: 500 TABLET, FILM COATED ORAL at 07:45

## 2022-05-04 RX ADMIN — TOPIRAMATE 25 MG: 25 TABLET, FILM COATED ORAL at 07:47

## 2022-05-04 RX ADMIN — PANTOPRAZOLE SODIUM 40 MG: 40 TABLET, DELAYED RELEASE ORAL at 05:49

## 2022-05-04 RX ADMIN — LEVETIRACETAM 1000 MG: 500 TABLET, FILM COATED ORAL at 20:42

## 2022-05-04 RX ADMIN — FENOFIBRATE 160 MG: 160 TABLET ORAL at 07:45

## 2022-05-04 RX ADMIN — ASPIRIN 81 MG: 81 TABLET, COATED ORAL at 07:46

## 2022-05-04 RX ADMIN — TOPIRAMATE 25 MG: 25 TABLET, FILM COATED ORAL at 20:43

## 2022-05-04 RX ADMIN — GABAPENTIN 600 MG: 300 CAPSULE ORAL at 14:40

## 2022-05-04 RX ADMIN — GABAPENTIN 600 MG: 300 CAPSULE ORAL at 20:42

## 2022-05-04 RX ADMIN — ATORVASTATIN CALCIUM 40 MG: 40 TABLET, FILM COATED ORAL at 20:42

## 2022-05-04 RX ADMIN — POLYETHYLENE GLYCOL 3350 17 G: 17 POWDER, FOR SOLUTION ORAL at 07:46

## 2022-05-04 RX ADMIN — CLOPIDOGREL BISULFATE 75 MG: 75 TABLET ORAL at 07:45

## 2022-05-04 RX ADMIN — INSULIN LISPRO 2 UNITS: 100 INJECTION, SOLUTION INTRAVENOUS; SUBCUTANEOUS at 20:44

## 2022-05-04 RX ADMIN — DIVALPROEX SODIUM 500 MG: 500 TABLET, EXTENDED RELEASE ORAL at 07:47

## 2022-05-04 RX ADMIN — POTASSIUM CHLORIDE 20 MEQ: 20 TABLET, EXTENDED RELEASE ORAL at 07:45

## 2022-05-04 RX ADMIN — ENOXAPARIN SODIUM 40 MG: 100 INJECTION SUBCUTANEOUS at 07:46

## 2022-05-04 RX ADMIN — INSULIN GLARGINE 10 UNITS: 100 INJECTION, SOLUTION SUBCUTANEOUS at 20:44

## 2022-05-04 RX ADMIN — GABAPENTIN 600 MG: 300 CAPSULE ORAL at 07:45

## 2022-05-04 RX ADMIN — ACETAMINOPHEN 650 MG: 325 TABLET ORAL at 20:42

## 2022-05-04 ASSESSMENT — PAIN SCALES - GENERAL
PAINLEVEL_OUTOF10: 8
PAINLEVEL_OUTOF10: 0
PAINLEVEL_OUTOF10: 3

## 2022-05-04 ASSESSMENT — PAIN DESCRIPTION - LOCATION
LOCATION: ARM;SHOULDER
LOCATION: ARM;SHOULDER

## 2022-05-04 ASSESSMENT — PAIN DESCRIPTION - DESCRIPTORS
DESCRIPTORS: ACHING;JABBING;THROBBING
DESCRIPTORS: ACHING;CRAMPING

## 2022-05-04 ASSESSMENT — PAIN DESCRIPTION - ORIENTATION
ORIENTATION: RIGHT
ORIENTATION: RIGHT

## 2022-05-04 NOTE — PLAN OF CARE
Problem: Discharge Planning  Goal: Discharge to home or other facility with appropriate resources  5/4/2022 0207 by Bianka Pugh RN  Outcome: Progressing  Flowsheets (Taken 5/3/2022 2020)  Discharge to home or other facility with appropriate resources:   Identify barriers to discharge with patient and caregiver   Arrange for needed discharge resources and transportation as appropriate   Identify discharge learning needs (meds, wound care, etc)   Refer to discharge planning if patient needs post-hospital services based on physician order or complex needs related to functional status, cognitive ability or social support system  5/3/2022 1729 by Frank Martinez LPN  Outcome: Progressing     Problem: Safety - Adult  Goal: Free from fall injury  5/4/2022 0207 by Bianka Pugh RN  Outcome: Progressing  5/3/2022 1729 by Frank Martinez LPN  Outcome: Progressing     Problem: ABCDS Injury Assessment  Goal: Absence of physical injury  5/4/2022 0207 by Bianka Pugh RN  Outcome: Progressing  5/3/2022 1729 by Frank Martinez LPN  Outcome: Progressing     Problem: Skin/Tissue Integrity  Goal: Absence of new skin breakdown  Description: 1. Monitor for areas of redness and/or skin breakdown  2. Assess vascular access sites hourly  3. Every 4-6 hours minimum:  Change oxygen saturation probe site  4. Every 4-6 hours:  If on nasal continuous positive airway pressure, respiratory therapy assess nares and determine need for appliance change or resting period.   5/4/2022 0207 by Bianka Pugh RN  Outcome: Progressing  5/3/2022 1729 by Frank Martinez LPN  Outcome: Progressing     Problem: Chronic Conditions and Co-morbidities  Goal: Patient's chronic conditions and co-morbidity symptoms are monitored and maintained or improved  5/4/2022 0207 by Bianka Pugh RN  Outcome: Progressing  Flowsheets (Taken 5/3/2022 2020)  Care Plan - Patient's Chronic Conditions and Co-Morbidity Symptoms are Monitored and Maintained or Improved:   Monitor and assess patient's chronic conditions and comorbid symptoms for stability, deterioration, or improvement   Collaborate with multidisciplinary team to address chronic and comorbid conditions and prevent exacerbation or deterioration   Update acute care plan with appropriate goals if chronic or comorbid symptoms are exacerbated and prevent overall improvement and discharge  5/3/2022 1729 by Huber Husain LPN  Outcome: Progressing

## 2022-05-04 NOTE — PLAN OF CARE
Problem: Skin/Tissue Integrity  Goal: Absence of new skin breakdown  Description: 1. Monitor for areas of redness and/or skin breakdown  2. Assess vascular access sites hourly  3. Every 4-6 hours minimum:  Change oxygen saturation probe site  4. Every 4-6 hours:  If on nasal continuous positive airway pressure, respiratory therapy assess nares and determine need for appliance change or resting period. Outcome: Progressing  Note: There are no new skin issues so far this shift. Will continue to assist patient with repositioning at least every two hours. Problem: Pain  Goal: Verbalizes/displays adequate comfort level or baseline comfort level  Outcome: Progressing  Note: Patient denies pain at this time. Will continue to assess.

## 2022-05-04 NOTE — FLOWSHEET NOTE
05/03/22 1800   Spiritual/Emotional needs   Type Spiritual Support   Assessment/Intervention/Outcome   Assessment Other (Comment)  (Sleeping)   Intervention Prayer (assurance of)/[place]   Visited by General Hitchcock

## 2022-05-04 NOTE — PROGRESS NOTES
Occupational Therapy  Facility/Department: [facility]  Rehabilitation Occupational Therapy Daily Treatment Note    Date: 22  Patient Name: Toro Quick       Room: 9153/6441-61  MRN: [id number]  Account: [de-identified]   : 1950  (70 y.o.) Gender: female                    Past Medical History:  has a past medical history of Cerebral artery occlusion with cerebral infarction [facility]), CVA (cerebral infarction), Diabetes mellitus ([facility] [address] Hyperlipidemia, Hypertension, and Seizures ([facility] Past Surgical History:   has a past surgical history that includes Appendectomy; Hysterectomy; Bronchoscopy diagnostic (2014); and Endoscopy, colon, diagnostic. Restrictions  Restrictions/Precautions: Fall Risk;General Precautions; Up as Tolerated  Other position/activity restrictions: SBP <180  Required Braces or Orthoses?: No    Subjective  Subjective: \"Same ole, same ole\"   Restrictions/Precautions: Fall Risk;General Precautions; Up as Tolerated        Pain Assessment  Pain Assessment: None - Denies Pain    Objective     Cognition  Overall Cognitive Status: Exceptions  Arousal/Alertness: Appropriate responses to stimuli  Following Commands:  Follows multistep commands with increased time  Attention Span: Appears intact  Memory: Decreased recall of recent events  Safety Judgement: Decreased awareness of need for safety;Decreased awareness of need for assistance  Problem Solving: Assistance required to identify errors made;Assistance required to correct errors made  Insights: Decreased awareness of deficits  Initiation: Requires cues for some  Sequencing: Requires cues for some  Orientation  Overall Orientation Status: Within Functional Limits  Orientation Level: Oriented X4         ADL  Feeding  Assistance Level: Modified independent  Skilled Clinical Factors: per pt report     Grooming/Oral Hygiene  Assistance Level: Supervision;Minimal assistance  Skilled Clinical Factors: MIN A for styling hair otherwise SUP at seated in w/c sinklevel     Upper Extremity Bathing  Assistance Level: Stand by assist;Set-up; Verbal cues; Increased time to complete  Skilled Clinical Factors: Pt able to wash LUE with RUE stablized and also washing upper body displaying self [place] to promote functional movement with RUE. Increased RUE participation noted     Lower Extremity Bathing  Assistance Level: Minimal assistance;Set-up; Verbal cues; Increased time to complete  Skilled Clinical Factors: Min A for standing balance for lenore care using shower GB for UE support and seated to wash BLEs using figure 4 tech     Upper Extremity Dressing  Assistance Level: Stand by assist;Set-up; Verbal cues; Increased time to complete  Skilled Clinical Factors: able to stablize bra with R thumb web space in order to clasp bra this date, increased movement in fingers this date. able to don/doff bra with twisting method and pull over shirt with G carryover on estee tech     Lower Extremity Dressing  Assistance Level: Minimal assistance;Verbal cues; Set-up; Increased time to complete  Skilled Clinical Factors: MIN A for balance maintenance shower GB  for 0-1 UE support, able to thread pants and underwear BLEs and pull pants/underwear up over hips    Putting On/Taking Off Footwear  Assistance Level: Stand by assist;Set-up  Skilled Clinical Factors: TA for TEDs and able to don/doff shoes on kendra feet displaying increased trunk flexion     Toileting  Assistance Level: Minimal assistance  Skilled Clinical Factors: seated weight shift for lenore hygine; MIN A for standing balance     Toilet Transfers  Technique: Stand pivot; To the left; To the right  Equipment: Standard toilet;Grab bars  Additional Factors: Verbal cues;Cues for hand placement; With handrails  Assistance Level: Minimal assistance  Skilled Clinical Factors: vc for hand foot placement for increased safety, no LOB noted     Tub/Shower Transfers  Type: Shower  Transfer From: Wheelchair  Transfer To: Tub transfer bench  Additional Factors: With handrails;Verbal cues;Cues for hand placement  Assistance Level: Minimal assistance  Skilled Clinical Factors: vc for sequencing with G carryover noted      Functional Mobility  Device: Wheelchair  Activity: To/From bathroom; To/From therapy gym  Assistance Level: Dependent  Skilled Clinical Factors: did not self propel     Roll Left  Assistance Level: Stand by assist  Skilled Clinical Factors: G awareness of RUE during mobility     Sit to Supine  Assistance Level: Stand by assist  Skilled Clinical Factors: sit>supine; no assist for BLEs this date     Supine to Sit  Assistance Level: Stand by assist  Skilled Clinical Factors: Pt able to position R LE throughout mobility with HOB elevated     Scooting  Assistance Level: Stand by assist  Skilled Clinical Factors: to EOB     Sit to Stand  Assistance Level: Minimal assistance  Skilled Clinical Factors: R LE buckling, no LOB      Stand to Sit  Assistance Level: Minimal assistance  Skilled Clinical Factors: vc for hand placement no LOB     Bed To/From Chair  Technique: Stand pivot  Assistance Level: Minimal assistance  Skilled Clinical Factors: A x1 vc for hand and foot placement prior to transfer    Stand Pivot  Assistance Level: Minimal assistance  Skilled Clinical Factors: vc for hand and foot placement for increased pt safety        Additional Activities: PM: PERRY facilitated pt engagement seated table top activities to promote fucntional movement and hand coordination with RUE to increase independence in daily tasks. 1st activity pt was instructed to reach and  plastic pegs from table and transport pegs to elevated targeted area. 2nd activity pt was instructed to place plastic pegs onto peg board. pt able to complete task with minimal difficulty. 3rd activity pt was instructed to place golf tees from table and transport tees to spinning peg board. G tolerance noted with all acitivties.    Assessment  Assessment  Activity Tolerance: Patient tolerated treatment well  Discharge Recommendations: Home with assist PRN;Home with Home health OT  Safety Devices  Safety Devices in place: Yes  Type of devices: Patient at risk for falls;Call light within reach;Gait belt;Left in chair    Patient Education  Education  Education Given To: Patient  Education Provided: Role of Therapy;Plan of Care;ADL Function; Safety;Transfer Training;Home Exercise Program  Education Method: Verbal;Demonstration  Education Outcome: Continued education needed    Plan  Plan  Times per Week: 5-7  Times per Day: Twice a day  Current Treatment Recommendations: Strengthening;ROM;Balance training;Functional mobility training; Endurance training; Safety education & training;Patient/Caregiver education & training;Equipment evaluation, education, & procurement;Self-Care / ADL; Coordination training;Neuromuscular re-education;Modalities    Goals  Patient Goals   Patient goals : \"To end up gonig home and walk out of here and be able to be as normal as possible\"  Short Term Goals  Time Frame for Short term goals: One week  Short Term Goal 1: Patient will perform upper body bathing and dressing with SUP. Short Term Goal 2: Patient will perform lower body bathing and dressing with Minimal assist.  Short Term Goal 3: Patient will perform toileting tasks with Minimal assist.  Short Term Goal 4: Patient will perform stand pivot transfers during self-care with Minimal assist and Good safety. Short Term Goal 5: Patient will verbalize/demonstrate Good understanding of assistive equipment/durable medical equipment/modified techniques for increased safety and IND with self-care. Short Term Goal 6: Patient will verbalize/demonstrate Good understanding of modified techniques for R UE during self-care. Short Term Goal 7: Patient will actively participate in 30+ minutes of therapeutic exercise/functional activities to promote increased IND with self-care and mobility.   Long Term Goals  Time

## 2022-05-04 NOTE — PROGRESS NOTES
Speech Language Pathology  Speech Language Pathology  [facility] [address] [place] [address]    Cognitive Treatment Note    Date: 5/4/2022  Patients Name: Silvia Farias  MRN: [id number]  Diagnosis:   Patient Active Problem List   Diagnosis Code    Altered mental status R41.82    Generalized nonconvulsive seizures ([facility] G40.309    History of CVA (cerebrovascular accident) Z80.78    Noncompliance Z91.19    Hemiparesis ([facility] G81.90    Respiratory failure ([facility] J96.90    Upper respiratory infection J06.9    Acute respiratory failure ([facility] J96.00    HTN (hypertension) I10    HLD (hyperlipidemia) E78.5    Hyperglycemia R73.9    Hypokalemia E87.6    Anemia due to acute blood loss D62    Right sided weakness R53.1    Cerebrovascular accident (CVA) ([facility] I63.9    Tissue plasminogen activator (tPA) administered at other facility within 24 hours before current admission Z92.82       Pain: 0/10    Cognitive Treatment    Treatment time: 9220-5293      Subjective: [x] Alert [x] Cooperative     [] Confused     [] Agitated    [] Lethargic      Objective/Assessment:  Attention: Sustained throughout, distractions minimized. Orientation: Orientation log administered, O-Log score- 29/30 (disoriented to date only, correct with cue). Recall: Image retention (15 min delay, [place])- 83% accuracy julienne, 100% cued. Organization: Convergent categorization, ID category of 3 similar items (abstract)- 60% accuracy julienne, 100% cued. Divergent thinking, add 1 to list of 3 similar items (abstract)- 80% accuracy julienne, 100% cued. Problem Solving/Reasoning: n/a    Other: Pt demo occasional word retrieval deficits in conversation; able to utilize circumlocution with verbal cues from [address] [place] [address]     Pt. stating, \"My heart is shattered to a million pieces\" when discussing relationship with family members and passing of son. Emotional support provided by writer.         Plan:  [x] Continue ST services    [] Discharge from ST:      Discharge recommendations: [] Inpatient Rehab   [] [place]   [] Outpatient Therapy  [] Follow up at trauma clinic   [] Other:       Treatment completed by:  Leroy Sharma M.A., CCC-SLP

## 2022-05-04 NOTE — PROGRESS NOTES
Comprehensive Nutrition Assessment    Type and Reason for Visit:  Reassess    Nutrition Recommendations/Plan:   1. Continue diet as ordered. Malnutrition Assessment:  Malnutrition Status: At risk for malnutrition (Comment) (04/28/22 6626)    Context:  Acute Illness     Findings of the 6 clinical characteristics of malnutrition:  Energy Intake:  75% or less of estimated energy requirements for 7 or more days  Weight Loss:  No significant weight loss     Body Fat Loss:  No significant body fat loss     Muscle Mass Loss:  No significant muscle mass loss    Fluid Accumulation:  No significant fluid accumulation     Strength:  Not Performed    Nutrition Assessment:    Pt states her appetite is fine eating % of meals. No nutrition problems identified. Nutrition Related Findings:    No edema. Bowel sounds active. Labs and meds reviewed. Potassium 3.4, POC glucose 183         Current Nutrition Intake & Therapies:    Average Meal Intake: %,51-75%  Average Supplements Intake: None Ordered  ADULT DIET; Regular; 4 carb choices (60 gm/meal)    Anthropometric Measures:  Height: 5' 5\" (165.1 cm)  Ideal Body Weight (IBW): 125 lbs (57 kg)    Admission Body Weight: 134 lb (60.8 kg)  Current Body Weight: 134 lb (60.8 kg), 107.2 % IBW.  Weight Source: Bed Scale  Current BMI (kg/m2): 22.3  Usual Body Weight: 130 lb (59 kg)  % Weight Change (Calculated): 3.1   BMI Categories: Normal Weight (BMI 22.0 to 24.9) age over 72    Estimated Daily Nutrient Needs:  Energy Requirements Based On: Kcal/kg  Weight Used for Energy Requirements: Current  Energy (kcal/day):    Weight Used for Protein Requirements: Current  Protein (g/day): 73 gm     Nutrition Diagnosis:   · Inadequate oral intake related to other (comment) (current condition - right sided weakness) as evidenced by intake 51-75%      Nutrition Interventions:   Food and/or Nutrient Delivery: Continue Current Diet  Nutrition Education/Counseling: Education not indicated  Coordination of Nutrition Care: Continue to monitor while inpatient       Goals:     Goals: Meet at least 75% of estimated needs       Nutrition Monitoring and Evaluation:   Behavioral-Environmental Outcomes: None Identified  Food/Nutrient Intake Outcomes: Food and Nutrient Intake  Physical Signs/Symptoms Outcomes: Biochemical Data,GI Status,Skin,Weight    Discharge Planning:    Continue current diet     Anne-Marie Acosta, [address] [place] [address], [facility]

## 2022-05-04 NOTE — PROGRESS NOTES
[place] [address] Internal Medicine    CONSULTATION / HISTORY AND PHYSICAL EXAMINATION            Date:   5/4/2022  Patient name:  Niraj Hernandez  Date of admission:  4/28/2022 12:10 PM  MRN:   [id number]  Account:  [de-identified]  YOB: 1950  PCP:    Lavon Cavazos MD  Room:   8983/5937-92  Code Status:    Full Code    Physician Requesting Consult: Roseann Yin MD    Reason for Consult:  Medical management    Chief Complaint:     No chief complaint on file. Right-sided weakness    History Obtained From:     Patient, EMR, nursing staff    History of Present Illness:     41-year-old female with history of questionable seizure disorder, chronic right hemiparesis numbness of right leg migraines admitted on 4/17 after being found altered, concern for stroke  Started on IV tPA but then became obtunded and flaccid and tPA held due to concern for hemorrhagic conversion  She was intubated, stat CT done which was unremarkable subsequently tPA was resumed and completed. MRI brain negative for stroke  Episode of flaccidity concerning for seizure hence loaded with IV Keppra  Extubated 4/19  Persistent right-sided weakness, fluctuating, neurology considering psychosomatic symptoms  Patient is on aspirin Plavix    Diabetes   Diagnosed within last year  Modifying factors on med:   Severity:controlled   Associated signs and symtoms: neuropathy/ckd/ CAD.    aggravated with sugar diet and better with low sugar diet      5/2   Patient again had episode of hypoglycemia in the morning, orange juice was provided  Blood sugar went to 68      Past Medical History:     Past Medical History:   Diagnosis Date    Cerebral artery occlusion with cerebral infarction ([address] 2008, 2014    CVA (cerebral infarction)     Diabetes mellitus ([address]     Hyperlipidemia     Hypertension     Seizures ([address] 04/2022        Past Surgical History:     Past Surgical History:   Procedure Laterality Date    APPENDECTOMY      BRONCHOSCOPY DIAGNOSTIC  7/24/2014         ENDOSCOPY, COLON, DIAGNOSTIC      HYSTERECTOMY          Medications Prior to Admission:     Prior to Admission medications    Medication Sig Start Date End Date Taking? Authorizing Provider   divalproex (DEPAKOTE ER) 500 MG extended release tablet Take 1 tablet by mouth in the morning and at bedtime 4/26/22   Patrick Vizcarra MD   levETIRAcetam (KEPPRA) 1000 MG tablet Take 1 tablet by mouth 2 times daily 4/21/22   Karol Hdz MD   topiramate (TOPAMAX) 25 MG tablet Take 1 tablet by mouth 2 times daily 4/21/22   Karol Hdz MD   clopidogrel (PLAVIX) 75 MG tablet Take 75 mg by mouth nightly    Historical Provider, MD   gabapentin (NEURONTIN) 600 MG tablet Take 600 mg by mouth 3 times daily. Historical Provider, MD   potassium chloride (KLOR-CON M) 20 MEQ extended release tablet Take 20 mEq by mouth 2 times daily    Historical Provider, MD   fenofibrate (TRIGLIDE) 160 MG tablet Take 160 mg by mouth daily    Historical Provider, MD   Pantoprazole Sodium (PROTONIX PO) Take  by mouth. Historical Provider, MD   acetaminophen (TYLENOL) 160 MG/5ML solution Take 20.3 mLs by mouth every 4 hours as needed for Fever. 7/28/14   Licha Markham DO   albuterol (PROVENTIL HFA;VENTOLIN HFA) 108 (90 BASE) MCG/ACT inhaler Inhale 6 puffs into the lungs every 6 hours as needed for Wheezing. 7/28/14   Licha Markham DO   glucagon, rDNA, 1 MG SOLR injection Inject 1 mg into the muscle as needed for Low blood sugar (Blood glucose less than 70 mg/dL and patient NOT ALERT or NPO and does not have IV access. ). 7/28/14   Licha Markham DO   glucose (GLUTOSE) 40 % GEL Take 15 g by mouth as needed. 7/28/14   Licha Markham DO   insulin glargine (LANTUS) 100 UNIT/ML injection vial Inject 10 Units into the skin nightly. 7/28/14   Licha Markham DO   insulin lispro (HUMALOG) 100 UNIT/ML injection vial Inject 0-12 Units into the skin every 6 hours.  7/28/14   Haim Gracia DO Corin   niacin (NIASPAN) 500 MG CR tablet Take 1 tablet by mouth nightly. 14   Teresa Young DO   potassium chloride (KAYCIEL) 20 MEQ/15ML (10%) solution Take 15 mLs by mouth daily. 14   Teresa Young DO        Allergies:     Patient has no known allergies. Social History:     Tobacco:    reports that she has never smoked. She has never used smokeless tobacco.  Alcohol:      reports no history of alcohol use. Drug Use:  reports no history of drug use. Family History:     History reviewed. No pertinent family history. Review of Systems:     Positive and Negative as described in HPI. CONSTITUTIONAL:  negative for fevers, chills, sweats, fatigue, weight loss  HEENT:  negative for vision, hearing changes, runny nose, throat pain  RESPIRATORY:  negative for shortness of breath, cough, congestion, wheezing. CARDIOVASCULAR:  negative for chest pain, palpitations.   GASTROINTESTINAL:  negative for nausea, vomiting, diarrhea, constipation, change in bowel habits, abdominal pain   GENITOURINARY:  negative for difficulty of urination, burning with urination, frequency   INTEGUMENT:  negative for rash, skin lesions, easy bruising   HEMATOLOGIC/LYMPHATIC:  negative for swelling/edema   ALLERGIC/IMMUNOLOGIC:  negative for urticaria , itching  ENDOCRINE:  negative increase in drinking, increase in urination, hot or cold intolerance  MUSCULOSKELETAL:  negative joint pains, muscle aches, swelling of joints  NEUROLOGICAL:  Positive for right sided weakness, negative for headaches, dizziness, lightheadedness, numbness, pain, tingling extremities      Physical Exam:     /70   Pulse 65   Temp 97.9 °F (36.6 °C)   Resp 16   Ht 5' 5\" (1.651 m)   Wt 134 lb (60.8 kg)   SpO2 98%   BMI 22.30 kg/m²   Temp (24hrs), Av.2 °F (36.8 °C), Min:97.9 °F (36.6 °C), Max:98.4 °F (36.9 °C)    Recent Labs     22  0537 22  1207 22  1602   POCGLU 132* 108* 94 132* Intake/Output Summary (Last 24 hours) at 5/4/2022 1625  Last data filed at [facility] per 24 hour   Intake 960 ml   Output --   Net 960 ml       General Appearance:  alert, well appearing, and in no acute distress  Head:  normocephalic, atraumatic. Eye: no icterus, redness, pupils equal and reactive, extraocular eye movements intact, conjunctiva clear  Ear: normal external ear, no discharge, hearing intact  Nose:  no drainage noted  Mouth: mucous membranes moist  Neck: supple, no carotid bruits, thyroid not palpable  Lungs: Bilateral equal air entry, clear to ausculation, no wheezing, rales or rhonchi, normal effort  Cardiovascular: normal rate, regular rhythm, no murmur, gallop, rub.   Abdomen: Soft, nontender, nondistended, normal bowel sounds, no hepatomegaly or splenomegaly  Neurologic:  Right UE and Lower extremity power 2/5, There are no new focal motor or sensory deficits, normal muscle tone and bulk, no abnormal sensation, normal speech, cranial nerves II through XII grossly intact  Skin: No gross lesions, rashes, bruising or bleeding on exposed skin area  Extremities:  peripheral pulses palpable, no pedal edema or calf pain with palpation  Psych:normal affect    Investigations:      Laboratory Testing:  Recent Results (from the past 24 hour(s))   POC Glucose Fingerstick    Collection Time: 05/03/22  8:15 PM   Result Value Ref Range    POC Glucose 132 (H) 65 - 105 mg/dL   POC Glucose Fingerstick    Collection Time: 05/04/22  5:37 AM   Result Value Ref Range    POC Glucose 108 (H) 65 - 105 mg/dL   POC Glucose Fingerstick    Collection Time: 05/04/22 12:07 PM   Result Value Ref Range    POC Glucose 94 65 - 105 mg/dL   POC Glucose Fingerstick    Collection Time: 05/04/22  4:02 PM   Result Value Ref Range    POC Glucose 132 (H) 65 - 105 mg/dL       Imaging/Diagonstics:  Recent data reviewed    Assessment :      Primary Problem  Right sided weakness    Active Hospital Problems    Diagnosis Date Noted    Right sided weakness [R53.1] 04/17/2022    HTN (hypertension) [I10] 07/19/2014       Plan:     1. Right-sided weakness MRI brain negative for stroke- PT OT  2. Type 2 diabetes- continue Lantus, sliding scale  3. History of prior strokes-continue aspirin, Plavix, statin  4. History of seizure disorder- continue Keppra, Topamax, Depakote  5. Hyperlipidemia-patient on statin, fenofibrate  6. H/o Hypertension-currently controlled without meds  7. Recent extubation on 4/19    DVT prophylaxis-Lovenox    May 1  Hypoglycemia noted  Reduce lantus to 15 qhs  5/2   Reducing dose of insulin to 10 units, reducing sliding scale to low-dose  Blood pressure is controlled, off antihypertensives    5/4  Patient blood pressure better controlled after adjusting insulin    No new complaints  Consultations:   Charity Chand WORK  IP CONSULT TO INTERNAL MEDICINE      Bowen Mckeon MD  5/4/2022  4:25 PM    Copy sent to Dr. Tab Preciado MD    Please note that this chart was generated using voice recognition Dragon dictation software. Although every effort was made to ensure the accuracy of this automated transcription, some errors in transcription may have occurred.

## 2022-05-04 NOTE — PROGRESS NOTES
Physical Therapy  Facility/Department: [facility] ACUTE REHAB  Rehabilitation Physical Therapy Daily Treatment Note    NAME: Raya Kramer  : 1950 (80 y.o.)  MRN: [id number]  CODE STATUS: Full Code    Date of Service: 22    Patient assessed for rehabilitation services?: Yes  Additional Pertinent Hx: Raya Kramer is a 80 y.o. female with history of CVA, HTN, and HLD admitted to [facility] on 2022. She initially presented with acute-onset right hemiparesis. NIHSS 21. She was given tPA by the mobile stroke unit, but infusion was stopped due to worsening of symptoms and concern for possible hemorrhagic conversion. She was intubated prior to arrival to the ED. CT head showed no acute intracranial abnormality, and tPA was restarted. She was also loaded with keppra due to concern for seizures. MRI brain showed no acute intracranial abnormality. Extubated 22. Candie Monzon Per notes, she had a similar episodes in  and . Per Neuro notes- Reported history of chronic infarcts with residual right sided weakness but not clearly evident on brain imaging. Neuro recommending EMG as outpatient. Pt admotted to rehab unit on 22. Family / Caregiver Present: No  Referral Date : 22  Diagnosis: Right side weakness    Restrictions:  Restrictions/Precautions: Fall Risk;General Precautions; Up as Tolerated  Position Activity Restriction  Other position/activity restrictions: SBP <180     SUBJECTIVE  Subjective: Pt is pleasant and agreeable to PT. PT states she slept well lasts night. Pain: Patient denies pain. OBJECTIVE  Vision  Vision: Impaired  Vision Exceptions: Wears glasses at all times    Hearing  Hearing: Exceptions to [facility]  Hearing Exceptions: Hard of hearing/hearing concerns;Right hearing aid    Cognition  Overall Cognitive Status: Exceptions  Arousal/Alertness: Appropriate responses to stimuli  Following Commands:  Follows multistep commands with increased time  Attention Span: Appears intact  Memory: Decreased recall of recent events  Safety Judgement: Decreased awareness of need for safety;Decreased awareness of need for assistance  Problem Solving: Assistance required to identify errors made;Assistance required to correct errors made  Insights: Decreased awareness of deficits  Initiation: Requires cues for some  Sequencing: Requires cues for some    Sensation  Overall Sensation Status: [facility]    Functional Mobility  Bed mobility  Supine to Sit: Stand by assistance (long sit method head of bed elevated)  Sit to Supine: Stand by assistance (left LE assists right fom left side bed )  Scooting: Minimal assistance  Bed Mobility Comments: PT mat, wedge, 3 pillows. Transfers  Sit to Stand: Minimal Assistance  Stand to sit: Minimal Assistance  Stand Pivot Transfers: Minimal Assistance  Squat Pivot Transfers: Minimal Assistance  Comment: Several transfers completed in AM and PM. Stand pivot transfers completed without RW. Pt demos good hand placement. Pt is progressing towards CGA with STS transfers. Balance  Posture: Fair  Sitting - Static: Fair;-  Sitting - Dynamic: Poor;+  Standing - Static: Poor  Standing - Dynamic: Poor;-  Comments: Standing with rolling walker. Environmental Mobility  Ambulation  Surface: level tile  Device: Parallel Bars  Assistance: Maximum assistance; Moderate assistance (right LE advancement )  Quality of Gait: Patient did not have to wear surgical shoe cover on this date. Writer facilitated hip flexion with tapping and provided assist with terminal knee extention. No buckling on this date during amb. or standing. Patient able to amb. fwd. and retro. Gait Deviations: Slow Irene;Decreased step length;Decreased step height  Distance: 8'x1 F/R  Comments: pt instructed and visual in decreased trunk sway , stabilization COG over single THOMAS. Mirror utilized for visual feedback.    More Ambulation?: Yes  Ambulation 2  Surface - 2: level tile  Device 2: Rolling Walker  Other Apparatus 2: Wheelchair follow  Assistance 2: Minimal assistance (right LE for advancement , possible tone PF and inversion )  Quality of Gait 2: Pt demos decrease DF, demos dragging RLE foot. Occassionally pt is able to slide RLE foot forward. Decrease step height/length. Gait Deviations: Decreased step height;Decreased step length; Slow Irene  Distance: 61'x2  Comments: Seated rest break post amb. Pt is grateful to amb with RW. Ambulation 3  Surface - 3: level tile  Device 3: Rolling Walker  Other Apparatus 3: AFO (Right)  Assistance 3: Minimal assistance  Quality of Gait 3: Pt amb with R AFO this PM. Pt states \"It makes me feel more independent! \" Pt demos improve RLE knee control and improved hip flexion. Gait Deviations: Slow Irene  Distance: 80'x2  Comments: PM tx focused mainly on edu and proper fitting of AFO. Stairs/Curb  Stairs?: No    PT Exercises  Exercise Treatment: seated bilateral x 15 right A/AROM and LLE 2#. (reciprical pattern )  Circulation/Endurance Exercises: NuStep x 10 mins. Workload 4  Functional Mobility Circuit Training: sit to stand WC <> parallel bars . Also WC to RW. Pre gait ex. for wt. shifting. (instruction in sequencing muscle and decreased trunk sway )  Static Standing Balance Exercises: static stand parallel bars   Dynamic Standing Balance Exercises: BLE ex's in // bars. Reps: 15 each. Writer assisting with RLE knee extension. ASSESSMENT  Vitals  O2 Device: None (Room air)    Activity Tolerance  Activity Tolerance: Patient tolerated treatment well    Assessment  Assessment: Pt presents with R side weakness, pt unable to move R LE on her own, Pt requires assistace for all functional tasks at this time, will continue POC to faciliate improvement in R LE strength, overall balance and fucntional mobility for safe DC home. Performance Deficits/Impairments: Decreased functional mobility ; Decreased tolerance to work activity; Decreased strength;Decreased safe awareness;Decreased endurance;Decreased balance;Decreased posture;Decreased ROM  Treatment Diagnosis: Weakness, difficulty walking  Therapy Prognosis: Good  Decision Making: High Complexity  Discharge Recommendations: Patient would benefit from continued therapy after discharge;Home with assist PRN  PT Equipment Recommendations  Equipment Needed:  (TBD as therapy progresses)    CLINICAL IMPRESSION   Pt presents with R side weakness, pt unable to move R LE on her own, Pt requires assistace for all functional tasks at this time, will continue POC to faciliate improvement in R LE strength, overall balance and fucntional mobility for safe DC home. GOALS  Patient Goals   Patient goals : To get better  Short Term Goals  Time Frame for Short term goals: 10 days  Short term goal 1: Pt to perform bed mobility from flat surface independently  Short term goal 2: Demonstrate functional transfers min A  Short term goal 3: Pt to ambulate distance of 100 ft with rolling walker at mod A  Short term goal 4: Pt able to improve sitting balance at EOB/EOM to good to be alen to eat meals. Short term goal 5: Demonstrate dynamic standing balance of fiar - to decrease fall risk  Short Term Goal 6: Pt able to  propel w/c distance of 100 to 150 ft, CGA, level surfaces. Long Term Goals  Time Frame for Long term goals : By DC  Long term goal 1: Pt able to perform transfers at mod-I  Long term goal 2: Pt able to ambulate distance of 100 to 150 ft with appropriate device, at min A. with assistive device. Long term goal 3: Pt able to perform a curb step with a rolling wwalker/UE support, mod A  Long term goal 4: Pt able to propel w/c distance of 150 ft , level surfaces, including turns, mod-I  Long term goal 5: Pt  able to improve standing balance with assistive device to fair to reduce fall risk  Long term goal 6: Improve 2MWT distance to 80 ft to improve function and gait speed.   Long term goal 7: Improve PASS score to 25/36 to improve overall function. PLAN OF CARE    Plan  Plan:  minutes of therapy at least 5 out of 7 days a week (--)  Current Treatment Recommendations: Strengthening;ROM;Balance training;Functional mobility training;Gait training;Neuromuscular re-education;Transfer training; Endurance training; Wheelchair mobility training;Stair training;Home exercise program;Safety education & training;Patient/Caregiver education & training;Equipment evaluation, education, & procurement; Modalities (E-stim as needed to neuro-muscular faciliatation R LE as nee)  Safety Devices  Type of Devices: Call light within reach;Gait belt;Patient at risk for falls; Bed alarm in place; All fall risk precautions in place  Restraints  Restraints Initially in Place: No      Therapy Time     05/04/22 0750 05/04/22 1339   PT Individual Minutes   Time In 0800 1339   Time Out 0903 1406   Minutes [address] [place] [address], 05/04/22 at 3:33 PM

## 2022-05-05 LAB
GLUCOSE BLD-MCNC: 116 MG/DL (ref 65–105)
GLUCOSE BLD-MCNC: 117 MG/DL (ref 65–105)
GLUCOSE BLD-MCNC: 168 MG/DL (ref 65–105)
GLUCOSE BLD-MCNC: 186 MG/DL (ref 65–105)

## 2022-05-05 PROCEDURE — 97129 THER IVNTJ 1ST 15 MIN: CPT

## 2022-05-05 PROCEDURE — 97530 THERAPEUTIC ACTIVITIES: CPT

## 2022-05-05 PROCEDURE — 97116 GAIT TRAINING THERAPY: CPT

## 2022-05-05 PROCEDURE — 6370000000 HC RX 637 (ALT 250 FOR IP): Performed by: PHYSICAL MEDICINE & REHABILITATION

## 2022-05-05 PROCEDURE — 97110 THERAPEUTIC EXERCISES: CPT

## 2022-05-05 PROCEDURE — 82947 ASSAY GLUCOSE BLOOD QUANT: CPT

## 2022-05-05 PROCEDURE — 97535 SELF CARE MNGMENT TRAINING: CPT

## 2022-05-05 PROCEDURE — 6360000002 HC RX W HCPCS: Performed by: STUDENT IN AN ORGANIZED HEALTH CARE EDUCATION/TRAINING PROGRAM

## 2022-05-05 PROCEDURE — 6370000000 HC RX 637 (ALT 250 FOR IP): Performed by: STUDENT IN AN ORGANIZED HEALTH CARE EDUCATION/TRAINING PROGRAM

## 2022-05-05 PROCEDURE — 1180000000 HC REHAB R&B

## 2022-05-05 PROCEDURE — 6370000000 HC RX 637 (ALT 250 FOR IP): Performed by: INTERNAL MEDICINE

## 2022-05-05 PROCEDURE — 97130 THER IVNTJ EA ADDL 15 MIN: CPT

## 2022-05-05 PROCEDURE — 99232 SBSQ HOSP IP/OBS MODERATE 35: CPT | Performed by: INTERNAL MEDICINE

## 2022-05-05 RX ADMIN — GABAPENTIN 600 MG: 300 CAPSULE ORAL at 07:44

## 2022-05-05 RX ADMIN — INSULIN LISPRO 1 UNITS: 100 INJECTION, SOLUTION INTRAVENOUS; SUBCUTANEOUS at 20:52

## 2022-05-05 RX ADMIN — ACETAMINOPHEN 650 MG: 325 TABLET ORAL at 20:50

## 2022-05-05 RX ADMIN — TOPIRAMATE 25 MG: 25 TABLET, FILM COATED ORAL at 07:50

## 2022-05-05 RX ADMIN — ENOXAPARIN SODIUM 40 MG: 100 INJECTION SUBCUTANEOUS at 07:43

## 2022-05-05 RX ADMIN — LEVETIRACETAM 1000 MG: 500 TABLET, FILM COATED ORAL at 20:51

## 2022-05-05 RX ADMIN — GABAPENTIN 600 MG: 300 CAPSULE ORAL at 14:30

## 2022-05-05 RX ADMIN — ASPIRIN 81 MG: 81 TABLET, COATED ORAL at 07:43

## 2022-05-05 RX ADMIN — GABAPENTIN 600 MG: 300 CAPSULE ORAL at 20:51

## 2022-05-05 RX ADMIN — ATORVASTATIN CALCIUM 40 MG: 40 TABLET, FILM COATED ORAL at 20:51

## 2022-05-05 RX ADMIN — TOPIRAMATE 25 MG: 25 TABLET, FILM COATED ORAL at 20:52

## 2022-05-05 RX ADMIN — LEVETIRACETAM 1000 MG: 500 TABLET, FILM COATED ORAL at 07:43

## 2022-05-05 RX ADMIN — DIVALPROEX SODIUM 500 MG: 500 TABLET, EXTENDED RELEASE ORAL at 07:50

## 2022-05-05 RX ADMIN — CLOPIDOGREL BISULFATE 75 MG: 75 TABLET ORAL at 07:44

## 2022-05-05 RX ADMIN — ACETAMINOPHEN 650 MG: 325 TABLET ORAL at 05:58

## 2022-05-05 RX ADMIN — INSULIN GLARGINE 10 UNITS: 100 INJECTION, SOLUTION SUBCUTANEOUS at 20:52

## 2022-05-05 RX ADMIN — POTASSIUM CHLORIDE 20 MEQ: 20 TABLET, EXTENDED RELEASE ORAL at 07:43

## 2022-05-05 RX ADMIN — PANTOPRAZOLE SODIUM 40 MG: 40 TABLET, DELAYED RELEASE ORAL at 05:58

## 2022-05-05 RX ADMIN — DIVALPROEX SODIUM 500 MG: 500 TABLET, EXTENDED RELEASE ORAL at 20:51

## 2022-05-05 RX ADMIN — FENOFIBRATE 160 MG: 160 TABLET ORAL at 07:43

## 2022-05-05 RX ADMIN — INSULIN LISPRO 1 UNITS: 100 INJECTION, SOLUTION INTRAVENOUS; SUBCUTANEOUS at 17:10

## 2022-05-05 ASSESSMENT — PAIN SCALES - GENERAL
PAINLEVEL_OUTOF10: 8
PAINLEVEL_OUTOF10: 2
PAINLEVEL_OUTOF10: 8
PAINLEVEL_OUTOF10: 0

## 2022-05-05 ASSESSMENT — PAIN DESCRIPTION - LOCATION
LOCATION: ARM
LOCATION: ARM;SHOULDER
LOCATION: ARM;SHOULDER

## 2022-05-05 ASSESSMENT — PAIN DESCRIPTION - ORIENTATION
ORIENTATION: RIGHT
ORIENTATION: RIGHT

## 2022-05-05 ASSESSMENT — PAIN DESCRIPTION - DESCRIPTORS
DESCRIPTORS: JABBING
DESCRIPTORS: ACHING;POUNDING;THROBBING

## 2022-05-05 NOTE — PROGRESS NOTES
Occupational Therapy  Facility/Department: [facility]UTE REH[facility]  Rehabilitation Occupational Therapy Daily Treatment Note    Date: 22  Patient Name: Karolyn Rose       Room: 7004/6825-80  MRN: [id number]  Account: [de-identified]   : 1950  (70 y.o.) Gender: female                    Past Medical History:  has a past medical history of Cerebral artery occlusion with cerebral infarction [facility]), CVA (cerebral infarction), Diabetes mellitus ([facility] Hyperlipidemia, Hypertension, and Seizures ([facility] Past Surgical History:   has a past surgical history that includes Appendectomy; Hysterectomy; Bronchoscopy diagnostic (2014); and Endoscopy, colon, diagnostic. Restrictions  Restrictions/Precautions: Fall Risk;General Precautions; Up as Tolerated  Other position/activity restrictions: SBP <180  Required Braces or Orthoses?: No    Subjective  Subjective: \"I showered yesterday so I don't need to do that, but I'll wash my face and change\"  Restrictions/Precautions: Fall Risk;General Precautions; Up as Tolerated             Objective     Cognition  Overall Cognitive Status: Exceptions  Arousal/Alertness: Appropriate responses to stimuli  Following Commands:  Follows multistep commands with increased time  Attention Span: Appears intact  Memory: Decreased recall of recent events  Safety Judgement: Decreased awareness of need for safety;Decreased awareness of need for assistance  Problem Solving: Assistance required to identify errors made;Assistance required to correct errors made  Insights: Decreased awareness of deficits  Initiation: Requires cues for some  Sequencing: Requires cues for some  Orientation  Overall Orientation Status: Within Functional Limits  Orientation Level: Oriented X4         ADL  Grooming/Oral Hygiene  Assistance Level: Supervision;Minimal assistance  Skilled Clinical Factors: Min A to pull hair into ponytail, otherwise SUP while seated in w/c at sink; utilizes RUE to wash face and apply lotion  Upper Extremity Dressing  Assistance Level: Stand by assist;Set-up; Increased time to complete  Skilled Clinical Factors: Pt able to utilize R hand to clasp bra this date; able to don overhead shirt with increased time  Lower Extremity Dressing  Assistance Level: Minimal assistance;Verbal cues; Set-up; Increased time to complete  Skilled Clinical Factors: Min A provided to maintain balance in stance; pt able to thread underwear and pants over feet, pulls underwear and pants up while in stance   Putting On/Taking Off Footwear  Assistance Level: Stand by assist;Set-up  Skilled Clinical Factors: TA for TEDs, able to don/doff shoes with increased forward trunk flexion     Bed Mobility  Overall Assistance Level: Stand By Assist  Additional Factors: With handrails; Head of bed flat  Roll Left  Assistance Level: Stand by assist  Skilled Clinical Factors: Pt demonstrates good awareness of RUE during bed mobility  Sit to Supine  Assistance Level: Stand by assist  Skilled Clinical Factors: Pt completes with HOB lowered  Supine to Sit  Assistance Level: Stand by assist  Skilled Clinical Factors: HOB lowered, pt able to manage BLE out of bed  Transfers  Surface: To bed;From bed; Wheelchair  Additional Factors:  With handrails;Verbal cues  Device: Walker  Sit to Stand  Assistance Level: Minimal assistance  Skilled Clinical Factors: Min A due to unsteadiness upon standing; no LOB noted  Stand to Sit  Assistance Level: Minimal assistance  Skilled Clinical Factors: VC for hand placement, good carryover  Bed To/From Chair  Technique: Stand pivot  Assistance Level: Minimal assistance  Skilled Clinical Factors: VC for hand/foot placement  Stand Pivot  Assistance Level: Minimal assistance  Skilled Clinical Factors: VC for hand and foot placement   OT Exercises  Motor Control/Coordination: AM: OT facilitates pts engagement in fine motor coordination activities to increase functional use of RUE for improved independence with self-cares. Pt participated in retrieving golf tees from table top. Pt demonstrates improved fine motor coordination this date, AEB increased ease with retrieving the tees. OT facilitates pt retrieving cards from table top, practicing pronation of R forearm by placing the cards face down; pt demonstrated good technique following verbal cue to initiate pronation. Pt then participated in playing card game at table top to increase functional use of RUE. PM: OT facilitates pts engagement in fine motor coordination table top task to increase coordination of RUE for increased ease with opening containers during ADLs/IADLs. Pt participated in activity of placing small cubes into correct pattern on board. Pt demonstrated good coordination this date, AEB no droppage and improved use/incorporation of RUE in task. Pt also participated in table top activity of retrieving coins from table top and placing in slotted coin bank; pt demonstrated good fine motor coordination with use of pincer grasp and no droppage, with good technique after verbal cue to initiate use of RUE with task. 05/05/22 1102   Additional Activities   Additional Activities Comment PM: OT facilitated pt participating in trial of 9 Hole Peg test; pt completed task with R hand in 25.1 seconds and with L hand in 29.2 seconds with no droppage noted. Assessment  Assessment  Activity Tolerance: Patient tolerated treatment well  Discharge Recommendations: Home with assist PRN;Home with Home health OT  OT Equipment Recommendations  Other: TBD  Safety Devices  Safety Devices in place: Yes  Type of devices: Patient at risk for falls;Call light within reach;Gait belt;Left in chair    Patient Education  Education  Education Given To: Patient  Education Provided: Role of Therapy;Plan of Care;ADL Function; Safety;Transfer Training;Home Exercise Program  Education Method: Verbal;Demonstration  Education Outcome: Continued education needed    Plan  Plan  Times per Week: 5-7  Times per Day: Twice a day  Current Treatment Recommendations: Strengthening;ROM;Balance training;Functional mobility training; Endurance training; Safety education & training;Patient/Caregiver education & training;Equipment evaluation, education, & procurement;Self-Care / ADL; Coordination training;Neuromuscular re-education;Modalities    Goals  Patient Goals   Patient goals : \"To end up gonig home and walk out of here and be able to be as normal as possible\"  Short Term Goals  Time Frame for Short term goals: One week  Short Term Goal 1: Patient will perform upper body bathing and dressing with SUP. Short Term Goal 2: Patient will perform lower body bathing and dressing with Minimal assist.  Short Term Goal 3: Patient will perform toileting tasks with Minimal assist.  Short Term Goal 4: Patient will perform stand pivot transfers during self-care with Minimal assist and Good safety. Short Term Goal 5: Patient will verbalize/demonstrate Good understanding of assistive equipment/durable medical equipment/modified techniques for increased safety and IND with self-care. Additional Goals?: Yes  Short Term Goal 6: Patient will verbalize/demonstrate Good understanding of modified techniques for R UE during self-care. Short Term Goal 7: Patient will actively participate in 30+ minutes of therapeutic exercise/functional activities to promote increased IND with self-care and mobility. Long Term Goals  Time Frame for Long term goals : By discharge  Long Term Goal 1: Patient will perform BADLs with modified IND and Good safety. Long Term Goal 2: Patient will perform stand pivot transfers during self-care with modified IND and Good safety. Long Term Goal 3: Patient will perform functional mobility during self-care at w/c level with modified IND. Long Term Goal 4: Patient will verbalize/demonstrate Good understanding of Fall Prevention strategies during self-care to maximize safety and IND.   Long Term Goal 5: Patient will verbalize/demonstrate Good understanding of home exercise program for R UE ROM, strengthening, and coordination as appropriate.   Additional Goals?: Yes  Long Term Goal 6: 9 hole peg test and dynamometer to be assessed for R UE as appropriate    Therapy Time     05/05/22 1102 05/05/22 1557   OT Individual Minutes   Time In 1102 1400   Time Out 1202 1430   Minutes 60 [address] [place], [place]

## 2022-05-05 NOTE — PLAN OF CARE
Problem: Discharge Planning  Goal: Discharge to home or other facility with appropriate resources  5/5/2022 1434 by Renetta Greer RN  Outcome: Progressing  5/5/2022 0233 by Annetta Hodgkin, RN  Outcome: Progressing  Flowsheets (Taken 5/4/2022 2030)  Discharge to home or other facility with appropriate resources:   Identify barriers to discharge with patient and caregiver   Arrange for needed discharge resources and transportation as appropriate   Identify discharge learning needs (meds, wound care, etc)   Refer to discharge planning if patient needs post-hospital services based on physician order or complex needs related to functional status, cognitive ability or social support system     Problem: Safety - Adult  Goal: Free from fall injury  5/5/2022 1434 by Renetta Greer RN  Outcome: Progressing  Note: Using AFO on right foot for stability when up with walker and one assist. Remains free from injury. 5/5/2022 0233 by Annetta Hodgkin, RN  Outcome: Progressing     Problem: ABCDS Injury Assessment  Goal: Absence of physical injury  5/5/2022 1434 by Renetta Greer RN  Outcome: Progressing  5/5/2022 0233 by Annetta Hodgkin, RN  Outcome: Progressing     Problem: Skin/Tissue Integrity  Goal: Absence of new skin breakdown  Description: 1. Monitor for areas of redness and/or skin breakdown  2. Assess vascular access sites hourly  3. Every 4-6 hours minimum:  Change oxygen saturation probe site  4. Every 4-6 hours:  If on nasal continuous positive airway pressure, respiratory therapy assess nares and determine need for appliance change or resting period. 5/5/2022 1434 by Renetta Greer RN  Outcome: Progressing  Note: No skin breakdown.   5/5/2022 0233 by Annetta Hodgkin, RN  Outcome: Progressing     Problem: Nutrition Deficit:  Goal: Optimize nutritional status  5/5/2022 1434 by Renetta Greer RN  Outcome: Progressing  5/5/2022 0233 by Annetta Hodgkin, RN  Outcome: Progressing     Problem: Pain  Goal: Verbalizes/displays adequate comfort level or baseline comfort level  5/5/2022 1434 by Peggy Suh RN  Outcome: Progressing  Note: Denies any pain, states it is under control.   5/5/2022 0233 by Redd De Los Santos RN  Outcome: Progressing

## 2022-05-05 NOTE — PROGRESS NOTES
Speech Language Pathology  Speech Language Pathology  [facility]    Cognitive Treatment Note    Date: 5/5/2022  Patients Name: Meenu Chappell  MRN: [id number]  Diagnosis:   Patient Active Problem List   Diagnosis Code    Altered mental status R41.82    Generalized nonconvulsive seizures ([facility] G40.309    History of CVA (cerebrovascular accident) Z80.78    Noncompliance Z91.19    Hemiparesis ([facility] G81.90    Respiratory failure ([address] J96.90    Upper respiratory infection J06.9    Acute respiratory failure ([address] J96.00    HTN (hypertension) I10    HLD (hyperlipidemia) E78.5    Hyperglycemia R73.9    Hypokalemia E87.6    Anemia due to acute blood loss D62    Right sided weakness R53.1    Cerebrovascular accident (CVA) ([facility] I63.9    Tissue plasminogen activator (tPA) administered at other facility within 24 hours before current admission Z92.82       Pain: 1/10 (leg)    Cognitive Treatment    Treatment time: 9268-8761      Subjective: [x] Alert [x] Cooperative     [] Confused     [] Agitated    [] Lethargic      Objective/Assessment:  Attention: Sustained throughout, distractions minimized. Orientation: Oriented x4. Recall: Pt. Education provided re: compensatory strategies to facilitate recall (e.g., ID key words, repetition and rehearsal). Pt. Verbalized understanding and facilitating strategy to complete the following task:    Paragraph recall (4-7 sentences): 78% accuracy julienne, increasing to 100% c min-mod cues. Organization: n/a     Problem Solving/Reasoning: Word deductions- 80% accuracy julienne, increasing to 100% cued. Other: Pt continues to demo occasional word retrieval deficits in conversation; able to utilize circumlocution with verbal cues from [address] [place] [address]        Plan:  [x] Continue  services    [] Discharge from ST:      Discharge recommendations: [] Inpatient Rehab   [] [place]   [] Outpatient Therapy  [] Follow up at trauma clinic   [] Other: Treatment completed by:  Montserrat Da Silva M.A., CCC-SLP

## 2022-05-05 NOTE — FLOWSHEET NOTE
05/05/22 1255   Encounter Summary   Encounter Overview/Reason   Encounter   Service Provided For: Patient   Referral/Consult From: Patient   Last Encounter  05/05/22   Complexity of Encounter Low   Spiritual/Emotional needs   Type Spiritual Support   Rituals, Rites and Sacraments   Type   (Declined Anointing - Fr.  [place] 5-5-22)

## 2022-05-05 NOTE — PLAN OF CARE
comorbid conditions and prevent exacerbation or deterioration   Update acute care plan with appropriate goals if chronic or comorbid symptoms are exacerbated and prevent overall improvement and discharge     Problem: Nutrition Deficit:  Goal: Optimize nutritional status  Outcome: Progressing     Problem: Pain  Goal: Verbalizes/displays adequate comfort level or baseline comfort level  5/5/2022 0233 by Jarad Iqbal RN  Outcome: Progressing  5/4/2022 1621 by Francisco Javier Solares RN  Outcome: Progressing  Note: Patient denies pain at this time. Will continue to assess.

## 2022-05-05 NOTE — PROGRESS NOTES
Physical Therapy  Facility/Department: [facility] ACUTE REHAB      NAME: Rm Gurrola  : 1950 (80 y.o.)  MRN: [id number]  CODE STATUS: Full Code    Date of Service: 22      Past Medical History:   Diagnosis Date    Cerebral artery occlusion with cerebral infarction ([facility] ,     CVA (cerebral infarction)     Diabetes mellitus ([facility]     Hyperlipidemia     Hypertension     Seizures ([facility] 2022     Past Surgical History:   Procedure Laterality Date    APPENDECTOMY      BRONCHOSCOPY DIAGNOSTIC  2014         ENDOSCOPY, COLON, DIAGNOSTIC      HYSTERECTOMY         Patient assessed for rehabilitation services?: Yes  Additional Pertinent Hx: Rm Gurrola is a 80 y.o. female with history of CVA, HTN, and HLD admitted to [facility] on 2022. She initially presented with acute-onset right hemiparesis. NIHSS 21. She was given tPA by the mobile stroke unit, but infusion was stopped due to worsening of symptoms and concern for possible hemorrhagic conversion. She was intubated prior to arrival to the ED. CT head showed no acute intracranial abnormality, and tPA was restarted. She was also loaded with keppra due to concern for seizures. MRI brain showed no acute intracranial abnormality. Extubated 22. Mac Boubacar Per notes, she had a similar episodes in  and . Per Neuro notes- Reported history of chronic infarcts with residual right sided weakness but not clearly evident on brain imaging. Neuro recommending EMG as outpatient. Pt admotted to rehab unit on 22. Family / Caregiver Present: No  Referral Date : 22  Diagnosis: Right side weakness    Restrictions:  Restrictions/Precautions: Fall Risk;General Precautions; Up as Tolerated  Position Activity Restriction  Other position/activity restrictions: SBP <180     SUBJECTIVE  Subjective: Patient reports she is feeling more stable with AFO donned during ambulation and transfers.   Patient agreeable to do therapy on this today.  Distance: 100 ft x 2 in AM and PM;  short distances within gym. Comments: Cues for safety        Stairs/Curb  Stairs?: Yes  Stairs  # Steps : 5  Stairs Height: 6\" (and 4\")  Rails: Bilateral  Device: No Device  Other Apparatus: Right;AFO  Assistance: Minimal assistance  Comment: Cues for step to pattern and safety. PT Exercises  Exercise Treatment: seated bilateral x 20 right A/AROM and LLE 2#.  Circulation/Endurance Exercises: NuStep x 15 mins. Workload 4  Functional Mobility Circuit Training: sit to stand WC <> parallel bars . Also WC to RW. Pre gait ex. for wt. shifting. (instruction in sequencing muscle and decreased trunk sway )  Static Standing Balance Exercises: static stand parallel bars   Dynamic Standing Balance Exercises: BLE ex's in // bars. Reps: 15 each. Writer assisting with RLE knee extension. ASSESSMENT  Vitals  O2 Device: None (Room air)    Activity Tolerance  Activity Tolerance: Patient tolerated treatment well    Assessment  Performance Deficits/Impairments: Decreased functional mobility ; Decreased tolerance to work activity; Decreased strength;Decreased safe awareness;Decreased endurance;Decreased balance;Decreased posture;Decreased ROM  Treatment Diagnosis: Weakness, difficulty walking  Therapy Prognosis: Good  Discharge Recommendations: Patient would benefit from continued therapy after discharge;Home with assist PRN  PT Equipment Recommendations  Equipment Needed:  (TBD as therapy progresses)           GOALS  Patient Goals   Patient goals : To get better  Short Term Goals  Time Frame for Short term goals: 10 days  Short term goal 1: Pt to perform bed mobility from flat surface independently  Short term goal 2: Demonstrate functional transfers min A  Short term goal 3: Pt to ambulate distance of 100 ft with rolling walker at mod A  Short term goal 4: Pt able to improve sitting balance at EOB/EOM to good to be alen to eat meals.   Short term goal 5: Demonstrate dynamic standing balance of fiar - to decrease fall risk  Short Term Goal 6: Pt able to  propel w/c distance of 100 to 150 ft, CGA, level surfaces. Long Term Goals  Time Frame for Long term goals : By DC  Long term goal 1: Pt able to perform transfers at mod-I  Long term goal 2: Pt able to ambulate distance of 100 to 150 ft with appropriate device, at min A. with assistive device. Long term goal 3: Pt able to perform a curb step with a rolling wwalker/UE support, mod A  Long term goal 4: Pt able to propel w/c distance of 150 ft , level surfaces, including turns, mod-I  Long term goal 5: Pt  able to improve standing balance with assistive device to fair to reduce fall risk  Long term goal 6: Improve 2MWT distance to 80 ft to improve function and gait speed. Long term goal 7: Improve PASS score to 25/36 to improve overall function. PLAN OF CARE    Plan  Plan:  minutes of therapy at least 5 out of 7 days a week (--)  Current Treatment Recommendations: Strengthening;ROM;Balance training;Functional mobility training;Gait training;Neuromuscular re-education;Transfer training; Endurance training; Wheelchair mobility training;Stair training;Home exercise program;Safety education & training;Patient/Caregiver education & training;Equipment evaluation, education, & procurement; Modalities (E-stim as needed to neuro-muscular faciliatation R LE as nee)  Safety Devices  Type of Devices: Call light within reach;Gait belt;Patient at risk for falls; Bed alarm in place; All fall risk precautions in place  Restraints  Restraints Initially in Place: No  Therapy Time       05/05/22 0845 05/05/22 1308   PT Individual Minutes   Time In 0815 1308   Time Out 0845 1350   Minutes 30 42   PT Concurrent Minutes   Time In [address]   Time Out 0900 [address] [facility]   Minutes [address] [place] [address], 05/05/22 at 4:46 PM

## 2022-05-05 NOTE — PROGRESS NOTES
[facility] Internal Medicine    CONSULTATION / HISTORY AND PHYSICAL EXAMINATION            Date:   5/5/2022  Patient name:  Сергей Orr  Date of admission:  4/28/2022 12:10 PM  MRN:   [id number]  Account:  [de-identified]  YOB: 1950  PCP:    Talat Lutz MD  Room:   6281/4886-37  Code Status:    Full Code    Physician Requesting Consult: Pérez Mitchell MD    Reason for Consult:  Medical management    Chief Complaint:     No chief complaint on file. Right-sided weakness    History Obtained From:     Patient, EMR, nursing staff    History of Present Illness:     41-year-old female with history of questionable seizure disorder, chronic right hemiparesis numbness of right leg migraines admitted on 4/17 after being found altered, concern for stroke  Started on IV tPA but then became obtunded and flaccid and tPA held due to concern for hemorrhagic conversion  She was intubated, stat CT done which was unremarkable subsequently tPA was resumed and completed. MRI brain negative for stroke  Episode of flaccidity concerning for seizure hence loaded with IV Keppra  Extubated 4/19  Persistent right-sided weakness, fluctuating, neurology considering psychosomatic symptoms  Patient is on aspirin Plavix    Diabetes   Diagnosed within last year  Modifying factors on med:   Severity:controlled   Associated signs and symtoms: neuropathy/ckd/ CAD.    aggravated with sugar diet and better with low sugar diet      5/2   Patient again had episode of hypoglycemia in the morning, orange juice was provided  Blood sugar went to 68      Past Medical History:     Past Medical History:   Diagnosis Date    Cerebral artery occlusion with cerebral infarction ([address] 2008, 2014    CVA (cerebral infarction)     Diabetes mellitus ([address]     Hyperlipidemia     Hypertension     Seizures ([facility] [address] 04/2022        Past Surgical History:     Past Surgical History:   Procedure Laterality Date    APPENDECTOMY      BRONCHOSCOPY DIAGNOSTIC  7/24/2014         ENDOSCOPY, COLON, DIAGNOSTIC      HYSTERECTOMY          Medications Prior to Admission:     Prior to Admission medications    Medication Sig Start Date End Date Taking? Authorizing Provider   divalproex (DEPAKOTE ER) 500 MG extended release tablet Take 1 tablet by mouth in the morning and at bedtime 4/26/22   Arleth Lantigua MD   levETIRAcetam (KEPPRA) 1000 MG tablet Take 1 tablet by mouth 2 times daily 4/21/22   Swati Garcia MD   topiramate (TOPAMAX) 25 MG tablet Take 1 tablet by mouth 2 times daily 4/21/22   Swati Garcia MD   clopidogrel (PLAVIX) 75 MG tablet Take 75 mg by mouth nightly    Historical Provider, MD   gabapentin (NEURONTIN) 600 MG tablet Take 600 mg by mouth 3 times daily. Historical Provider, MD   potassium chloride (KLOR-CON M) 20 MEQ extended release tablet Take 20 mEq by mouth 2 times daily    Historical Provider, MD   fenofibrate (TRIGLIDE) 160 MG tablet Take 160 mg by mouth daily    Historical Provider, MD   Pantoprazole Sodium (PROTONIX PO) Take  by mouth. Historical Provider, MD   acetaminophen (TYLENOL) 160 MG/5ML solution Take 20.3 mLs by mouth every 4 hours as needed for Fever. 7/28/14   [place], DO   albuterol (PROVENTIL HFA;VENTOLIN HFA) 108 (90 BASE) MCG/ACT inhaler Inhale 6 puffs into the lungs every 6 hours as needed for Wheezing. 7/28/14   [place], DO   glucagon, rDNA, 1 MG SOLR injection Inject 1 mg into the muscle as needed for Low blood sugar (Blood glucose less than 70 mg/dL and patient NOT ALERT or NPO and does not have IV access. ). 7/28/14   [employer], DO   glucose (GLUTOSE) 40 % GEL Take 15 g by mouth as needed. 7/28/14   [place], DO   insulin glargine (LANTUS) 100 UNIT/ML injection vial Inject 10 Units into the skin nightly. 7/28/14   [employer], DO   insulin lispro (HUMALOG) 100 UNIT/ML injection vial Inject 0-12 Units into the skin every 6 hours.  7/28/14   Juliette Reyes DO Corin   niacin (NIASPAN) 500 MG CR tablet Take 1 tablet by mouth nightly. 14   Jami Rucker DO   potassium chloride (KAYCIEL) 20 MEQ/15ML (10%) solution Take 15 mLs by mouth daily. 14   Jami Rucker DO        Allergies:     Patient has no known allergies. Social History:     Tobacco:    reports that she has never smoked. She has never used smokeless tobacco.  Alcohol:      reports no history of alcohol use. Drug Use:  reports no history of drug use. Family History:     History reviewed. No pertinent family history. Review of Systems:     Positive and Negative as described in HPI. CONSTITUTIONAL:  negative for fevers, chills, sweats, fatigue, weight loss  HEENT:  negative for vision, hearing changes, runny nose, throat pain  RESPIRATORY:  negative for shortness of breath, cough, congestion, wheezing. CARDIOVASCULAR:  negative for chest pain, palpitations.   GASTROINTESTINAL:  negative for nausea, vomiting, diarrhea, constipation, change in bowel habits, abdominal pain   GENITOURINARY:  negative for difficulty of urination, burning with urination, frequency   INTEGUMENT:  negative for rash, skin lesions, easy bruising   HEMATOLOGIC/LYMPHATIC:  negative for swelling/edema   ALLERGIC/IMMUNOLOGIC:  negative for urticaria , itching  ENDOCRINE:  negative increase in drinking, increase in urination, hot or cold intolerance  MUSCULOSKELETAL:  negative joint pains, muscle aches, swelling of joints  NEUROLOGICAL:  Positive for right sided weakness, negative for headaches, dizziness, lightheadedness, numbness, pain, tingling extremities      Physical Exam:     /69   Pulse 62   Temp 97.5 °F (36.4 °C)   Resp 16   Ht 5' 5\" (1.651 m)   Wt 134 lb (60.8 kg)   SpO2 98%   BMI 22.30 kg/m²   Temp (24hrs), Av.8 °F (36.6 °C), Min:97.5 °F (36.4 °C), Max:98.1 °F (36.7 °C)    Recent Labs     22  0508 22  1127 22  1615   POCGLU 252* 117* 116* 186* Intake/Output Summary (Last 24 hours) at 5/5/2022 1631  Last data filed at 5/4/2022 2200  Gross per 24 hour   Intake 960 ml   Output --   Net 960 ml       General Appearance:  alert, well appearing, and in no acute distress  Head:  normocephalic, atraumatic. Eye: no icterus, redness, pupils equal and reactive, extraocular eye movements intact, conjunctiva clear  Ear: normal external ear, no discharge, hearing intact  Nose:  no drainage noted  Mouth: mucous membranes moist  Neck: supple, no carotid bruits, thyroid not palpable  Lungs: Bilateral equal air entry, clear to ausculation, no wheezing, rales or rhonchi, normal effort  Cardiovascular: normal rate, regular rhythm, no murmur, gallop, rub.   Abdomen: Soft, nontender, nondistended, normal bowel sounds, no hepatomegaly or splenomegaly  Neurologic:  Right UE and Lower extremity power 2/5, There are no new focal motor or sensory deficits, normal muscle tone and bulk, no abnormal sensation, normal speech, cranial nerves II through XII grossly intact  Skin: No gross lesions, rashes, bruising or bleeding on exposed skin area  Extremities:  peripheral pulses palpable, no pedal edema or calf pain with palpation  Psych:normal affect    Investigations:      Laboratory Testing:  Recent Results (from the past 24 hour(s))   POC Glucose Fingerstick    Collection Time: 05/04/22  8:16 PM   Result Value Ref Range    POC Glucose 252 (H) 65 - 105 mg/dL   POC Glucose Fingerstick    Collection Time: 05/05/22  5:08 AM   Result Value Ref Range    POC Glucose 117 (H) 65 - 105 mg/dL   POC Glucose Fingerstick    Collection Time: 05/05/22 11:27 AM   Result Value Ref Range    POC Glucose 116 (H) 65 - 105 mg/dL   POC Glucose Fingerstick    Collection Time: 05/05/22  4:15 PM   Result Value Ref Range    POC Glucose 186 (H) 65 - 105 mg/dL       Imaging/Diagonstics:  Recent data reviewed    Assessment :      Primary Problem  Right sided weakness    Active Hospital Problems    Diagnosis Date Noted    Right sided weakness [R53.1] 04/17/2022    HTN (hypertension) [I10] 07/19/2014       Plan:     1. Right-sided weakness MRI brain negative for stroke- PT OT  2. Type 2 diabetes- continue Lantus, sliding scale  3. History of prior strokes-continue aspirin, Plavix, statin  4. History of seizure disorder- continue Keppra, Topamax, Depakote  5. Hyperlipidemia-patient on statin, fenofibrate  6. H/o Hypertension-currently controlled without meds  7. Recent extubation on 4/19    DVT prophylaxis-Lovenox    May 1  Hypoglycemia noted  Reduce lantus to 15 qhs  5/2   Reducing dose of insulin to 10 units, reducing sliding scale to low-dose  Blood pressure is controlled, off antihypertensives    5/5  Patient blood sugar better controlled after adjusting insulin  Blood pressure controlled  No new complaints  Consultations:   IP CONSULT TO DIETITIAN  IP CONSULT TO SOCIAL WORK  IP CONSULT TO INTERNAL MEDICINE      Yue Franks MD  5/5/2022  4:31 PM    Copy sent to Dr. Marlon Mayorga MD    Please note that this chart was generated using voice recognition Dragon dictation software. Although every effort was made to ensure the accuracy of this automated transcription, some errors in transcription may have occurred.

## 2022-05-06 LAB
ABSOLUTE BANDS #: 0.04 K/UL (ref 0–1)
ABSOLUTE EOS #: 0 K/UL (ref 0–0.4)
ABSOLUTE LYMPH #: 1.94 K/UL (ref 1–4.8)
ABSOLUTE MONO #: 0.3 K/UL (ref 0.1–1.3)
ANION GAP SERPL CALCULATED.3IONS-SCNC: 12 MMOL/L (ref 9–17)
BANDS: 1 % (ref 0–10)
BASOPHILS # BLD: 0 % (ref 0–2)
BASOPHILS ABSOLUTE: 0 K/UL (ref 0–0.2)
BUN BLDV-MCNC: 23 MG/DL (ref 8–23)
CALCIUM SERPL-MCNC: 9.3 MG/DL (ref 8.6–10.4)
CHLORIDE BLD-SCNC: 110 MMOL/L (ref 98–107)
CO2: 21 MMOL/L (ref 20–31)
CREAT SERPL-MCNC: 0.73 MG/DL (ref 0.5–0.9)
EOSINOPHILS RELATIVE PERCENT: 0 % (ref 0–4)
GFR AFRICAN AMERICAN: >60 ML/MIN
GFR NON-AFRICAN AMERICAN: >60 ML/MIN
GFR SERPL CREATININE-BSD FRML MDRD: ABNORMAL ML/MIN/{1.73_M2}
GLUCOSE BLD-MCNC: 103 MG/DL (ref 65–105)
GLUCOSE BLD-MCNC: 173 MG/DL (ref 70–99)
GLUCOSE BLD-MCNC: 176 MG/DL (ref 65–105)
GLUCOSE BLD-MCNC: 178 MG/DL (ref 65–105)
GLUCOSE BLD-MCNC: 92 MG/DL (ref 65–105)
HCT VFR BLD CALC: 33.2 % (ref 36–46)
HEMOGLOBIN: 11.3 G/DL (ref 12–16)
LYMPHOCYTES # BLD: 45 % (ref 24–44)
MCH RBC QN AUTO: 31.6 PG (ref 26–34)
MCHC RBC AUTO-ENTMCNC: 33.9 G/DL (ref 31–37)
MCV RBC AUTO: 93 FL (ref 80–100)
MONOCYTES # BLD: 7 % (ref 1–7)
MORPHOLOGY: ABNORMAL
MORPHOLOGY: ABNORMAL
PDW BLD-RTO: 13.9 % (ref 11.5–14.9)
PLATELET # BLD: 189 K/UL (ref 150–450)
PMV BLD AUTO: 7.2 FL (ref 6–12)
POTASSIUM SERPL-SCNC: 3.9 MMOL/L (ref 3.7–5.3)
RBC # BLD: 3.57 M/UL (ref 4–5.2)
SEG NEUTROPHILS: 47 % (ref 36–66)
SEGMENTED NEUTROPHILS ABSOLUTE COUNT: 2.02 K/UL (ref 1.3–9.1)
SODIUM BLD-SCNC: 143 MMOL/L (ref 135–144)
WBC # BLD: 4.3 K/UL (ref 3.5–11)

## 2022-05-06 PROCEDURE — 97110 THERAPEUTIC EXERCISES: CPT

## 2022-05-06 PROCEDURE — 85025 COMPLETE CBC W/AUTO DIFF WBC: CPT

## 2022-05-06 PROCEDURE — 36415 COLL VENOUS BLD VENIPUNCTURE: CPT

## 2022-05-06 PROCEDURE — 97116 GAIT TRAINING THERAPY: CPT

## 2022-05-06 PROCEDURE — 6360000002 HC RX W HCPCS: Performed by: STUDENT IN AN ORGANIZED HEALTH CARE EDUCATION/TRAINING PROGRAM

## 2022-05-06 PROCEDURE — 97129 THER IVNTJ 1ST 15 MIN: CPT

## 2022-05-06 PROCEDURE — 6370000000 HC RX 637 (ALT 250 FOR IP): Performed by: STUDENT IN AN ORGANIZED HEALTH CARE EDUCATION/TRAINING PROGRAM

## 2022-05-06 PROCEDURE — 99232 SBSQ HOSP IP/OBS MODERATE 35: CPT | Performed by: INTERNAL MEDICINE

## 2022-05-06 PROCEDURE — 97130 THER IVNTJ EA ADDL 15 MIN: CPT

## 2022-05-06 PROCEDURE — 6370000000 HC RX 637 (ALT 250 FOR IP): Performed by: INTERNAL MEDICINE

## 2022-05-06 PROCEDURE — 99232 SBSQ HOSP IP/OBS MODERATE 35: CPT | Performed by: STUDENT IN AN ORGANIZED HEALTH CARE EDUCATION/TRAINING PROGRAM

## 2022-05-06 PROCEDURE — 80048 BASIC METABOLIC PNL TOTAL CA: CPT

## 2022-05-06 PROCEDURE — 6370000000 HC RX 637 (ALT 250 FOR IP): Performed by: PHYSICAL MEDICINE & REHABILITATION

## 2022-05-06 PROCEDURE — 97530 THERAPEUTIC ACTIVITIES: CPT

## 2022-05-06 PROCEDURE — 97535 SELF CARE MNGMENT TRAINING: CPT

## 2022-05-06 PROCEDURE — 1180000000 HC REHAB R&B

## 2022-05-06 PROCEDURE — 82947 ASSAY GLUCOSE BLOOD QUANT: CPT

## 2022-05-06 RX ADMIN — GABAPENTIN 600 MG: 300 CAPSULE ORAL at 07:59

## 2022-05-06 RX ADMIN — DIVALPROEX SODIUM 500 MG: 500 TABLET, EXTENDED RELEASE ORAL at 08:01

## 2022-05-06 RX ADMIN — ATORVASTATIN CALCIUM 40 MG: 40 TABLET, FILM COATED ORAL at 20:44

## 2022-05-06 RX ADMIN — DIVALPROEX SODIUM 500 MG: 500 TABLET, EXTENDED RELEASE ORAL at 20:43

## 2022-05-06 RX ADMIN — GABAPENTIN 600 MG: 300 CAPSULE ORAL at 20:43

## 2022-05-06 RX ADMIN — ENOXAPARIN SODIUM 40 MG: 100 INJECTION SUBCUTANEOUS at 07:59

## 2022-05-06 RX ADMIN — PANTOPRAZOLE SODIUM 40 MG: 40 TABLET, DELAYED RELEASE ORAL at 05:33

## 2022-05-06 RX ADMIN — INSULIN GLARGINE 10 UNITS: 100 INJECTION, SOLUTION SUBCUTANEOUS at 20:43

## 2022-05-06 RX ADMIN — POTASSIUM CHLORIDE 20 MEQ: 20 TABLET, EXTENDED RELEASE ORAL at 07:59

## 2022-05-06 RX ADMIN — LEVETIRACETAM 1000 MG: 500 TABLET, FILM COATED ORAL at 08:00

## 2022-05-06 RX ADMIN — FENOFIBRATE 160 MG: 160 TABLET ORAL at 07:59

## 2022-05-06 RX ADMIN — INSULIN LISPRO 1 UNITS: 100 INJECTION, SOLUTION INTRAVENOUS; SUBCUTANEOUS at 20:43

## 2022-05-06 RX ADMIN — POLYETHYLENE GLYCOL 3350 17 G: 17 POWDER, FOR SOLUTION ORAL at 07:59

## 2022-05-06 RX ADMIN — CLOPIDOGREL BISULFATE 75 MG: 75 TABLET ORAL at 08:00

## 2022-05-06 RX ADMIN — TOPIRAMATE 25 MG: 25 TABLET, FILM COATED ORAL at 08:01

## 2022-05-06 RX ADMIN — LEVETIRACETAM 1000 MG: 500 TABLET, FILM COATED ORAL at 20:43

## 2022-05-06 RX ADMIN — TOPIRAMATE 25 MG: 25 TABLET, FILM COATED ORAL at 20:43

## 2022-05-06 RX ADMIN — ASPIRIN 81 MG: 81 TABLET, COATED ORAL at 07:59

## 2022-05-06 NOTE — PROGRESS NOTES
Physical Medicine & Rehabilitation  Progress Note      Subjective:      Radha Montgomery is a 80 y.o. female with right-sided weakness of unclear etiology. She reports doing well. She continues to note improvement in right upper and lower limb strength. She states that she has been trialing a right AFO in therapy and feels like it is helping her walk better. She denies any acute concerns. ROS:  Denies fevers, chills, sweats. No chest pain, palpitations, lightheadedness. Denies coughing, wheezing or shortness of breath. Denies abdominal pain, nausea, diarrhea or constipation. No new areas of joint pain. Denies new areas of numbness or weakness. Denies new anxiety or depression issues. No new skin problems. Rehabilitation:   Progressing in therapies. PT:  Restrictions/Precautions: Fall Risk,General Precautions,Up as Tolerated  Other position/activity restrictions: SBP <180   Transfers  Sit to Stand: Contact guard assistance  Stand to sit: Contact guard assistance  Bed to Chair: Minimal assistance  Stand Pivot Transfers: Contact guard assistance  Squat Pivot Transfers: Minimal Assistance  Comment: Several transfers completed AM/PM; blaine safe, steady transfers with RW. Ambulation  Surface: level tile  Device: Rolling Walker  Other Apparatus: Right,AFO  Assistance: Contact guard assistance  Quality of Gait: Patient able to advance R LE with AFO and reports improved R knee support. Cues for RW safety and upright posture. Improved endurance noted today. Gait Deviations: Slow Irene,Decreased step length,Decreased step height  Distance: >300ft up/down ramp; ambulation throughout hallways.   Comments: Cues for safety  More Ambulation?: Yes    Transfers  Sit to Stand: Contact guard assistance  Stand to sit: Contact guard assistance  Bed to Chair: Minimal assistance  Stand Pivot Transfers: Contact guard assistance  Squat Pivot Transfers: Minimal Assistance  Comment: Several transfers completed AM/PM; demos safe, steady transfers with RW. Ambulation  Surface: level tile  Device: Rolling Walker  Other Apparatus: Right,AFO  Assistance: Contact guard assistance  Quality of Gait: Patient able to advance R LE with AFO and reports improved R knee support. Cues for RW safety and upright posture. Improved endurance noted today. Gait Deviations: Slow Irene,Decreased step length,Decreased step height  Distance: >300ft up/down ramp; ambulation throughout hallways. Comments: Cues for safety  More Ambulation?: Yes    Surface: level tile  Ambulation  Surface: level tile  Device: Rolling Walker  Other Apparatus: Right,AFO  Assistance: Contact guard assistance  Quality of Gait: Patient able to advance R LE with AFO and reports improved R knee support. Cues for RW safety and upright posture. Improved endurance noted today. Gait Deviations: Slow Irene,Decreased step length,Decreased step height  Distance: >300ft up/down ramp; ambulation throughout hallways. Comments: Cues for safety  More Ambulation?: Yes    OT:  ADL  Feeding: Setup  Feeding Skilled Clinical Factors: Patient reports use of non-dominant L hand for self-feeding currently  Grooming: Stand by assistance  Grooming Skilled Clinical Factors: While seated in w/c at sink  UE Bathing: Moderate assistance  UE Bathing Skilled Clinical Factors: Assist for L UE and R UE positioning while seated on tub transfer bench in shower  LE Bathing: Moderate assistance  LE Bathing Skilled Clinical Factors: Assist for buttocks and perineal area;  Minimal assist for standing balance  UE Dressing: Minimal assistance  UE Dressing Skilled Clinical Factors: Assist bra fastening and threading L UE  LE Dressing: Maximum assistance  LE Dressing Skilled Clinical Factors: Assist to thread R LE, don TEDs & pull pants over hips  Toileting: Maximum assistance  Toileting Skilled Clinical Factors: Assist for clothing management; personal hygiene while seated  Additional Comments: OT facilitated patient engagement in showering routine including bathing, dressing, toileting, and grooming tasks. Patient demonstrates Fair compensatory strategies for self-care with further education warranted to maximize safety and IND. Balance  Sitting Balance: Stand by assistance  Standing Balance: Minimal assistance            Bed mobility  Rolling to Left: Stand by assistance  Rolling to Right: Stand by assistance  Supine to Sit: Stand by assistance  Sit to Supine: Stand by assistance  Scooting: Stand by assistance  Bed Mobility Comments: PT mat, wedge, 3 pillows. Transfers  Stand Pivot Transfers: 2 Person assistance,Moderate assistance  Sit to stand: Moderate assistance  Stand to sit: Moderate assistance  Transfer Comments: with Minimal verbal cues for hand placement and safety   Toilet Transfers  Toilet - Technique: Stand pivot,To right,To left  Equipment Used: Raised toilet seat with rails  Toilet Transfer: 2 Person assistance,Moderate assistance  Toilet Transfers Comments: with Minimal verbal cues for hand placement and safety     Shower Transfers  Shower - Transfer From: Wheelchair  Shower - Transfer Type: To and From  Shower - Transfer To: Transfer tub bench  Shower - Technique: Stand pivot,To right,To left  Shower Transfers: 2 Person assistance,Moderate assistance       SPEECH:  Subjective: [x]? Alert     [x]? Cooperative     []? Confused     []? Agitated    []?  Lethargic        Objective/Assessment:  Attention: Sustained throughout, distractions minimized.      Orientation: Oriented x4.      Recall: Pt recalled daily events with mod level of detail (I), increasing to high level of detail with min A from ST.      Organization: Pt completed written 2-3 step directions with thought organization component with 50% accuracy (I), requiring mod A to recognize and correct errors to improve to 75% accuracy.      Problem Solving/Reasoning: n/a     Other: Pt demo occasional word retrieval deficits in conversation; able to utilize circumlocution with verbal cues from [address] [place] [address]  Current Medications:   Current Facility-Administered Medications: insulin glargine (LANTUS) injection vial 10 Units, 10 Units, SubCUTAneous, Nightly  insulin lispro (HUMALOG) injection vial 0-6 Units, 0-6 Units, SubCUTAneous, TID WC  insulin lispro (HUMALOG) injection vial 0-3 Units, 0-3 Units, SubCUTAneous, Nightly  enoxaparin (LOVENOX) injection 40 mg, 40 mg, SubCUTAneous, Daily  ondansetron (ZOFRAN-ODT) disintegrating tablet 4 mg, 4 mg, Oral, Q8H PRN **OR** [DISCONTINUED] ondansetron (ZOFRAN) injection 4 mg, 4 mg, IntraVENous, Q6H PRN  aspirin EC tablet 81 mg, 81 mg, Oral, Daily  atorvastatin (LIPITOR) tablet 40 mg, 40 mg, Oral, Nightly  albuterol sulfate  (90 Base) MCG/ACT inhaler 6 puff, 6 puff, Inhalation, Q6H PRN  clopidogrel (PLAVIX) tablet 75 mg, 75 mg, Oral, Daily  divalproex (DEPAKOTE ER) extended release tablet 500 mg, 500 mg, Oral, BID  fenofibrate (TRIGLIDE) tablet 160 mg, 160 mg, Oral, Daily  gabapentin (NEURONTIN) capsule 600 mg, 600 mg, Oral, TID  levETIRAcetam (KEPPRA) tablet 1,000 mg, 1,000 mg, Oral, BID  pantoprazole (PROTONIX) tablet 40 mg, 40 mg, Oral, QAM AC  potassium chloride (KLOR-CON M) extended release tablet 20 mEq, 20 mEq, Oral, Daily with breakfast  topiramate (TOPAMAX) tablet 25 mg, 25 mg, Oral, BID  acetaminophen (TYLENOL) tablet 650 mg, 650 mg, Oral, Q4H PRN  polyethylene glycol (GLYCOLAX) packet 17 g, 17 g, Oral, Daily  senna (SENOKOT) tablet 17.2 mg, 2 tablet, Oral, Daily PRN  bisacodyl (DULCOLAX) suppository 10 mg, 10 mg, Rectal, Daily PRN      Objective:  /60   Pulse 72   Temp 97.9 °F (36.6 °C)   Resp 16   Ht 5' 5\" (1.651 m)   Wt 134 lb (60.8 kg)   SpO2 99%   BMI 22.30 kg/m²       GEN: Well developed, well nourished, no acute distress  HEENT: NCAT. EOM grossly intact. Hearing grossly intact.   Mucous membranes pink and moist.  RESP: Normal breath sounds with no wheezing, rales, or rhonchi. Respirations WNL and unlabored. CV: Regular rate and rhythm. No murmurs, rubs, or gallops. ABD: Soft, non-distended, BS+ and equal.  NEURO: Alert. Speech fluent. Sensation to light touch decreased in right upper and lower limbs compared to left. MSK:  Muscle bulk is normal bilaterally. Strength 4/5 in right upper limb. Strength 4+/5 in left upper limb. Able to extend the right knee partially against gravity. Right AFO in place. LIMBS: No edema in bilateral lower limbs. SKIN: Warm and dry with good turgor. PSYCH: Mood WNL. Affect WNL. Appropriately interactive. Diagnostics:     CBC:   Recent Labs     05/06/22  0805   WBC 4.3   RBC 3.57*   HGB 11.3*   HCT 33.2*   MCV 93.0   RDW 13.9        BMP:   Recent Labs     05/06/22  0805      K 3.9   *   CO2 21   BUN 23   CREATININE 0.73   GLUCOSE 173*     BNP: No results for input(s): BNP in the last 72 hours. PT/INR: No results for input(s): PROTIME, INR in the last 72 hours. APTT: No results for input(s): APTT in the last 72 hours. CARDIAC ENZYMES: No results for input(s): CKMB, CKMBINDEX, TROPONINT in the last 72 hours. Invalid input(s): CKTOTAL;3  FASTING LIPID PANEL:  Lab Results   Component Value Date    CHOL 202 (H) 04/17/2022    HDL 55 04/17/2022    TRIG 164 (H) 04/17/2022     LIVER PROFILE: No results for input(s): AST, ALT, ALB, BILIDIR, BILITOT, ALKPHOS in the last 72 hours. Impression/Plan:   Impaired ADLs, gait, and mobility due to:    1. Right-sided weakness:  Unknown etiology - possibly psychosomatic. PT/OT for gait, mobility, strengthening, endurance, ADLs, and self care. Plan for outpatient EMG. On aspirin, plavix. On gabapentin. Placed consult to orthotist for evaluation for right AFO for home. 2. Cognitive impairment:  SLP following. 3. Anemia: Hemoglobin 11.3 on 5/6, stable. Monitoring. 4. Type 2 Diabetes:  Hemoglobin A1c 10.3 on 4/17.  On Lantus nightly and sliding scale (IM decreased both medications on 5/2)  5. HTN:  Not currently on medication  6. HLD: On atorvastatin, fenofibrate  7. History of CVA:  On aspirin, plavix, atorvastatin, fenofibrate  8. Seizure: On Keppra and Depakote  9. GERD: On pantoprazole  10. Hypokalemia:  Improved. On KCl repletion daily. Monitoring. 11. Bowel Management: Miralax daily, senokot prn, dulcolax prn. 12. DVT Prophylaxis:  low molecular weight heparin, SCD's while in bed and ALLIE's while in bed  13.  Internal medicine for medical management      Electronically signed by James Lerner MD on 5/6/2022 at 10:49 PM

## 2022-05-06 NOTE — PLAN OF CARE
Problem: Discharge Planning  Goal: Discharge to home or other facility with appropriate resources  5/6/2022 0212 by Olman Perry RN  Outcome: Progressing  Flowsheets (Taken 5/5/2022 2030)  Discharge to home or other facility with appropriate resources:   Identify barriers to discharge with patient and caregiver   Arrange for needed discharge resources and transportation as appropriate   Identify discharge learning needs (meds, wound care, etc)   Refer to discharge planning if patient needs post-hospital services based on physician order or complex needs related to functional status, cognitive ability or social support system  5/5/2022 1434 by Tawny Joshi RN  Outcome: Progressing     Problem: Safety - Adult  Goal: Free from fall injury  5/6/2022 0212 by Olman Perry RN  Outcome: Progressing  5/5/2022 1434 by Tawny Joshi RN  Outcome: Progressing  Note: Using AFO on right foot for stability when up with walker and one assist. Remains free from injury. Problem: ABCDS Injury Assessment  Goal: Absence of physical injury  5/6/2022 0212 by Olman Perry RN  Outcome: Progressing  5/5/2022 1434 by Tawny Joshi RN  Outcome: Progressing     Problem: Skin/Tissue Integrity  Goal: Absence of new skin breakdown  Description: 1. Monitor for areas of redness and/or skin breakdown  2. Assess vascular access sites hourly  3. Every 4-6 hours minimum:  Change oxygen saturation probe site  4. Every 4-6 hours:  If on nasal continuous positive airway pressure, respiratory therapy assess nares and determine need for appliance change or resting period. 5/6/2022 0212 by Olman Perry RN  Outcome: Progressing  5/5/2022 1434 by Tawny Joshi RN  Outcome: Progressing  Note: No skin breakdown.      Problem: Chronic Conditions and Co-morbidities  Goal: Patient's chronic conditions and co-morbidity symptoms are monitored and maintained or improved  5/6/2022 0212 by Olman Perry RN  Outcome: Progressing  Flowsheets (Taken 5/5/2022 2030)  Care Plan - Patient's Chronic Conditions and Co-Morbidity Symptoms are Monitored and Maintained or Improved:   Monitor and assess patient's chronic conditions and comorbid symptoms for stability, deterioration, or improvement   Collaborate with multidisciplinary team to address chronic and comorbid conditions and prevent exacerbation or deterioration   Update acute care plan with appropriate goals if chronic or comorbid symptoms are exacerbated and prevent overall improvement and discharge  5/5/2022 1434 by Herman López RN  Outcome: Progressing     Problem: Nutrition Deficit:  Goal: Optimize nutritional status  5/6/2022 0212 by Billy Rabago RN  Outcome: Progressing  5/5/2022 1434 by Herman López RN  Outcome: Progressing     Problem: Pain  Goal: Verbalizes/displays adequate comfort level or baseline comfort level  5/6/2022 0212 by Billy Rabago RN  Outcome: Progressing  Flowsheets (Taken 5/5/2022 1915)  Verbalizes/displays adequate comfort level or baseline comfort level:   Encourage patient to monitor pain and request assistance   Assess pain using appropriate pain scale   Administer analgesics based on type and severity of pain and evaluate response   Implement non-pharmacological measures as appropriate and evaluate response   Consider cultural and social influences on pain and pain management  5/5/2022 1434 by Herman López RN  Outcome: Progressing  Note: Denies any pain, states it is under control.

## 2022-05-06 NOTE — PROGRESS NOTES
Speech Language Pathology  Speech Language Pathology  [address] [place] [address]    Cognitive Treatment Note    Date: 5/6/2022  Patients Name: Meera Poon  MRN: [id number]  Diagnosis:   Patient Active Problem List   Diagnosis Code    Altered mental status R41.82    Generalized nonconvulsive seizures ([facility] [address] G40.309    History of CVA (cerebrovascular accident) Z80.78    Noncompliance Z91.19    Hemiparesis ([facility] [address] G81.90    Respiratory failure ([address] J96.90    Upper respiratory infection J06.9    Acute respiratory failure ([address] J96.00    HTN (hypertension) I10    HLD (hyperlipidemia) E78.5    Hyperglycemia R73.9    Hypokalemia E87.6    Anemia due to acute blood loss D62    Right sided weakness R53.1    Cerebrovascular accident (CVA) ([facility] [address] I63.9    Tissue plasminogen activator (tPA) administered at other facility within 24 hours before current admission Z92.82       Pain: 0/10    Cognitive Treatment    Treatment time: 4829-0284      Subjective: [x] Alert [x] Cooperative     [] Confused     [] Agitated    [] Lethargic      Objective/Assessment:  Attention: Sustained throughout, distractions minimized. Orientation: Oriented x4. Recall: Pt recalled daily events with mod level of detail (I), increasing to high level of detail with min A from ST. Organization: Pt completed written 2-3 step directions with thought organization component with 50% accuracy (I), requiring mod A to recognize and correct errors to improve to 75% accuracy. Problem Solving/Reasoning: n/a    Other: Pt demo occasional word retrieval deficits in conversation; able to utilize circumlocution with verbal cues from [address] [place] [address]         Plan:  [x] Continue  services    [] Discharge from :      Discharge recommendations: [] Inpatient Rehab   [] [place]   [] Outpatient Therapy  [] Follow up at trauma clinic   [] Other:       Treatment completed by: Franklin Cadena M.S., [address] [facility]

## 2022-05-06 NOTE — PROGRESS NOTES
Occupational Therapy  Facility/Department: [facility]  Rehabilitation Occupational Therapy Daily Treatment Note    Date: 22  Patient Name: Meenu Chappell       Room: 5673/6229-84  MRN: [id number]  Account: [de-identified]   : 1950  (70 y.o.) Gender: female                    Past Medical History:  has a past medical history of Cerebral artery occlusion with cerebral infarction [facility]), CVA (cerebral infarction), Diabetes mellitus [facility] Hyperlipidemia, Hypertension, and Seizures ([facility] Past Surgical History:   has a past surgical history that includes Appendectomy; Hysterectomy; Bronchoscopy diagnostic (2014); and Endoscopy, colon, diagnostic. Restrictions  Restrictions/Precautions: Fall Risk;General Precautions; Up as Tolerated  Other position/activity restrictions: SBP <180  Required Braces or Orthoses?: No    Subjective  Subjective: \" I am orginized, I don't need to so this\"   Restrictions/Precautions: Fall Risk;General Precautions; Up as Tolerated        Pain Assessment  Pain Assessment: None - Denies Pain    Objective     Cognition  Overall Cognitive Status: Exceptions  Arousal/Alertness: Appropriate responses to stimuli  Following Commands: Follows multistep commands with increased time  Attention Span: Appears intact  Memory: Decreased recall of recent events  Safety Judgement: Decreased awareness of need for safety;Decreased awareness of need for assistance  Problem Solving: Assistance required to identify errors made;Assistance required to correct errors made  Insights: Decreased awareness of deficits  Initiation: Requires cues for some  Sequencing: Requires cues for some  Orientation  Overall Orientation Status: Within Functional Limits  Orientation Level: Oriented X4         ADL  Grooming/Oral Hygiene  Assistance Level: Supervision;Minimal assistance  Skilled Clinical Factors: assist to place hair in pony tail  Upper Extremity Bathing  Assistance Level: Stand by assist;Set-up; Verbal cues;Increased time to complete  Lower Extremity Bathing  Assistance Level: Minimal assistance;Set-up; Verbal cues; Increased time to complete  Skilled Clinical Factors: assist for balance standing at sink for lenore care   Upper Extremity Dressing  Assistance Level: Stand by assist;Set-up; Increased time to complete  Skilled Clinical Factors: don bra and [place] shirt   Lower Extremity Dressing  Assistance Level: Minimal assistance;Verbal cues; Set-up; Increased time to complete  Skilled Clinical Factors: assist for balance w hen standing to pull up   Putting On/Taking Off Footwear  Assistance Level: Stand by assist;Set-up  Skilled Clinical Factors: TA for TEDs, able to don/doff shoes with increased forward trunk flexion  Toileting  Assistance Level: Minimal assistance  Toilet Transfers  Technique: Stand pivot  Equipment: Standard toilet;Grab bars  Additional Factors: Verbal cues;Cues for hand placement; With handrails  Assistance Level: Minimal assistance  Skilled Clinical Factors: vc for hand foot placement for increased safety, no LOB noted      Functional Mobility  Device: Wheelchair  Activity: To/From bathroom; To/From therapy gym  Assistance Level: Dependent  Skilled Clinical Factors: did not self propel   Bed Mobility  Overall Assistance Level: Stand By Assist  Additional Factors: With handrails; Head of bed flat  Sit to Supine  Assistance Level: Stand by assist  Skilled Clinical Factors: HOB slightly elevated   Supine to Sit  Assistance Level: Stand by assist  Skilled Clinical Factors: HOB slightly elevated   Transfers  Surface: To bed;From bed; Wheelchair  Sit to Stand  Assistance Level: Minimal assistance  Stand to Sit  Assistance Level: Minimal assistance  Skilled Clinical Factors: cues for safe controlled sitting   Stand Pivot  Assistance Level: Contact guard assist  Skilled Clinical Factors: cues for hand placement and safety    OT Exercises  Exercise Treatment: AM: Pt engaged in actiivty with red theraflex bar.  Pt found it difficult to complete bending of the bar up and down. Writer modified activity, having pt twist with B hands 15 reps. Pt reports fatigue after activity. PM:Pt completed exercises with 3# weight bar 20 reps in all planes. Pt also completed activity with 3.3# weighted ball reaching to pickup/move ball from varrying surfaces. Activites completed to increase pts endurance and strength for ADL and IADL activites    Motor Control/Coordination: Pt engaged in fine motor coordination activity of placing small pegs into corosponding pattened hole. Pt Completed with RUE to increase coordination/fine motor abilities. Pt often states that this activity is to difficult but completes activity with precision. Assessment  Assessment  Activity Tolerance: Patient tolerated treatment well  Safety Devices  Safety Devices in place: Yes  Type of devices: Patient at risk for falls;Call light within reach;Gait belt;Left in chair    Patient Education       Plan  Plan  Times per Week: 5-7  Times per Day: Twice a day  Current Treatment Recommendations: Strengthening;ROM;Balance training;Functional mobility training; Endurance training; Safety education & training;Patient/Caregiver education & training;Equipment evaluation, education, & procurement;Self-Care / ADL; Coordination training;Neuromuscular re-education;Modalities    Goals  Patient Goals   Patient goals : \"To end up gonig home and walk out of here and be able to be as normal as possible\"  Short Term Goals  Time Frame for Short term goals: One week  Short Term Goal 1: Patient will perform upper body bathing and dressing with SUP. Short Term Goal 2: Patient will perform lower body bathing and dressing with Minimal assist.  Short Term Goal 3: Patient will perform toileting tasks with Minimal assist.  Short Term Goal 4: Patient will perform stand pivot transfers during self-care with Minimal assist and Good safety.   Short Term Goal 5: Patient will verbalize/demonstrate Good understanding of assistive equipment/durable medical equipment/modified techniques for increased safety and IND with self-care. Additional Goals?: Yes  Short Term Goal 6: Patient will verbalize/demonstrate Good understanding of modified techniques for R UE during self-care. Short Term Goal 7: Patient will actively participate in 30+ minutes of therapeutic exercise/functional activities to promote increased IND with self-care and mobility. Long Term Goals  Time Frame for Long term goals : By discharge  Long Term Goal 1: Patient will perform BADLs with modified IND and Good safety. Long Term Goal 2: Patient will perform stand pivot transfers during self-care with modified IND and Good safety. Long Term Goal 3: Patient will perform functional mobility during self-care at w/c level with modified IND. Long Term Goal 4: Patient will verbalize/demonstrate Good understanding of Fall Prevention strategies during self-care to maximize safety and IND. Long Term Goal 5: Patient will verbalize/demonstrate Good understanding of home exercise program for R UE ROM, strengthening, and coordination as appropriate.   Additional Goals?: Yes  Long Term Goal 6: 9 hole peg test and dynamometer to be assessed for R UE as appropriate    Therapy Time     05/06/22 1245 05/06/22 1246   OT Individual Minutes   Time In 1105 1340   Time Out 1201 1409   Minutes 56 29     OJ Stokes

## 2022-05-06 NOTE — PROGRESS NOTES
[place] [id number] Internal Medicine    CONSULTATION / HISTORY AND PHYSICAL EXAMINATION            Date:   5/6/2022  Patient name:  Janet Vargas  Date of admission:  4/28/2022 12:10 PM  MRN:   [id number]  Account:  [de-identified]  YOB: 1950  PCP:    Baljinder Merchant MD  Room:   2987/2970-05  Code Status:    Full Code    Physician Requesting Consult: Kerrie Turner MD    Reason for Consult:  Medical management    Chief Complaint:     No chief complaint on file. Right-sided weakness    History Obtained From:     Patient, EMR, nursing staff    History of Present Illness:     67-year-old female with history of questionable seizure disorder, chronic right hemiparesis numbness of right leg migraines admitted on 4/17 after being found altered, concern for stroke  Started on IV tPA but then became obtunded and flaccid and tPA held due to concern for hemorrhagic conversion  She was intubated, stat CT done which was unremarkable subsequently tPA was resumed and completed. MRI brain negative for stroke  Episode of flaccidity concerning for seizure hence loaded with IV Keppra  Extubated 4/19  Persistent right-sided weakness, fluctuating, neurology considering psychosomatic symptoms  Patient is on aspirin Plavix    Diabetes   Diagnosed within last year  Modifying factors on med:   Severity:controlled   Associated signs and symtoms: neuropathy/ckd/ CAD.    aggravated with sugar diet and better with low sugar diet      5/2   Patient again had episode of hypoglycemia in the morning, orange juice was provided  Blood sugar went to 68      Past Medical History:     Past Medical History:   Diagnosis Date    Cerebral artery occlusion with cerebral infarction ([address] 2008, 2014    CVA (cerebral infarction)     Diabetes mellitus ([address]     Hyperlipidemia     Hypertension     Seizures ([address] 04/2022        Past Surgical History:     Past Surgical History:   Procedure Laterality Date    APPENDECTOMY      BRONCHOSCOPY DIAGNOSTIC  7/24/2014         ENDOSCOPY, COLON, DIAGNOSTIC      HYSTERECTOMY          Medications Prior to Admission:     Prior to Admission medications    Medication Sig Start Date End Date Taking? Authorizing Provider   divalproex (DEPAKOTE ER) 500 MG extended release tablet Take 1 tablet by mouth in the morning and at bedtime 4/26/22   Huston Fabry, MD   levETIRAcetam (KEPPRA) 1000 MG tablet Take 1 tablet by mouth 2 times daily 4/21/22   Beny Quispe MD   topiramate (TOPAMAX) 25 MG tablet Take 1 tablet by mouth 2 times daily 4/21/22   Beny Quispe MD   clopidogrel (PLAVIX) 75 MG tablet Take 75 mg by mouth nightly    Historical Provider, MD   gabapentin (NEURONTIN) 600 MG tablet Take 600 mg by mouth 3 times daily. Historical Provider, MD   potassium chloride (KLOR-CON M) 20 MEQ extended release tablet Take 20 mEq by mouth 2 times daily    Historical Provider, MD   fenofibrate (TRIGLIDE) 160 MG tablet Take 160 mg by mouth daily    Historical Provider, MD   Pantoprazole Sodium (PROTONIX PO) Take  by mouth. Historical Provider, MD   acetaminophen (TYLENOL) 160 MG/5ML solution Take 20.3 mLs by mouth every 4 hours as needed for Fever. 7/28/14   Darrold Mooring, DO   albuterol (PROVENTIL HFA;VENTOLIN HFA) 108 (90 BASE) MCG/ACT inhaler Inhale 6 puffs into the lungs every 6 hours as needed for Wheezing. 7/28/14   Darrold Mooring, DO   glucagon, rDNA, 1 MG SOLR injection Inject 1 mg into the muscle as needed for Low blood sugar (Blood glucose less than 70 mg/dL and patient NOT ALERT or NPO and does not have IV access. ). 7/28/14   Darrold Mooring, DO   glucose (GLUTOSE) 40 % GEL Take 15 g by mouth as needed. 7/28/14   Darrold Mooring, DO   insulin glargine (LANTUS) 100 UNIT/ML injection vial Inject 10 Units into the skin nightly. 7/28/14   Darrold Mooring, DO   insulin lispro (HUMALOG) 100 UNIT/ML injection vial Inject 0-12 Units into the skin every 6 hours.  7/28/14   Jennifer Sánchez DO Corin   niacin (NIASPAN) 500 MG CR tablet Take 1 tablet by mouth nightly. 14   Karthik Martinez DO   potassium chloride (KAYCIEL) 20 MEQ/15ML (10%) solution Take 15 mLs by mouth daily. 14   Karthik Martinez DO        Allergies:     Patient has no known allergies. Social History:     Tobacco:    reports that she has never smoked. She has never used smokeless tobacco.  Alcohol:      reports no history of alcohol use. Drug Use:  reports no history of drug use. Family History:     History reviewed. No pertinent family history. Review of Systems:     Positive and Negative as described in HPI. CONSTITUTIONAL:  negative for fevers, chills, sweats, fatigue, weight loss  HEENT:  negative for vision, hearing changes, runny nose, throat pain  RESPIRATORY:  negative for shortness of breath, cough, congestion, wheezing. CARDIOVASCULAR:  negative for chest pain, palpitations.   GASTROINTESTINAL:  negative for nausea, vomiting, diarrhea, constipation, change in bowel habits, abdominal pain   GENITOURINARY:  negative for difficulty of urination, burning with urination, frequency   INTEGUMENT:  negative for rash, skin lesions, easy bruising   HEMATOLOGIC/LYMPHATIC:  negative for swelling/edema   ALLERGIC/IMMUNOLOGIC:  negative for urticaria , itching  ENDOCRINE:  negative increase in drinking, increase in urination, hot or cold intolerance  MUSCULOSKELETAL:  negative joint pains, muscle aches, swelling of joints  NEUROLOGICAL:  Positive for right sided weakness, negative for headaches, dizziness, lightheadedness, numbness, pain, tingling extremities      Physical Exam:     /69   Pulse 59   Temp 97.9 °F (36.6 °C)   Resp 16   Ht 5' 5\" (1.651 m)   Wt 134 lb (60.8 kg)   SpO2 100%   BMI 22.30 kg/m²   Temp (24hrs), Av.9 °F (36.6 °C), Min:97.9 °F (36.6 °C), Max:97.9 °F (36.6 °C)    Recent Labs     22  1615 22  0532 22  1142   POCGLU 186* 168* 92 103 Intake/Output Summary (Last 24 hours) at 5/6/2022 1542  Last data filed at 5/5/2022 1915  Gross per 24 hour   Intake 480 ml   Output --   Net 480 ml       General Appearance:  alert, well appearing, and in no acute distress  Head:  normocephalic, atraumatic. Eye: no icterus, redness, pupils equal and reactive, extraocular eye movements intact, conjunctiva clear  Ear: normal external ear, no discharge, hearing intact  Nose:  no drainage noted  Mouth: mucous membranes moist  Neck: supple, no carotid bruits, thyroid not palpable  Lungs: Bilateral equal air entry, clear to ausculation, no wheezing, rales or rhonchi, normal effort  Cardiovascular: normal rate, regular rhythm, no murmur, gallop, rub.   Abdomen: Soft, nontender, nondistended, normal bowel sounds, no hepatomegaly or splenomegaly  Neurologic:  Right UE and Lower extremity power 2/5, There are no new focal motor or sensory deficits, normal muscle tone and bulk, no abnormal sensation, normal speech, cranial nerves II through XII grossly intact  Skin: No gross lesions, rashes, bruising or bleeding on exposed skin area  Extremities:  peripheral pulses palpable, no pedal edema or calf pain with palpation  Psych:normal affect    Investigations:      Laboratory Testing:  Recent Results (from the past 24 hour(s))   POC Glucose Fingerstick    Collection Time: 05/05/22  4:15 PM   Result Value Ref Range    POC Glucose 186 (H) 65 - 105 mg/dL   POC Glucose Fingerstick    Collection Time: 05/05/22  8:12 PM   Result Value Ref Range    POC Glucose 168 (H) 65 - 105 mg/dL   POC Glucose Fingerstick    Collection Time: 05/06/22  5:32 AM   Result Value Ref Range    POC Glucose 92 65 - 105 mg/dL   Basic Metabolic Panel w/ Reflex to MG    Collection Time: 05/06/22  8:05 AM   Result Value Ref Range    Glucose 173 (H) 70 - 99 mg/dL    BUN 23 8 - 23 mg/dL    CREATININE 0.73 0.50 - 0.90 mg/dL    Calcium 9.3 8.6 - 10.4 mg/dL    Sodium 143 135 - 144 mmol/L    Potassium 3.9 3.7 - 5.3 mmol/L    Chloride 110 (H) 98 - 107 mmol/L    CO2 21 20 - 31 mmol/L    Anion Gap 12 9 - 17 mmol/L    GFR Non-African American >60 >60 mL/min    GFR African American >60 >60 mL/min    GFR Comment         CBC auto differential    Collection Time: 05/06/22  8:05 AM   Result Value Ref Range    WBC 4.3 3.5 - 11.0 k/uL    RBC 3.57 (L) 4.0 - 5.2 m/uL    Hemoglobin 11.3 (L) 12.0 - 16.0 g/dL    Hematocrit 33.2 (L) 36 - 46 %    MCV 93.0 80 - 100 fL    MCH 31.6 26 - 34 pg    MCHC 33.9 31 - 37 g/dL    RDW 13.9 11.5 - 14.9 %    Platelets 158 884 - 372 k/uL    MPV 7.2 6.0 - 12.0 fL    Seg Neutrophils 47 36 - 66 %    Lymphocytes 45 (H) 24 - 44 %    Monocytes 7 1 - 7 %    Eosinophils % 0 0 - 4 %    Basophils 0 0 - 2 %    Bands 1 0 - 10 %    Segs Absolute 2.02 1.3 - 9.1 k/uL    Absolute Lymph # 1.94 1.0 - 4.8 k/uL    Absolute Mono # 0.30 0.1 - 1.3 k/uL    Absolute Eos # 0.00 0.0 - 0.4 k/uL    Basophils Absolute 0.00 0.0 - 0.2 k/uL    Absolute Bands # 0.04 0.0 - 1.0 k/uL    Morphology ANISOCYTOSIS PRESENT     Morphology 1+ ELLIPTOCYTES    POC Glucose Fingerstick    Collection Time: 05/06/22 11:42 AM   Result Value Ref Range    POC Glucose 103 65 - 105 mg/dL       Imaging/Diagonstics:  Recent data reviewed    Assessment :      Primary Problem  Right sided weakness    Active Hospital Problems    Diagnosis Date Noted    Right sided weakness [R53.1] 04/17/2022    HTN (hypertension) [I10] 07/19/2014       Plan:     1. Right-sided weakness MRI brain negative for stroke- PT OT  2. Type 2 diabetes- continue Lantus, sliding scale  3. History of prior strokes-continue aspirin, Plavix, statin  4. History of seizure disorder- continue Keppra, Topamax, Depakote  5. Hyperlipidemia-patient on statin, fenofibrate  6. H/o Hypertension-currently controlled without meds  7.  Recent extubation on 4/19    DVT prophylaxis-Lovenox    May 1  Hypoglycemia noted  Reduce lantus to 15 qhs  5/2   Reducing dose of insulin to 10 units, reducing sliding scale to low-dose  Blood pressure is controlled, off antihypertensives    5/6  Patient blood sugar better controlled after adjusting insulin  Blood pressure controlled  No new complaints  Consultations:   IP CONSULT TO DIETITIAN  IP CONSULT TO SOCIAL WORK  IP CONSULT TO INTERNAL MEDICINE  INPATIENT CONSULT TO ORTHOTIST/PROSTHETIST      Johana Laws MD  5/6/2022  3:42 PM    Copy sent to Dr. William Li MD    Please note that this chart was generated using voice recognition Dragon dictation software. Although every effort was made to ensure the accuracy of this automated transcription, some errors in transcription may have occurred.

## 2022-05-06 NOTE — CARE COORDINATION
Orders received for a Right  AFO/ orders printed and sent to [facility]. Confirmation received. Shoe size included  6.5     Called and spoke with Barrett./ above information given.  Informed him of patients discharge date of 5/13/2022

## 2022-05-06 NOTE — PROGRESS NOTES
Physical Therapy  Facility/Department: [facility]  Rehabilitation Physical Therapy     NAME: Dina Aly  : 1950 (80 y.o.)  MRN: [id number]  CODE STATUS: Full Code    Date of Service: 22   Activity Tolerance: Patient tolerated treatment well  Therapy Prognosis: Good  Decision Making: High Complexity  Discharge Recommendations: Patient would benefit from continued therapy after discharge;Home with assist PRN    Restrictions:  Restrictions/Precautions: Fall Risk;General Precautions; Up as Tolerated  Position Activity Restriction  Other position/activity restrictions: SBP <180     SUBJECTIVE  Subjective: Patient reports she is feeling more stable with AFO donned during ambulation and transfers. Patient agreeable to do therapy on this date. Pain: No complaints of pain at this time     Cognition  Overall Cognitive Status: Exceptions  Arousal/Alertness: Appropriate responses to stimuli  Following Commands: Follows multistep commands with increased time  Attention Span: Appears intact  Memory: Decreased recall of recent events  Safety Judgement: Decreased awareness of need for safety;Decreased awareness of need for assistance  Problem Solving: Assistance required to identify errors made;Assistance required to correct errors made  Insights: Decreased awareness of deficits  Initiation: Requires cues for some  Sequencing: Requires cues for some    Functional Mobility  Transfers  Sit to Stand: Contact guard assistance  Stand to sit: Contact guard assistance  Stand Pivot Transfers: Contact guard assistance  Comment: Several transfers completed AM/PM; demos safe, steady transfers with RW. Environmental Mobility  Ambulation  Surface: level tile  Device: Rolling Walker  Other Apparatus: Right;AFO  Assistance: Contact guard assistance  Quality of Gait: Patient able to advance R LE with AFO and reports improved R knee support. Cues for RW safety and upright posture. Improved endurance noted today.   Gait Deviations: Slow Irene;Decreased step length;Decreased step height  Distance: >300ft up/down ramp; ambulation throughout hallways. PT Exercises  Exercise Treatment: seated bilateral x 20 right A/AROM and LLE 2#. PROM Exercises: PROM right seated hamstring / gastrocnemius   Circulation/Endurance Exercises: nustep x10 mins L3  Functional Mobility Circuit Training: STS training in parallel bars, standing squats 2 sets x10 with emphasis on locking R knee in ext  Dynamic Standing Balance Exercises: Standing balloon taps with support from RW; kickball; SLS with and without UB support as tolerated. Pt able to self correct if off balance with RW. Exercise Equipment: NuStep L3 x 10min    GOALS  Patient Goals   Patient goals : To get better  Short Term Goals  Time Frame for Short term goals: 10 days  Short term goal 1: Pt to perform bed mobility from flat surface independently  Short term goal 2: Demonstrate functional transfers min A  Short term goal 3: Pt to ambulate distance of 100 ft with rolling walker at mod A  Short term goal 4: Pt able to improve sitting balance at EOB/EOM to good to be alen to eat meals. Short term goal 5: Demonstrate dynamic standing balance of fiar - to decrease fall risk  Short Term Goal 6: Pt able to  propel w/c distance of 100 to 150 ft, CGA, level surfaces. Long Term Goals  Time Frame for Long term goals : By DC  Long term goal 1: Pt able to perform transfers at mod-I  Long term goal 2: Pt able to ambulate distance of 100 to 150 ft with appropriate device, at min A. with assistive device. Long term goal 3: Pt able to perform a curb step with a rolling wwalker/UE support, mod A  Long term goal 4: Pt able to propel w/c distance of 150 ft , level surfaces, including turns, mod-I  Long term goal 5: Pt  able to improve standing balance with assistive device to fair to reduce fall risk  Long term goal 6: Improve 2MWT distance to 80 ft to improve function and gait speed.   Long term goal 7: Improve PASS score to 25/36 to improve overall function. PLAN OF CARE    Plan  Plan:  minutes of therapy at least 5 out of 7 days a week (--)  Current Treatment Recommendations: Strengthening;ROM;Balance training;Functional mobility training;Gait training;Neuromuscular re-education;Transfer training; Endurance training; Wheelchair mobility training;Stair training;Home exercise program;Safety education & training;Patient/Caregiver education & training;Equipment evaluation, education, & procurement; Modalities (E-stim as needed to neuro-muscular faciliatation R LE as nee)    Therapy Time   Individual Individual Group Co-treatment   Time In 0800  1300       Time Out 0900  1330       Minutes 60  30       90 mins total  Amanda Ritter PTA, 05/06/22 at 3:19 PM

## 2022-05-06 NOTE — PLAN OF CARE
Problem: Discharge Planning  Goal: Discharge to home or other facility with appropriate resources  5/6/2022 0214 by Lauren Carbajal RN  Outcome: Progressing  5/6/2022 0212 by Lauren Carbajal RN  Outcome: Progressing  Flowsheets (Taken 5/5/2022 2030)  Discharge to home or other facility with appropriate resources:   Identify barriers to discharge with patient and caregiver   Arrange for needed discharge resources and transportation as appropriate   Identify discharge learning needs (meds, wound care, etc)   Refer to discharge planning if patient needs post-hospital services based on physician order or complex needs related to functional status, cognitive ability or social support system  5/5/2022 1434 by Evangelista Arzate RN  Outcome: Progressing     Problem: Safety - Adult  Goal: Free from fall injury  5/6/2022 0214 by Lauren Carbajal RN  Outcome: Progressing  5/6/2022 0212 by Lauren Carbajal RN  Outcome: Progressing  5/5/2022 1434 by Evangelista Arzate RN  Outcome: Progressing  Note: Using AFO on right foot for stability when up with walker and one assist. Remains free from injury. Problem: ABCDS Injury Assessment  Goal: Absence of physical injury  5/6/2022 0214 by Lauren Carbajal RN  Outcome: Progressing  5/6/2022 0212 by Lauren Carbajal RN  Outcome: Progressing  5/5/2022 1434 by Evangelista Arzate RN  Outcome: Progressing     Problem: Skin/Tissue Integrity  Goal: Absence of new skin breakdown  Description: 1. Monitor for areas of redness and/or skin breakdown  2. Assess vascular access sites hourly  3. Every 4-6 hours minimum:  Change oxygen saturation probe site  4. Every 4-6 hours:  If on nasal continuous positive airway pressure, respiratory therapy assess nares and determine need for appliance change or resting period.   5/6/2022 0214 by Lauren Carbajal RN  Outcome: Progressing  5/6/2022 0212 by Lauren Carbajal RN  Outcome: Progressing  5/5/2022 1434 by Michael Baljinder Lee RN  Outcome: Progressing  Note: No skin breakdown.      Problem: Chronic Conditions and Co-morbidities  Goal: Patient's chronic conditions and co-morbidity symptoms are monitored and maintained or improved  5/6/2022 0214 by Charlene Valerio RN  Outcome: Progressing  5/6/2022 0212 by Charlene Valerio RN  Outcome: Progressing  Flowsheets (Taken 5/5/2022 2030)  Care Plan - Patient's Chronic Conditions and Co-Morbidity Symptoms are Monitored and Maintained or Improved:   Monitor and assess patient's chronic conditions and comorbid symptoms for stability, deterioration, or improvement   Collaborate with multidisciplinary team to address chronic and comorbid conditions and prevent exacerbation or deterioration   Update acute care plan with appropriate goals if chronic or comorbid symptoms are exacerbated and prevent overall improvement and discharge  5/5/2022 1434 by Ellen Razo RN  Outcome: Progressing     Problem: Nutrition Deficit:  Goal: Optimize nutritional status  5/6/2022 0214 by Charlene Valerio RN  Outcome: Progressing  5/6/2022 0212 by Charlene Valerio RN  Outcome: Progressing  5/5/2022 1434 by Ellen Razo RN  Outcome: Progressing     Problem: Pain  Goal: Verbalizes/displays adequate comfort level or baseline comfort level  5/6/2022 0214 by Charlene Valerio RN  Outcome: Progressing  5/6/2022 0212 by Charlene Valerio RN  Outcome: Progressing  Flowsheets (Taken 5/5/2022 1915)  Verbalizes/displays adequate comfort level or baseline comfort level:   Encourage patient to monitor pain and request assistance   Assess pain using appropriate pain scale   Administer analgesics based on type and severity of pain and evaluate response   Implement non-pharmacological measures as appropriate and evaluate response   Consider cultural and social influences on pain and pain management  5/5/2022 1434 by Ellen Razo RN  Outcome: Progressing  Note: Denies any pain, states it is under control.

## 2022-05-07 LAB
GLUCOSE BLD-MCNC: 137 MG/DL (ref 65–105)
GLUCOSE BLD-MCNC: 139 MG/DL (ref 65–105)
GLUCOSE BLD-MCNC: 95 MG/DL (ref 65–105)
GLUCOSE BLD-MCNC: 96 MG/DL (ref 65–105)

## 2022-05-07 PROCEDURE — 99232 SBSQ HOSP IP/OBS MODERATE 35: CPT | Performed by: INTERNAL MEDICINE

## 2022-05-07 PROCEDURE — 97530 THERAPEUTIC ACTIVITIES: CPT

## 2022-05-07 PROCEDURE — 1180000000 HC REHAB R&B

## 2022-05-07 PROCEDURE — 97116 GAIT TRAINING THERAPY: CPT

## 2022-05-07 PROCEDURE — 97129 THER IVNTJ 1ST 15 MIN: CPT

## 2022-05-07 PROCEDURE — 6370000000 HC RX 637 (ALT 250 FOR IP): Performed by: STUDENT IN AN ORGANIZED HEALTH CARE EDUCATION/TRAINING PROGRAM

## 2022-05-07 PROCEDURE — 82947 ASSAY GLUCOSE BLOOD QUANT: CPT

## 2022-05-07 PROCEDURE — 97535 SELF CARE MNGMENT TRAINING: CPT

## 2022-05-07 PROCEDURE — 6360000002 HC RX W HCPCS: Performed by: STUDENT IN AN ORGANIZED HEALTH CARE EDUCATION/TRAINING PROGRAM

## 2022-05-07 PROCEDURE — 97110 THERAPEUTIC EXERCISES: CPT

## 2022-05-07 PROCEDURE — 97130 THER IVNTJ EA ADDL 15 MIN: CPT

## 2022-05-07 RX ADMIN — TOPIRAMATE 25 MG: 25 TABLET, FILM COATED ORAL at 22:49

## 2022-05-07 RX ADMIN — GABAPENTIN 600 MG: 300 CAPSULE ORAL at 15:11

## 2022-05-07 RX ADMIN — FENOFIBRATE 160 MG: 160 TABLET ORAL at 09:13

## 2022-05-07 RX ADMIN — ASPIRIN 81 MG: 81 TABLET, COATED ORAL at 09:13

## 2022-05-07 RX ADMIN — LEVETIRACETAM 1000 MG: 500 TABLET, FILM COATED ORAL at 21:18

## 2022-05-07 RX ADMIN — LEVETIRACETAM 1000 MG: 500 TABLET, FILM COATED ORAL at 09:13

## 2022-05-07 RX ADMIN — ENOXAPARIN SODIUM 40 MG: 100 INJECTION SUBCUTANEOUS at 09:13

## 2022-05-07 RX ADMIN — GABAPENTIN 600 MG: 300 CAPSULE ORAL at 21:18

## 2022-05-07 RX ADMIN — PANTOPRAZOLE SODIUM 40 MG: 40 TABLET, DELAYED RELEASE ORAL at 06:11

## 2022-05-07 RX ADMIN — DIVALPROEX SODIUM 500 MG: 500 TABLET, EXTENDED RELEASE ORAL at 09:15

## 2022-05-07 RX ADMIN — CLOPIDOGREL BISULFATE 75 MG: 75 TABLET ORAL at 09:13

## 2022-05-07 RX ADMIN — DIVALPROEX SODIUM 500 MG: 500 TABLET, EXTENDED RELEASE ORAL at 22:49

## 2022-05-07 RX ADMIN — POTASSIUM CHLORIDE 20 MEQ: 20 TABLET, EXTENDED RELEASE ORAL at 09:14

## 2022-05-07 RX ADMIN — ATORVASTATIN CALCIUM 40 MG: 40 TABLET, FILM COATED ORAL at 21:18

## 2022-05-07 RX ADMIN — TOPIRAMATE 25 MG: 25 TABLET, FILM COATED ORAL at 09:15

## 2022-05-07 RX ADMIN — GABAPENTIN 600 MG: 300 CAPSULE ORAL at 09:13

## 2022-05-07 ASSESSMENT — PAIN SCALES - GENERAL
PAINLEVEL_OUTOF10: 0
PAINLEVEL_OUTOF10: 0

## 2022-05-07 NOTE — PLAN OF CARE
Problem: Safety - Adult  Goal: Free from fall injury  5/7/2022 0407 by Lauren Greenwood RN  Outcome: Progressing  Note: No falls or injuries sustained at this time. No attempts to get out of bed without nursing assistance. Call light within reach and pt. uses appropriately for assistance. Siderails up x 2. Nonskid footwear remains on. Bed in low and locked position. Hourly nursing rounds made. Pt. Alert and oriented, aware of limitations, and exhibits good safety judgement. Pt. uses assistive devices appropriately. Pt. understands individual fall risk factors. Problem: ABCDS Injury Assessment  Goal: Absence of physical injury  5/7/2022 0407 by Lauren Greenwood RN  Outcome: Progressing     Problem: Skin/Tissue Integrity  Goal: Absence of new skin breakdown  Description: 1. Monitor for areas of redness and/or skin breakdown  2. Assess vascular access sites hourly  3. Every 4-6 hours minimum:  Change oxygen saturation probe site  4. Every 4-6 hours:  If on nasal continuous positive airway pressure, respiratory therapy assess nares and determine need for appliance change or resting period. 5/7/2022 0407 by Lauren Greenwood RN  Note: No falls or injuries sustained at this time.       Problem: Chronic Conditions and Co-morbidities  Goal: Patient's chronic conditions and co-morbidity symptoms are monitored and maintained or improved  5/7/2022 0407 by Lauren Greenwood RN  Outcome: Progressing     Problem: Nutrition Deficit:  Goal: Optimize nutritional status  5/7/2022 0407 by Lauren Greenwood RN  Outcome: Progressing     Problem: Pain  Goal: Verbalizes/displays adequate comfort level or baseline comfort level  5/7/2022 0407 by Lauren Greenwood RN  Outcome: Progressing  Flowsheets (Taken 5/7/2022 0407)  Verbalizes/displays adequate comfort level or baseline comfort level:   Assess pain using appropriate pain scale   Implement non-pharmacological measures as appropriate and evaluate response  Note: Pain assessment completed. Pt. able to rest.Pt. denies pain this shift. Nonverbal cues indicate absence of pain.

## 2022-05-07 NOTE — PROGRESS NOTES
Speech Language Pathology  Speech Language Pathology  [facility]    Cognitive Treatment Note    Date: 5/7/2022  Patients Name: Carole Cevallos  MRN: [id number]  Diagnosis:   Patient Active Problem List   Diagnosis Code    Altered mental status R41.82    Generalized nonconvulsive seizures ([facility] G40.309    History of CVA (cerebrovascular accident) Z80.78    Noncompliance Z91.19    Hemiparesis ([facility] G81.90    Respiratory failure ([facility] J96.90    Upper respiratory infection J06.9    Acute respiratory failure ([address] J96.00    HTN (hypertension) I10    HLD (hyperlipidemia) E78.5    Hyperglycemia R73.9    Hypokalemia E87.6    Anemia due to acute blood loss D62    Right sided weakness R53.1    Cerebrovascular accident (CVA) ([facility] I63.9    Tissue plasminogen activator (tPA) administered at other facility within 24 hours before current admission Z92.82       Pain: 0/10    Cognitive Treatment    Treatment time: 2718-4622      Subjective: [x] Alert [x] Cooperative     [] Confused     [] Agitated    [] Lethargic      Objective/Assessment:  Attention: Sustained throughout, distractions minimized. Orientation: Oriented x4. Recall: Pt. Education provided re: internal memory aide (i.e., association strategy) to assist in recall. Pt. Verbalized understanding. Pt. Required Mod A to generate associations. Following task completed with Pt. utilizing trained strategy:   5 unit image retention (person/place association, immediate recall)- 100% julienne. 5 unit image retention (person/place association, 15 min delay)- 100% julienne, using associations appropriately. Organization: n/a    Problem Solving/Reasoning: Multiple definitions- 80% accuracy julienne, increasing to 100% c cues. Other: Pt demo occasional word retrieval deficits in conversation; able to utilize circumlocution with verbal cues from [address] [place] [address]         Plan:  [x] Continue ST services    [] Discharge from ST:      Discharge recommendations: [] Inpatient Rehab   [] Shamir Perez   [] Outpatient Therapy  [] Follow up at trauma clinic   [] Other:       Treatment completed by:  Lyric Kelley M.A., CCC-SLP

## 2022-05-07 NOTE — PROGRESS NOTES
[facility] Internal Medicine    CONSULTATION / HISTORY AND PHYSICAL EXAMINATION            Date:   5/7/2022  Patient name:  Meera Poon  Date of admission:  4/28/2022 12:10 PM  MRN:   [id number]  Account:  [de-identified]  YOB: 1950  PCP:    Juliette Bahena MD  Room:   2829/3419-00  Code Status:    Full Code    Physician Requesting Consult: Jada Garrett MD    Reason for Consult:  Medical management    Chief Complaint:     No chief complaint on file. Right-sided weakness    History Obtained From:     Patient, EMR, nursing staff    History of Present Illness:     70-year-old female with history of questionable seizure disorder, chronic right hemiparesis numbness of right leg migraines admitted on 4/17 after being found altered, concern for stroke  Started on IV tPA but then became obtunded and flaccid and tPA held due to concern for hemorrhagic conversion  She was intubated, stat CT done which was unremarkable subsequently tPA was resumed and completed. MRI brain negative for stroke  Episode of flaccidity concerning for seizure hence loaded with IV Keppra  Extubated 4/19  Persistent right-sided weakness, fluctuating, neurology considering psychosomatic symptoms  Patient is on aspirin Plavix    Diabetes   Diagnosed within last year  Modifying factors on med:   Severity:controlled   Associated signs and symtoms: neuropathy/ckd/ CAD.    aggravated with sugar diet and better with low sugar diet      5/2   Patient again had episode of hypoglycemia in the morning, orange juice was provided  Blood sugar went to 68      Past Medical History:     Past Medical History:   Diagnosis Date    Cerebral artery occlusion with cerebral infarction ([address] 2008, 2014    CVA (cerebral infarction)     [facility]     Hyperlipidemia     Hypertension     Seizures ([facility] 04/2022        Past Surgical History:     Past Surgical History:   Procedure Laterality Date    APPENDECTOMY      BRONCHOSCOPY DIAGNOSTIC  7/24/2014         ENDOSCOPY, COLON, DIAGNOSTIC      HYSTERECTOMY          Medications Prior to Admission:     Prior to Admission medications    Medication Sig Start Date End Date Taking? Authorizing Provider   divalproex (DEPAKOTE ER) 500 MG extended release tablet Take 1 tablet by mouth in the morning and at bedtime 4/26/22   Lydia Sullivan MD   levETIRAcetam (KEPPRA) 1000 MG tablet Take 1 tablet by mouth 2 times daily 4/21/22   Norvell Duverney, MD   topiramate (TOPAMAX) 25 MG tablet Take 1 tablet by mouth 2 times daily 4/21/22   Norvell Duverney, MD   clopidogrel (PLAVIX) 75 MG tablet Take 75 mg by mouth nightly    Historical Provider, MD   gabapentin (NEURONTIN) 600 MG tablet Take 600 mg by mouth 3 times daily. Historical Provider, MD   potassium chloride (KLOR-CON M) 20 MEQ extended release tablet Take 20 mEq by mouth 2 times daily    Historical Provider, MD   fenofibrate (TRIGLIDE) 160 MG tablet Take 160 mg by mouth daily    Historical Provider, MD   Pantoprazole Sodium (PROTONIX PO) Take  by mouth. Historical Provider, MD   acetaminophen (TYLENOL) 160 MG/5ML solution Take 20.3 mLs by mouth every 4 hours as needed for Fever. 7/28/14   Lennox Afshin, DO   albuterol (PROVENTIL HFA;VENTOLIN HFA) 108 (90 BASE) MCG/ACT inhaler Inhale 6 puffs into the lungs every 6 hours as needed for Wheezing. 7/28/14   Lennox Afshin, DO   glucagon, rDNA, 1 MG SOLR injection Inject 1 mg into the muscle as needed for Low blood sugar (Blood glucose less than 70 mg/dL and patient NOT ALERT or NPO and does not have IV access. ). 7/28/14   Lennox Afshin, DO   glucose (GLUTOSE) 40 % GEL Take 15 g by mouth as needed. 7/28/14   Lennox Afshin, DO   insulin glargine (LANTUS) 100 UNIT/ML injection vial Inject 10 Units into the skin nightly. 7/28/14   Lennox Afshin, DO   insulin lispro (HUMALOG) 100 UNIT/ML injection vial Inject 0-12 Units into the skin every 6 hours.  7/28/14   Edmar Melgoza or output data in the 24 hours ending 05/07/22 1802    General Appearance:  alert, well appearing, and in no acute distress  Head:  normocephalic, atraumatic. Eye: no icterus, redness, pupils equal and reactive, extraocular eye movements intact, conjunctiva clear  Ear: normal external ear, no discharge, hearing intact  Nose:  no drainage noted  Mouth: mucous membranes moist  Neck: supple, no carotid bruits, thyroid not palpable  Lungs: Bilateral equal air entry, clear to ausculation, no wheezing, rales or rhonchi, normal effort  Cardiovascular: normal rate, regular rhythm, no murmur, gallop, rub.   Abdomen: Soft, nontender, nondistended, normal bowel sounds, no hepatomegaly or splenomegaly  Neurologic:  Right UE and Lower extremity power 2/5, There are no new focal motor or sensory deficits, normal muscle tone and bulk, no abnormal sensation, normal speech, cranial nerves II through XII grossly intact  Skin: No gross lesions, rashes, bruising or bleeding on exposed skin area  Extremities:  peripheral pulses palpable, no pedal edema or calf pain with palpation  Psych:normal affect    Investigations:      Laboratory Testing:  Recent Results (from the past 24 hour(s))   POC Glucose Fingerstick    Collection Time: 05/06/22  8:28 PM   Result Value Ref Range    POC Glucose 176 (H) 65 - 105 mg/dL   POC Glucose Fingerstick    Collection Time: 05/07/22  6:23 AM   Result Value Ref Range    POC Glucose 95 65 - 105 mg/dL   POC Glucose Fingerstick    Collection Time: 05/07/22 10:33 AM   Result Value Ref Range    POC Glucose 96 65 - 105 mg/dL   POC Glucose Fingerstick    Collection Time: 05/07/22  4:39 PM   Result Value Ref Range    POC Glucose 137 (H) 65 - 105 mg/dL       Imaging/Diagonstics:  Recent data reviewed    Assessment :      Primary Problem  Right sided weakness    Active Hospital Problems    Diagnosis Date Noted    Right sided weakness [R53.1] 04/17/2022    HTN (hypertension) [I10] 07/19/2014       Plan:

## 2022-05-07 NOTE — PROGRESS NOTES
with and without UB support as tolerated. Pt able to self correct if off balance with RW. Exercise Equipment: NuStep L3 x 10min     Cognition  Overall Cognitive Status: Exceptions  Arousal/Alertness: Appropriate responses to stimuli  Following Commands: Follows multistep commands with increased time  Attention Span: Appears intact  Memory: Decreased recall of recent events  Safety Judgement: Decreased awareness of need for safety;Decreased awareness of need for assistance  Problem Solving: Assistance required to identify errors made;Assistance required to correct errors made  Insights: Decreased awareness of deficits  Initiation: Requires cues for some  Sequencing: Requires cues for some  Orientation  Overall Orientation Status: Within Functional Limits  Orientation Level: Oriented X4          ADL  Grooming/Oral Hygiene  Assistance Level: Supervision;Minimal assistance  Skilled Clinical Factors: assist to place hair in pony tail  Upper Extremity Bathing  Assistance Level: Stand by assist;Set-up; Verbal cues; Increased time to complete  Lower Extremity Bathing  Assistance Level: Minimal assistance;Set-up; Verbal cues; Increased time to complete  Skilled Clinical Factors: assist for balance standing at sink for lenore care   Upper Extremity Dressing  Assistance Level: Stand by assist;Set-up; Increased time to complete  Skilled Clinical Factors: don bra and OH shirt   Lower Extremity Dressing  Assistance Level: Minimal assistance;Verbal cues; Set-up; Increased time to complete  Skilled Clinical Factors: assist for balance w hen standing to pull up   Putting On/Taking Off Footwear  Assistance Level: Stand by assist;Set-up  Skilled Clinical Factors: TA for TEDs, able to don/doff shoes with increased forward trunk flexion  Toileting  Assistance Level: Minimal assistance  Toilet Transfers  Technique: Stand pivot  Equipment: Standard toilet;Grab bars  Additional Factors: Verbal cues;Cues for hand placement; With handrails  Assistance Level: Minimal assistance  Skilled Clinical Factors: vc for hand foot placement for increased safety, no LOB noted       Functional Mobility  Device: Wheelchair  Activity: To/From bathroom; To/From therapy gym  Assistance Level: Dependent  Skilled Clinical Factors: did not self propel   Bed Mobility  Overall Assistance Level: Stand By Assist  Additional Factors: With handrails; Head of bed flat  Sit to Supine  Assistance Level: Stand by assist  Skilled Clinical Factors: HOB slightly elevated   Supine to Sit  Assistance Level: Stand by assist  Skilled Clinical Factors: HOB slightly elevated   Transfers  Surface: To bed;From bed; Wheelchair  Sit to Stand  Assistance Level: Minimal assistance  Stand to Sit  Assistance Level: Minimal assistance  Skilled Clinical Factors: cues for safe controlled sitting   Stand Pivot  Assistance Level: Contact guard assist  Skilled Clinical Factors: cues for hand placement and safety    OT Exercises  Exercise Treatment: AM: Pt engaged in actiivty with red theraflex bar. Pt found it difficult to complete bending of the bar up and down. Writer modified activity, having pt twist with B hands 15 reps. Pt reports fatigue after activity. PM:Pt completed exercises with 3# weight bar 20 reps in all planes. Pt also completed activity with 3.3# weighted ball reaching to pickup/move ball from varrying surfaces. Activites completed to increase pts endurance and strength for ADL and IADL activites    Motor Control/Coordination: Pt engaged in fine motor coordination activity of placing small pegs into corosponding pattened hole. Pt Completed with RUE to increase coordination/fine motor abilities. Pt often states that this activity is to difficult but completes activity with precision.         SPEECH:  Subjective: [x]? Alert     [x]? Cooperative     []? Confused     []? Agitated    []? Lethargic         Objective/Assessment:  Attention: Sustained throughout, distractions minimized.    Orientation: Oriented x4.      Recall: Pt recalled daily events with mod level of detail (I), increasing to high level of detail with min A from ST.      Organization: Pt completed written 2-3 step directions with thought organization component with 50% accuracy (I), requiring mod A to recognize and correct errors to improve to 75% accuracy.      Problem Solving/Reasoning: n/a     Other: Pt demo occasional word retrieval deficits in conversation; able to utilize circumlocution with verbal cues from ST.        Plan:  [x]? Continue ST services    []?  Discharge from :         Current Medications:   Current Facility-Administered Medications: insulin glargine (LANTUS) injection vial 10 Units, 10 Units, SubCUTAneous, Nightly  insulin lispro (HUMALOG) injection vial 0-6 Units, 0-6 Units, SubCUTAneous, TID WC  insulin lispro (HUMALOG) injection vial 0-3 Units, 0-3 Units, SubCUTAneous, Nightly  enoxaparin (LOVENOX) injection 40 mg, 40 mg, SubCUTAneous, Daily  ondansetron (ZOFRAN-ODT) disintegrating tablet 4 mg, 4 mg, Oral, Q8H PRN **OR** [DISCONTINUED] ondansetron (ZOFRAN) injection 4 mg, 4 mg, IntraVENous, Q6H PRN  aspirin EC tablet 81 mg, 81 mg, Oral, Daily  atorvastatin (LIPITOR) tablet 40 mg, 40 mg, Oral, Nightly  albuterol sulfate  (90 Base) MCG/ACT inhaler 6 puff, 6 puff, Inhalation, Q6H PRN  clopidogrel (PLAVIX) tablet 75 mg, 75 mg, Oral, Daily  divalproex (DEPAKOTE ER) extended release tablet 500 mg, 500 mg, Oral, BID  fenofibrate (TRIGLIDE) tablet 160 mg, 160 mg, Oral, Daily  gabapentin (NEURONTIN) capsule 600 mg, 600 mg, Oral, TID  levETIRAcetam (KEPPRA) tablet 1,000 mg, 1,000 mg, Oral, BID  pantoprazole (PROTONIX) tablet 40 mg, 40 mg, Oral, QAM AC  potassium chloride (KLOR-CON M) extended release tablet 20 mEq, 20 mEq, Oral, Daily with breakfast  topiramate (TOPAMAX) tablet 25 mg, 25 mg, Oral, BID  acetaminophen (TYLENOL) tablet 650 mg, 650 mg, Oral, Q4H PRN  polyethylene glycol (GLYCOLAX) packet 17 g, 17 g, Oral, Daily  senna (SENOKOT) tablet 17.2 mg, 2 tablet, Oral, Daily PRN  bisacodyl (DULCOLAX) suppository 10 mg, 10 mg, Rectal, Daily PRN      Objective:  /65   Pulse 79   Temp 97.9 °F (36.6 °C)   Resp 19   Ht 5' 5\" (1.651 m)   Wt 134 lb (60.8 kg)   SpO2 98%   BMI 22.30 kg/m²       GEN: Well developed, well nourished, no acute distress  HEENT: NCAT. EOM grossly intact. Hearing grossly intact. Mucous membranes pink and moist.  RESP: Normal breath sounds with no wheezing, rales, or rhonchi. Respirations WNL and unlabored. CV: Regular rate and rhythm. No murmurs, rubs, or gallops. ABD: Soft, non-distended, BS+ and equal.  NEURO: Alert. Speech fluent. Sensation to light touch decreased in right upper and lower limbs compared to left. MSK:  Muscle bulk is normal bilaterally. Strength 4/5 in right upper limb. Strength 4+/5 in left upper limb. Able to extend the right knee partially against gravity. Right AFO in place. LIMBS: No edema in bilateral lower limbs. SKIN: Warm and dry with good turgor. PSYCH: Mood WNL. Affect WNL. Appropriately interactive. Diagnostics:     CBC:   Recent Labs     05/06/22  0805   WBC 4.3   RBC 3.57*   HGB 11.3*   HCT 33.2*   MCV 93.0   RDW 13.9        BMP:   Recent Labs     05/06/22  0805      K 3.9   *   CO2 21   BUN 23   CREATININE 0.73   GLUCOSE 173*     BNP: No results for input(s): BNP in the last 72 hours. PT/INR: No results for input(s): PROTIME, INR in the last 72 hours. APTT: No results for input(s): APTT in the last 72 hours. CARDIAC ENZYMES: No results for input(s): CKMB, CKMBINDEX, TROPONINT in the last 72 hours. Invalid input(s): CKTOTAL;3  FASTING LIPID PANEL:  Lab Results   Component Value Date    CHOL 202 (H) 04/17/2022    HDL 55 04/17/2022    TRIG 164 (H) 04/17/2022     LIVER PROFILE: No results for input(s): AST, ALT, ALB, BILIDIR, BILITOT, ALKPHOS in the last 72 hours.        Impression/Plan:   Impaired ADLs, gait, and mobility due to:    1. Right-sided weakness:  Unknown etiology - possibly psychosomatic. PT/OT for gait, mobility, strengthening, endurance, ADLs, and self care. Plan for outpatient EMG. On aspirin, plavix. On gabapentin. Placed consult to orthotist for evaluation for right AFO for home. 2. Cognitive impairment:  SLP following. 3. Anemia: Hemoglobin 11.3 on 5/6, stable. Monitoring. 4. Type 2 Diabetes:  Hemoglobin A1c 10.3 on 4/17. On Lantus nightly and sliding scale (IM decreased both medications on 5/2)  5. HTN:  Not currently on medication  6. HLD: On atorvastatin, fenofibrate  7. History of CVA:  On aspirin, plavix, atorvastatin, fenofibrate  8. Seizure: On Keppra and Depakote  9. GERD: On pantoprazole  10. Hypokalemia:  Improved. On KCl repletion daily. Monitoring. 11. Bowel Management: Miralax daily, senokot prn, dulcolax prn. 12. DVT Prophylaxis:  low molecular weight heparin, SCD's while in bed and ALLIE's while in bed  13.  Internal medicine for medical management      Electronically signed by Jessy Deras MD on 5/7/2022 at 9:39 AM

## 2022-05-07 NOTE — PROGRESS NOTES
Occupational Therapy  Facility/Department: [facility]  Rehabilitation Occupational Therapy Daily Treatment Note    Date: 22  Patient Name: Monse Living       Room: 5741/7157-44  MRN: [id number]  Account: [de-identified]   : 1950  (70 y.o.) Gender: female      diagnosis: R sided weakness              Past Medical History:  has a past medical history of Cerebral artery occlusion with cerebral infarction [facility]), CVA (cerebral infarction), Diabetes mellitus ([facility] [address] Hyperlipidemia, Hypertension, and Seizures ([facility] Past Surgical History:   has a past surgical history that includes Appendectomy; Hysterectomy; Bronchoscopy diagnostic (2014); and Endoscopy, colon, diagnostic. Restrictions   fall risk    Subjective  Subjective: \"It's a lot better. \"  R hand coordination                Objective     Cognition  Overall Cognitive Status: [facility]  Cognition Comment: demo good problem solving, memory, sequencing for adls, good adaptations to physical limitations         ADL  Feeding  Assistance Level: Modified independent  Skilled Clinical Factors: observed pt using R hand to hold knife to cut meat and then to hold fork to feed self. Grooming/Oral Hygiene  Assistance Level: Supervision  Skilled Clinical Factors: standing at sink to complete- demo good balance  Upper Extremity Bathing  Assistance Level: Set-up  Lower Extremity Bathing  Assistance Level: Contact guard assist;Set-up  Upper Extremity Dressing  Assistance Level: Set-up  Skilled Clinical Factors: don bra and OH shirt   Lower Extremity Dressing  Assistance Level: Stand by assist  Putting On/Taking Off Footwear  Assistance Level: Set-up; Verbal cues  Skilled Clinical Factors: TA for TEDs, able to don/doff shoes with elastic laces, don RLE AFO indep, practiced in am and again in pm, able to complete indep post practice.   Toileting  Assistance Level: Contact guard assist  Toilet Transfers  Equipment: Standard toilet;Grab bars  Assistance Level: Contact guard assist  Tub/Shower Transfers  Type: Shower  Transfer From: Rolling walker  Transfer To: Tub transfer bench  Assistance Level: Minimal assistance     Functional Mobility  Device: Rolling walker  Activity: To/From bathroom;Transport items; Retrieve items  Assistance Level: Contact guard assist  Skilled Clinical Factors: w/c was not used during am or pm adls in room, pt using chair to sit on for adls, pt amb with RW in am and without AE in pm. CGA with both- more cues to slow down and not hold furniture when amb without RW. simple advanced adls ambulation in am and pm, orgainzing belongings, obtain own set up and clean up. Transfers  Device: Walker  Sit to Stand  Assistance Level: Contact guard assist  Stand to Sit  Assistance Level: Contact guard assist  Bed To/From Chair  Assistance Level: Contact guard assist  Stand Pivot  Assistance Level: Contact guard assist   OT Exercises  Dynamic Standing Balance Exercises: 18 min in am and 13 min in pm along with many shorter stands both am and pm.  pt weight bearing primarily on LLE as she is aware RLE is not locking. Motor Control/Coordination: pt using RUE functionally with fair coordination during adls. Assessment  Assessment  Activity Tolerance: Patient tolerated treatment well       Patient Education  Education  Education Provided: ADL Function;IADL Function; Safety  Education Provided Comments: liban MYLES AFO indep, practiced in am and again in pm, able to complete indep post practice. ed to continue to have CGA with all stand, ambulate.   provided written info re OT 's testing in event pt wants to drive in future- do not attempt unless MD permits and OT testing completed- ed re option to move foot pedals to be used with LLE instead of R.  Education Method: Printed Information/Hand-outs  Education Outcome: Demonstrated understanding;Verbalized understanding    Plan   continue POC       Therapy Time   Individual Concurrent Group Co-treatment   Time In [phone] Time Out 1404         Minutes 31                   05/07/22 1546 05/07/22 1547   OT Individual Minutes   Time In 1107 1333   Time Out 1210 1404   Minutes 1306 [facility]

## 2022-05-07 NOTE — PLAN OF CARE
Problem: Discharge Planning  Goal: Discharge to home or other facility with appropriate resources  Outcome: Progressing     Problem: Safety - Adult  Goal: Free from fall injury  5/7/2022 1017 by Afshan Sweet  Outcome: Progressing     Problem: ABCDS Injury Assessment  Goal: Absence of physical injury  5/7/2022 1017 by DERREK SESAY  Outcome: Progressing     Problem: Skin/Tissue Integrity  Goal: Absence of new skin breakdown  Description: 1. Monitor for areas of redness and/or skin breakdown  2. Assess vascular access sites hourly  3. Every 4-6 hours minimum:  Change oxygen saturation probe site  4. Every 4-6 hours:  If on nasal continuous positive airway pressure, respiratory therapy assess nares and determine need for appliance change or resting period.   5/7/2022 1017 by Afshan Sweet  Outcome: Progressing     Problem: Nutrition Deficit:  Goal: Optimize nutritional status  5/7/2022 1017 by Afshan Sweet  Outcome: Progressing     Problem: Pain  Goal: Verbalizes/displays adequate comfort level or baseline comfort level  5/7/2022 1017 by Afshan Sweet  Outcome: Progressing

## 2022-05-07 NOTE — PROGRESS NOTES
Several STS transfers completed Mod I. Demos steadiness throughout. Environmental Mobility  Ambulation  Surface: level tile;uneven  Device: Rolling Walker  Other Apparatus: Right;AFO  Assistance: Contact guard assistance;Stand by assistance  Quality of Gait: Patient able to advance R LE with AFO and reports improved R knee support. Pt is improving with R knee stability during gait. Gait Deviations: Slow Irene  Distance: >300ft within hospital; 200ft outside on various surfaces  Comments: Instructed pt on safe RW placement; Trialed ambulation without AD to further challenge balance and encourage more natural gait pattern. Pt amb 170ft from gym to room with no AD SBA, followed by W/C. Stairs/Curb  Stairs?: Yes  Stairs  Curbs: 6\"  Device: Rolling walker  Other Apparatus: Right;AFO  Assistance: Contact guard assistance;Stand by assistance  Comment: Pt performed curb step training today with RW and completed with no difficulites. PT Exercises  A/AROM Exercises: Seated BLE AROM x20 reps each to strengthen. Mostly targeted R quad to improve knee stability. Motor Control/Coordination: Balloon taps in standing 2 x3mins with RW for support. Pt did have one LOB and fell back into W/C. Pt said \"My foot got caught and I couoldn't move. \"  Standing Open/Closed Kinetic Chain Exercises: Standing squats in // 2 x15 reps with emphasis on R knee locking when in extension with good improvement. GOALS  Patient Goals   Patient goals : To get better  Short Term Goals  Time Frame for Short term goals: 10 days  Short term goal 1: Pt to perform bed mobility from flat surface independently  Short term goal 2: Demonstrate functional transfers min A  Short term goal 3: Pt to ambulate distance of 100 ft with rolling walker at mod A  Short term goal 4: Pt able to improve sitting balance at EOB/EOM to good to be alen to eat meals.   Short term goal 5: Demonstrate dynamic standing balance of fiar - to decrease fall risk  Short Term Goal 6: Pt able to  propel w/c distance of 100 to 150 ft, CGA, level surfaces. Long Term Goals  Time Frame for Long term goals : By DC  Long term goal 1: Pt able to perform transfers at mod-I  Long term goal 2: Pt able to ambulate distance of 100 to 150 ft with appropriate device, at min A. with assistive device. Long term goal 3: Pt able to perform a curb step with a rolling wwalker/UE support, mod A  Long term goal 4: Pt able to propel w/c distance of 150 ft , level surfaces, including turns, mod-I  Long term goal 5: Pt  able to improve standing balance with assistive device to fair to reduce fall risk  Long term goal 6: Improve 2MWT distance to 80 ft to improve function and gait speed. Long term goal 7: Improve PASS score to 25/36 to improve overall function. PLAN OF CARE  Frequency: 1-2 treatment sessions per day, 5-7 days per week  Plan  Plan:  minutes of therapy at least 5 out of 7 days a week (--)  Current Treatment Recommendations: Strengthening;ROM;Balance training;Functional mobility training;Gait training;Neuromuscular re-education;Transfer training; Endurance training; Wheelchair mobility training;Stair training;Home exercise program;Safety education & training;Patient/Caregiver education & training;Equipment evaluation, education, & procurement; Modalities (E-stim as needed to neuro-muscular faciliatation R LE as nee)    Therapy Time   Individual Individual Group Co-treatment   Time In 7924  3178       Time Out 0859  1330       Minutes 62  34       96 mins  Pablo Meza PTA, 05/07/22 at 3:07 PM

## 2022-05-08 LAB
GLUCOSE BLD-MCNC: 166 MG/DL (ref 65–105)
GLUCOSE BLD-MCNC: 232 MG/DL (ref 65–105)
GLUCOSE BLD-MCNC: 96 MG/DL (ref 65–105)

## 2022-05-08 PROCEDURE — 6370000000 HC RX 637 (ALT 250 FOR IP): Performed by: STUDENT IN AN ORGANIZED HEALTH CARE EDUCATION/TRAINING PROGRAM

## 2022-05-08 PROCEDURE — 1180000000 HC REHAB R&B

## 2022-05-08 PROCEDURE — 6360000002 HC RX W HCPCS: Performed by: STUDENT IN AN ORGANIZED HEALTH CARE EDUCATION/TRAINING PROGRAM

## 2022-05-08 PROCEDURE — 82947 ASSAY GLUCOSE BLOOD QUANT: CPT

## 2022-05-08 PROCEDURE — 97116 GAIT TRAINING THERAPY: CPT

## 2022-05-08 PROCEDURE — 99232 SBSQ HOSP IP/OBS MODERATE 35: CPT | Performed by: INTERNAL MEDICINE

## 2022-05-08 PROCEDURE — 97535 SELF CARE MNGMENT TRAINING: CPT

## 2022-05-08 PROCEDURE — 97110 THERAPEUTIC EXERCISES: CPT

## 2022-05-08 PROCEDURE — 97530 THERAPEUTIC ACTIVITIES: CPT

## 2022-05-08 PROCEDURE — 6370000000 HC RX 637 (ALT 250 FOR IP): Performed by: INTERNAL MEDICINE

## 2022-05-08 RX ADMIN — LEVETIRACETAM 1000 MG: 500 TABLET, FILM COATED ORAL at 20:36

## 2022-05-08 RX ADMIN — INSULIN GLARGINE 10 UNITS: 100 INJECTION, SOLUTION SUBCUTANEOUS at 20:37

## 2022-05-08 RX ADMIN — ENOXAPARIN SODIUM 40 MG: 100 INJECTION SUBCUTANEOUS at 09:37

## 2022-05-08 RX ADMIN — DIVALPROEX SODIUM 500 MG: 500 TABLET, EXTENDED RELEASE ORAL at 20:36

## 2022-05-08 RX ADMIN — GABAPENTIN 600 MG: 300 CAPSULE ORAL at 13:55

## 2022-05-08 RX ADMIN — FENOFIBRATE 160 MG: 160 TABLET ORAL at 09:37

## 2022-05-08 RX ADMIN — CLOPIDOGREL BISULFATE 75 MG: 75 TABLET ORAL at 09:37

## 2022-05-08 RX ADMIN — ASPIRIN 81 MG: 81 TABLET, COATED ORAL at 09:37

## 2022-05-08 RX ADMIN — LEVETIRACETAM 1000 MG: 500 TABLET, FILM COATED ORAL at 09:37

## 2022-05-08 RX ADMIN — GABAPENTIN 600 MG: 300 CAPSULE ORAL at 09:37

## 2022-05-08 RX ADMIN — ATORVASTATIN CALCIUM 40 MG: 40 TABLET, FILM COATED ORAL at 20:36

## 2022-05-08 RX ADMIN — TOPIRAMATE 25 MG: 25 TABLET, FILM COATED ORAL at 09:46

## 2022-05-08 RX ADMIN — INSULIN LISPRO 1 UNITS: 100 INJECTION, SOLUTION INTRAVENOUS; SUBCUTANEOUS at 20:37

## 2022-05-08 RX ADMIN — GABAPENTIN 600 MG: 300 CAPSULE ORAL at 20:36

## 2022-05-08 RX ADMIN — PANTOPRAZOLE SODIUM 40 MG: 40 TABLET, DELAYED RELEASE ORAL at 06:30

## 2022-05-08 RX ADMIN — DIVALPROEX SODIUM 500 MG: 500 TABLET, EXTENDED RELEASE ORAL at 09:44

## 2022-05-08 RX ADMIN — TOPIRAMATE 25 MG: 25 TABLET, FILM COATED ORAL at 20:36

## 2022-05-08 RX ADMIN — POTASSIUM CHLORIDE 20 MEQ: 20 TABLET, EXTENDED RELEASE ORAL at 09:50

## 2022-05-08 NOTE — PROGRESS NOTES
Occupational Therapy  Facility/Department: [facility] ACUTE REHAB  Rehabilitation Occupational Therapy Daily Treatment Note    Date: 22  Patient Name: Ena Mark       Room: 91/2671-81  MRN: [id number]  Account: [de-identified]   : 1950  (70 y.o.) Gender: female        Diagnosis: Right sided weakness           Past Medical History:  has a past medical history of Cerebral artery occlusion with cerebral infarction [facility]), CVA (cerebral infarction), Diabetes mellitus ([facility] [address] Hyperlipidemia, Hypertension, and Seizures ([facility] Past Surgical History:   has a past surgical history that includes Appendectomy; Hysterectomy; Bronchoscopy diagnostic (2014); and Endoscopy, colon, diagnostic. Restrictions   fall    Subjective  Subjective: \"It's the only way it will lock. \"  R knee in standing- moves foot backwards                Objective               ADL  Feeding  Assistance Level: Modified independent  Grooming/Oral Hygiene  Assistance Level: Supervision  Skilled Clinical Factors: standing at sink to complete- 1 mild LOB- pt recovered indep  Upper Extremity Bathing  Skilled Clinical Factors: none  Lower Extremity Bathing  Skilled Clinical Factors: none  Upper Extremity Dressing  Assistance Level: Set-up  Skilled Clinical Factors: don bra and OH shirt   Lower Extremity Dressing  Assistance Level: Supervision  Skilled Clinical Factors: sits in chair to don pants over feet, sup to stand and pull up  Putting On/Taking Off Footwear  Assistance Level: Set-up; Verbal cues  Skilled Clinical Factors: TEDs already on, able to don/doff shoes with elastic laces, don RLE AFO with verbal cues for direction of straps.   Toileting  Assistance Level: Supervision  Toilet Transfers  Equipment: Grab bars  Assistance Level: Supervision  Tub/Shower Transfers  Type: Tub  Transfer From: Standing without device  Assistance Level: Stand by assist  Skilled Clinical Factors: while fully dressed including R AFO, pt observed demo then practiced x 3 stepping into and out of tub simulating home set up.  pt notes goal to stand to shower and not obtain tub seat.  ed pt will continue to assess safety as balance improves but right now- would not be safe. Advanced adls:  w/c was not used during am or pm adls in room, pt using chair to sit on for adls, pt amb without AE in room and down king to therapy gym am and pm CGA. 1 significant LOB when turning corner. simple advanced adls-obtain own set up and clean up for adls, complete gather dirty clothes, sort clothes (seated EOB), load into washer, unload from dryer , stand to fold, put clothes away in cupboard. sup with these tasks, practiced carrying load of laundry in bag hugged to body BUE and in laundry basket- needed min- CGA with these tasks. Functional Mobility  Device:  (no device)  Activity: To/From bathroom;Transport items; Retrieve items; To/From therapy gym  Assistance Level: Supervision  Bed Mobility  Overall Assistance Level: Independent  Sit to Supine  Assistance Level: Independent  Supine to Sit  Assistance Level: Independent  Scooting  Assistance Level: Independent  Sit to Stand  Assistance Level: Independent  Stand to Sit  Assistance Level: Independent  Bed To/From Chair  Assistance Level: Supervision   OT Exercises  Dynamic Standing Balance Exercises: 3-5 min x 6, 12 min, 15 min in am, 14 min x 2 in pm.  pt moves RLE backwards to allow knee to lock to weight bear on BLE in standing. Motor Control/Coordination: pt using RUE functionally with fair coordination during adls. pt practiced writing her name with OZIEL, slow but legible, gave practice writing assignment which pt completed. Assessment  Assessment  Activity Tolerance: Patient tolerated treatment well       Patient Education  Education  Education Provided: ADL Function;IADL Function; Safety;Transfer Training;Equipment  Education Provided Comments: liban MYLES AFMITCH indep, practiced in am.  ed to continue to have CGA with all stand, ambulate. reviewed info re OT 's testing in event pt wants to drive in future- do not attempt unless MD permits and OT testing completed- ed re option to move foot pedals to be used with LLE instead of R. ed re checking into transportation available,  pt notes Jeramie Og has some services and she will check because her son and dtr are busy with work and raising children. practiced tub transfers- pt completed x 3 post observe demo. .    Plan  Plan  Times per Week: 5-7  Times per Day: Twice a day  Current Treatment Recommendations: Strengthening;ROM;Balance training;Functional mobility training; Endurance training; Safety education & training;Patient/Caregiver education & training;Equipment evaluation, education, & procurement;Self-Care / ADL; Coordination training;Neuromuscular re-education;Modalities    Goals  Patient Goals   Patient goals : \"To end up gonig home and walk out of here and be able to be as normal as possible\"  complete own grocery shopping (will need assist with transportation),  shower standing- does not want to obtain shower seat. able to write her checks to pay bills. Short Term Goals  Time Frame for Short term goals: One week:  5-8-22 all STG met  Short Term Goal 1: Patient will perform upper body bathing and dressing with SUP. Short Term Goal 2: Patient will perform lower body bathing and dressing with Minimal assist.  Short Term Goal 3: Patient will perform toileting tasks with Minimal assist.  Short Term Goal 4: Patient will perform stand pivot transfers during self-care with Minimal assist and Good safety. Short Term Goal 5: Patient will verbalize/demonstrate Good understanding of assistive equipment/durable medical equipment/modified techniques for increased safety and IND with self-care. Short Term Goal 6: Patient will verbalize/demonstrate Good understanding of modified techniques for R UE during self-care.   Short Term Goal 7: Patient will actively participate in 30+ minutes of therapeutic exercise/functional activities to promote increased IND with self-care and mobility. Long Term Goals  Long Term Goal 3: Patient will perform functional mobility during self-care at w/c level with modified IND. / modify goal: 5-8-22  mod indep functional mobility for ambulation during adls with good safety.     Therapy Time   Individual Concurrent Group Co-treatment   Time In 3069         Time Out 1503         Minutes 32                   05/08/22 1257 05/08/22 1628   OT Individual Minutes   Time In 1104 1431   Time Out 1210 1503   Minutes [phone] [facility]

## 2022-05-08 NOTE — PROGRESS NOTES
Physical Therapy  Facility/Department: [facility] ACUTE REHAB  Rehabilitation Physical Therapy    NAME: Janet Vargas  : 1950 (80 y.o.)  MRN: [id number]  CODE STATUS: Full Code    Date of Service: 22  Assessment: Pt performed curb step training today with RW and completed with no difficulites. Pt continues to meet goals, however, would benefit from cont skilled PT to address strength, balance, and functional mobility deficits. Therapy Prognosis: Good  Decision Making: High Complexity  Discharge Recommendations: Patient would benefit from continued therapy after discharge;Home with assist PRN  Chart Reviewed: Yes  Family / Caregiver Present: No   Activity Tolerance  Activity Tolerance: Patient tolerated treatment well  Activity Tolerance Comments: Pt continues to meet goals, improvement in endurance noted. Restrictions:  Restrictions/Precautions: Fall Risk;General Precautions; Up as Tolerated  Position Activity Restriction  Other position/activity restrictions: SBP <180     SUBJECTIVE  Subjective: Pt up at EOB upon entry eating breakfast. Agreeable with heading outside first thing this morning to get some fresh air and practice outdoor amb/curb. Pleasant and cooperative. Cognition  Overall Cognitive Status: WFL  Arousal/Alertness: Appropriate responses to stimuli  Following Commands:  Follows all commands without difficulty  Attention Span: Appears intact  Memory: Decreased recall of recent events  Safety Judgement: Decreased awareness of need for assistance;Decreased awareness of need for safety  Problem Solving: Good awareness of errors made;Assistance required to generate solutions  Insights: Fully aware of deficits  Initiation: Does not require cues  Sequencing: Does not require cues  Cognition Comment: demo good problem solving, memory, sequencing for adls, good adaptations to physical limitations    Functional Mobility  Transfers  Sit to Stand: Modified independent  Stand to sit: Modified independent  Bed to Chair: Supervision  Stand Pivot Transfers: Supervision  Comment: Pt continues to progress requiring less cues from therapist. Demo's good safety with t/fs. Environmental Mobility  Ambulation  Surface: level tile;uneven;carpet;outdoors;ramp  Device: Rolling Walker  Other Apparatus: Right;AFO  Assistance: Stand by assistance;Supervision  Quality of Gait: Patient able to advance R LE with AFO and reports improved R knee support. Pt is improving with R knee stability during gait. Gait Deviations: Slow Irene  Distance: Pt ambulated from room to outside today with 2 seated rest breaks total.  Comments: Pt demo's improved endurance this session, ambulating further distances with less rest breaks on multiple surfaces. More Ambulation?: Yes  Ambulation 2  Surface - 2: level tile  Device 2: Rolling Walker  Quality of Gait 2: Trialed gait with AFO doffed; Pt demos decrease DF, demos dragging RLE foot. Occassionally pt is able to slide RLE foot forward. Decrease step height/length. Gait Deviations: Decreased step height;Decreased step length; Slow Irene; Shuffles  Distance: 10ft    PT Exercises  A/AROM Exercises: ex's with AFO doffed, minimal AROM noted. Motor Control/Coordination: Standing balloon taps on foam pad BUE's with support from RW. Pt able to stabilize and self correct balance. Standing Open/Closed Kinetic Chain Exercises: Standing marches in front of mirror with RW 2 [id number] with focus on locking R knee into extension. GOALS  Patient Goals   Patient goals : To get better  Short Term Goals  Time Frame for Short term goals: 10 days  Short term goal 1: Pt to perform bed mobility from flat surface independently  Short term goal 2: Demonstrate functional transfers min A  Short term goal 3: Pt to ambulate distance of 100 ft with rolling walker at mod A  Short term goal 4: Pt able to improve sitting balance at EOB/EOM to good to be alen to eat meals.   Short term goal 5: Demonstrate dynamic standing balance of fiar - to decrease fall risk  Short Term Goal 6: Pt able to  propel w/c distance of 100 to 150 ft, CGA, level surfaces. Long Term Goals  Time Frame for Long term goals : By DC  Long term goal 1: Pt able to perform transfers at mod-I  Long term goal 2: Pt able to ambulate distance of 100 to 150 ft with appropriate device, at min A. with assistive device. Long term goal 3: Pt able to perform a curb step with a rolling wwalker/UE support, mod A  Long term goal 4: Pt able to propel w/c distance of 150 ft , level surfaces, including turns, mod-I  Long term goal 5: Pt  able to improve standing balance with assistive device to fair to reduce fall risk  Long term goal 6: Improve 2MWT distance to 80 ft to improve function and gait speed. Long term goal 7: Improve PASS score to 25/36 to improve overall function. PLAN OF CARE  Frequency: 1-2 treatment sessions per day, 5-7 days per week  Plan  Plan:  minutes of therapy at least 5 out of 7 days a week (--)  Current Treatment Recommendations: Strengthening;ROM;Balance training;Functional mobility training;Gait training;Neuromuscular re-education;Transfer training; Endurance training; Wheelchair mobility training;Stair training;Home exercise program;Safety education & training;Patient/Caregiver education & training;Equipment evaluation, education, & procurement; Modalities (E-stim as needed to neuro-muscular faciliatation R LE as nee)  Therapy Time   Individual Individual Group Co-treatment   Time In 0758  1258       Time Out 0902  1329       Minutes 64  31       95 mins  Mohini Alcala PTA, 05/08/22 at 1:41 PM

## 2022-05-08 NOTE — PROGRESS NOTES
Physical Medicine & Rehabilitation  Progress Note      Subjective:      Dina Aly is a 80 y.o. female with right-sided weakness of unclear etiology. She reports doing well. She denies any acute concerns. B/B ok . ROS:  Denies fevers, chills, sweats. No chest pain, palpitations, lightheadedness. Denies coughing, wheezing or shortness of breath. Denies abdominal pain, nausea, diarrhea or constipation. No new areas of joint pain. Denies new areas of numbness or weakness. Denies new anxiety or depression issues. No new skin problems. Rehabilitation:   Progressing in therapies. PT:      Functional Mobility  Transfers  Sit to Stand:  (Pt pushes up with one hand from W/C while other hand is on RW and transfers safely without difficulty.)  Stand to sit: Modified independent  Bed to Chair: Supervision  Stand Pivot Transfers: Supervision  Comment: Several STS transfers completed Mod I. Demos steadiness throughout.     Environmental Mobility  Ambulation  Surface: level tile;uneven  Device: Rolling Walker  Other Apparatus: Right;AFO  Assistance: Contact guard assistance;Stand by assistance  Quality of Gait: Patient able to advance R LE with AFO and reports improved R knee support. Pt is improving with R knee stability during gait. Gait Deviations: Slow Irene  Distance: >300ft within hospital; 200ft outside on various surfaces  Comments: Instructed pt on safe RW placement; Trialed ambulation without AD to further challenge balance and encourage more natural gait pattern. Pt amb 170ft from gym to room with no AD SBA, followed by W/C. Stairs/Curb  Stairs?: Yes  Stairs  Curbs: 6\"  Device: Rolling walker  Other Apparatus: Right;AFO  Assistance: Contact guard assistance;Stand by assistance  Comment: Pt performed curb step training today with RW and completed with no difficulites. PT Exercises  A/AROM Exercises: Seated BLE AROM x20 reps each to strengthen.  Mostly targeted R quad to improve knee stability. Motor Control/Coordination: Balloon taps in standing 2 x3mins with RW for support. Pt did have one LOB and fell back into W/C. Pt said \"My foot got caught and I couoldn't move. \"  Standing Open/Closed Kinetic Chain Exercises: Standing squats in // 2 x15 reps with emphasis on R knee locking when in extension with good improvement.       OT    Cognition  Overall Cognitive Status: [facility]  Cognition Comment: demo good problem solving, memory, sequencing for adls, good adaptations to physical limitations          ADL  Feeding  Assistance Level: Modified independent  Skilled Clinical Factors: observed pt using R hand to hold knife to cut meat and then to hold fork to feed self. Grooming/Oral Hygiene  Assistance Level: Supervision  Skilled Clinical Factors: standing at sink to complete- demo good balance  Upper Extremity Bathing  Assistance Level: Set-up  Lower Extremity Bathing  Assistance Level: Contact guard assist;Set-up  Upper Extremity Dressing  Assistance Level: Set-up  Skilled Clinical Factors: don bra and OH shirt   Lower Extremity Dressing  Assistance Level: Stand by assist  Putting On/Taking Off Footwear  Assistance Level: Set-up; Verbal cues  Skilled Clinical Factors: TA for TEDs, able to don/doff shoes with elastic laces, don RLE AFO indep, practiced in am and again in pm, able to complete indep post practice. Toileting  Assistance Level: Contact guard assist  Toilet Transfers  Equipment: Standard toilet;Grab bars  Assistance Level: Contact guard assist  Tub/Shower Transfers  Type: Shower  Transfer From: Rolling walker  Transfer To: Tub transfer bench  Assistance Level: Minimal assistance      Functional Mobility  Device: Rolling walker  Activity: To/From bathroom;Transport items; Retrieve items  Assistance Level: Contact guard assist  Skilled Clinical Factors: w/c was not used during am or pm adls in room, pt using chair to sit on for adls, pt amb with RW in am and without AE in pm. CGA with both- more cues to slow down and not hold furniture when amb without RW. simple advanced adls ambulation in am and pm, orgainzing belongings, obtain own set up and clean up. Transfers  Device: Walker  Sit to Stand  Assistance Level: Contact guard assist  Stand to Sit  Assistance Level: Contact guard assist  Bed To/From Chair  Assistance Level: Contact guard assist  Stand Pivot  Assistance Level: Contact guard assist   OT Exercises  Dynamic Standing Balance Exercises: 18 min in am and 13 min in pm along with many shorter stands both am and pm.  pt weight bearing primarily on LLE as she is aware RLE is not locking. Motor Control/Coordination: pt using RUE functionally with fair coordination during adls.      Assessment  Assessment  Activity Tolerance: Patient tolerated treatment well     Patient Education  Education  Education Provided: ADL Function;IADL Function; Safety  Education Provided Comments: liban MYLES AFO indep, practiced in am and again in pm, able to complete indep post practice. ed to continue to have CGA with all stand, ambulate. provided written info re OT 's testing in event pt wants to drive in future- do not attempt unless MD permits and OT testing completed- ed re option to move foot pedals to be used with LLE instead of R.  Education Method: Printed Information/Hand-outs  Education Outcome: Demonstrated understanding;Verbalized understanding       SPEECH:    Attention: Sustained throughout, distractions minimized.      Orientation: Oriented x4.      Recall: Pt. Education provided re: internal memory aide (i.e., association strategy) to assist in recall. Pt. Verbalized understanding. Pt. Required Mod A to generate associations. Following task completed with Pt. utilizing trained strategy:              5 unit image retention (person/place association, immediate recall)- 100% julienne. 5 unit image retention (person/place association, 15 min delay)- 100% julienne, using associations appropriately.    Organization: n/a     Problem Solving/Reasoning: Multiple definitions- 80% accuracy julienne, increasing to 100% c cues.      Other: Pt demo occasional word retrieval deficits in conversation; able to utilize circumlocution with verbal cues from [address] [place] [address]           Current Medications:   Current Facility-Administered Medications: insulin glargine (LANTUS) injection vial 10 Units, 10 Units, SubCUTAneous, Nightly  insulin lispro (HUMALOG) injection vial 0-6 Units, 0-6 Units, SubCUTAneous, TID WC  insulin lispro (HUMALOG) injection vial 0-3 Units, 0-3 Units, SubCUTAneous, Nightly  enoxaparin (LOVENOX) injection 40 mg, 40 mg, SubCUTAneous, Daily  ondansetron (ZOFRAN-ODT) disintegrating tablet 4 mg, 4 mg, Oral, Q8H PRN **OR** [DISCONTINUED] ondansetron (ZOFRAN) injection 4 mg, 4 mg, IntraVENous, Q6H PRN  aspirin EC tablet 81 mg, 81 mg, Oral, Daily  atorvastatin (LIPITOR) tablet 40 mg, 40 mg, Oral, Nightly  albuterol sulfate  (90 Base) MCG/ACT inhaler 6 puff, 6 puff, Inhalation, Q6H PRN  clopidogrel (PLAVIX) tablet 75 mg, 75 mg, Oral, Daily  divalproex (DEPAKOTE ER) extended release tablet 500 mg, 500 mg, Oral, BID  fenofibrate (TRIGLIDE) tablet 160 mg, 160 mg, Oral, Daily  gabapentin (NEURONTIN) capsule 600 mg, 600 mg, Oral, TID  levETIRAcetam (KEPPRA) tablet 1,000 mg, 1,000 mg, Oral, BID  pantoprazole (PROTONIX) tablet 40 mg, 40 mg, Oral, QAM AC  potassium chloride (KLOR-CON M) extended release tablet 20 mEq, 20 mEq, Oral, Daily with breakfast  topiramate (TOPAMAX) tablet 25 mg, 25 mg, Oral, BID  acetaminophen (TYLENOL) tablet 650 mg, 650 mg, Oral, Q4H PRN  polyethylene glycol (GLYCOLAX) packet 17 g, 17 g, Oral, Daily  senna (SENOKOT) tablet 17.2 mg, 2 tablet, Oral, Daily PRN  bisacodyl (DULCOLAX) suppository 10 mg, 10 mg, Rectal, Daily PRN      Objective:  /69   Pulse 70   Temp 98 °F (36.7 °C)   Resp 19   Ht 5' 5\" (1.651 m)   Wt 134 lb (60.8 kg)   SpO2 99%   BMI 22.30 kg/m²       GEN: Well developed, well following. 3. Anemia: Hemoglobin 11.3 on 5/6, stable. Monitoring. 4. Type 2 Diabetes:  Hemoglobin A1c 10.3 on 4/17. On Lantus nightly and sliding scale (IM decreased both medications on 5/2)  5. HTN:  Not currently on medication  6. HLD: On atorvastatin, fenofibrate  7. History of CVA:  On aspirin, plavix, atorvastatin, fenofibrate  8. Seizure: On Keppra and Depakote  9. GERD: On pantoprazole  10. Hypokalemia:  Improved. On KCl repletion daily. Monitoring. 11. Bowel Management: Miralax daily, senokot prn, dulcolax prn. 12. DVT Prophylaxis:  low molecular weight heparin, SCD's while in bed and ALLIE's while in bed  13.  Internal medicine for medical management      Electronically signed by Raudel Hagan MD on 5/8/2022 at 9:50 AM

## 2022-05-08 NOTE — PLAN OF CARE
Problem: Safety - Adult  Goal: Free from fall injury  Outcome: Progressing     Problem: ABCDS Injury Assessment  Goal: Absence of physical injury  Outcome: Progressing     Problem: Skin/Tissue Integrity  Goal: Absence of new skin breakdown  Description: 1. Monitor for areas of redness and/or skin breakdown  2. Assess vascular access sites hourly  3. Every 4-6 hours minimum:  Change oxygen saturation probe site  4. Every 4-6 hours:  If on nasal continuous positive airway pressure, respiratory therapy assess nares and determine need for appliance change or resting period.   Outcome: Progressing

## 2022-05-08 NOTE — PROGRESS NOTES
[facility]    CONSULTATION / HISTORY AND PHYSICAL EXAMINATION            Date:   5/8/2022  Patient name:  Carole Cevallos  Date of admission:  4/28/2022 12:10 PM  MRN:   [id number]  Account:  [de-identified]  YOB: 1950  PCP:    Elisa Vicente MD  Room:   8184/6240-75  Code Status:    Full Code    Physician Requesting Consult: An Blancas MD    Reason for Consult:  Medical management    Chief Complaint:     No chief complaint on file. Right-sided weakness    History Obtained From:     Patient, EMR, nursing staff    History of Present Illness:     78-year-old female with history of questionable seizure disorder, chronic right hemiparesis numbness of right leg migraines admitted on 4/17 after being found altered, concern for stroke  Started on IV tPA but then became obtunded and flaccid and tPA held due to concern for hemorrhagic conversion  She was intubated, stat CT done which was unremarkable subsequently tPA was resumed and completed. MRI brain negative for stroke  Episode of flaccidity concerning for seizure hence loaded with IV Keppra  Extubated 4/19  Persistent right-sided weakness, fluctuating, neurology considering psychosomatic symptoms  Patient is on aspirin Plavix    Diabetes   Diagnosed within last year  Modifying factors on med:   Severity:controlled   Associated signs and symtoms: neuropathy/ckd/ CAD.    aggravated with sugar diet and better with low sugar diet      5/2   Patient again had episode of hypoglycemia in the morning, orange juice was provided  Blood sugar went to 68      Past Medical History:     Past Medical History:   Diagnosis Date    Cerebral artery occlusion with cerebral infarction ([address] 2008, 2014    CVA (cerebral infarction)     Diabetes mellitus ([address]     Hyperlipidemia     Hypertension     Seizures ([facility] [address] 04/2022        Past Surgical History:     Past Surgical History:   Procedure Laterality Date    APPENDECTOMY      BRONCHOSCOPY DIAGNOSTIC  7/24/2014         ENDOSCOPY, COLON, DIAGNOSTIC      HYSTERECTOMY          Medications Prior to Admission:     Prior to Admission medications    Medication Sig Start Date End Date Taking? Authorizing Provider   divalproex (DEPAKOTE ER) 500 MG extended release tablet Take 1 tablet by mouth in the morning and at bedtime 4/26/22   Kim Servin MD   levETIRAcetam (KEPPRA) 1000 MG tablet Take 1 tablet by mouth 2 times daily 4/21/22   Isela Garcia MD   topiramate (TOPAMAX) 25 MG tablet Take 1 tablet by mouth 2 times daily 4/21/22   Isela Garcia MD   clopidogrel (PLAVIX) 75 MG tablet Take 75 mg by mouth nightly    Historical Provider, MD   gabapentin (NEURONTIN) 600 MG tablet Take 600 mg by mouth 3 times daily. Historical Provider, MD   potassium chloride (KLOR-CON M) 20 MEQ extended release tablet Take 20 mEq by mouth 2 times daily    Historical Provider, MD   fenofibrate (TRIGLIDE) 160 MG tablet Take 160 mg by mouth daily    Historical Provider, MD   Pantoprazole Sodium (PROTONIX PO) Take  by mouth. Historical Provider, MD   acetaminophen (TYLENOL) 160 MG/5ML solution Take 20.3 mLs by mouth every 4 hours as needed for Fever. 7/28/14   Jennifer Lark, DO   albuterol (PROVENTIL HFA;VENTOLIN HFA) 108 (90 BASE) MCG/ACT inhaler Inhale 6 puffs into the lungs every 6 hours as needed for Wheezing. 7/28/14   Jennifer Lark, DO   glucagon, rDNA, 1 MG SOLR injection Inject 1 mg into the muscle as needed for Low blood sugar (Blood glucose less than 70 mg/dL and patient NOT ALERT or NPO and does not have IV access. ). 7/28/14   Jennifer Lark, DO   glucose (GLUTOSE) 40 % GEL Take 15 g by mouth as needed. 7/28/14   Jennifer Lark, DO   insulin glargine (LANTUS) 100 UNIT/ML injection vial Inject 10 Units into the skin nightly. 7/28/14   Jennifer Lark, DO   insulin lispro (HUMALOG) 100 UNIT/ML injection vial Inject 0-12 Units into the skin every 6 hours.  7/28/14   Trishadeann Rodriguez DO Corin   niacin (NIASPAN) 500 MG CR tablet Take 1 tablet by mouth nightly. 14   Shashank Cosme DO   potassium chloride (KAYCIEL) 20 MEQ/15ML (10%) solution Take 15 mLs by mouth daily. 14   Shashank Cosme DO        Allergies:     Patient has no known allergies. Social History:     Tobacco:    reports that she has never smoked. She has never used smokeless tobacco.  Alcohol:      reports no history of alcohol use. Drug Use:  reports no history of drug use. Family History:     History reviewed. No pertinent family history. Review of Systems:     Positive and Negative as described in HPI. CONSTITUTIONAL:  negative for fevers, chills, sweats, fatigue, weight loss  HEENT:  negative for vision, hearing changes, runny nose, throat pain  RESPIRATORY:  negative for shortness of breath, cough, congestion, wheezing. CARDIOVASCULAR:  negative for chest pain, palpitations.   GASTROINTESTINAL:  negative for nausea, vomiting, diarrhea, constipation, change in bowel habits, abdominal pain   GENITOURINARY:  negative for difficulty of urination, burning with urination, frequency   INTEGUMENT:  negative for rash, skin lesions, easy bruising   HEMATOLOGIC/LYMPHATIC:  negative for swelling/edema   ALLERGIC/IMMUNOLOGIC:  negative for urticaria , itching  ENDOCRINE:  negative increase in drinking, increase in urination, hot or cold intolerance  MUSCULOSKELETAL:  negative joint pains, muscle aches, swelling of joints  NEUROLOGICAL:  Positive for right sided weakness, negative for headaches, dizziness, lightheadedness, numbness, pain, tingling extremities      Physical Exam:     /69   Pulse 70   Temp 98 °F (36.7 °C)   Resp 19   Ht 5' 5\" (1.651 m)   Wt 134 lb (60.8 kg)   SpO2 99%   BMI 22.30 kg/m²   Temp (24hrs), Av °F (36.7 °C), Min:98 °F (36.7 °C), Max:98 °F (36.7 °C)    Recent Labs     22  1639 22  2112 22  0628 22  1034   POCGLU 137* 139* 96 232*     No intake or output data in the 24 hours ending 05/08/22 1704    General Appearance:  alert, well appearing, and in no acute distress  Head:  normocephalic, atraumatic. Eye: no icterus, redness, pupils equal and reactive, extraocular eye movements intact, conjunctiva clear  Ear: normal external ear, no discharge, hearing intact  Nose:  no drainage noted  Mouth: mucous membranes moist  Neck: supple, no carotid bruits, thyroid not palpable  Lungs: Bilateral equal air entry, clear to ausculation, no wheezing, rales or rhonchi, normal effort  Cardiovascular: normal rate, regular rhythm, no murmur, gallop, rub. Abdomen: Soft, nontender, nondistended, normal bowel sounds, no hepatomegaly or splenomegaly  Neurologic:  Right UE and Lower extremity power 2/5, There are no new focal motor or sensory deficits, normal muscle tone and bulk, no abnormal sensation, normal speech, cranial nerves II through XII grossly intact  Skin: No gross lesions, rashes, bruising or bleeding on exposed skin area  Extremities:  peripheral pulses palpable, no pedal edema or calf pain with palpation  Psych:normal affect    Investigations:      Laboratory Testing:  Recent Results (from the past 24 hour(s))   POC Glucose Fingerstick    Collection Time: 05/07/22  9:12 PM   Result Value Ref Range    POC Glucose 139 (H) 65 - 105 mg/dL   POC Glucose Fingerstick    Collection Time: 05/08/22  6:28 AM   Result Value Ref Range    POC Glucose 96 65 - 105 mg/dL   POC Glucose Fingerstick    Collection Time: 05/08/22 10:34 AM   Result Value Ref Range    POC Glucose 232 (H) 65 - 105 mg/dL       Imaging/Diagonstics:  Recent data reviewed    Assessment :      Primary Problem  Right sided weakness    Active Hospital Problems    Diagnosis Date Noted    Right sided weakness [R53.1] 04/17/2022    HTN (hypertension) [I10] 07/19/2014       Plan:     1. Right-sided weakness MRI brain negative for stroke- PT OT  2. Type 2 diabetes- continue Lantus, sliding scale  3.  History of prior strokes-continue aspirin, Plavix, statin  4. History of seizure disorder- continue Keppra, Topamax, Depakote  5. Hyperlipidemia-patient on statin, fenofibrate  6. H/o Hypertension-currently controlled without meds  7. Recent extubation on 4/19    DVT prophylaxis-Lovenox    May 1  Hypoglycemia noted  Reduce lantus to 15 qhs  5/2   Reducing dose of insulin to 10 units, reducing sliding scale to low-dose  Blood pressure is controlled, off antihypertensives    5/8  Patient blood sugar better controlled after adjusting insulin  Blood pressure controlled  No new complaints  Consultations:   IP CONSULT TO DIETITIAN  IP CONSULT TO SOCIAL WORK  IP CONSULT TO INTERNAL MEDICINE  INPATIENT CONSULT TO ORTHOTIST/PROSTHETIST      Johana Laws MD  5/8/2022  5:04 PM    Copy sent to Dr. William Li MD    Please note that this chart was generated using voice recognition Dragon dictation software. Although every effort was made to ensure the accuracy of this automated transcription, some errors in transcription may have occurred.

## 2022-05-08 NOTE — PLAN OF CARE
Problem: Skin/Tissue Integrity  Goal: Absence of new skin breakdown  Description: 1. Monitor for areas of redness and/or skin breakdown  2. Assess vascular access sites hourly  3. Every 4-6 hours minimum:  Change oxygen saturation probe site  4. Every 4-6 hours:  If on nasal continuous positive airway pressure, respiratory therapy assess nares and determine need for appliance change or resting period. Outcome: Progressing  Note:  Skin assessment completed this shift. Nutrition and Hydration status assessed with adequate intake. Harrison Score as charted. Bilateral heels remain elevated on pillows throughout the shift. Patient tolerates repositioning by staff at least every 2 hours. Patient able to reposition self for comfort and to prevent breakdown. Patient verbalizes understanding of pressure ulcer prevention measures. Skin integrity maintained. No new skin breakdown noted. Skin to high risk pressure areas including coccyx and heels are clear. Problem: Pain  Goal: Verbalizes/displays adequate comfort level or baseline comfort level  Outcome: Progressing  Verbalizes/displays adequate comfort level or baseline comfort level:   Encourage patient to monitor pain and request assistance   Administer analgesics based on type and severity of pain and evaluate response   Consider cultural and social influences on pain and pain management   Assess pain using appropriate pain scale   Implement non-pharmacological measures as appropriate and evaluate response  Note: Pain assessment completed. Pt. able to rest.  Patient medicated with Tylenol for pain this shift as ordered. Patient relates a tolerable level of discomfort with the current medication. Pt. Repositions per self for comfort. Nonverbal cues indicate pain relief. Pt. Rests quietly with eyes closed after pain medication administration. Respirations easy and unlabored. Appears free from distress.

## 2022-05-09 LAB
GLUCOSE BLD-MCNC: 107 MG/DL (ref 65–105)
GLUCOSE BLD-MCNC: 127 MG/DL (ref 65–105)
GLUCOSE BLD-MCNC: 151 MG/DL (ref 65–105)
GLUCOSE BLD-MCNC: 153 MG/DL (ref 65–105)

## 2022-05-09 PROCEDURE — 97110 THERAPEUTIC EXERCISES: CPT

## 2022-05-09 PROCEDURE — 97535 SELF CARE MNGMENT TRAINING: CPT

## 2022-05-09 PROCEDURE — 6370000000 HC RX 637 (ALT 250 FOR IP): Performed by: STUDENT IN AN ORGANIZED HEALTH CARE EDUCATION/TRAINING PROGRAM

## 2022-05-09 PROCEDURE — 97116 GAIT TRAINING THERAPY: CPT

## 2022-05-09 PROCEDURE — 97530 THERAPEUTIC ACTIVITIES: CPT

## 2022-05-09 PROCEDURE — 1180000000 HC REHAB R&B

## 2022-05-09 PROCEDURE — 97129 THER IVNTJ 1ST 15 MIN: CPT

## 2022-05-09 PROCEDURE — 97130 THER IVNTJ EA ADDL 15 MIN: CPT

## 2022-05-09 PROCEDURE — 6370000000 HC RX 637 (ALT 250 FOR IP): Performed by: PHYSICAL MEDICINE & REHABILITATION

## 2022-05-09 PROCEDURE — 82947 ASSAY GLUCOSE BLOOD QUANT: CPT

## 2022-05-09 PROCEDURE — 99231 SBSQ HOSP IP/OBS SF/LOW 25: CPT | Performed by: PHYSICAL MEDICINE & REHABILITATION

## 2022-05-09 PROCEDURE — 6360000002 HC RX W HCPCS: Performed by: STUDENT IN AN ORGANIZED HEALTH CARE EDUCATION/TRAINING PROGRAM

## 2022-05-09 PROCEDURE — 6370000000 HC RX 637 (ALT 250 FOR IP): Performed by: INTERNAL MEDICINE

## 2022-05-09 PROCEDURE — 99231 SBSQ HOSP IP/OBS SF/LOW 25: CPT | Performed by: INTERNAL MEDICINE

## 2022-05-09 RX ADMIN — INSULIN LISPRO 1 UNITS: 100 INJECTION, SOLUTION INTRAVENOUS; SUBCUTANEOUS at 21:03

## 2022-05-09 RX ADMIN — POLYETHYLENE GLYCOL 3350 17 G: 17 POWDER, FOR SOLUTION ORAL at 07:16

## 2022-05-09 RX ADMIN — POTASSIUM CHLORIDE 20 MEQ: 20 TABLET, EXTENDED RELEASE ORAL at 07:16

## 2022-05-09 RX ADMIN — DIVALPROEX SODIUM 500 MG: 500 TABLET, EXTENDED RELEASE ORAL at 07:18

## 2022-05-09 RX ADMIN — ENOXAPARIN SODIUM 40 MG: 100 INJECTION SUBCUTANEOUS at 07:15

## 2022-05-09 RX ADMIN — GABAPENTIN 600 MG: 300 CAPSULE ORAL at 14:27

## 2022-05-09 RX ADMIN — FENOFIBRATE 160 MG: 160 TABLET ORAL at 07:16

## 2022-05-09 RX ADMIN — INSULIN GLARGINE 10 UNITS: 100 INJECTION, SOLUTION SUBCUTANEOUS at 21:02

## 2022-05-09 RX ADMIN — ASPIRIN 81 MG: 81 TABLET, COATED ORAL at 07:16

## 2022-05-09 RX ADMIN — DIVALPROEX SODIUM 500 MG: 500 TABLET, EXTENDED RELEASE ORAL at 21:02

## 2022-05-09 RX ADMIN — LEVETIRACETAM 1000 MG: 500 TABLET, FILM COATED ORAL at 07:16

## 2022-05-09 RX ADMIN — GABAPENTIN 600 MG: 300 CAPSULE ORAL at 07:16

## 2022-05-09 RX ADMIN — LEVETIRACETAM 1000 MG: 500 TABLET, FILM COATED ORAL at 21:02

## 2022-05-09 RX ADMIN — TOPIRAMATE 25 MG: 25 TABLET, FILM COATED ORAL at 21:02

## 2022-05-09 RX ADMIN — TOPIRAMATE 25 MG: 25 TABLET, FILM COATED ORAL at 07:17

## 2022-05-09 RX ADMIN — GABAPENTIN 600 MG: 300 CAPSULE ORAL at 21:01

## 2022-05-09 RX ADMIN — PANTOPRAZOLE SODIUM 40 MG: 40 TABLET, DELAYED RELEASE ORAL at 05:58

## 2022-05-09 RX ADMIN — ATORVASTATIN CALCIUM 40 MG: 40 TABLET, FILM COATED ORAL at 21:02

## 2022-05-09 RX ADMIN — CLOPIDOGREL BISULFATE 75 MG: 75 TABLET ORAL at 07:16

## 2022-05-09 ASSESSMENT — PAIN SCALES - GENERAL: PAINLEVEL_OUTOF10: 0

## 2022-05-09 NOTE — PROGRESS NOTES
Physical Medicine & Rehabilitation  Progress Note      Subjective:      80year-old female with R sided weakness. Patient is well, and has had no acute complaints or problems    ROS:  Denies fevers, chills, sweats. No chest pain, palpitations, lightheadedness. Denies coughing, wheezing or shortness of breath. Denies abdominal pain, nausea, diarrhea or constipation. No new areas of joint pain. Denies new areas of numbness or weakness. Denies new anxiety or depression issues. No new skin problems. Rehabilitation:   Progressing in therapies. PT:  Restrictions/Precautions: Fall Risk,General Precautions,Up as Tolerated  Other position/activity restrictions: SBP <180   Transfers  Sit to Stand: Modified independent  Stand to sit: Modified independent  Bed to Chair: Supervision  Stand Pivot Transfers: Supervision  Squat Pivot Transfers: Minimal Assistance  Comment: Pt continues to progress requiring less cues from therapist. Demo's good safety with t/fs. Ambulation  Surface: level tile  Device: Rolling Walker  Other Apparatus: Right,AFO  Assistance: Stand by [employer]  Quality of Gait: Patient able to advance R LE with AFO and reports improved R knee support. Pt is improving with R knee stability during gait. Gait Deviations: Slow Irene  Distance: 200ft  Comments: Pt demo's improved endurance this session, ambulating further distances with less rest breaks on multiple surfaces. More Ambulation?: Yes    Transfers  Sit to Stand: Modified independent  Stand to sit: Modified independent  Bed to Chair: Supervision  Stand Pivot Transfers: Supervision  Squat Pivot Transfers: Minimal Assistance  Comment: Pt continues to progress requiring less cues from therapist. Demo's good safety with t/fs.      Ambulation  Surface: level tile  Device: Rolling Walker  Other Apparatus: Right,AFO  Assistance: Stand by [employer]  Quality of Gait: Patient able to advance R LE with AFO and reports improved R knee support. Pt is improving with R knee stability during gait. Gait Deviations: Slow Irene  Distance: 200ft  Comments: Pt demo's improved endurance this session, ambulating further distances with less rest breaks on multiple surfaces. More Ambulation?: Yes    Surface: level tile  Ambulation  Surface: level tile  Device: Rolling Walker  Other Apparatus: Right,AFO  Assistance: Stand by [employer]  Quality of Gait: Patient able to advance R LE with AFO and reports improved R knee support. Pt is improving with R knee stability during gait. Gait Deviations: Slow Irene  Distance: 200ft  Comments: Pt demo's improved endurance this session, ambulating further distances with less rest breaks on multiple surfaces. More Ambulation?: Yes    OT:  ADL  Feeding: Setup  Feeding Skilled Clinical Factors: Patient reports use of non-dominant L hand for self-feeding currently  Grooming: Stand by assistance  Grooming Skilled Clinical Factors: While seated in w/c at sink  UE Bathing: Moderate assistance  UE Bathing Skilled Clinical Factors: Assist for L UE and R UE positioning while seated on tub transfer bench in shower  LE Bathing: Moderate assistance  LE Bathing Skilled Clinical Factors: Assist for buttocks and perineal area; Minimal assist for standing balance  UE Dressing: Minimal assistance  UE Dressing Skilled Clinical Factors: Assist bra fastening and threading L UE  LE Dressing: Maximum assistance  LE Dressing Skilled Clinical Factors: Assist to thread R LE, don TEDs & pull pants over hips  Toileting: Maximum assistance  Toileting Skilled Clinical Factors: Assist for clothing management; personal hygiene while seated  Additional Comments: OT facilitated patient engagement in showering routine including bathing, dressing, toileting, and grooming tasks. Patient demonstrates Fair compensatory strategies for self-care with further education warranted to maximize safety and IND.          Balance  Sitting Balance: Stand by assistance  Standing Balance: Minimal assistance            Bed mobility  Rolling to Left: Stand by assistance  Rolling to Right: Stand by assistance  Supine to Sit: Stand by assistance  Sit to Supine: Stand by assistance  Scooting: Stand by assistance  Bed Mobility Comments: PT mat, wedge, 3 pillows. Transfers  Stand Pivot Transfers: 2 Person assistance,Moderate assistance  Sit to stand: Moderate assistance  Stand to sit: Moderate assistance  Transfer Comments: with Minimal verbal cues for hand placement and safety   Toilet Transfers  Toilet - Technique: Stand pivot,To right,To left  Equipment Used: Raised toilet seat with rails  Toilet Transfer: 2 Person assistance,Moderate assistance  Toilet Transfers Comments: with Minimal verbal cues for hand placement and safety     Shower Transfers  Shower - Transfer From: Wheelchair  Shower - Transfer Type: To and From  Shower - Transfer To: Transfer tub bench  Shower - Technique: Stand pivot,To right,To left  Shower Transfers: 2 Person assistance,Moderate assistance       SPEECH:  Subjective: [x]? Alert     [x]? Cooperative     []? Confused     []? Agitated    []? Lethargic        Objective/Assessment:  Attention: Sustained throughout, distractions minimized.      Orientation: Oriented x4.      Recall: Image retention (15 min delay) - 83% accuracy julienne, 100% cued.      Organization: n/a     Problem Solving/Reasoning: Time management, story problems task- 30% accuracy julienne, increasing to 80% c MAX cues. Pt. expressing frustration with task and repeatedly stating \"I'm not good at [facility]". Frequent encouragement provided to attempt responses. Education provided re: rationale for task.   Pt. Verbalized understanding, \"I know why we're doing this, I just don't like it and I'm not good at it\".       Other: Pt demo occasional word retrieval deficits in conversation; able to utilize circumlocution with verbal cues from ST.          Objective:  /77   Pulse 66   Temp 98.1 °F (36.7 °C)   Resp 16   Ht 5' 5\" (1.651 m)   Wt 134 lb (60.8 kg)   SpO2 99%   BMI 22.30 kg/m²       GEN: well developed, well nourished, NAD  HEENT: NCAT, PERRL, EOMI, mucous membranes pink and moist  CV: RRR, no murmurs, rubs or gallops  PULM: CTAB, no rales or rhonchi. Respirations WNL and unlabored  ABD: soft, NT, ND, BS+ and equal  NEURO: A&O x3. Sensation intact to light touch. MSK: Functional ROM all extremities . Strength 4+/5 key muscles all extremities. EXTREMITIES: No calf tenderness to palpation bilaterally. No edema BLEs  SKIN: warm dry and intact with good turgor  PSYCH: appropriately interactive. Affect WNL. Diagnostics:     CBC: No results for input(s): WBC, RBC, HGB, HCT, MCV, RDW, PLT in the last 72 hours. BMP: No results for input(s): NA, K, CL, CO2, PHOS, BUN, CREATININE, CA, GLUCOSE in the last 72 hours. BNP: No results for input(s): BNP in the last 72 hours. PT/INR: No results for input(s): PROTIME, INR in the last 72 hours. APTT: No results for input(s): APTT in the last 72 hours. CARDIAC ENZYMES: No results for input(s): CKMB, CKMBINDEX, TROPONINT in the last 72 hours. Invalid input(s): CKTOTAL;3 troponins   FASTING LIPID PANEL:  Lab Results   Component Value Date    CHOL 202 (H) 04/17/2022    HDL 55 04/17/2022    TRIG 164 (H) 04/17/2022     LIVER PROFILE: No results for input(s): AST, ALT, ALB, BILIDIR, BILITOT, ALKPHOS in the last 72 hours.      Current Medications:   Current Facility-Administered Medications: insulin glargine (LANTUS) injection vial 10 Units, 10 Units, SubCUTAneous, Nightly  insulin lispro (HUMALOG) injection vial 0-6 Units, 0-6 Units, SubCUTAneous, TID WC  insulin lispro (HUMALOG) injection vial 0-3 Units, 0-3 Units, SubCUTAneous, Nightly  enoxaparin (LOVENOX) injection 40 mg, 40 mg, SubCUTAneous, Daily  ondansetron (ZOFRAN-ODT) disintegrating tablet 4 mg, 4 mg, Oral, Q8H PRN **OR** [DISCONTINUED] ondansetron (ZOFRAN) injection 4 mg, 4 mg, IntraVENous, Q6H PRN  aspirin EC tablet 81 mg, 81 mg, Oral, Daily  atorvastatin (LIPITOR) tablet 40 mg, 40 mg, Oral, Nightly  albuterol sulfate  (90 Base) MCG/ACT inhaler 6 puff, 6 puff, Inhalation, Q6H PRN  clopidogrel (PLAVIX) tablet 75 mg, 75 mg, Oral, Daily  divalproex (DEPAKOTE ER) extended release tablet 500 mg, 500 mg, Oral, BID  fenofibrate (TRIGLIDE) tablet 160 mg, 160 mg, Oral, Daily  gabapentin (NEURONTIN) capsule 600 mg, 600 mg, Oral, TID  levETIRAcetam (KEPPRA) tablet 1,000 mg, 1,000 mg, Oral, BID  pantoprazole (PROTONIX) tablet 40 mg, 40 mg, Oral, QAM AC  potassium chloride (KLOR-CON M) extended release tablet 20 mEq, 20 mEq, Oral, Daily with breakfast  topiramate (TOPAMAX) tablet 25 mg, 25 mg, Oral, BID  acetaminophen (TYLENOL) tablet 650 mg, 650 mg, Oral, Q4H PRN  polyethylene glycol (GLYCOLAX) packet 17 g, 17 g, Oral, Daily  senna (SENOKOT) tablet 17.2 mg, 2 tablet, Oral, Daily PRN  bisacodyl (DULCOLAX) suppository 10 mg, 10 mg, Rectal, Daily PRN      Impression/Plan:   Impaired ADLs, gait, and mobility due to:      1. Right-sided weakness:  Unknown etiology - possibly psychosomatic. PT/OT for gait, mobility, strengthening, endurance, ADLs, and self care. Plan for outpatient EMG. On aspirin, plavix. On gabapentin. orthotist for evaluation for right AFO for home - will be here 5/10. 2. Cognitive impairment:  SLP treating. 3. Anemia: Hemoglobin stable. Monitoring. 4. Type 2 Diabetes:  Hemoglobin A1c 10.3 on 4/17. On Lantus nightly and sliding scale (IM decreased both medications on 5/2)  5. HTN:  Stable without medication  6. HLD: On atorvastatin, fenofibrate  7. History of CVA:  On aspirin, plavix, atorvastatin, fenofibrate  8. Seizure: On Keppra and Depakote  9. GERD: On pantoprazole  10. Hypokalemia:  Improved. On KCl repletion daily. Monitoring.   11. Bowel Management: Miralax daily, senokot prn, dulcolax prn.   12. DVT Prophylaxis:  low molecular weight heparin, SCD's while in bed and ALLIE's while in bed  13. Internal medicine for medical management      Electronically signed by Harley Ayala MD on 5/9/2022 at 9:27 AM      This note is created with the assistance of a speech recognition program.  While intending to generate a document that actually reflects the content of the visit, the document can still have some errors including those of syntax and sound a like substitutions which may escape proof reading. In such instances, actual meaning can be extrapolated by contextual diversion.

## 2022-05-09 NOTE — PROGRESS NOTES
[place] [id number] Internal Medicine    CONSULTATION / HISTORY AND PHYSICAL EXAMINATION            Date:   5/9/2022  Patient name:  Leo Hobbs  Date of admission:  4/28/2022 12:10 PM  MRN:   [id number]  Account:  [de-identified]  YOB: 1950  PCP:    Randy Altamirano MD  Room:   9726/5801-12  Code Status:    Full Code    Physician Requesting Consult: Robinson Cuadra MD    Reason for Consult:  Medical management    Chief Complaint:     No chief complaint on file. Right-sided weakness    History Obtained From:     Patient, EMR, nursing staff    History of Present Illness:     81-year-old female with history of questionable seizure disorder, chronic right hemiparesis numbness of right leg migraines admitted on 4/17 after being found altered, concern for stroke  Started on IV tPA but then became obtunded and flaccid and tPA held due to concern for hemorrhagic conversion  She was intubated, stat CT done which was unremarkable subsequently tPA was resumed and completed. MRI brain negative for stroke  Episode of flaccidity concerning for seizure hence loaded with IV Keppra  Extubated 4/19  Persistent right-sided weakness, fluctuating, neurology considering psychosomatic symptoms  Patient is on aspirin Plavix    Diabetes   Diagnosed within last year  Modifying factors on med:   Severity:controlled   Associated signs and symtoms: neuropathy/ckd/ CAD.    aggravated with sugar diet and better with low sugar diet      5/2   Patient again had episode of hypoglycemia in the morning, orange juice was provided  Blood sugar went to 68      Past Medical History:     Past Medical History:   Diagnosis Date    Cerebral artery occlusion with cerebral infarction ([address] 2008, 2014    CVA (cerebral infarction)     Diabetes mellitus ([address]     Hyperlipidemia     Hypertension     Seizures ([facility] 04/2022        Past Surgical History:     Past Surgical History:   Procedure Laterality Date    APPENDECTOMY      BRONCHOSCOPY DIAGNOSTIC  7/24/2014         ENDOSCOPY, COLON, DIAGNOSTIC      HYSTERECTOMY          Medications Prior to Admission:     Prior to Admission medications    Medication Sig Start Date End Date Taking? Authorizing Provider   divalproex (DEPAKOTE ER) 500 MG extended release tablet Take 1 tablet by mouth in the morning and at bedtime 4/26/22   Nel Johns MD   levETIRAcetam (KEPPRA) 1000 MG tablet Take 1 tablet by mouth 2 times daily 4/21/22   Gladys Valdez MD   topiramate (TOPAMAX) 25 MG tablet Take 1 tablet by mouth 2 times daily 4/21/22   Gladys Valdez MD   clopidogrel (PLAVIX) 75 MG tablet Take 75 mg by mouth nightly    Historical Provider, MD   gabapentin (NEURONTIN) 600 MG tablet Take 600 mg by mouth 3 times daily. Historical Provider, MD   potassium chloride (KLOR-CON M) 20 MEQ extended release tablet Take 20 mEq by mouth 2 times daily    Historical Provider, MD   fenofibrate (TRIGLIDE) 160 MG tablet Take 160 mg by mouth daily    Historical Provider, MD   Pantoprazole Sodium (PROTONIX PO) Take  by mouth. Historical Provider, MD   acetaminophen (TYLENOL) 160 MG/5ML solution Take 20.3 mLs by mouth every 4 hours as needed for Fever. 7/28/14   Amy Nina, DO   albuterol (PROVENTIL HFA;VENTOLIN HFA) 108 (90 BASE) MCG/ACT inhaler Inhale 6 puffs into the lungs every 6 hours as needed for Wheezing. 7/28/14   Amy Nina, DO   glucagon, rDNA, 1 MG SOLR injection Inject 1 mg into the muscle as needed for Low blood sugar (Blood glucose less than 70 mg/dL and patient NOT ALERT or NPO and does not have IV access. ). 7/28/14   Amy Nina, DO   glucose (GLUTOSE) 40 % GEL Take 15 g by mouth as needed. 7/28/14   Amy Nina, DO   insulin glargine (LANTUS) 100 UNIT/ML injection vial Inject 10 Units into the skin nightly. 7/28/14   Amy Nina, DO   insulin lispro (HUMALOG) 100 UNIT/ML injection vial Inject 0-12 Units into the skin every 6 hours.  7/28/14   Dolores Wood DO Corin   niacin (NIASPAN) 500 MG CR tablet Take 1 tablet by mouth nightly. 14   Shashank Cosme DO   potassium chloride (KAYCIEL) 20 MEQ/15ML (10%) solution Take 15 mLs by mouth daily. 14   Shashank Cosme DO        Allergies:     Patient has no known allergies. Social History:     Tobacco:    reports that she has never smoked. She has never used smokeless tobacco.  Alcohol:      reports no history of alcohol use. Drug Use:  reports no history of drug use. Family History:     History reviewed. No pertinent family history. Review of Systems:     Positive and Negative as described in HPI. CONSTITUTIONAL:  negative for fevers, chills, sweats, fatigue, weight loss  HEENT:  negative for vision, hearing changes, runny nose, throat pain  RESPIRATORY:  negative for shortness of breath, cough, congestion, wheezing. CARDIOVASCULAR:  negative for chest pain, palpitations.   GASTROINTESTINAL:  negative for nausea, vomiting, diarrhea, constipation, change in bowel habits, abdominal pain   GENITOURINARY:  negative for difficulty of urination, burning with urination, frequency   INTEGUMENT:  negative for rash, skin lesions, easy bruising   HEMATOLOGIC/LYMPHATIC:  negative for swelling/edema   ALLERGIC/IMMUNOLOGIC:  negative for urticaria , itching  ENDOCRINE:  negative increase in drinking, increase in urination, hot or cold intolerance  MUSCULOSKELETAL:  negative joint pains, muscle aches, swelling of joints  NEUROLOGICAL:  Positive for right sided weakness, negative for headaches, dizziness, lightheadedness, numbness, pain, tingling extremities      Physical Exam:     /77   Pulse 66   Temp 98.1 °F (36.7 °C)   Resp 16   Ht 5' 5\" (1.651 m)   Wt 134 lb (60.8 kg)   SpO2 99%   BMI 22.30 kg/m²   Temp (24hrs), Av °F (36.7 °C), Min:97.9 °F (36.6 °C), Max:98.1 °F (36.7 °C)    Recent Labs     22  1034 22  1943 22  0534 22  1142   POCGLU 232* 166* 127* 107*     No intake or output data in the 24 hours ending 05/09/22 1204    General Appearance:  alert, well appearing, and in no acute distress  Head:  normocephalic, atraumatic. Eye: no icterus, redness, pupils equal and reactive, extraocular eye movements intact, conjunctiva clear  Ear: normal external ear, no discharge, hearing intact  Nose:  no drainage noted  Mouth: mucous membranes moist  Neck: supple, no carotid bruits, thyroid not palpable  Lungs: Bilateral equal air entry, clear to ausculation, no wheezing, rales or rhonchi, normal effort  Cardiovascular: normal rate, regular rhythm, no murmur, gallop, rub. Abdomen: Soft, nontender, nondistended, normal bowel sounds, no hepatomegaly or splenomegaly  Neurologic:  Right UE and Lower extremity power 2/5, There are no new focal motor or sensory deficits, normal muscle tone and bulk, no abnormal sensation, normal speech, cranial nerves II through XII grossly intact  Skin: No gross lesions, rashes, bruising or bleeding on exposed skin area  Extremities:  peripheral pulses palpable, no pedal edema or calf pain with palpation  Psych:normal affect    Investigations:      Laboratory Testing:  Recent Results (from the past 24 hour(s))   POC Glucose Fingerstick    Collection Time: 05/08/22  7:43 PM   Result Value Ref Range    POC Glucose 166 (H) 65 - 105 mg/dL   POC Glucose Fingerstick    Collection Time: 05/09/22  5:34 AM   Result Value Ref Range    POC Glucose 127 (H) 65 - 105 mg/dL   POC Glucose Fingerstick    Collection Time: 05/09/22 11:42 AM   Result Value Ref Range    POC Glucose 107 (H) 65 - 105 mg/dL       Imaging/Diagonstics:  Recent data reviewed    Assessment :      Primary Problem  Right sided weakness    Active Hospital Problems    Diagnosis Date Noted    Right sided weakness [R53.1] 04/17/2022    HTN (hypertension) [I10] 07/19/2014       Plan:     1. Right-sided weakness MRI brain negative for stroke- PT OT  2.  Type 2 diabetes- continue Lantus, sliding scale  3. History of prior strokes-continue aspirin, Plavix, statin  4. History of seizure disorder- continue Keppra, Topamax, Depakote  5. Hyperlipidemia-patient on statin, fenofibrate  6. H/o Hypertension-currently controlled without meds  7. Recent extubation on 4/19    DVT prophylaxis-Lovenox    May 1  Hypoglycemia noted  Reduce lantus to 15 qhs  5/2   Reducing dose of insulin to 10 units, reducing sliding scale to low-dose  Blood pressure is controlled, off antihypertensives    5/8  Patient blood sugar better controlled after adjusting insulin  Blood pressure controlled  No new complaints    5/9  Patient reports no new complaints. Engaging with physical therapy. Labs and vitals reviewed. No new issues. Continue with current care. Consultations:   Jean Ocasio  INPATIENT CONSULT TO ORTHOTIST/PROSTHETIST      Randi Olmedo MD  5/9/2022  12:04 PM    Copy sent to Dr. Michelle Luque MD    Please note that this chart was generated using voice recognition Dragon dictation software. Although every effort was made to ensure the accuracy of this automated transcription, some errors in transcription may have occurred.

## 2022-05-09 NOTE — PROGRESS NOTES
Physical Therapy  Facility/Department: [facility] ACUTE REHAB  Rehabilitation Physical Therapy    NAME: René Thornton  : 1950 (80 y.o.)  MRN: [id number]  CODE STATUS: Full Code    Date of Service: 22      Past Medical History:   Diagnosis Date    Cerebral [facility] occlusion with [facility] infarction ([facility] ,     CVA (cerebral infarction)     Diabetes mellitus ([facility]     Hyperlipidemia     Hypertension     Seizures ([facility] 2022     Past Surgical History:   Procedure Laterality Date    APPENDECTOMY      BRONCHOSCOPY DIAGNOSTIC  2014         ENDOSCOPY, COLON, DIAGNOSTIC      HYSTERECTOMY         Chart Reviewed: Yes  Additional Pertinent Hx: René Thornton is a 80 y.o. female with history of CVA, HTN, and HLD admitted to [address] [place] [address] on 2022. She initially presented with acute-onset right hemiparesis. NIHSS 21. She was given tPA by the mobile stroke unit, but infusion was stopped due to worsening of symptoms and concern for possible hemorrhagic conversion. She was intubated prior to arrival to the ED. CT head showed no acute intracranial abnormality, and tPA was restarted. She was also loaded with keppra due to concern for seizures. MRI brain showed no acute intracranial abnormality. Extubated 22. [place] Per notes, she had a similar episodes in  and . Per Neuro notes- Reported history of chronic infarcts with residual right sided weakness but not clearly evident on brain imaging. Neuro recommending EMG as outpatient. Pt admotted to rehab unit on 22. Family / Caregiver Present: No  Diagnosis: Right side weakness    Restrictions:  Restrictions/Precautions: Fall Risk;General Precautions; Up as Tolerated  Position Activity Restriction  Other position/activity restrictions: SBP <180     SUBJECTIVE  Subjective: Patient stating she was more fatigued in PM    OBJECTIVE  Functional Mobility  Bed mobility  Sit to Supine: Stand by assistance  Transfers  Sit to Stand: Modified independent  Stand to sit: Modified independent    Environmental Mobility  Ambulation  Surface: level tile  Device: Rolling Walker  Other Apparatus: Right;AFO  Assistance: Stand by assistance;Supervision  Quality of Gait: Patient able to advance R LE with AFO and reports improved R knee support. Pt is improving with R knee stability during gait. Gait Deviations: Slow Irene  Distance: 200ft  Ambulation 2  Surface - 2: level tile  Device 2: No device  Assistance 2: Minimal assistance;Contact guard assistance  Quality of Gait 2: Trialed gait with AFO doffed; Pt demos decrease DF, compensates by marching R LE up. Decrease step height/length. Gait Deviations: Decreased step height;Decreased step length; Slow Irene; Shuffles  Distance: 200ft   Ambulation 3  Surface - 3: ramp;level tile  Device 3: Rolling Walker  Assistance 3: Minimal assistance  Gait Deviations: Slow Irene  Distance: 90ft; 152ft   Stairs/Curb  Stairs?: Yes  Stairs  # Steps : 15  Stairs Height:  (4\"/6\")  Rails: Bilateral  Device: Rolling walker  Other Apparatus: Right;AFO  Assistance: Stand by assistance  Comment: Cues for step to pattern and safety. PT Exercises  A/AROM Exercises: seated B LE exercises x20 reps with #2 and green tband. R AFO doffed for exercises   Circulation/Endurance Exercises: standing UBE 5 minutes FWD/BWD with seated rest break after FWD; NuStep L3 x15 minutes   Dynamic Standing Balance Exercises: standing B LE exercises x10 reps with RW and mirror used for postural feedback. Patient switched laudry from washer to dryer with no UE support. ASSESSMENT       Activity Tolerance  Activity Tolerance: Patient tolerated treatment well    Assessment  Assessment: Pt performed curb step training today with RW and completed with no difficulites. Pt continues to meet goals, however, would benefit from cont skilled PT to address strength, balance, and functional mobility deficits.   Discharge Recommendations: Patient would benefit from continued therapy after discharge;Home with assist PRN  PT Equipment Recommendations  Equipment Needed:  (TBD)    CLINICAL IMPRESSION   Pt performed curb step training today with RW and completed with no difficulites. Pt continues to meet goals, however, would benefit from cont skilled PT to address strength, balance, and functional mobility deficits. GOALS  Patient Goals   Patient goals : To get better  Short Term Goals  Time Frame for Short term goals: 10 days  Short term goal 1: Pt to perform bed mobility from flat surface independently  Short term goal 2: Demonstrate functional transfers min A  Short term goal 3: Pt to ambulate distance of 100 ft with rolling walker at mod A  Short term goal 4: Pt able to improve sitting balance at EOB/EOM to good to be alen to eat meals. Short term goal 5: Demonstrate dynamic standing balance of fiar - to decrease fall risk  Short Term Goal 6: Pt able to  propel w/c distance of 100 to 150 ft, CGA, level surfaces. Long Term Goals  Time Frame for Long term goals : By DC  Long term goal 1: Pt able to perform transfers at mod-I  Long term goal 2: Pt able to ambulate distance of 100 to 150 ft with appropriate device, at min A. with assistive device. Long term goal 3: Pt able to perform a curb step with a rolling wwalker/UE support, mod A  Long term goal 4: Pt able to propel w/c distance of 150 ft , level surfaces, including turns, mod-I  Long term goal 5: Pt  able to improve standing balance with assistive device to fair to reduce fall risk  Long term goal 6: Improve 2MWT distance to 80 ft to improve function and gait speed. Long term goal 7: Improve PASS score to 25/36 to improve overall function. PLAN OF CARE  Plan  Plan:  minutes of therapy at least 5 out of 7 days a week  Current Treatment Recommendations: Strengthening;ROM;Balance training;Functional mobility training;Gait training;Neuromuscular re-education;Transfer training; Endurance training; Wheelchair mobility training;Stair training;Home exercise program;Safety education & training;Patient/Caregiver education & training;Equipment evaluation, education, & procurement; Modalities  Safety Devices  Type of Devices: Call light within reach;Gait belt;Patient at risk for falls; Bed alarm in place; All fall risk precautions in place  Restraints  Restraints Initially in Place: No       05/09/22 0904 05/09/22 1534   PT Individual Minutes   Time In 0806 1448   Time Out 0903 1523   Minutes 57 35     Electronically signed by Eliud Sanchez PTA on 5/9/22 at 3:41 PM EDT

## 2022-05-09 NOTE — PROGRESS NOTES
Occupational Therapy  Facility/Department: [facility]  Rehabilitation Occupational Therapy Daily Treatment Note    Date: 22  Patient Name: Leo Hobbs       Room: 2086/0860-13  MRN: [id number]  Account: [de-identified]   : 1950  (70 y.o.) Gender: female                    Past Medical History:  has a past medical history of Cerebral artery occlusion with cerebral infarction [facility]), CVA (cerebral infarction), Diabetes mellitus ([facility] Hyperlipidemia, Hypertension, and Seizures ([facility] Past Surgical History:   has a past surgical history that includes Appendectomy; Hysterectomy; Bronchoscopy diagnostic (2014); and Endoscopy, colon, diagnostic. Restrictions  Restrictions/Precautions: Fall Risk;General Precautions; Up as Tolerated  Other position/activity restrictions: SBP <180  Required Braces or Orthoses?: No    Subjective  Subjective: Pt agreeable to OT tx this date   Restrictions/Precautions: Fall Risk;General Precautions; Up as Tolerated        Pain Assessment  Pain Assessment: None - Denies Pain    Objective     Cognition  Overall Cognitive Status: WFL  Arousal/Alertness: Appropriate responses to stimuli  Following Commands:  Follows all commands without difficulty  Attention Span: Appears intact  Memory: Decreased recall of recent events  Safety Judgement: Decreased awareness of need for assistance;Decreased awareness of need for safety  Problem Solving: Good awareness of errors made;Assistance required to generate solutions  Insights: Fully aware of deficits  Initiation: Does not require cues  Sequencing: Does not require cues  Cognition Comment: demo good problem solving, memory, sequencing for adls, good adaptations to physical limitations  Orientation  Overall Orientation Status: Within Functional Limits  Orientation Level: Oriented X4         ADL  Feeding  Assistance Level: Modified independent  Skilled Clinical Factors: per pt report     Grooming/Oral Hygiene  Assistance Level: Supervision  Skilled Clinical Factors: standing at sink level     Upper Extremity Bathing  Assistance Level: Set-up  Skilled Clinical Factors: seated     Lower Extremity Bathing  Assistance Level: Supervision  Skilled Clinical Factors: completed lenore care only, declines BLEs    Upper Extremity Dressing  Assistance Level: Set-up  Skilled Clinical Factors: don bra and OH shirt     Lower Extremity Dressing  Assistance Level: Supervision  Skilled Clinical Factors: sits in chair to don pants over feet, sup to stand and pull up    Putting On/Taking Off Footwear  Assistance Level: Verbal cues  Skilled Clinical Factors: pt able to doff/xochitl TEDs and shoes with elastic laces using [employer] 4 tech     Toileting  Assistance Level: Supervision  Skilled Clinical Factors: seated weight shift for lenore hygine; GB for 0-1 UE support no LOB     Toilet Transfers  Technique:  (ambulating )  Equipment: Grab bars  Additional Factors: With handrails  Assistance Level: Supervision  Skilled Clinical Factors: Pt continues to demo G hand placement and no LOB noted      Functional Mobility  Activity: To/From bathroom;Transport items; Retrieve items; To/From therapy gym  Assistance Level: Contact guard assist  Skilled Clinical Factors: initally CGA progressing to SBA throughout AM ADLs for item retrieval and transport in prep for ADLs; PM: Pt engaged in ambulation, SBA around therapy gym, hallways, and kitchen locating gathering cones from various surface levels (I.E upper/lower cabinets, shelves, floor level, oven, refrigerator, and oven) to support ability to self correct should balance be lost, tolerance and endurance needed to engage in daily tasks safely and independently. Pt req 1 vc on taking a step towrard targetd cone to avoid excessive leaning preventing falls, Pt verbalized G undertanding and G carryover noted. Pt tolerated ~10-11mins with no RB needed and No LOB noted this date     Sit to Supine  Assistance Level:  Independent  Skilled Clinical Factors: HOB slightly elevated     Supine to Sit  Assistance Level: Independent  Skilled Clinical Factors: HOB slightly elevated     Scooting  Assistance Level: Independent  Skilled Clinical Factors: to EOB      OT Exercises  Exercise Treatment: AM: BUE exercises with 2# weight x15 reps x2 to support overall strength for optimal safety and independence during functional tasks. Pt req intermittent RB throughout. Resistive Exercises: PM: Red thera flex excerises (up/down/twist) and adjustable hand gripper on 2nd setting for RUE and 3rd setting for LUE exercises facilitated to support strength needed for ADLs and IADLs safely and indpendendently. (x15 reps x2)     Assessment  Assessment  Activity Tolerance: Patient tolerated treatment well  Discharge Recommendations: Home with assist PRN;Home with Home health OT  Safety Devices  Safety Devices in place: Yes  Type of devices: Patient at risk for falls;Call light within reach;Gait belt;Left in chair    Patient Education  Education  Education Given To: Patient  Education Provided: ADL Function;IADL Function; Safety;Transfer Training;Equipment;Home Exercise Program;Plan of Care; Mobility Training  Education Method: Demonstration;Verbal  Education Outcome: Demonstrated understanding;Verbalized understanding    Plan  Plan  Times per Week: 5-7  Times per Day: Twice a day  Current Treatment Recommendations: Strengthening;ROM;Balance training;Functional mobility training; Endurance training; Safety education & training;Patient/Caregiver education & training;Equipment evaluation, education, & procurement;Self-Care / ADL; Coordination training;Neuromuscular re-education;Modalities    Goals  Patient Goals   Patient goals : \"To end up gonig home and walk out of here and be able to be as normal as possible\"  complete own grocery shopping (will need assist with transportation),  shower standing- does not want to obtain shower seat.   able to write her checks to pay saurav.  Short Term Goals  Time Frame for Short term goals: One week:  5-8-22 all STG met  Short Term Goal 1: Patient will perform upper body bathing and dressing with SUP. Short Term Goal 2: Patient will perform lower body bathing and dressing with Minimal assist.  Short Term Goal 3: Patient will perform toileting tasks with Minimal assist.  Short Term Goal 4: Patient will perform stand pivot transfers during self-care with Minimal assist and Good safety. Short Term Goal 5: Patient will verbalize/demonstrate Good understanding of assistive equipment/durable medical equipment/modified techniques for increased safety and IND with self-care. Additional Goals?: Yes  Short Term Goal 6: Patient will verbalize/demonstrate Good understanding of modified techniques for R UE during self-care. Short Term Goal 7: Patient will actively participate in 30+ minutes of therapeutic exercise/functional activities to promote increased IND with self-care and mobility. Long Term Goals  Time Frame for Long term goals : By discharge  Long Term Goal 1: Patient will perform BADLs with modified IND and Good safety. Long Term Goal 2: Patient will perform stand pivot transfers during self-care with modified IND and Good safety. Long Term Goal 3: Patient will perform functional mobility during self-care at w/c level with modified IND. / modify goal: 5-8-22  mod indep functional mobility for ambulation during adls with good safety. Long Term Goal 4: Patient will verbalize/demonstrate Good understanding of Fall Prevention strategies during self-care to maximize safety and IND. Long Term Goal 5: Patient will verbalize/demonstrate Good understanding of home exercise program for R UE ROM, strengthening, and coordination as appropriate.   Long Term Goal 6: 9 hole peg test and dynamometer to be assessed for R UE as appropriate       05/09/22 1041 05/09/22 1417   OT Individual Minutes   Time In 4595 4946   Time Out 1142 1447   Minutes 61 30 Christianne Mcneil, OJ

## 2022-05-09 NOTE — PLAN OF CARE
Problem: Safety - Adult  Goal: Free from fall injury  5/9/2022 1443 by Kolby Farley RN  Outcome: Progressing  5/9/2022 0428 by Chayo Collins RN  Outcome: Progressing     Problem: Skin/Tissue Integrity  Goal: Absence of new skin breakdown  Description: 1. Monitor for areas of redness and/or skin breakdown  2. Assess vascular access sites hourly  3. Every 4-6 hours minimum:  Change oxygen saturation probe site  4. Every 4-6 hours:  If on nasal continuous positive airway pressure, respiratory therapy assess nares and determine need for appliance change or resting period.   5/9/2022 1443 by Kolby Farley RN  Outcome: Progressing  5/9/2022 0428 by Chayo Collins RN  Outcome: Progressing     Problem: Pain  Goal: Verbalizes/displays adequate comfort level or baseline comfort level  5/9/2022 1443 by Kolby Farley RN  Outcome: Progressing  5/9/2022 0428 by Chayo Collins RN  Outcome: Progressing

## 2022-05-09 NOTE — PROGRESS NOTES
Speech Language Pathology  Speech Language Pathology  [facility]    Cognitive Treatment Note    Date: 5/9/2022  Patients Name: Heddy Sacks  MRN: [id number]  Diagnosis:   Patient Active Problem List   Diagnosis Code    Altered mental status R41.82    Generalized nonconvulsive seizures ([facility] [address] G40.309    History of CVA (cerebrovascular accident) Z80.78    Noncompliance Z91.19    Hemiparesis ([facility] [address] G81.90    Respiratory failure ([address] J96.90    Upper respiratory infection J06.9    Acute respiratory failure ([facility] [address] J96.00    HTN (hypertension) I10    HLD (hyperlipidemia) E78.5    Hyperglycemia R73.9    Hypokalemia E87.6    Anemia due to acute blood loss D62    Right sided weakness R53.1    Cerebrovascular accident (CVA) ([facility] [address] I63.9    Tissue plasminogen activator (tPA) administered at other facility within 24 hours before current admission Z92.82       Pain: 0/10    Cognitive Treatment    Treatment time: 4485-8270    Subjective: [x] Alert [x] Cooperative     [] Confused     [] Agitated    [] Lethargic      Objective/Assessment:  Attention: Sustained throughout, distractions minimized. Orientation: Oriented x4. Recall: Image retention (15 min delay) - 83% accuracy julienne, 100% cued. Organization: n/a    Problem Solving/Reasoning: Time management, story problems task- 30% accuracy julienne, increasing to 80% c MAX cues. Pt. expressing frustration with task and repeatedly stating \"I'm not good at [facility]". Frequent encouragement provided to attempt responses. Education provided re: rationale for task. Pt. Verbalized understanding, \"I know why we're doing this, I just don't like it and I'm not good at it\". Other: Pt demo occasional word retrieval deficits in conversation; able to utilize circumlocution with verbal cues from [address]        Plan:  [x] Continue ST services    [] Discharge from ST:      Discharge recommendations: [] Inpatient Rehab   [] [place]   [] Outpatient Therapy  [] Follow up at trauma clinic   [] Other:       Treatment completed by:  Lencho Pratt M.A., CCC-SLP

## 2022-05-09 NOTE — CARE COORDINATION
Sapphire, from [place] called and states the clinician will be out tomorrow 5/10/22 to see this patient. PT notified.

## 2022-05-09 NOTE — PATIENT CARE CONFERENCE
[facility] Acute Inpatient Rehabilitation  TEAM CONFERENCE NOTE  Date: 5/10/22  Patient Name: Nika Khan       Room: 8941/2252-47  MRN: [id number]       : 1950  (70 y.o.)     Gender: female       Right sided weakness [R53.1]  Diagnosis: Right sided weakness     NURSING  Bladder  Always Continent  Bowel   Always Continent  Date of Last BM:   Intervention    Both Bowel & Bladder Program     Wounds/Incisions/Ulcers: No skin issues identified  Medication Education Program: Patient able to manage medications and being educated by nursing  Pain: no pain concerns to address    Fall Risk:  Falling star program initiated    PHYSICAL THERAPY  Bed mobility  Sit to Supine: Stand by assistance    Transfers:  Sit to Stand: Modified independent  Stand to sit: Modified independent  Bed to Chair: Supervision    Ambulation  Surface: level tile  Device: Rolling Walker  Other Apparatus: Right;AFO  Assistance: Stand by assistance;Supervision  Quality of Gait: Patient able to advance R LE with AFO and reports improved R knee support. Pt is improving with R knee stability during gait. Gait Deviations: Slow Irene  Distance: 200ft  Comments: Pt demo's improved endurance this session, ambulating further distances with less rest breaks on multiple surfaces. More Ambulation?: Yes  Ambulation 2  Surface - 2: level tile  Device 2: No device  Assistance 2: Minimal assistance;Contact guard assistance  Quality of Gait 2: Trialed gait with AFO doffed; Pt demos decrease DF, compensates by marching R LE up. Decrease step height/length. Gait Deviations: Decreased step height;Decreased step length; Slow Irene; Shuffles  Distance: 200ft   Ambulation 3  Surface - 3: ramp;level tile  Device 3: Rolling Walker  Assistance 3: Minimal assistance  Gait Deviations: Slow Irene  Distance: 90ft; 152ft     # Steps : 15  Stairs Height:  (4\"/6\")  Rails: Bilateral  Device: Rolling walker  Other Apparatus: Right;AFO  Assistance: Stand by assistance  Comment: Cues for step to pattern and safety. Pt performed curb step training today with RW and completed with no difficulites. Pt continues to meet goals, however, would benefit from cont skilled PT to address strength, balance, and functional mobility deficits. Goals  Time Frame for Short term goals: 10 days  Short term goal 1: Pt to perform bed mobility from flat surface independently  Short term goal 2: Demonstrate functional transfers min A  Short term goal 3: Pt to ambulate distance of 100 ft with rolling walker at mod A  Short term goal 4: Pt able to improve sitting balance at EOB/EOM to good to be alen to eat meals. Short term goal 5: Demonstrate dynamic standing balance of fiar - to decrease fall risk  Short Term Goal 6: Pt able to  propel w/c distance of 100 to 150 ft, CGA, level surfaces.       OCCUPATIONAL THERAPY  SELF CARE     Eating      Feeding  Assistance Level: Modified independent  Skilled Clinical Factors: per pt report      Oral Hygiene      Grooming/Oral Hygiene  Assistance Level: Supervision  Skilled Clinical Factors: standing at sink level      Shower/Bathe Self     Upper Extremity Bathing  Assistance Level: Set-up  Skilled Clinical Factors: seated    Lower Extremity Bathing  Assistance Level: Supervision  Skilled Clinical Factors: completed lenore care only, declines BLEs     Upper Body Dressing     Upper Extremity Dressing  Assistance Level: Set-up  Skilled Clinical Factors: don bra and OH shirt      Lower Body Dressing     Lower Extremity Dressing  Assistance Level: Supervision  Skilled Clinical Factors: sits in chair to don pants over feet, sup to stand and pull up     Putting On/Taking Off Footwear     Putting On/Taking Off Footwear  Assistance Level: Verbal cues  Skilled Clinical Factors: pt able to doff/xochitl TEDs and shoes with elastic laces using fingure 4 tech           Toileting  Assistance Level: Supervision  Skilled Clinical Factors: seated weight shift for lenore hygine; GB for 0-1 UE support no LOB           Toilet Transfers  Technique:  (ambulating )  Equipment: Grab bars  Additional Factors: With handrails  Assistance Level: Supervision  Skilled Clinical Factors: Pt continues to demo G hand placement and no LOB noted     Short Term Goals  Time Frame for Short term goals: One week:  5-8-22 all STG met  Short Term Goal 1: Patient will perform upper body bathing and dressing with SUP. Short Term Goal 2: Patient will perform lower body bathing and dressing with Minimal assist.  Short Term Goal 3: Patient will perform toileting tasks with Minimal assist.  Short Term Goal 4: Patient will perform stand pivot transfers during self-care with Minimal assist and Good safety. Short Term Goal 5: Patient will verbalize/demonstrate Good understanding of assistive equipment/durable medical equipment/modified techniques for increased safety and IND with self-care. Additional Goals?: Yes  Short Term Goal 6: Patient will verbalize/demonstrate Good understanding of modified techniques for R UE during self-care. Short Term Goal 7: Patient will actively participate in 30+ minutes of therapeutic exercise/functional activities to promote increased IND with self-care and mobility. SPEECH THERAPY  Supervision for comprehension, Mod A for problem solving and memory   Orientation Log (O-Log) Score: 29/30 (administered 05/04)  Cognitive Log (Cog-Log) Score:   Short Term Goal: Mod I comprehension, Min A problem solving and memory     NUTRITION  Weight: 134 lb (60.8 kg) / Body mass index is 22.3 kg/m². Diet Rx: 4 Carbohydrate Choices per meal. PO intake appears adequate. Pt plans to moderate portion sizes at home and limit sweets, but does not plan on strict compliance to diet. Ref.  Range 5/8/2022 06:28 5/8/2022 10:34 5/8/2022 19:43 5/9/2022 05:34 5/9/2022 11:42   POC Glucose Latest Ref Range: 65 - 105 mg/dL 96 232 (H) 166 (H) 127 (H) 107 (H)   Please see nutrition note for details. SOCIAL WORK ASSESSMENT  Assessment: Home either w/ daughter or to dt home   Pre-Admission Status:  Lives With: Alone  Type of Home: House  Home Layout: One level  Home Access: Level entry  Bathroom Shower/Tub: Tub/Shower unit,Curtain  Bathroom Toilet: Standard (Sink counter on the right side)  Bathroom Equipment: Hand-held shower  Bathroom Accessibility: Wheelchair accessible  Home Equipment: Duran Garter  Has the patient had two or more falls in the past year or any fall with injury in the past year?: Yes (fell and broke R ankle, last August (wore boot))  ADL Assistance: [address]: Independent  Homemaking Responsibilities: Yes  Ambulation Assistance: Independent (St cane for outside)  Transfer Assistance: Independent  Active : Yes  Mode of Transportation: Car  Occupation: Retired  Leisure & Hobbies: Knitting, puzzles  IADL Comments: pt [facility] 6year old grandson this summer. Additional Comments: Son works nights, daughter in law works days, can assist. Pt reports daughter is able to provide prn assist upon discharge     Family Education: Need to make contact with family to initiate education    Percentage Risk for Readmission: Moderate 19% - 27%   Readmission Risk              Risk of Unplanned Readmission:  19       %    Critical Items: None       Problem / Barrier Intervention / Plan  Results   Impaired functional  2* R sided weakness. Strengthening, functional mobility training    Altered ability to care for self Remediation and training in modified care strategies    Impaired cognition Cognitive retraining exercises                           Total Self Care Score    Total Mobility Score  Admission Score:  20      Admission Score:  21  Goal:  42/42         Goal:  58/90   `  Discharge Plan   Estimated Discharge Date: 5/13/2022  Home evaluation needed?  Home Evaluation Indication (NO, Requires ReEval, YES/Date): No home evaluation need of home exercise program for R UE ROM, strengthening, and coordination as appropriate. Additional Goals?: Yes  Long Term Goal 6: 9 hole peg test and dynamometer to be assessed for R UE as appropriate  ST: Independent comprehension, Supervision problem solving and memory     Participating Team Members:  /:  Graeme Dover RN  Occupational Therapist: Petar Harkins OT    Physical Therapist: Yany Catherine PT  Speech Therapist: Rawleigh Hockey, M.A., Sandi Curling  Nurse: Dinora Snell RN    Dietary/Nutrition: Renetta Antonio RD, LD  Pastoral Care: Garrick Stein  CMG: Kelsea Snyder RN    I approve the established interdisciplinary plan of care as documented within the medical record of Dionicia Frankel.     Dex Corona MD

## 2022-05-09 NOTE — PROGRESS NOTES
Comprehensive Nutrition Assessment    Type and Reason for Visit:  Reassess    Nutrition Recommendations/Plan:   1. Continue current diet. Malnutrition Assessment:  Malnutrition Status: At risk for malnutrition (Comment) (04/28/22 4489)    Context:  Acute Illness     Findings of the 6 clinical characteristics of malnutrition:  Energy Intake:  75% or less of estimated energy requirements for 7 or more days  Weight Loss:  No significant weight loss     Body Fat Loss:  No significant body fat loss     Muscle Mass Loss:  No significant muscle mass loss    Fluid Accumulation:  No significant fluid accumulation     Strength:  Not Performed    Nutrition Assessment:    Pt states she is eating fairly well; PO intake appears to be % of most meals. States she was diagnosed with DM in March. Declined offer of formal diet education as she said she is overwhelmed with all of the information and everything going on with her overall medical condition. States she does drink diet pop and is trying to watch her portion sizes. Does not plan to alter meal intake at home; states she eats tortillas with every meal and does not eat many vegetables. Not interested in a multivitamin/mineral supplement. Encouraged pt to eat 4 times daily with moderate intake, limiting sweets. Nutrition Related Findings:    No edema. POC Glucose:  (5/8-5/9). A1C: 10.3 (4/17). Lantus. Low dose insulin corrective algorithm. Other labs and meds reviewed. Current Nutrition Intake & Therapies:    Average Meal Intake: %,51-75%  Average Supplements Intake: None Ordered  ADULT DIET; Regular; 4 carb choices (60 gm/meal)    Anthropometric Measures:  Height: 5' 5\" (165.1 cm)  Ideal Body Weight (IBW): 125 lbs (57 kg)    Admission Body Weight: 134 lb (60.8 kg)  Current Body Weight: 134 lb (60.8 kg), 107.2 % IBW.  Weight Source: Bed Scale  Current BMI (kg/m2): 22.3  Usual Body Weight: 130 lb (59 kg)  % Weight Change (Calculated): 3.1

## 2022-05-09 NOTE — PLAN OF CARE
Problem: Discharge Planning  Goal: Discharge to home or other facility with appropriate resources  5/9/2022 0428 by Almon Closs, RN  Outcome: Progressing  5/8/2022 1714 by Al Brewer RN  Outcome: Progressing     Problem: Safety - Adult  Goal: Free from fall injury  5/9/2022 0428 by Almon Closs, RN  Outcome: Progressing  5/8/2022 1714 by Al Brewer RN  Outcome: Progressing     Problem: ABCDS Injury Assessment  Goal: Absence of physical injury  5/9/2022 0428 by Almon Closs, RN  Outcome: Progressing  5/8/2022 1714 by Al Brewer RN  Outcome: Progressing     Problem: Skin/Tissue Integrity  Goal: Absence of new skin breakdown  Description: 1. Monitor for areas of redness and/or skin breakdown  2. Assess vascular access sites hourly  3. Every 4-6 hours minimum:  Change oxygen saturation probe site  4. Every 4-6 hours:  If on nasal continuous positive airway pressure, respiratory therapy assess nares and determine need for appliance change or resting period.   5/9/2022 0428 by Almon Closs, RN  Outcome: Progressing  5/8/2022 1714 by Al Brewer RN  Outcome: Progressing     Problem: Chronic Conditions and Co-morbidities  Goal: Patient's chronic conditions and co-morbidity symptoms are monitored and maintained or improved  5/9/2022 0428 by Almon Closs, RN  Outcome: Progressing  5/8/2022 1714 by Al Brewer RN  Outcome: Progressing     Problem: Nutrition Deficit:  Goal: Optimize nutritional status  5/9/2022 0428 by Almon Closs, RN  Outcome: Progressing  5/8/2022 1714 by Al Brewer, RN  Outcome: Progressing     Problem: Pain  Goal: Verbalizes/displays adequate comfort level or baseline comfort level  5/9/2022 0428 by Almon Closs, RN  Outcome: Progressing  5/8/2022 1714 by Al Brewer, RN  Outcome: Progressing

## 2022-05-10 LAB
GLUCOSE BLD-MCNC: 104 MG/DL (ref 65–105)
GLUCOSE BLD-MCNC: 185 MG/DL (ref 65–105)
GLUCOSE BLD-MCNC: 88 MG/DL (ref 65–105)
GLUCOSE BLD-MCNC: 90 MG/DL (ref 65–105)

## 2022-05-10 PROCEDURE — 6370000000 HC RX 637 (ALT 250 FOR IP): Performed by: INTERNAL MEDICINE

## 2022-05-10 PROCEDURE — 97116 GAIT TRAINING THERAPY: CPT

## 2022-05-10 PROCEDURE — 99232 SBSQ HOSP IP/OBS MODERATE 35: CPT | Performed by: PHYSICAL MEDICINE & REHABILITATION

## 2022-05-10 PROCEDURE — 97130 THER IVNTJ EA ADDL 15 MIN: CPT

## 2022-05-10 PROCEDURE — 97530 THERAPEUTIC ACTIVITIES: CPT

## 2022-05-10 PROCEDURE — 6360000002 HC RX W HCPCS: Performed by: STUDENT IN AN ORGANIZED HEALTH CARE EDUCATION/TRAINING PROGRAM

## 2022-05-10 PROCEDURE — 97110 THERAPEUTIC EXERCISES: CPT

## 2022-05-10 PROCEDURE — 6370000000 HC RX 637 (ALT 250 FOR IP): Performed by: STUDENT IN AN ORGANIZED HEALTH CARE EDUCATION/TRAINING PROGRAM

## 2022-05-10 PROCEDURE — 1180000000 HC REHAB R&B

## 2022-05-10 PROCEDURE — 82947 ASSAY GLUCOSE BLOOD QUANT: CPT

## 2022-05-10 PROCEDURE — 99232 SBSQ HOSP IP/OBS MODERATE 35: CPT | Performed by: INTERNAL MEDICINE

## 2022-05-10 PROCEDURE — 97129 THER IVNTJ 1ST 15 MIN: CPT

## 2022-05-10 PROCEDURE — 97535 SELF CARE MNGMENT TRAINING: CPT

## 2022-05-10 RX ORDER — INSULIN GLARGINE 100 [IU]/ML
5 INJECTION, SOLUTION SUBCUTANEOUS NIGHTLY
Status: DISCONTINUED | OUTPATIENT
Start: 2022-05-10 | End: 2022-05-11

## 2022-05-10 RX ORDER — CEPHALEXIN 500 MG/1
500 CAPSULE ORAL EVERY 8 HOURS SCHEDULED
Status: DISCONTINUED | OUTPATIENT
Start: 2022-05-10 | End: 2022-05-13 | Stop reason: HOSPADM

## 2022-05-10 RX ADMIN — GABAPENTIN 600 MG: 300 CAPSULE ORAL at 07:42

## 2022-05-10 RX ADMIN — FENOFIBRATE 160 MG: 160 TABLET ORAL at 07:43

## 2022-05-10 RX ADMIN — DIVALPROEX SODIUM 500 MG: 500 TABLET, EXTENDED RELEASE ORAL at 07:42

## 2022-05-10 RX ADMIN — TOPIRAMATE 25 MG: 25 TABLET, FILM COATED ORAL at 20:23

## 2022-05-10 RX ADMIN — LEVETIRACETAM 1000 MG: 500 TABLET, FILM COATED ORAL at 20:20

## 2022-05-10 RX ADMIN — POTASSIUM CHLORIDE 20 MEQ: 20 TABLET, EXTENDED RELEASE ORAL at 07:43

## 2022-05-10 RX ADMIN — ENOXAPARIN SODIUM 40 MG: 100 INJECTION SUBCUTANEOUS at 07:45

## 2022-05-10 RX ADMIN — INSULIN LISPRO 1 UNITS: 100 INJECTION, SOLUTION INTRAVENOUS; SUBCUTANEOUS at 20:24

## 2022-05-10 RX ADMIN — INSULIN GLARGINE 5 UNITS: 100 INJECTION, SOLUTION SUBCUTANEOUS at 20:25

## 2022-05-10 RX ADMIN — ASPIRIN 81 MG: 81 TABLET, COATED ORAL at 07:41

## 2022-05-10 RX ADMIN — LEVETIRACETAM 1000 MG: 500 TABLET, FILM COATED ORAL at 07:43

## 2022-05-10 RX ADMIN — DIVALPROEX SODIUM 500 MG: 500 TABLET, EXTENDED RELEASE ORAL at 20:23

## 2022-05-10 RX ADMIN — PANTOPRAZOLE SODIUM 40 MG: 40 TABLET, DELAYED RELEASE ORAL at 06:09

## 2022-05-10 RX ADMIN — CLOPIDOGREL BISULFATE 75 MG: 75 TABLET ORAL at 07:42

## 2022-05-10 RX ADMIN — GABAPENTIN 600 MG: 300 CAPSULE ORAL at 12:32

## 2022-05-10 RX ADMIN — TOPIRAMATE 25 MG: 25 TABLET, FILM COATED ORAL at 07:43

## 2022-05-10 RX ADMIN — CEPHALEXIN 500 MG: 500 CAPSULE ORAL at 21:54

## 2022-05-10 RX ADMIN — METFORMIN HYDROCHLORIDE 500 MG: 500 TABLET ORAL at 17:17

## 2022-05-10 RX ADMIN — ATORVASTATIN CALCIUM 40 MG: 40 TABLET, FILM COATED ORAL at 20:20

## 2022-05-10 RX ADMIN — GABAPENTIN 600 MG: 300 CAPSULE ORAL at 20:20

## 2022-05-10 ASSESSMENT — PAIN SCALES - GENERAL
PAINLEVEL_OUTOF10: 0

## 2022-05-10 ASSESSMENT — PAIN DESCRIPTION - PROGRESSION

## 2022-05-10 NOTE — PROGRESS NOTES
Physical Therapy  Facility/Department: [facility]  Rehabilitation Physical Therapy Treatment Note    NAME: Сергей Orr  : 1950 (80 y.o.)  MRN: [id number]  CODE STATUS: Full Code    Date of Service: 5/10/22       Restrictions:  Restrictions/Precautions: Fall Risk;General Precautions; Up as Tolerated     SUBJECTIVE  Subjective  Subjective: Reports she has a rollator as well as rolling walker at home. Per pt, she does not have any body to drive her to outpatient therapy  due to family member's work schedule. Will let the care manager know about it. Pain: No complaints of pain at this time  Pain Assessment  Response to Pain Intervention: Patient satisfied        Post Treatment Pain Screening  Pain Assessment  Response to Pain Intervention: Patient satisfied      OBJECTIVE  Cognition  Overall Cognitive Status: WFL  Arousal/Alertness: Appropriate responses to stimuli  Following Commands: Follows all commands without difficulty  Attention Span: Appears intact  Memory: Decreased recall of recent events  Safety Judgement: Decreased awareness of need for assistance;Decreased awareness of need for safety  Problem Solving: Assistance required to generate solutions  Insights: Decreased awareness of deficits  Initiation: Does not require cues  Sequencing: Does not require cues  Cognition Comment: demo good problem solving, memory, sequencing for adls, good adaptations to physical limitations  Orientation  Overall Orientation Status: Within Functional Limits  Orientation Level: Oriented X4    Functional Mobility  Balance  Sitting Balance: Independent  Standing Balance: Supervision  Transfers  Surface: Wheelchair  Device:  (Rollator, R AFO)  Sit to Stand  Assistance Level: Stand by assist  Stand to Sit  Assistance Level: Stand by assist  Stand Pivot  Assistance Level: Stand by assist  Skilled Clinical Factors: Pt demonstarates good safety with using rollator -brakes/on/off.       Environmental Mobility  Ambulation  Surface: Level surface; Uneven surface  Device: Rollator  Distance: >300ft, up/down ramp  Activity: Within Unit  Additional Factors: Right AFO  Assistance Level: Stand by assist  Gait Deviations: Slow johann;Narrow base of support;Decreased heel strike right  Skilled Clinical Factors: Pt was alen to Curly R AFO on her own. Will conitnue to work on ambulation wit rollator. PT Exercises  Motor Control/Coordination: Standing balloon taps on foam pad BUE's with support from RW. Pt able to stabilize and self correct balance. ASSESSMENT/PROGRESS TOWARDS GOALS  Vital Signs  BP Location: Right upper arm  O2 Device: None (Room air)    Assessment  Activity Tolerance: Patient tolerated treatment well  Discharge Recommendations: Patient would benefit from continued therapy after discharge;Home with assist PRN  PT Equipment Recommendations  Equipment Needed:  (Pt has Rollator as well as Rolling walker at home. )  Other: Pt to receive R AFO    Goals  Patient Goals   Patient goals : To get better  Short Term Goals  Time Frame for Short term goals: 10 days  Short term goal 1: Pt to perform bed mobility from flat surface independently  Short term goal 2: Demonstrate functional transfers min A  Short term goal 3: Pt to ambulate distance of 100 ft with rolling walker at mod A  Short term goal 4: Pt able to improve sitting balance at EOB/EOM to good to be alen to eat meals. Short term goal 5: Demonstrate dynamic standing balance of fiar - to decrease fall risk  Short Term Goal 6: Pt able to  propel w/c distance of 100 to 150 ft, CGA, level surfaces. Long Term Goals  Time Frame for Long term goals : By DC  Long term goal 1: Pt able to perform transfers at mod-I  Long term goal 2: Pt able to ambulate distance of 100 to 150 ft with appropriate device, at min A. with assistive device.   Long term goal 3: Pt able to perform a curb step with a rolling wwalker/UE support, mod A  Long term goal 4: Pt able to propel w/c distance of 150 ft , level surfaces, including turns, mod-I  Long term goal 5: Pt  able to improve standing balance with assistive device to fair to reduce fall risk  Long term goal 6: Improve 2MWT distance to 80 ft to improve function and gait speed. Long term goal 7: Improve PASS score to 25/36 to improve overall function. PLAN OF CARE/SAFETY  Plan  Plan:  minutes of therapy at least 5 out of 7 days a week  Specific Instructions for Next Treatment: Conitnue rollator for ambultion/fucntional task. Current Treatment Recommendations: Strengthening;ROM;Balance training;Functional mobility training;Gait training;Neuromuscular re-education;Transfer training; Endurance training; Wheelchair mobility training;Stair training;Home exercise program;Safety education & training;Patient/Caregiver education & training;Equipment evaluation, education, & procurement; Modalities; Therapeutic activities  Safety Devices  Type of Devices: Call light within reach;Gait belt;Patient at risk for falls; Bed alarm in place; All fall risk precautions in place  Restraints  Restraints Initially in Place: No      Therapy Time   Individual Concurrent Group Co-treatment   Time In 1303         Time Out 1330         Minutes Yousif Graham PT, 05/10/22 at 4:29 PM

## 2022-05-10 NOTE — PLAN OF CARE
Problem: Discharge Planning  Goal: Discharge to home or other facility with appropriate resources  5/10/2022 1522 by [facility], LPN  Outcome: Progressing     Problem: Safety - Adult  Goal: Free from fall injury  5/10/2022 1522 by [facility], LPN  Outcome: Progressing     Problem: ABCDS Injury Assessment  Goal: Absence of physical injury  5/10/2022 1522 by [facility], LPN  Outcome: Progressing     Problem: Skin/Tissue Integrity  Goal: Absence of new skin breakdown  Description: 1. Monitor for areas of redness and/or skin breakdown  2. Assess vascular access sites hourly  3. Every 4-6 hours minimum:  Change oxygen saturation probe site  4. Every 4-6 hours:  If on nasal continuous positive airway pressure, respiratory therapy assess nares and determine need for appliance change or resting period.   5/10/2022 1522 by [facility], LPN  Outcome: Progressing     Problem: Chronic Conditions and Co-morbidities  Goal: Patient's chronic conditions and co-morbidity symptoms are monitored and maintained or improved  5/10/2022 1522 by [facility], LPN  Outcome: Progressing     Problem: Nutrition Deficit:  Goal: Optimize nutritional status  5/10/2022 1522 by [facility], LPN  Outcome: Progressing     Problem: Pain  Goal: Verbalizes/displays adequate comfort level or baseline comfort level  5/10/2022 1522 by [facility], LPN  Outcome: Progressing

## 2022-05-10 NOTE — PROGRESS NOTES
Physical Medicine & Rehabilitation  Progress Note      Subjective:      80year-old female with R sided weakness. Patient is doing well today. She notes her sleep is impaired but at baseline for her. She does note some swelling and pain in her L 4th finger with some purulent drainage at nailbed. ROS:  Denies fevers, chills, sweats. No chest pain, palpitations, lightheadedness. Denies coughing, wheezing or shortness of breath. Denies abdominal pain, nausea, diarrhea or constipation. No new areas of joint pain. Denies new areas of numbness or weakness. Denies new anxiety or depression issues. No new skin problems. Rehabilitation:   Progressing in therapies. PT:  Restrictions/Precautions: Fall Risk,General Precautions,Up as Tolerated  Other position/activity restrictions: SBP <180   Transfers  Sit to Stand: Modified independent  Stand to sit: Modified independent  Bed to Chair: Supervision  Stand Pivot Transfers: Supervision  Squat Pivot Transfers: Minimal Assistance  Comment: Pt continues to progress requiring less cues from therapist. Demo's good safety with t/fs. Ambulation  Surface: level tile  Device: Rolling Walker  Other Apparatus: Right,AFO  Assistance: Stand by [employer]  Quality of Gait: Patient able to advance R LE with AFO and reports improved R knee support. Pt is improving with R knee stability during gait. Gait Deviations: Slow Irene  Distance: 200ft  Comments: Pt demo's improved endurance this session, ambulating further distances with less rest breaks on multiple surfaces. More Ambulation?: Yes    Transfers  Sit to Stand: Modified independent  Stand to sit: Modified independent  Bed to Chair: Supervision  Stand Pivot Transfers: Supervision  Squat Pivot Transfers: Minimal Assistance  Comment: Pt continues to progress requiring less cues from therapist. Demo's good safety with t/fs.      Ambulation  Surface: level tile  Device: Rolling Walker  Other Apparatus: Right,AFO  Assistance: Stand by [employer]  Quality of Gait: Patient able to advance R LE with AFO and reports improved R knee support. Pt is improving with R knee stability during gait. Gait Deviations: Slow Irene  Distance: 200ft  Comments: Pt demo's improved endurance this session, ambulating further distances with less rest breaks on multiple surfaces. More Ambulation?: Yes    Surface: level tile  Ambulation  Surface: level tile  Device: Rolling Walker  Other Apparatus: Right,AFO  Assistance: Stand by [employer]  Quality of Gait: Patient able to advance R LE with AFO and reports improved R knee support. Pt is improving with R knee stability during gait. Gait Deviations: Slow Irene  Distance: 200ft  Comments: Pt demo's improved endurance this session, ambulating further distances with less rest breaks on multiple surfaces. More Ambulation?: Yes    OT:  ADL  Feeding: Setup  Feeding Skilled Clinical Factors: Patient reports use of non-dominant L hand for self-feeding currently  Grooming: Stand by assistance  Grooming Skilled Clinical Factors: While seated in w/c at sink  UE Bathing: Moderate assistance  UE Bathing Skilled Clinical Factors: Assist for L UE and R UE positioning while seated on tub transfer bench in shower  LE Bathing: Moderate assistance  LE Bathing Skilled Clinical Factors: Assist for buttocks and perineal area; Minimal assist for standing balance  UE Dressing: Minimal assistance  UE Dressing Skilled Clinical Factors: Assist bra fastening and threading L UE  LE Dressing: Maximum assistance  LE Dressing Skilled Clinical Factors: Assist to thread R LE, don TEDs & pull pants over hips  Toileting: Maximum assistance  Toileting Skilled Clinical Factors: Assist for clothing management; personal hygiene while seated  Additional Comments: OT facilitated patient engagement in showering routine including bathing, dressing, toileting, and grooming tasks.  Patient demonstrates Fair compensatory strategies for self-care with further education warranted to maximize safety and IND. Balance  Sitting Balance: Stand by assistance  Standing Balance: Minimal assistance            Bed mobility  Rolling to Left: Stand by assistance  Rolling to Right: Stand by assistance  Supine to Sit: Stand by assistance  Sit to Supine: Stand by assistance  Scooting: Stand by assistance  Bed Mobility Comments: PT mat, wedge, 3 pillows. Transfers  Stand Pivot Transfers: 2 Person assistance,Moderate assistance  Sit to stand: Moderate assistance  Stand to sit: Moderate assistance  Transfer Comments: with Minimal verbal cues for hand placement and safety   Toilet Transfers  Toilet - Technique: Stand pivot,To right,To left  Equipment Used: Raised toilet seat with rails  Toilet Transfer: 2 Person assistance,Moderate assistance  Toilet Transfers Comments: with Minimal verbal cues for hand placement and safety     Shower Transfers  Shower - Transfer From: Wheelchair  Shower - Transfer Type: To and From  Shower - Transfer To: Transfer tub bench  Shower - Technique: Stand pivot,To right,To left  Shower Transfers: 2 Person assistance,Moderate assistance       SPEECH:  Subjective: [x]? Alert     [x]? Cooperative     []? Confused     []? Agitated    []? Lethargic        Objective/Assessment:  Attention: Sustained throughout, distractions minimized.      Orientation: Oriented x4.      Recall: Not addressed directly.      Organization: n/a     Problem Solving/Reasoning: Reasoning, ID similar category items/odd one out- 70% accuracy julienne, 100% c mod cues.   Problem solving, \"What would you do if\" task- 80% accuracy julienne, 100% cued.      Other: Pt demo occasional word retrieval deficits in conversation; able to utilize circumlocution with verbal cues from ST.          Objective:  /79   Pulse 79   Temp 98 °F (36.7 °C)   Resp 19   Ht 5' 5\" (1.651 m)   Wt 134 lb (60.8 kg)   SpO2 99%   BMI 22.30 kg/m²       GEN: well developed, well nourished, NAD  HEENT: NCAT, PERRL, EOMI, mucous membranes pink and moist  CV: RRR, no murmurs, rubs or gallops  PULM: CTAB, no rales or rhonchi. Respirations WNL and unlabored  ABD: soft, NT, ND, BS+ and equal  NEURO: A&O x3. Sensation intact to light touch. MSK: Functional ROM all extremities . Strength 4+/5 key muscles all extremities. EXTREMITIES: No calf tenderness to palpation bilaterally. No edema BLEs. L 4th digit with edema and erythema. SKIN: warm dry and intact with good turgor  PSYCH: appropriately interactive. Affect WNL. Diagnostics:     CBC: No results for input(s): WBC, RBC, HGB, HCT, MCV, RDW, PLT in the last 72 hours. BMP: No results for input(s): NA, K, CL, CO2, PHOS, BUN, CREATININE, CA, GLUCOSE in the last 72 hours. BNP: No results for input(s): BNP in the last 72 hours. PT/INR: No results for input(s): PROTIME, INR in the last 72 hours. APTT: No results for input(s): APTT in the last 72 hours. CARDIAC ENZYMES: No results for input(s): CKMB, CKMBINDEX, TROPONINT in the last 72 hours. Invalid input(s): CKTOTAL;3 troponins   FASTING LIPID PANEL:  Lab Results   Component Value Date    CHOL 202 (H) 04/17/2022    HDL 55 04/17/2022    TRIG 164 (H) 04/17/2022     LIVER PROFILE: No results for input(s): AST, ALT, ALB, BILIDIR, BILITOT, ALKPHOS in the last 72 hours.      Current Medications:   Current Facility-Administered Medications: insulin glargine (LANTUS) injection vial 10 Units, 10 Units, SubCUTAneous, Nightly  insulin lispro (HUMALOG) injection vial 0-6 Units, 0-6 Units, SubCUTAneous, TID WC  insulin lispro (HUMALOG) injection vial 0-3 Units, 0-3 Units, SubCUTAneous, Nightly  enoxaparin (LOVENOX) injection 40 mg, 40 mg, SubCUTAneous, Daily  ondansetron (ZOFRAN-ODT) disintegrating tablet 4 mg, 4 mg, Oral, Q8H PRN **OR** [DISCONTINUED] ondansetron (ZOFRAN) injection 4 mg, 4 mg, IntraVENous, Q6H PRN  aspirin EC tablet 81 mg, 81 mg, Oral, Daily  atorvastatin (LIPITOR) tablet 40 mg, 40 mg, Oral, Nightly  albuterol sulfate  (90 Base) MCG/ACT inhaler 6 puff, 6 puff, Inhalation, Q6H PRN  clopidogrel (PLAVIX) tablet 75 mg, 75 mg, Oral, Daily  divalproex (DEPAKOTE ER) extended release tablet 500 mg, 500 mg, Oral, BID  fenofibrate (TRIGLIDE) tablet 160 mg, 160 mg, Oral, Daily  gabapentin (NEURONTIN) capsule 600 mg, 600 mg, Oral, TID  levETIRAcetam (KEPPRA) tablet 1,000 mg, 1,000 mg, Oral, BID  pantoprazole (PROTONIX) tablet 40 mg, 40 mg, Oral, QAM AC  potassium chloride (KLOR-CON M) extended release tablet 20 mEq, 20 mEq, Oral, Daily with breakfast  topiramate (TOPAMAX) tablet 25 mg, 25 mg, Oral, BID  acetaminophen (TYLENOL) tablet 650 mg, 650 mg, Oral, Q4H PRN  polyethylene glycol (GLYCOLAX) packet 17 g, 17 g, Oral, Daily  senna (SENOKOT) tablet 17.2 mg, 2 tablet, Oral, Daily PRN  bisacodyl (DULCOLAX) suppository 10 mg, 10 mg, Rectal, Daily PRN      Impression/Plan:   Impaired ADLs, gait, and mobility due to:      1. Right-sided weakness:  Unknown etiology - possibly psychosomatic. PT/OT for gait, mobility, strengthening, endurance, ADLs, and self care. Plan for outpatient EMG. On aspirin, plavix. On gabapentin. orthotist for evaluation for right AFO for home - will be here 5/10. 2. Cognitive impairment:  SLP treating. 3. Anemia: Hemoglobin stable. Monitoring. 4. Type 2 Diabetes:  Hemoglobin A1c 10.3 on 4/17. On Lantus nightly and sliding scale (IM decreased both medications on 5/2)  5. HTN:  Stable without medication  6. HLD: On atorvastatin, fenofibrate  7. History of CVA:  On aspirin, plavix, atorvastatin, fenofibrate  8. Seizure: On Keppra and Depakote  9. GERD: On pantoprazole  10. L finger paronychia: IM started cephalexin until 5/17. 11. Hypokalemia:  Improved. On KCl repletion daily. Monitoring.   12. Bowel Management: Miralax daily, senokot prn, dulcolax prn.   13. DVT Prophylaxis:  low molecular weight heparin, SCD's while in bed and ALLIE's while in bed  14. Internal medicine for medical management      Electronically signed by Martha Fernandez MD on 5/10/2022 at 9:26 AM      This note is created with the assistance of a speech recognition program.  While intending to generate a document that actually reflects the content of the visit, the document can still have some errors including those of syntax and sound a like substitutions which may escape proof reading. In such instances, actual meaning can be extrapolated by contextual diversion.

## 2022-05-10 NOTE — PROGRESS NOTES
Physical Therapy  Facility/Department: [facility]      NAME: Caleb Schneider  : 1950 (80 y.o.)  MRN: [id number]  CODE STATUS: Full Code    Date of Service: 5/10/22      Past Medical History:   Diagnosis Date    Cerebral artery occlusion with [facility] infarction ([facility] [address] [facility] ,     CVA (cerebral infarction)     Diabetes mellitus ([facility] [facility]     Hyperlipidemia     Hypertension     Seizures ([facility] [address] [facility] 2022     Past Surgical History:   Procedure Laterality Date    APPENDECTOMY      BRONCHOSCOPY DIAGNOSTIC  2014         ENDOSCOPY, COLON, DIAGNOSTIC      HYSTERECTOMY         Chart Reviewed: Yes  Additional Pertinent Hx: Caleb Schneider is a 80 y.o. female with history of CVA, HTN, and HLD admitted to [address] [place] [address] on 2022. She initially presented with acute-onset right hemiparesis. NIHSS 21. She was given tPA by the mobile stroke unit, but infusion was stopped due to worsening of symptoms and concern for possible hemorrhagic conversion. She was intubated prior to arrival to the ED. CT head showed no acute intracranial abnormality, and tPA was restarted. She was also loaded with keppra due to concern for seizures. MRI brain showed no acute intracranial abnormality. Extubated 22. Heddy  Per notes, she had a similar episodes in  and . Per Neuro notes- Reported history of chronic infarcts with residual right sided weakness but not clearly evident on brain imaging. Neuro recommending EMG as outpatient. Pt admotted to rehab unit on 22. Family / Caregiver Present: No  Diagnosis: Right side weakness    Restrictions:  Restrictions/Precautions: Fall Risk;General Precautions; Up as Tolerated  Position Activity Restriction  Other position/activity restrictions: SBP <180     SUBJECTIVE  Subjective: Patient reporting she can ambulate without bracing this AM.  She did bring it to session though.        Post Treatment Pain Screening       Prior Level of Function:  Social/Functional History  Lives With: Alone  Type of Home: House  Home Layout: One level  Home Access: Level entry  Bathroom Shower/Tub: Tub/Shower unit,Curtain  Bathroom Toilet: Standard (Sink counter on the right side)  Bathroom Equipment: Hand-held shower  Bathroom Accessibility: Wheelchair accessible  Home Equipment: Rollator,Cane  ADL Assistance: Independent  Homemaking Assistance: Independent  Homemaking Responsibilities: Yes  Ambulation Assistance: Independent (St cane for outside)  Transfer Assistance: Independent  Active : Yes  Mode of Transportation: Car  Occupation: Retired  Leisure & Hobbies: Knitting, puzzles  Additional Comments: Son works nights, daughter in law works days, can assist. Pt reports daughter is able to provide prn assist upon discharge      Functional Mobility  Bed mobility  Rolling to Left: Stand by assistance  Rolling to Right: Stand by assistance  Supine to Sit: Stand by assistance  Sit to Supine: Stand by assistance  Scooting: Stand by assistance  Transfers  Sit to Stand: Modified independent  Stand to sit: Modified independent  Bed to Chair: Supervision  Stand Pivot Transfers: Supervision    Environmental Mobility  Ambulation  Surface: level tile  Device: Rolling Walker  Assistance: Supervision  Quality of Gait: Patient able to advance R LE this AM without an AFO. Steady pace with no LOB. Gait Deviations: Slow Irene  Distance: 200ft;  short distances withing gym  Stairs/Curb  Stairs?: Yes  Stairs  # Steps : 15  Stairs Height:  (4\"/6\")  Rails: Bilateral  Assistance: Stand by assistance  Comment: Step to pattern the first 5 steps, but then she was able to complete reciprocal with knee buckle or loss of balance. PT Exercises  A/AROM Exercises: seated B LE exercises x20 reps with #2 and green tband.  R AFO doffed for exercises   Circulation/Endurance Exercises: standing UBE 5 minutes FWD/BWD with seated rest break after FWD; NuStep L3 x15 minutes     ASSESSMENT       Activity Tolerance  Activity Tolerance: Patient tolerated treatment well    Assessment  Performance Deficits/Impairments: Decreased functional mobility ; Decreased tolerance to work activity; Decreased strength;Decreased safe awareness;Decreased endurance;Decreased balance;Decreased posture;Decreased ROM  Treatment Diagnosis: Weakness, difficulty walking  Discharge Recommendations: Patient would benefit from continued therapy after discharge;Home with assist PRN  PT Equipment Recommendations  Equipment Needed:  (TBD)           GOALS  Patient Goals   Patient goals : To get better  Short Term Goals  Time Frame for Short term goals: 10 days  Short term goal 1: Pt to perform bed mobility from flat surface independently  Short term goal 2: Demonstrate functional transfers min A  Short term goal 3: Pt to ambulate distance of 100 ft with rolling walker at mod A  Short term goal 4: Pt able to improve sitting balance at EOB/EOM to good to be alen to eat meals. Short term goal 5: Demonstrate dynamic standing balance of fiar - to decrease fall risk  Short Term Goal 6: Pt able to  propel w/c distance of 100 to 150 ft, CGA, level surfaces. Long Term Goals  Time Frame for Long term goals : By DC  Long term goal 1: Pt able to perform transfers at mod-I  Long term goal 2: Pt able to ambulate distance of 100 to 150 ft with appropriate device, at min A. with assistive device. Long term goal 3: Pt able to perform a curb step with a rolling wwalker/UE support, mod A  Long term goal 4: Pt able to propel w/c distance of 150 ft , level surfaces, including turns, mod-I  Long term goal 5: Pt  able to improve standing balance with assistive device to fair to reduce fall risk  Long term goal 6: Improve 2MWT distance to 80 ft to improve function and gait speed. Long term goal 7: Improve PASS score to 25/36 to improve overall function.      PLAN OF CARE  Plan  Plan:  minutes of therapy at least 5 out of 7 days a week  Current Treatment Recommendations: Strengthening;ROM;Balance training;Functional mobility training;Gait training;Neuromuscular re-education;Transfer training; Endurance training; Wheelchair mobility training;Stair training;Home exercise program;Safety education & training;Patient/Caregiver education & training;Equipment evaluation, education, & procurement; Modalities  Safety Devices  Type of Devices: Call light within reach;Gait belt;Patient at risk for falls; Bed alarm in place; All fall risk precautions in place      Therapy Time   Individual Concurrent Group Co-treatment   Time In 0800         Time Out 0900         Minutes [address] [place] [address], 05/10/22 at 12:45 PM

## 2022-05-10 NOTE — PROGRESS NOTES
Occupational Therapy  Facility/Department: [facility]  Rehabilitation Occupational Therapy Daily Treatment Note    Date: 5/10/22  Patient Name: Kentrell Mejia       Room: 2166/9181-65  MRN: [id number]  Account: [de-identified]   : 1950  (70 y.o.) Gender: female                    Past Medical History:  has a past medical history of Cerebral artery occlusion with cerebral infarction [facility]), CVA (cerebral infarction), Diabetes mellitus ([facility] Hyperlipidemia, Hypertension, and Seizures ([facility] Past Surgical History:   has a past surgical history that includes Appendectomy; Hysterectomy; Bronchoscopy diagnostic (2014); and Endoscopy, colon, diagnostic. Restrictions  Restrictions/Precautions: Fall Risk;General Precautions; Up as Tolerated  Required Braces or Orthoses?: No    Subjective  Subjective: Pt agreeable to OT tx this date   Restrictions/Precautions: Fall Risk;General Precautions; Up as Tolerated        Pain Assessment  Pain Assessment: None - Denies Pain    Objective     Cognition  Overall Cognitive Status: WFL  Arousal/Alertness: Appropriate responses to stimuli  Following Commands:  Follows all commands without difficulty  Attention Span: Appears intact  Memory: Decreased recall of recent events  Safety Judgement: Decreased awareness of need for assistance;Decreased awareness of need for safety  Problem Solving: Good awareness of errors made;Assistance required to generate solutions  Insights: Fully aware of deficits  Initiation: Does not require cues  Sequencing: Does not require cues  Cognition Comment: demo good problem solving, memory, sequencing for adls, good adaptations to physical limitations  Orientation  Overall Orientation Status: Within Functional Limits  Orientation Level: Oriented X4         ADL  Feeding  Assistance Level: Modified independent  Skilled Clinical Factors: per pt report     Grooming/Oral Hygiene  Assistance Level: Supervision  Skilled Clinical Factors: standing at sink level     Upper Extremity Bathing  Assistance Level: Set-up  Skilled Clinical Factors: seated     Lower Extremity Bathing  Assistance Level: Supervision  Skilled Clinical Factors: completed lenore care displaying seated weight shift    Upper Extremity Dressing  Assistance Level: Set-up  Skilled Clinical Factors: don bra and OH shirt     Lower Extremity Dressing  Assistance Level: Supervision  Skilled Clinical Factors: sits in chair to don pants over feet, sup to stand and pull up    Putting On/Taking Off Footwear  Assistance Level: Modified independent  Skilled Clinical Factors: seated to don/doff TEDs and shoes with elatic laces    Toileting  Assistance Level: Supervision  Skilled Clinical Factors: standing for lenore care, GB and RW for 0-1 UE support for balance maintenance     Toilet Transfers  Technique:  (ambulating )  Equipment: Grab bars  Additional Factors: With handrails  Assistance Level: Supervision  Skilled Clinical Factors: Pt continues to demo G hand placement and no LOB noted     Tub/Shower Transfers  Type: Shower (PM: Tub )  Transfer From: Rolling walker  Transfer To: Tub transfer bench (PM: to standing (no DME))  Additional Factors: Verbal cues; With handrails  Assistance Level: Stand by assist;Verbal cues;Contact guard assist (PM: Initally CGA over tub treshold progressing SBA )  Skilled Clinical Factors: AM: VC for over shower threshold for increased safety, no LOB noted; PM: PERRY facilitated pt engagement in tub transfer (x4)  RW<>standing in tub. PERRY provided demonstration on transfer prior to pt completing for increase pt safety. Pt ablel to redemo with 100% accuracy using suction GB for intermittent UE support. no LOB noted. PERRY provided pt with education and recommendation on suction GB to increase pt safety during transfer with pt verbalizing she will contact ilya to purchase suction bars for once pt retruns to private resident.      Functional Mobility  Device: Rolling walker  Activity: To/From bathroom;Transport items; Retrieve items; To/From therapy gym  Assistance Level: Supervision  Skilled Clinical Factors: AM: Pt engaged in ambulation around room and bathrrom with RW gathering items needed to engage in dressing and showering task and engaged in functional mobility post ADLs for clean up to support ability to self correct should balance be lost, tolerance and endurance needed to engage in daily tasks independently. . Pt req 1 vc to step closer when reacbing for items outside of THOMAS to avoid excessive leaning, G retrun noted     Roll Left  Assistance Level: Modified independent  Skilled Clinical Factors: Pt demonstrates good awareness of RUE during bed mobility    Sit to Supine  Assistance Level: Independent  Skilled Clinical Factors: HOB slightly elevated     Supine to Sit  Assistance Level: Independent  Skilled Clinical Factors: HOB slightly elevated     Scooting  Assistance Level: Independent  Skilled Clinical Factors: to EOB     Sit to Stand  Assistance Level: Independent  Skilled Clinical Factors: G hand placement     Stand to Sit  Assistance Level: Supervision  Skilled Clinical Factors: 1 vc for hand placement      OT Exercises  Dynamic Standing Balance Exercises: PM: Pt engaged in dynamic standing activity, bouncing ball onto rebound with no device for UE support to support ability to self correct should balance be lost, tolerance and endurance needed to participate in functional tasks safely and independently. Pt tolerated ~15-16 mins of standing with 1 seated RB due to increased fatigue. Assessment  Assessment  Activity Tolerance: Patient tolerated treatment well  Discharge Recommendations: Home with assist PRN;Home with Home health OT  Safety Devices  Safety Devices in place: Yes    Patient Education  Education  Education Given To: Patient  Education Provided: ADL Function;IADL Function; Safety;Transfer Training;Equipment;Home Exercise Program;Plan of Care; Mobility Training  Education Method: Demonstration;Verbal  Education Outcome: Demonstrated understanding;Verbalized understanding    Plan  Plan  Times per Week: 5-7  Times per Day: Twice a day  Current Treatment Recommendations: Strengthening;ROM;Balance training;Functional mobility training; Endurance training; Safety education & training;Patient/Caregiver education & training;Equipment evaluation, education, & procurement;Self-Care / ADL; Coordination training;Neuromuscular re-education;Modalities    Goals  Patient Goals   Patient goals : \"To end up gonig home and walk out of here and be able to be as normal as possible\"  complete own grocery shopping (will need assist with transportation),  shower standing- does not want to obtain shower seat. able to write her checks to pay bills. Short Term Goals  Time Frame for Short term goals: One week:  5-8-22 all STG met  Short Term Goal 1: Patient will perform upper body bathing and dressing with SUP. Short Term Goal 2: Patient will perform lower body bathing and dressing with Minimal assist.  Short Term Goal 3: Patient will perform toileting tasks with Minimal assist.  Short Term Goal 4: Patient will perform stand pivot transfers during self-care with Minimal assist and Good safety. Short Term Goal 5: Patient will verbalize/demonstrate Good understanding of assistive equipment/durable medical equipment/modified techniques for increased safety and IND with self-care. Additional Goals?: Yes  Short Term Goal 6: Patient will verbalize/demonstrate Good understanding of modified techniques for R UE during self-care. Short Term Goal 7: Patient will actively participate in 30+ minutes of therapeutic exercise/functional activities to promote increased IND with self-care and mobility. Long Term Goals  Time Frame for Long term goals : By discharge  Long Term Goal 1: Patient will perform BADLs with modified IND and Good safety.   Long Term Goal 2: Patient will perform stand pivot transfers during self-care with modified IND and Good safety. Long Term Goal 3: Patient will perform functional mobility during self-care at w/c level with modified IND. / modify goal: 5-8-22  mod indep functional mobility for ambulation during adls with good safety. Long Term Goal 4: Patient will verbalize/demonstrate Good understanding of Fall Prevention strategies during self-care to maximize safety and IND. Long Term Goal 5: Patient will verbalize/demonstrate Good understanding of home exercise program for R UE ROM, strengthening, and coordination as appropriate.   Additional Goals?: Yes  Long Term Goal 6: 9 hole peg test and dynamometer to be assessed for R UE as appropriate       05/10/22 1105 05/10/22 1409   OT Individual Minutes   Time In 1105 1409   Time Out 1201 1443   Minutes 56 OJ Ya

## 2022-05-10 NOTE — PROGRESS NOTES
Speech Language Pathology  Speech Language Pathology  [address] [place] [address]    Cognitive Treatment Note    Date: 5/10/2022  Patients Name: Toro Quick  MRN: [id number]  Diagnosis:   Patient Active Problem List   Diagnosis Code    Altered mental status R41.82    Generalized nonconvulsive seizures ([facility] G40.309    History of CVA (cerebrovascular accident) Z80.78    Noncompliance Z91.19    Hemiparesis ([address] G81.90    Respiratory failure ([address] J96.90    Upper respiratory infection J06.9    Acute respiratory failure ([address] J96.00    HTN (hypertension) I10    HLD (hyperlipidemia) E78.5    Hyperglycemia R73.9    Hypokalemia E87.6    Anemia due to acute blood loss D62    Right sided weakness R53.1    Cerebrovascular accident (CVA) ([facility] I63.9    Tissue plasminogen activator (tPA) administered at other facility within 24 hours before current admission Z92.82       Pain: 0/10    Cognitive Treatment    Treatment time: 0910-1604  *shortened session d/t Pt. discussion with medical equipment staff member re: AFO upon arrival    Subjective: [x] Alert [x] Cooperative     [] Confused     [] Agitated    [] Lethargic      Objective/Assessment:  Attention: Sustained throughout, distractions minimized. Orientation: Oriented x4. Recall: Not addressed directly. Organization: n/a    Problem Solving/Reasoning: Reasoning, ID similar category items/odd one out- 70% accuracy julienne, 100% c mod cues. Problem solving, \"What would you do if\" task- 80% accuracy julienne, 100% cued. Other: Pt demo occasional word retrieval deficits in conversation; able to utilize circumlocution with verbal cues from [address] [place] [address]  Plan:  [x] Continue  services    [] Discharge from :      Discharge recommendations: [] Inpatient Rehab   [] [place]   [] Outpatient Therapy  [] Follow up at trauma clinic   [] Other:       Treatment completed by:  Mena Alexandre M.A., CCC-SLP

## 2022-05-10 NOTE — PROGRESS NOTES
[facility]    CONSULTATION / HISTORY AND PHYSICAL EXAMINATION            Date:   5/10/2022  Patient name:  Dina Aly  Date of admission:  4/28/2022 12:10 PM  MRN:   [id number]  Account:  [de-identified]  YOB: 1950  PCP:    Tab Preciado MD  Room:   [address] [place], [place] [address]  Code Status:    Full Code    Physician Requesting Consult: Clemente Borja MD    Reason for Consult:  Medical management    Chief Complaint:     No chief complaint on file. Right-sided weakness    History Obtained From:     Patient, EMR, nursing staff    History of Present Illness:     55-year-old female with history of questionable seizure disorder, chronic right hemiparesis numbness of right leg migraines admitted on 4/17 after being found altered, concern for stroke  Started on IV tPA but then became obtunded and flaccid and tPA held due to concern for hemorrhagic conversion  She was intubated, stat CT done which was unremarkable subsequently tPA was resumed and completed. MRI brain negative for stroke  Episode of flaccidity concerning for seizure hence loaded with IV Keppra  Extubated 4/19  Persistent right-sided weakness, fluctuating, neurology considering psychosomatic symptoms  Patient is on aspirin Plavix    Diabetes   Diagnosed within last year  Modifying factors on med:   Severity:controlled   Associated signs and symtoms: neuropathy/ckd/ CAD.    aggravated with sugar diet and better with low sugar diet      5/2   Patient again had episode of hypoglycemia in the morning, orange juice was provided  Blood sugar went to 68      Past Medical History:     Past Medical History:   Diagnosis Date    Cerebral artery occlusion with cerebral infarction ([address] 2008, 2014    CVA (cerebral infarction)     Diabetes mellitus ([address]     Hyperlipidemia     Hypertension     Seizures ([address] 04/2022        Past Surgical History:     Past Surgical History:   Procedure Laterality Date    APPENDECTOMY      BRONCHOSCOPY DIAGNOSTIC  7/24/2014         ENDOSCOPY, COLON, DIAGNOSTIC      HYSTERECTOMY          Medications Prior to Admission:     Prior to Admission medications    Medication Sig Start Date End Date Taking? Authorizing Provider   divalproex (DEPAKOTE ER) 500 MG extended release tablet Take 1 tablet by mouth in the morning and at bedtime 4/26/22   Phil Ordonez MD   levETIRAcetam (KEPPRA) 1000 MG tablet Take 1 tablet by mouth 2 times daily 4/21/22   Varun Montalvo MD   topiramate (TOPAMAX) 25 MG tablet Take 1 tablet by mouth 2 times daily 4/21/22   Varun Montalvo MD   clopidogrel (PLAVIX) 75 MG tablet Take 75 mg by mouth nightly    Historical Provider, MD   gabapentin (NEURONTIN) 600 MG tablet Take 600 mg by mouth 3 times daily. Historical Provider, MD   potassium chloride (KLOR-CON M) 20 MEQ extended release tablet Take 20 mEq by mouth 2 times daily    Historical Provider, MD   fenofibrate (TRIGLIDE) 160 MG tablet Take 160 mg by mouth daily    Historical Provider, MD   Pantoprazole Sodium (PROTONIX PO) Take  by mouth. Historical Provider, MD   acetaminophen (TYLENOL) 160 MG/5ML solution Take 20.3 mLs by mouth every 4 hours as needed for Fever. 7/28/14   Nereida Burns, DO   albuterol (PROVENTIL HFA;VENTOLIN HFA) 108 (90 BASE) MCG/ACT inhaler Inhale 6 puffs into the lungs every 6 hours as needed for Wheezing. 7/28/14   Nereida Burns, DO   glucagon, rDNA, 1 MG SOLR injection Inject 1 mg into the muscle as needed for Low blood sugar (Blood glucose less than 70 mg/dL and patient NOT ALERT or NPO and does not have IV access. ). 7/28/14   Nereida Burns, DO   glucose (GLUTOSE) 40 % GEL Take 15 g by mouth as needed. 7/28/14   Nereida Burns, DO   insulin glargine (LANTUS) 100 UNIT/ML injection vial Inject 10 Units into the skin nightly. 7/28/14   Nereida Burns, DO   insulin lispro (HUMALOG) 100 UNIT/ML injection vial Inject 0-12 Units into the skin every 6 hours.  7/28/14   [facility] DO Corin   niacin (NIASPAN) 500 MG CR tablet Take 1 tablet by mouth nightly. 14   Teresa Young DO   potassium chloride (KAYCIEL) 20 MEQ/15ML (10%) solution Take 15 mLs by mouth daily. 14   Teresa Young DO        Allergies:     Patient has no known allergies. Social History:     Tobacco:    reports that she has never smoked. She has never used smokeless tobacco.  Alcohol:      reports no history of alcohol use. Drug Use:  reports no history of drug use. Family History:     History reviewed. No pertinent family history. Review of Systems:     Positive and Negative as described in HPI. CONSTITUTIONAL:  negative for fevers, chills, sweats, fatigue, weight loss  HEENT:  negative for vision, hearing changes, runny nose, throat pain  RESPIRATORY:  negative for shortness of breath, cough, congestion, wheezing. CARDIOVASCULAR:  negative for chest pain, palpitations.   GASTROINTESTINAL:  negative for nausea, vomiting, diarrhea, constipation, change in bowel habits, abdominal pain   GENITOURINARY:  negative for difficulty of urination, burning with urination, frequency   INTEGUMENT:  negative for rash, skin lesions, easy bruising   HEMATOLOGIC/LYMPHATIC:  negative for swelling/edema   ALLERGIC/IMMUNOLOGIC:  negative for urticaria , itching  ENDOCRINE:  negative increase in drinking, increase in urination, hot or cold intolerance  MUSCULOSKELETAL:  negative joint pains, muscle aches, swelling of joints  NEUROLOGICAL:  Positive for right sided weakness, negative for headaches, dizziness, lightheadedness, numbness, pain, tingling extremities      Physical Exam:     /79   Pulse 79   Temp 98 °F (36.7 °C)   Resp 19   Ht 5' 5\" (1.651 m)   Wt 134 lb (60.8 kg)   SpO2 99%   BMI 22.30 kg/m²   Temp (24hrs), Av.1 °F (36.7 °C), Min:98 °F (36.7 °C), Max:98.2 °F (36.8 °C)    Recent Labs     22  1611 05/09/22  2008 05/10/22  0634 05/10/22  1156   POCGLU 151* 153* 90 88 Intake/Output Summary (Last 24 hours) at 5/10/2022 1539  Last data filed at 5/9/2022 1915  Gross per 24 hour   Intake 480 ml   Output --   Net 480 ml       General Appearance:  alert, well appearing, and in no acute distress  Head:  normocephalic, atraumatic. Eye: no icterus, redness, pupils equal and reactive, extraocular eye movements intact, conjunctiva clear  Ear: normal external ear, no discharge, hearing intact  Nose:  no drainage noted  Mouth: mucous membranes moist  Neck: supple, no carotid bruits, thyroid not palpable  Lungs: Bilateral equal air entry, clear to ausculation, no wheezing, rales or rhonchi, normal effort  Cardiovascular: normal rate, regular rhythm, no murmur, gallop, rub.   Abdomen: Soft, nontender, nondistended, normal bowel sounds, no hepatomegaly or splenomegaly  Neurologic:  Right UE and Lower extremity power 2/5, There are no new focal motor or sensory deficits, normal muscle tone and bulk, no abnormal sensation, normal speech, cranial nerves II through XII grossly intact  Skin: No gross lesions, rashes, bruising or bleeding on exposed skin area  Extremities:  peripheral pulses palpable, no pedal edema or calf pain with palpation  Psych:normal affect    Investigations:      Laboratory Testing:  Recent Results (from the past 24 hour(s))   POC Glucose Fingerstick    Collection Time: 05/09/22  4:11 PM   Result Value Ref Range    POC Glucose 151 (H) 65 - 105 mg/dL   POC Glucose Fingerstick    Collection Time: 05/09/22  8:08 PM   Result Value Ref Range    POC Glucose 153 (H) 65 - 105 mg/dL   POC Glucose Fingerstick    Collection Time: 05/10/22  6:34 AM   Result Value Ref Range    POC Glucose 90 65 - 105 mg/dL   POC Glucose Fingerstick    Collection Time: 05/10/22 11:56 AM   Result Value Ref Range    POC Glucose 88 65 - 105 mg/dL       Imaging/Diagonstics:  Recent data reviewed    Assessment :      Primary Problem  Right sided weakness    Active Hospital Problems    Diagnosis Date Noted  Right sided weakness [R53.1] 04/17/2022    HTN (hypertension) [I10] 07/19/2014       Plan:     1. Right-sided weakness MRI brain negative for stroke- PT OT  2. Type 2 diabetes- continue Lantus, sliding scale  3. History of prior strokes-continue aspirin, Plavix, statin  4. History of seizure disorder- continue Keppra, Topamax, Depakote  5. Hyperlipidemia-patient on statin, fenofibrate  6. H/o Hypertension-currently controlled without meds  7. Recent extubation on 4/19    DVT prophylaxis-Lovenox    May 1  Hypoglycemia noted  Reduce lantus to 15 qhs  5/2   Reducing dose of insulin to 10 units, reducing sliding scale to low-dose  Blood pressure is controlled, off antihypertensives    5/8  Patient blood sugar better controlled after adjusting insulin  Blood pressure controlled  No new complaints    5/9  Patient reports no new complaints. Engaging with physical therapy. Labs and vitals reviewed. No new issues. Continue with current care. 5/10  Blood sugars low normal consistently this morning  We will add metformin, reduce dose of Lantus hopefully will be able to discharge on metformin only  Note paronychia left ring finger-start antibiotics    Consultations:   IP CONSULT TO DIETITIAN  IP CONSULT TO SOCIAL WORK  IP CONSULT TO INTERNAL MEDICINE  INPATIENT CONSULT TO ORTHOTIST/PROSTHETIST      Pepper Arnett MD  5/10/2022  3:39 PM    Copy sent to Dr. Elisa Vicente MD    Please note that this chart was generated using voice recognition Dragon dictation software. Although every effort was made to ensure the accuracy of this automated transcription, some errors in transcription may have occurred.

## 2022-05-10 NOTE — PLAN OF CARE
Problem: Discharge Planning  Goal: Discharge to home or other facility with appropriate resources  5/10/2022 0213 by Leroy Guerrero RN  Outcome: Progressing  Flowsheets (Taken 5/9/2022 2100)  Discharge to home or other facility with appropriate resources:   Identify barriers to discharge with patient and caregiver   Arrange for needed discharge resources and transportation as appropriate   Identify discharge learning needs (meds, wound care, etc)   Refer to discharge planning if patient needs post-hospital services based on physician order or complex needs related to functional status, cognitive ability or social support system  5/9/2022 1443 by Cristina Vogel RN  Outcome: Progressing     Problem: Safety - Adult  Goal: Free from fall injury  5/10/2022 0213 by Leroy Guerrero RN  Outcome: Progressing  5/9/2022 1443 by Cristina Vogel RN  Outcome: Progressing     Problem: ABCDS Injury Assessment  Goal: Absence of physical injury  5/10/2022 0213 by Leroy Guerrero RN  Outcome: Progressing  5/9/2022 1443 by Cristina Voegl RN  Outcome: Progressing     Problem: Skin/Tissue Integrity  Goal: Absence of new skin breakdown  Description: 1. Monitor for areas of redness and/or skin breakdown  2. Assess vascular access sites hourly  3. Every 4-6 hours minimum:  Change oxygen saturation probe site  4. Every 4-6 hours:  If on nasal continuous positive airway pressure, respiratory therapy assess nares and determine need for appliance change or resting period.   5/10/2022 0213 by Leroy Guerrero RN  Outcome: Progressing  5/9/2022 1443 by Cristina Vogel RN  Outcome: Progressing     Problem: Chronic Conditions and Co-morbidities  Goal: Patient's chronic conditions and co-morbidity symptoms are monitored and maintained or improved  5/10/2022 0213 by Leroy Guerrero RN  Outcome: Progressing  Flowsheets (Taken 5/9/2022 2100)  Care Plan - Patient's Chronic Conditions and Co-Morbidity Symptoms are Monitored and Maintained or Improved:   Monitor and assess patient's chronic conditions and comorbid symptoms for stability, deterioration, or improvement   Collaborate with multidisciplinary team to address chronic and comorbid conditions and prevent exacerbation or deterioration  5/9/2022 1443 by Raymond Moya RN  Outcome: Progressing     Problem: Nutrition Deficit:  Goal: Optimize nutritional status  5/10/2022 0213 by Tommy Baeza RN  Outcome: Progressing  5/9/2022 1443 by Raymond Moya RN  Outcome: Progressing     Problem: Pain  Goal: Verbalizes/displays adequate comfort level or baseline comfort level  5/10/2022 0213 by Tommy Baeza RN  Outcome: Progressing  Flowsheets (Taken 5/9/2022 1915)  Verbalizes/displays adequate comfort level or baseline comfort level:   Encourage patient to monitor pain and request assistance   Assess pain using appropriate pain scale   Implement non-pharmacological measures as appropriate and evaluate response   Administer analgesics based on type and severity of pain and evaluate response   Consider cultural and social influences on pain and pain management   Notify Licensed Independent Practitioner if interventions unsuccessful or patient reports new pain  5/9/2022 1443 by Raymond Moya RN  Outcome: Progressing

## 2022-05-11 LAB
GLUCOSE BLD-MCNC: 72 MG/DL (ref 65–105)
GLUCOSE BLD-MCNC: 91 MG/DL (ref 65–105)

## 2022-05-11 PROCEDURE — 99231 SBSQ HOSP IP/OBS SF/LOW 25: CPT | Performed by: INTERNAL MEDICINE

## 2022-05-11 PROCEDURE — 97116 GAIT TRAINING THERAPY: CPT

## 2022-05-11 PROCEDURE — 82947 ASSAY GLUCOSE BLOOD QUANT: CPT

## 2022-05-11 PROCEDURE — 6370000000 HC RX 637 (ALT 250 FOR IP): Performed by: INTERNAL MEDICINE

## 2022-05-11 PROCEDURE — 97110 THERAPEUTIC EXERCISES: CPT

## 2022-05-11 PROCEDURE — 1180000000 HC REHAB R&B

## 2022-05-11 PROCEDURE — 6370000000 HC RX 637 (ALT 250 FOR IP): Performed by: STUDENT IN AN ORGANIZED HEALTH CARE EDUCATION/TRAINING PROGRAM

## 2022-05-11 PROCEDURE — 97130 THER IVNTJ EA ADDL 15 MIN: CPT

## 2022-05-11 PROCEDURE — 97129 THER IVNTJ 1ST 15 MIN: CPT

## 2022-05-11 PROCEDURE — 97530 THERAPEUTIC ACTIVITIES: CPT

## 2022-05-11 PROCEDURE — 6360000002 HC RX W HCPCS: Performed by: STUDENT IN AN ORGANIZED HEALTH CARE EDUCATION/TRAINING PROGRAM

## 2022-05-11 PROCEDURE — 97535 SELF CARE MNGMENT TRAINING: CPT

## 2022-05-11 PROCEDURE — 99232 SBSQ HOSP IP/OBS MODERATE 35: CPT | Performed by: PHYSICAL MEDICINE & REHABILITATION

## 2022-05-11 RX ADMIN — PANTOPRAZOLE SODIUM 40 MG: 40 TABLET, DELAYED RELEASE ORAL at 06:09

## 2022-05-11 RX ADMIN — CLOPIDOGREL BISULFATE 75 MG: 75 TABLET ORAL at 09:26

## 2022-05-11 RX ADMIN — DIVALPROEX SODIUM 500 MG: 500 TABLET, EXTENDED RELEASE ORAL at 09:28

## 2022-05-11 RX ADMIN — ASPIRIN 81 MG: 81 TABLET, COATED ORAL at 09:26

## 2022-05-11 RX ADMIN — ATORVASTATIN CALCIUM 40 MG: 40 TABLET, FILM COATED ORAL at 20:20

## 2022-05-11 RX ADMIN — METFORMIN HYDROCHLORIDE 500 MG: 500 TABLET ORAL at 08:45

## 2022-05-11 RX ADMIN — GABAPENTIN 600 MG: 300 CAPSULE ORAL at 14:05

## 2022-05-11 RX ADMIN — CEPHALEXIN 500 MG: 500 CAPSULE ORAL at 20:20

## 2022-05-11 RX ADMIN — GABAPENTIN 600 MG: 300 CAPSULE ORAL at 09:25

## 2022-05-11 RX ADMIN — DIVALPROEX SODIUM 500 MG: 500 TABLET, EXTENDED RELEASE ORAL at 20:22

## 2022-05-11 RX ADMIN — TOPIRAMATE 25 MG: 25 TABLET, FILM COATED ORAL at 09:28

## 2022-05-11 RX ADMIN — CEPHALEXIN 500 MG: 500 CAPSULE ORAL at 06:09

## 2022-05-11 RX ADMIN — POTASSIUM CHLORIDE 20 MEQ: 20 TABLET, EXTENDED RELEASE ORAL at 08:45

## 2022-05-11 RX ADMIN — TOPIRAMATE 25 MG: 25 TABLET, FILM COATED ORAL at 20:21

## 2022-05-11 RX ADMIN — METFORMIN HYDROCHLORIDE 500 MG: 500 TABLET ORAL at 17:23

## 2022-05-11 RX ADMIN — GABAPENTIN 600 MG: 300 CAPSULE ORAL at 20:20

## 2022-05-11 RX ADMIN — FENOFIBRATE 160 MG: 160 TABLET ORAL at 09:26

## 2022-05-11 RX ADMIN — CEPHALEXIN 500 MG: 500 CAPSULE ORAL at 14:05

## 2022-05-11 RX ADMIN — LEVETIRACETAM 1000 MG: 500 TABLET, FILM COATED ORAL at 20:21

## 2022-05-11 RX ADMIN — LEVETIRACETAM 1000 MG: 500 TABLET, FILM COATED ORAL at 09:26

## 2022-05-11 RX ADMIN — ENOXAPARIN SODIUM 40 MG: 100 INJECTION SUBCUTANEOUS at 09:28

## 2022-05-11 ASSESSMENT — PAIN DESCRIPTION - PROGRESSION

## 2022-05-11 ASSESSMENT — PAIN SCALES - GENERAL
PAINLEVEL_OUTOF10: 0
PAINLEVEL_OUTOF10: 0

## 2022-05-11 NOTE — PLAN OF CARE
Problem: Discharge Planning  Goal: Discharge to home or other facility with appropriate resources  5/11/2022 1202 by Bassam Bell  Outcome: Progressing     Problem: Safety - Adult  Goal: Free from fall injury  5/11/2022 1202 by Bassam Bell  Outcome: Progressing  Flowsheets (Taken 5/11/2022 1042 by Wagner Gavin LPN)  Free From Fall Injury: Instruct family/caregiver on patient safety     Problem: ABCDS Injury Assessment  Goal: Absence of physical injury  5/11/2022 1202 by Bassam Bell  Outcome: Progressing     Problem: Skin/Tissue Integrity  Goal: Absence of new skin breakdown  Description: 1. Monitor for areas of redness and/or skin breakdown  2. Assess vascular access sites hourly  3. Every 4-6 hours minimum:  Change oxygen saturation probe site  4. Every 4-6 hours:  If on nasal continuous positive airway pressure, respiratory therapy assess nares and determine need for appliance change or resting period.   5/11/2022 1202 by Bassam Bell  Outcome: Progressing     Problem: Nutrition Deficit:  Goal: Optimize nutritional status  5/11/2022 1202 by Bassam Bell  Outcome: Progressing     Problem: Pain  Goal: Verbalizes/displays adequate comfort level or baseline comfort level  5/11/2022 1202 by Bassam Bell  Outcome: Progressing

## 2022-05-11 NOTE — PROGRESS NOTES
Speech Language Pathology  Speech Language Pathology  [address] [place] [address]    Cognitive Treatment Note    Date: 5/11/2022  Patients Name: Esperanza Ruelas  MRN: [id number]  Diagnosis:   Patient Active Problem List   Diagnosis Code    Altered mental status R41.82    Generalized nonconvulsive seizures ([facility] [address] G40.309    History of CVA (cerebrovascular accident) Z80.78    Noncompliance Z91.19    Hemiparesis ([address] G81.90    Respiratory failure ([address] J96.90    Upper respiratory infection J06.9    Acute respiratory failure ([address] J96.00    HTN (hypertension) I10    HLD (hyperlipidemia) E78.5    Hyperglycemia R73.9    Hypokalemia E87.6    Anemia due to acute blood loss D62    Right sided weakness R53.1    Cerebrovascular accident (CVA) ([facility] [address] I63.9    Tissue plasminogen activator (tPA) administered at other facility within 24 hours before current admission Z92.82       Pain: 0/10    Cognitive Treatment    Treatment time: 7342-4186    Subjective: [x] Alert [x] Cooperative     [] Confused     [] Agitated    [] Lethargic      Objective/Assessment:  Attention: Sustained throughout, distractions minimized. Orientation: Oriented x4. Recall: Image retention (fishing, 15 min delay)- 100% accuracy julienne. Organization: Convergent categorization, ID category of 3 similar items (abstract)- 75% accuracy julienne, 100% cued. Divergent thinking, add 1 to list of 3 similar items (abstract)- 90% accuracy julienne, 100% cued. Problem Solving/Reasoning: Safety questions- 70% accuracy julienne, increasing to 100% c min cues. Other: Pt demo occasional word retrieval deficits in conversation; able to utilize circumlocution with verbal cues from [address] [place] [address]  Plan:  [x] Continue ST services    [] Discharge from ST:      Discharge recommendations: [] Inpatient Rehab   [] [place]   [] Outpatient Therapy  [] Follow up at trauma clinic   [] Other:       Treatment completed by:  Otilia Hurst M.A., CCC-SLP

## 2022-05-11 NOTE — PROGRESS NOTES
[facility] Internal Medicine    CONSULTATION / HISTORY AND PHYSICAL EXAMINATION            Date:   5/11/2022  Patient name:  Leo Hobbs  Date of admission:  4/28/2022 12:10 PM  MRN:   [id number]  Account:  [de-identified]  YOB: 1950  PCP:    Randy Altamirano MD  Room:   6417/0742-06  Code Status:    Full Code    Physician Requesting Consult: Robinson Cuadra MD    Reason for Consult:  Medical management    Chief Complaint:     No chief complaint on file. Right-sided weakness    History Obtained From:     Patient, EMR, nursing staff    History of Present Illness:     80-year-old female with history of questionable seizure disorder, chronic right hemiparesis numbness of right leg migraines admitted on 4/17 after being found altered, concern for stroke  Started on IV tPA but then became obtunded and flaccid and tPA held due to concern for hemorrhagic conversion  She was intubated, stat CT done which was unremarkable subsequently tPA was resumed and completed. MRI brain negative for stroke  Episode of flaccidity concerning for seizure hence loaded with IV Keppra  Extubated 4/19  Persistent right-sided weakness, fluctuating, neurology considering psychosomatic symptoms  Patient is on aspirin Plavix    Diabetes   Diagnosed within last year  Modifying factors on med:   Severity:controlled   Associated signs and symtoms: neuropathy/ckd/ CAD.    aggravated with sugar diet and better with low sugar diet      5/2   Patient again had episode of hypoglycemia in the morning, orange juice was provided  Blood sugar went to 68      Past Medical History:     Past Medical History:   Diagnosis Date    Cerebral artery occlusion with cerebral infarction ([address] 2008, 2014    CVA (cerebral infarction)     Diabetes mellitus ([address]     Hyperlipidemia     Hypertension     Seizures ([facility] [address] 04/2022        Past Surgical History:     Past Surgical History:   Procedure Laterality Date    APPENDECTOMY      BRONCHOSCOPY DIAGNOSTIC  7/24/2014         ENDOSCOPY, COLON, DIAGNOSTIC      HYSTERECTOMY          Medications Prior to Admission:     Prior to Admission medications    Medication Sig Start Date End Date Taking? Authorizing Provider   divalproex (DEPAKOTE ER) 500 MG extended release tablet Take 1 tablet by mouth in the morning and at bedtime 4/26/22   Yrn Lopez MD   levETIRAcetam (KEPPRA) 1000 MG tablet Take 1 tablet by mouth 2 times daily 4/21/22   Jamie Sacks, MD   topiramate (TOPAMAX) 25 MG tablet Take 1 tablet by mouth 2 times daily 4/21/22   Jamie Sacks, MD   clopidogrel (PLAVIX) 75 MG tablet Take 75 mg by mouth nightly    Historical Provider, MD   gabapentin (NEURONTIN) 600 MG tablet Take 600 mg by mouth 3 times daily. Historical Provider, MD   potassium chloride (KLOR-CON M) 20 MEQ extended release tablet Take 20 mEq by mouth 2 times daily    Historical Provider, MD   fenofibrate (TRIGLIDE) 160 MG tablet Take 160 mg by mouth daily    Historical Provider, MD   Pantoprazole Sodium (PROTONIX PO) Take  by mouth. Historical Provider, MD   acetaminophen (TYLENOL) 160 MG/5ML solution Take 20.3 mLs by mouth every 4 hours as needed for Fever. 7/28/14   Donaldo Xavier, DO   albuterol (PROVENTIL HFA;VENTOLIN HFA) 108 (90 BASE) MCG/ACT inhaler Inhale 6 puffs into the lungs every 6 hours as needed for Wheezing. 7/28/14   Donaldo Lunar, DO   glucagon, rDNA, 1 MG SOLR injection Inject 1 mg into the muscle as needed for Low blood sugar (Blood glucose less than 70 mg/dL and patient NOT ALERT or NPO and does not have IV access. ). 7/28/14   Donaldo Xavier, DO   glucose (GLUTOSE) 40 % GEL Take 15 g by mouth as needed. 7/28/14   Corinth Duc, DO   insulin glargine (LANTUS) 100 UNIT/ML injection vial Inject 10 Units into the skin nightly. 7/28/14   Corinth Duc, DO   insulin lispro (HUMALOG) 100 UNIT/ML injection vial Inject 0-12 Units into the skin every 6 hours.  7/28/14   Jana Xie Corin,    niacin (NIASPAN) 500 MG CR tablet Take 1 tablet by mouth nightly. 14   [place] Post, DO   potassium chloride (KAYCIEL) 20 MEQ/15ML (10%) solution Take 15 mLs by mouth daily. 14   Dharmesh Post, DO        Allergies:     Patient has no known allergies. Social History:     Tobacco:    reports that she has never smoked. She has never used smokeless tobacco.  Alcohol:      reports no history of alcohol use. Drug Use:  reports no history of drug use. Family History:     History reviewed. No pertinent family history. Review of Systems:     Positive and Negative as described in HPI. CONSTITUTIONAL:  negative for fevers, chills, sweats, fatigue, weight loss  HEENT:  negative for vision, hearing changes, runny nose, throat pain  RESPIRATORY:  negative for shortness of breath, cough, congestion, wheezing. CARDIOVASCULAR:  negative for chest pain, palpitations.   GASTROINTESTINAL:  negative for nausea, vomiting, diarrhea, constipation, change in bowel habits, abdominal pain   GENITOURINARY:  negative for difficulty of urination, burning with urination, frequency   INTEGUMENT:  negative for rash, skin lesions, easy bruising   HEMATOLOGIC/LYMPHATIC:  negative for swelling/edema   ALLERGIC/IMMUNOLOGIC:  negative for urticaria , itching  ENDOCRINE:  negative increase in drinking, increase in urination, hot or cold intolerance  MUSCULOSKELETAL:  negative joint pains, muscle aches, swelling of joints  NEUROLOGICAL:  Positive for right sided weakness, negative for headaches, dizziness, lightheadedness, numbness, pain, tingling extremities      Physical Exam:     BP 95/62   Pulse 74   Temp 98.2 °F (36.8 °C) (Oral)   Resp 16   Ht 5' 5\" (1.651 m)   Wt 134 lb (60.8 kg)   SpO2 100%   BMI 22.30 kg/m²   Temp (24hrs), Av °F (36.7 °C), Min:97.9 °F (36.6 °C), Max:98.2 °F (36.8 °C)    Recent Labs     05/10/22  1626 05/10/22  1934 22  0558 22  1204   POCGLU 104 185* 72 91 Intake/Output Summary (Last 24 hours) at 5/11/2022 1513  Last data filed at 5/10/2022 1915  Gross per 24 hour   Intake 480 ml   Output --   Net 480 ml       General Appearance:  alert, well appearing, and in no acute distress  Head:  normocephalic, atraumatic. Eye: no icterus, redness, pupils equal and reactive, extraocular eye movements intact, conjunctiva clear  Ear: normal external ear, no discharge, hearing intact  Nose:  no drainage noted  Mouth: mucous membranes moist  Neck: supple, no carotid bruits, thyroid not palpable  Lungs: Bilateral equal air entry, clear to ausculation, no wheezing, rales or rhonchi, normal effort  Cardiovascular: normal rate, regular rhythm, no murmur, gallop, rub.   Abdomen: Soft, nontender, nondistended, normal bowel sounds, no hepatomegaly or splenomegaly  Neurologic:  Right UE and Lower extremity power 2/5, There are no new focal motor or sensory deficits, normal muscle tone and bulk, no abnormal sensation, normal speech, cranial nerves II through XII grossly intact  Skin: No gross lesions, rashes, bruising or bleeding on exposed skin area  Extremities:  peripheral pulses palpable, no pedal edema or calf pain with palpation  Psych:normal affect    Investigations:      Laboratory Testing:  Recent Results (from the past 24 hour(s))   POC Glucose Fingerstick    Collection Time: 05/10/22  4:26 PM   Result Value Ref Range    POC Glucose 104 65 - 105 mg/dL   POC Glucose Fingerstick    Collection Time: 05/10/22  7:34 PM   Result Value Ref Range    POC Glucose 185 (H) 65 - 105 mg/dL   POC Glucose Fingerstick    Collection Time: 05/11/22  5:58 AM   Result Value Ref Range    POC Glucose 72 65 - 105 mg/dL   POC Glucose Fingerstick    Collection Time: 05/11/22 12:04 PM   Result Value Ref Range    POC Glucose 91 65 - 105 mg/dL       Imaging/Diagonstics:  Recent data reviewed    Assessment :      Primary Problem  Right sided weakness    Active Hospital Problems    Diagnosis Date Noted    Right sided weakness [R53.1] 04/17/2022    HTN (hypertension) [I10] 07/19/2014       Plan:     1. Right-sided weakness MRI brain negative for stroke- PT OT  2. Type 2 diabetes- continue Lantus, sliding scale  3. History of prior strokes-continue aspirin, Plavix, statin  4. History of seizure disorder- continue Keppra, Topamax, Depakote  5. Hyperlipidemia-patient on statin, fenofibrate  6. H/o Hypertension-currently controlled without meds  7. Recent extubation on 4/19    DVT prophylaxis-Lovenox    May 1  Hypoglycemia noted  Reduce lantus to 15 qhs  5/2   Reducing dose of insulin to 10 units, reducing sliding scale to low-dose  Blood pressure is controlled, off antihypertensives    5/8  Patient blood sugar better controlled after adjusting insulin  Blood pressure controlled  No new complaints    5/9  Patient reports no new complaints. Engaging with physical therapy. Labs and vitals reviewed. No new issues. Continue with current care. 5/10  Blood sugars low normal consistently this morning  We will add metformin, reduce dose of Lantus hopefully will be able to discharge on metformin only  Note paronychia left ring finger-start antibiotics    5/11  Blood sugar still low will D/C Lantus  Left ring finger much better compared to yesterday continue current antibiotics    Consultations:   Sonny Lindsey TO ORTHOTIST/PROSTHETIST      Antelmo Askew MD  5/11/2022  3:13 PM    Copy sent to Dr. Luis Alfredo Orellana MD    Please note that this chart was generated using voice recognition Dragon dictation software. Although every effort was made to ensure the accuracy of this automated transcription, some errors in transcription may have occurred.

## 2022-05-11 NOTE — PROGRESS NOTES
Hygiene  Assistance Level: Supervision  Skilled Clinical Factors: standing at sink level     Upper Extremity Bathing  Assistance Level: Supervision;Set-up  Skilled Clinical Factors: standing in shower using GB for 0-1 UE support for safety     Lower Extremity Bathing  Assistance Level: Supervision;Set-up  Skilled Clinical Factors: standing for lenore care and washing BLEs displaying increased trunk flexion; 0-1 UE req for balance maintenance, No LOB; pt seated on TTB to dry off;    Upper Extremity Dressing  Assistance Level: Set-up  Skilled Clinical Factors: standing to doff over head shirt;seated on TTB donning bra and over head shirt     Lower Extremity Dressing  Assistance Level: Supervision  Skilled Clinical Factors: sits in tub transfer bench to don pants over feet, sup to stand and pull up, no LOB noted    Putting On/Taking Off Footwear  Assistance Level: Set-up  Skilled Clinical Factors: able to don/doff AFO, TEDs, and     Toileting  Assistance Level: Supervision  Skilled Clinical Factors: standing for lenore care, GB and RW for 0-1 UE support for balance maintenance     Toilet Transfers  Technique:  (ambulating )  Equipment: Grab bars  Additional Factors: With handrails  Assistance Level: Supervision  Skilled Clinical Factors: Pt continues to demo G hand placement and no LOB noted     Tub/Shower Transfers  Type: Shower  Transfer From: Rolling walker  Transfer To: Tub/Shower in standing position  Additional Factors: Verbal cues; With handrails  Assistance Level: Verbal cues;Stand by assist  Skilled Clinical Factors: VC for over shower threshold for increased safety, no LOB noted      Functional Mobility  Device: Rolling walker  Activity: To/From bathroom;Transport items; Retrieve items (hallways )  Assistance Level: Supervision  Skilled Clinical Factors: AM: Pt engaged in ambulation around room and bathroom with RW gathering items needed to engage in dressing and showering task and engaged in functional mobility post ADLs for clean up to support ability to self correct should balance be lost, tolerance and endurance needed to engage in daily tasks independently. . PM: pt ambulated through hallways, down ramps, and various textured [place] (I.E vinyl [place], tile, carpeted areas, and concrete floor), Pt demo'd G tolerance with No LOB     Bed Mobility  Overall Assistance Level: Independent  Additional Factors: With handrails; Head of bed flat    Roll Left  Assistance Level: Independent    Sit to Supine  Assistance Level: Independent    Supine to Sit  Assistance Level: Independent    Scooting  Assistance Level: Independent    Sit to Stand  Assistance Level: Independent  Skilled Clinical Factors: G hand placement     Stand to Sit  Assistance Level: Supervision  Skilled Clinical Factors: 1 vc for hand placement      OT Exercises  Exercise Treatment: PM:  BUE exercises with 2# weight x15 reps x1 in all planes except transverse plane req AAROM with RUE due decreased strength for proper tech for optimal benefit of task. Exercises elicted to support overall strength for optimal safety and independence during functional tasks. Pt req intermittent RB throughout. Assessment  Assessment  Activity Tolerance: Patient tolerated treatment well  Discharge Recommendations: Home with assist PRN;Home with Home health OT  Safety Devices  Safety Devices in place: Yes  Type of devices: Patient at risk for falls;Call light within reach;Gait belt;Left in chair;Left in bed    Patient Education  Education  Education Given To: Patient  Education Provided: ADL Function;IADL Function; Safety;Transfer Training;Equipment;Home Exercise Program;Plan of Care; Mobility Training;DME/Home Modifications; Fall Prevention Strategies  Education Method: Demonstration;Verbal  Education Outcome: Demonstrated understanding;Verbalized understanding;Continued education needed    Plan  Plan  Times per Week: 5-7  Times per Day: Twice a day  Current Treatment Recommendations: Strengthening;ROM;Balance training;Functional mobility training; Endurance training; Safety education & training;Patient/Caregiver education & training;Equipment evaluation, education, & procurement;Self-Care / ADL; Coordination training;Neuromuscular re-education;Modalities    Goals  Patient Goals   Patient goals : \"To end up gonig home and walk out of here and be able to be as normal as possible\"  complete own grocery shopping (will need assist with transportation),  shower standing- does not want to obtain shower seat. able to write her checks to pay bills. Short Term Goals  Time Frame for Short term goals: One week:  5-8-22 all STG met  Short Term Goal 1: Patient will perform upper body bathing and dressing with SUP. Short Term Goal 2: Patient will perform lower body bathing and dressing with Minimal assist.  Short Term Goal 3: Patient will perform toileting tasks with Minimal assist.  Short Term Goal 4: Patient will perform stand pivot transfers during self-care with Minimal assist and Good safety. Short Term Goal 5: Patient will verbalize/demonstrate Good understanding of assistive equipment/durable medical equipment/modified techniques for increased safety and IND with self-care. Additional Goals?: Yes  Short Term Goal 6: Patient will verbalize/demonstrate Good understanding of modified techniques for R UE during self-care. Short Term Goal 7: Patient will actively participate in 30+ minutes of therapeutic exercise/functional activities to promote increased IND with self-care and mobility. Long Term Goals  Time Frame for Long term goals : By discharge  Long Term Goal 1: Patient will perform BADLs with modified IND and Good safety. Long Term Goal 2: Patient will perform stand pivot transfers during self-care with modified IND and Good safety. Long Term Goal 3: Patient will perform functional mobility during self-care at w/c level with modified IND. / modify goal: 5-8-22  mod indep functional mobility for ambulation during adls with good safety. Long Term Goal 4: Patient will verbalize/demonstrate Good understanding of Fall Prevention strategies during self-care to maximize safety and IND. Long Term Goal 5: Patient will verbalize/demonstrate Good understanding of home exercise program for R UE ROM, strengthening, and coordination as appropriate.   Additional Goals?: Yes  Long Term Goal 6: 9 hole peg test and dynamometer to be assessed for R UE as appropriate     05/11/22 1049 05/11/22 1400   OT Individual Minutes   Time In 1049 1400   Time Out 1146 1436   Minutes 57 100 [facility] OJ Eid

## 2022-05-11 NOTE — PROGRESS NOTES
Physical Medicine & Rehabilitation  Progress Note      Subjective:      80year-old female with R sided weakness. Patient is doing well today. She notes improved pain in her L finger today. No new issues with sleep, appetite, bowel, or bladder. ROS:  Denies fevers, chills, sweats. No chest pain, palpitations, lightheadedness. Denies coughing, wheezing or shortness of breath. Denies abdominal pain, nausea, diarrhea or constipation. No new areas of joint pain. Denies new areas of numbness or weakness. Denies new anxiety or depression issues. No new skin problems. Rehabilitation:   Progressing in therapies. PT:  Restrictions/Precautions: Fall Risk,General Precautions,Up as Tolerated  Other position/activity restrictions: SBP <180   Transfers  Sit to Stand: Modified independent  Stand to sit: Modified independent  Bed to Chair: Supervision  Stand Pivot Transfers: Supervision  Squat Pivot Transfers: Minimal Assistance  Comment: Pt continues to progress requiring less cues from therapist. Demo's good safety with t/fs. Ambulation  Surface: level tile  Device: Rolling Walker  Other Apparatus: Right,AFO  Assistance: Supervision  Quality of Gait: Patient able to advance R LE this AM without an AFO. Steady pace with no LOB. Gait Deviations: Slow Irene  Distance: 200ft;  short distances withing gym  Comments: Pt demo's improved endurance this session, ambulating further distances with less rest breaks on multiple surfaces. More Ambulation?: Yes    Transfers  Sit to Stand: Modified independent  Stand to sit: Modified independent  Bed to Chair: Supervision  Stand Pivot Transfers: Supervision  Squat Pivot Transfers: Minimal Assistance  Comment: Pt continues to progress requiring less cues from therapist. Demo's good safety with t/fs.      Ambulation  Surface: level tile  Device: Rolling Walker  Other Apparatus: Right,AFO  Assistance: Supervision  Quality of Gait: Patient able to advance R LE this AM without an AFO. Steady pace with no LOB. Gait Deviations: Slow Irene  Distance: 200ft;  short distances withing gym  Comments: Pt demo's improved endurance this session, ambulating further distances with less rest breaks on multiple surfaces. More Ambulation?: Yes    Surface: level tile  Ambulation  Surface: level tile  Device: Rolling Walker  Other Apparatus: Right,AFO  Assistance: Supervision  Quality of Gait: Patient able to advance R LE this AM without an AFO. Steady pace with no LOB. Gait Deviations: Slow Irene  Distance: 200ft;  short distances withing gym  Comments: Pt demo's improved endurance this session, ambulating further distances with less rest breaks on multiple surfaces. More Ambulation?: Yes    OT:  ADL  Feeding: Setup  Feeding Skilled Clinical Factors: Patient reports use of non-dominant L hand for self-feeding currently  Grooming: Stand by assistance  Grooming Skilled Clinical Factors: While seated in w/c at sink  UE Bathing: Moderate assistance  UE Bathing Skilled Clinical Factors: Assist for L UE and R UE positioning while seated on tub transfer bench in shower  LE Bathing: Moderate assistance  LE Bathing Skilled Clinical Factors: Assist for buttocks and perineal area; Minimal assist for standing balance  UE Dressing: Minimal assistance  UE Dressing Skilled Clinical Factors: Assist bra fastening and threading L UE  LE Dressing: Maximum assistance  LE Dressing Skilled Clinical Factors: Assist to thread R LE, don TEDs & pull pants over hips  Toileting: Maximum assistance  Toileting Skilled Clinical Factors: Assist for clothing management; personal hygiene while seated  Additional Comments: OT facilitated patient engagement in showering routine including bathing, dressing, toileting, and grooming tasks. Patient demonstrates Fair compensatory strategies for self-care with further education warranted to maximize safety and IND.    Instrumental ADL's  Instrumental ADLs: Yes     Balance  Sitting Balance: Stand by assistance  Standing Balance: Minimal assistance            Bed mobility  Rolling to Left: Stand by assistance  Rolling to Right: Stand by assistance  Supine to Sit: Stand by assistance  Sit to Supine: Stand by assistance  Scooting: Stand by assistance  Bed Mobility Comments: PT mat, wedge, 3 pillows. Transfers  Stand Pivot Transfers: 2 Person assistance,Moderate assistance  Sit to stand: Moderate assistance  Stand to sit: Moderate assistance  Transfer Comments: with Minimal verbal cues for hand placement and safety   Toilet Transfers  Toilet - Technique: Stand pivot,To right,To left  Equipment Used: Raised toilet seat with rails  Toilet Transfer: 2 Person assistance,Moderate assistance  Toilet Transfers Comments: with Minimal verbal cues for hand placement and safety     Shower Transfers  Shower - Transfer From: Wheelchair  Shower - Transfer Type: To and From  Shower - Transfer To: Transfer tub bench  Shower - Technique: Stand pivot,To right,To left  Shower Transfers: 2 Person assistance,Moderate assistance       SPEECH:  Subjective: [x]? Alert     [x]? Cooperative     []? Confused     []? Agitated    []? Lethargic        Objective/Assessment:  Attention: Sustained throughout, distractions minimized.      Orientation: Oriented x4.      Recall: Image retention (fishing, 15 min delay)- 100% accuracy julienne.      Organization: Convergent categorization, ID category of 3 similar items (abstract)- 75% accuracy julienne, 100% cued.   Divergent thinking, add 1 to list of 3 similar items (abstract)- 90% accuracy julienne, 100% cued.      Problem Solving/Reasoning: Safety questions- 70% accuracy julienne, increasing to 100% c min cues.      Other: Pt demo occasional word retrieval deficits in conversation; able to utilize circumlocution with verbal cues from ST.        Objective:  BP 95/62   Pulse 74   Temp 98.2 °F (36.8 °C) (Oral)   Resp 16   Ht 5' 5\" (1.651 m)   Wt 134 lb (60.8 kg)   SpO2 100%   BMI 22.30 kg/m² GEN: well developed, well nourished, NAD  HEENT: NCAT, PERRL, EOMI, mucous membranes pink and moist  CV: RRR, no murmurs, rubs or gallops  PULM: CTAB, no rales or rhonchi. Respirations WNL and unlabored  ABD: soft, NT, ND, BS+ and equal  NEURO: A&O x3. Sensation intact to light touch. MSK: Functional ROM all extremities . Strength 4+/5 key muscles all extremities. EXTREMITIES: No calf tenderness to palpation bilaterally. No edema BLEs. L 4th digit with less edema and erythema. SKIN: warm dry and intact with good turgor  PSYCH: appropriately interactive. Affect WNL. Diagnostics:     CBC: No results for input(s): WBC, RBC, HGB, HCT, MCV, RDW, PLT in the last 72 hours. BMP: No results for input(s): NA, K, CL, CO2, PHOS, BUN, CREATININE, CA, GLUCOSE in the last 72 hours. BNP: No results for input(s): BNP in the last 72 hours. PT/INR: No results for input(s): PROTIME, INR in the last 72 hours. APTT: No results for input(s): APTT in the last 72 hours. CARDIAC ENZYMES: No results for input(s): CKMB, CKMBINDEX, TROPONINT in the last 72 hours. Invalid input(s): CKTOTAL;3 troponins   FASTING LIPID PANEL:  Lab Results   Component Value Date    CHOL 202 (H) 04/17/2022    HDL 55 04/17/2022    TRIG 164 (H) 04/17/2022     LIVER PROFILE: No results for input(s): AST, ALT, ALB, BILIDIR, BILITOT, ALKPHOS in the last 72 hours.      Current Medications:   Current Facility-Administered Medications: metFORMIN (GLUCOPHAGE) tablet 500 mg, 500 mg, Oral, BID WC  insulin glargine (LANTUS) injection vial 5 Units, 5 Units, SubCUTAneous, Nightly  cephALEXin (KEFLEX) capsule 500 mg, 500 mg, Oral, 3 times per day  insulin lispro (HUMALOG) injection vial 0-6 Units, 0-6 Units, SubCUTAneous, TID WC  insulin lispro (HUMALOG) injection vial 0-3 Units, 0-3 Units, SubCUTAneous, Nightly  enoxaparin (LOVENOX) injection 40 mg, 40 mg, SubCUTAneous, Daily  ondansetron (ZOFRAN-ODT) disintegrating tablet 4 mg, 4 mg, Oral, Q8H PRN **OR** [DISCONTINUED] ondansetron (ZOFRAN) injection 4 mg, 4 mg, IntraVENous, Q6H PRN  aspirin EC tablet 81 mg, 81 mg, Oral, Daily  atorvastatin (LIPITOR) tablet 40 mg, 40 mg, Oral, Nightly  albuterol sulfate  (90 Base) MCG/ACT inhaler 6 puff, 6 puff, Inhalation, Q6H PRN  clopidogrel (PLAVIX) tablet 75 mg, 75 mg, Oral, Daily  divalproex (DEPAKOTE ER) extended release tablet 500 mg, 500 mg, Oral, BID  fenofibrate (TRIGLIDE) tablet 160 mg, 160 mg, Oral, Daily  gabapentin (NEURONTIN) capsule 600 mg, 600 mg, Oral, TID  levETIRAcetam (KEPPRA) tablet 1,000 mg, 1,000 mg, Oral, BID  pantoprazole (PROTONIX) tablet 40 mg, 40 mg, Oral, QAM AC  potassium chloride (KLOR-CON M) extended release tablet 20 mEq, 20 mEq, Oral, Daily with breakfast  topiramate (TOPAMAX) tablet 25 mg, 25 mg, Oral, BID  acetaminophen (TYLENOL) tablet 650 mg, 650 mg, Oral, Q4H PRN  polyethylene glycol (GLYCOLAX) packet 17 g, 17 g, Oral, Daily  senna (SENOKOT) tablet 17.2 mg, 2 tablet, Oral, Daily PRN  bisacodyl (DULCOLAX) suppository 10 mg, 10 mg, Rectal, Daily PRN      Impression/Plan:   Impaired ADLs, gait, and mobility due to:      1. Right-sided weakness:  Unknown etiology - possibly psychosomatic. PT/OT for gait, mobility, strengthening, endurance, ADLs, and self care. Plan for outpatient EMG. On aspirin, plavix. On gabapentin. Orthotist for evaluation for right AFO for home - completed  2. Cognitive impairment:  SLP treating. 3. Anemia: Hemoglobin stable. Monitoring. 4. Type 2 Diabetes:  Hemoglobin A1c 10.3 on 4/17. Lantus was discontinued by IM for hypoglycemia  5. HTN:  Stable without medication  6. HLD: On atorvastatin, fenofibrate  7. History of CVA:  On aspirin, plavix, atorvastatin, fenofibrate  8. Seizure: On Keppra and Depakote  9. GERD: On pantoprazole  10. L finger paronychia: IM started cephalexin - planned until 5/17. 11. Hypokalemia:  Improved. On KCl repletion daily. Monitoring.   12.  Bowel Management: Miralax daily, senokot prn, dulcolax prn. 13. DVT Prophylaxis:  low molecular weight heparin, SCD's while in bed and ALLIE's while in bed  14. Internal medicine for medical management    Patient is seen in face to face evaluation today and will require the following durable medical equipment     BATH/SHOWER SEAT MISC    Bath/Shower Bench    Weight: Weight: 134 lb (60.8 kg)  Diagnosis: R hemiparesis  Duration: Purchase  Electronically signed by Donna Lazo MD on 5/11/2022 at 10:04 AM      This note is created with the assistance of a speech recognition program.  While intending to generate a document that actually reflects the content of the visit, the document can still have some errors including those of syntax and sound a like substitutions which may escape proof reading. In such instances, actual meaning can be extrapolated by contextual diversion.

## 2022-05-11 NOTE — CARE COORDINATION
Spoke with Patients daughter Shaye Larios. Patient will go home but she is concerns about OP Therapy. Family works and would not have anyone who could drive take her to her PT appointments. Will discuss this tomorrow in Teams. Meanwhile reached out to [facility] to see if they can accept this patient. Bath and shower seat ordered as well as grab bars, these items are not covered by insurance. Will make sure patient has orders as a reminder to purchase these items as recommened by OT    Did reach out to [facility] and was notified they do not accept this patients insurance. Waiting on a determination from [address] [place] [address] St and they unable to accept this patient    Called [facility] who states [facility] is who they use here in [place]. Left a message asking for a Liaison to return my call. Faxed MICHAEL and received fax confirmation.     Called [facility] to confirm they accept this patient

## 2022-05-11 NOTE — PLAN OF CARE
Problem: Discharge Planning  Goal: Discharge to home or other facility with appropriate resources  5/11/2022 0226 by Denise Barron RN  Outcome: Progressing  Flowsheets (Taken 5/10/2022 2031)  Discharge to home or other facility with appropriate resources:   Identify barriers to discharge with patient and caregiver   Arrange for needed discharge resources and transportation as appropriate   Identify discharge learning needs (meds, wound care, etc)   Arrange for interpreters to assist at discharge as needed   Refer to discharge planning if patient needs post-hospital services based on physician order or complex needs related to functional status, cognitive ability or social support system  5/10/2022 1522 by Julia Humphreys LPN  Outcome: Progressing     Problem: Safety - Adult  Goal: Free from fall injury  5/11/2022 0226 by Denise Barron RN  Outcome: Progressing  Flowsheets (Taken 5/10/2022 1523 by Julia Humphreys LPN)  Free From Fall Injury:   Instruct family/caregiver on patient safety   Based on caregiver fall risk screen, instruct family/caregiver to ask for assistance with transferring infant if caregiver noted to have fall risk factors  5/10/2022 1522 by Julia Humphreys LPN  Outcome: Progressing     Problem: ABCDS Injury Assessment  Goal: Absence of physical injury  5/11/2022 0226 by Denise Barron RN  Outcome: Progressing  5/10/2022 1522 by Julia Humphreys LPN  Outcome: Progressing     Problem: Skin/Tissue Integrity  Goal: Absence of new skin breakdown  Description: 1. Monitor for areas of redness and/or skin breakdown  2. Assess vascular access sites hourly  3. Every 4-6 hours minimum:  Change oxygen saturation probe site  4. Every 4-6 hours:  If on nasal continuous positive airway pressure, respiratory therapy assess nares and determine need for appliance change or resting period.   5/11/2022 0226 by Denise Barron RN  Outcome: Progressing  5/10/2022 1522 by Julia Humphreys LPN  Outcome: Progressing     Problem: Chronic Conditions and Co-morbidities  Goal: Patient's chronic conditions and co-morbidity symptoms are monitored and maintained or improved  5/11/2022 0226 by Marylee Stamp, RN  Outcome: Progressing  Flowsheets (Taken 5/10/2022 2031)  Care Plan - Patient's Chronic Conditions and Co-Morbidity Symptoms are Monitored and Maintained or Improved:   Monitor and assess patient's chronic conditions and comorbid symptoms for stability, deterioration, or improvement   Collaborate with multidisciplinary team to address chronic and comorbid conditions and prevent exacerbation or deterioration   Update acute care plan with appropriate goals if chronic or comorbid symptoms are exacerbated and prevent overall improvement and discharge  5/10/2022 1522 by Jeimy Bell LPN  Outcome: Progressing     Problem: Nutrition Deficit:  Goal: Optimize nutritional status  5/11/2022 0226 by Marylee Stamp, RN  Outcome: Progressing  5/10/2022 1522 by Jeimy Bell LPN  Outcome: Progressing     Problem: Pain  Goal: Verbalizes/displays adequate comfort level or baseline comfort level  5/11/2022 0226 by Marylee Stamp, RN  Outcome: Progressing  Flowsheets (Taken 5/10/2022 1915)  Verbalizes/displays adequate comfort level or baseline comfort level:   Encourage patient to monitor pain and request assistance   Assess pain using appropriate pain scale   Administer analgesics based on type and severity of pain and evaluate response   Implement non-pharmacological measures as appropriate and evaluate response   Consider cultural and social influences on pain and pain management   Notify Licensed Independent Practitioner if interventions unsuccessful or patient reports new pain  5/10/2022 1522 by Jeimy Bell LPN  Outcome: Progressing

## 2022-05-11 NOTE — PROGRESS NOTES
Physical Therapy  Facility/Department: [facility] ACUTE REHAB  Rehabilitation Physical Therapy Treatment    NAME: Radha Montgomery  : 1950 (80 y.o.)  MRN: [id number]  CODE STATUS: Full Code    Date of Service: 22      Past Medical History:   Diagnosis Date    Cerebral artery occlusion with cerebral infarction ([facility] ,     CVA (cerebral infarction)     Diabetes mellitus ([facility]     Hyperlipidemia     Hypertension     Seizures ([facility] 2022     Past Surgical History:   Procedure Laterality Date    APPENDECTOMY      BRONCHOSCOPY DIAGNOSTIC  2014         ENDOSCOPY, COLON, DIAGNOSTIC      HYSTERECTOMY         Chart Reviewed: Yes  Patient assessed for rehabilitation services?: Yes  Additional Pertinent Hx: Radha Montgomery is a 80 y.o. female with history of CVA, HTN, and HLD admitted to [facility] on 2022. She initially presented with acute-onset right hemiparesis. NIHSS 21. She was given tPA by the mobile stroke unit, but infusion was stopped due to worsening of symptoms and concern for possible hemorrhagic conversion. She was intubated prior to arrival to the ED. CT head showed no acute intracranial abnormality, and tPA was restarted. She was also loaded with keppra due to concern for seizures. MRI brain showed no acute intracranial abnormality. Extubated 22. [place] [facility] Per notes, she had a similar episodes in  and . Per Neuro notes- Reported history of chronic infarcts with residual right sided weakness but not clearly evident on brain imaging. Neuro recommending EMG as outpatient. Pt admotted to rehab unit on 22. Family / Caregiver Present: No  Referral Date : 22  Diagnosis: Right side weakness    Restrictions:  Restrictions/Precautions: Fall Risk;General Precautions; Up as Tolerated  Position Activity Restriction  Other position/activity restrictions: SBP <180     SUBJECTIVE  Subjective: Pt reported that she has been walking without an assistive device, but she has trouble locking her \"ankle\" (pointing to her Right knee). Pt stated that she wants to focus on her Right foot/ankle and balance. Pain: No complaints of pain at this time     OBJECTIVE   Cognition  Overall Cognitive Status: [facility]    Functional Mobility  Bed mobility  Rolling to Left: Stand by assistance  Rolling to Right: Stand by assistance  Supine to Sit: Stand by assistance  Sit to Supine: Stand by assistance  Scooting: Stand by assistance  Transfers  Sit to Stand: Modified independent  Stand to sit: Modified independent  Bed to Chair: Modified independent  Stand Pivot Transfers: Modified independent  Balance  Posture: Fair  Sitting - Static: Fair  Sitting - Dynamic: Fair  Standing - Static: Fair;+  Comments: Standing with no AD    Environmental Mobility  Ambulation  Surface: level tile  Device: No Device  Other Apparatus: Right;AFO  Assistance: Supervision  Quality of Gait: Pt had one lateral LOB to her right  Gait Deviations: Decreased step height;Decreased step length; Slow Irene; Shuffles  Distance: 200ft    Ambulation 2  Surface - 2: level tile;outdoors  Device 2: No device  Other Apparatus 2: Right;AFO  Assistance 2: Supervision  Gait Deviations: Decreased step height;Decreased step length; Slow Irene; Shuffles  Distance: 100ft, standing rest break in elevator, 160ft (20ft outdoors)    Stairs/Curb  Stairs?: Yes  Stairs  # Steps : 10  Stairs Height:  (4\" & 6\")  Rails: Bilateral  Other Apparatus: Right;AFO  Assistance: Stand by assistance  Propulsion 1  Propulsion: Manual  Level: Level Tile  Method: LUE;RUE  Level of Assistance: Stand by assistance  Description/ Details: straight path with 2 180 degree turns  Distance: 70 ft    PT Exercises  Exercise Treatment: Stretching: seated BLE gastrocnemius 30\" x3 (PTA and pt applied stretch)  Resistive Exercises: seated B LE exercises x20 reps with #2 and green tband.  R AFO doffed for exercises   Circulation/Endurance Exercises: ankle pumps throughout the day  Dynamic Standing Balance Exercises: standing B LE exercises x10 reps with RW and mirror used for postural feedback. Standing Open/Closed Kinetic Chain Exercises: Standing marches in front of mirror with RW 2 [id number] with focus on locking R knee into extension. Exercise Equipment: NuStep L3 x 10min    ASSESSMENT   Activity Tolerance  Activity Tolerance: Patient tolerated treatment well    GOALS  Patient Goals   Patient goals : To get better  Short Term Goals  Time Frame for Short term goals: 10 days  Short term goal 1: Pt to perform bed mobility from flat surface independently  Short term goal 2: Demonstrate functional transfers min A  Short term goal 3: Pt to ambulate distance of 100 ft with rolling walker at mod A  Short term goal 4: Pt able to improve sitting balance at EOB/EOM to good to be alen to eat meals. Short term goal 5: Demonstrate dynamic standing balance of fiar - to decrease fall risk  Short Term Goal 6: Pt able to  propel w/c distance of 100 to 150 ft, CGA, level surfaces. Long Term Goals  Time Frame for Long term goals : By DC  Long term goal 1: Pt able to perform transfers at mod-I  Long term goal 2: Pt able to ambulate distance of 100 to 150 ft with appropriate device, at min A. with assistive device. Long term goal 3: Pt able to perform a curb step with a rolling wwalker/UE support, mod A  Long term goal 4: Pt able to propel w/c distance of 150 ft , level surfaces, including turns, mod-I  Long term goal 5: Pt  able to improve standing balance with assistive device to fair to reduce fall risk  Long term goal 6: Improve 2MWT distance to 80 ft to improve function and gait speed. Long term goal 7: Improve PASS score to 25/36 to improve overall function.      PLAN OF CARE  Frequency: 1-2 treatment sessions per day, 5-7 days per week  Plan  Plan:  minutes of therapy at least 5 out of 7 days a week  Specific Instructions for Next Treatment: Continue rollator for ambultion/functional tasks  Current Treatment Recommendations: Strengthening;ROM;Balance training;Functional mobility training;Gait training;Neuromuscular re-education;Transfer training; Endurance training; Wheelchair mobility training;Stair training;Home exercise program;Safety education & training;Patient/Caregiver education & training;Equipment evaluation, education, & procurement; Modalities; Therapeutic activities  Safety Devices  Type of Devices: Call light within reach;Gait belt;Patient at risk for falls; All fall risk precautions in place; Left in chair  Restraints  Restraints Initially in Place: No    Therapy Time     05/11/22 0808 05/11/22 1305   Time Code Minutes   Timed Code Treatment Minutes  --  99 Minutes   PT Individual Minutes   Time In 0808 1305   Time Out [address]   Minutes [address] Quiroz [address] [place] [address], 05/11/22 at 5:20 PM

## 2022-05-12 VITALS
TEMPERATURE: 97.5 F | BODY MASS INDEX: 22.33 KG/M2 | HEIGHT: 65 IN | SYSTOLIC BLOOD PRESSURE: 110 MMHG | WEIGHT: 134 LBS | DIASTOLIC BLOOD PRESSURE: 73 MMHG | RESPIRATION RATE: 18 BRPM | OXYGEN SATURATION: 100 % | HEART RATE: 79 BPM

## 2022-05-12 LAB — GLUCOSE BLD-MCNC: 118 MG/DL (ref 65–105)

## 2022-05-12 PROCEDURE — 6370000000 HC RX 637 (ALT 250 FOR IP): Performed by: STUDENT IN AN ORGANIZED HEALTH CARE EDUCATION/TRAINING PROGRAM

## 2022-05-12 PROCEDURE — 97530 THERAPEUTIC ACTIVITIES: CPT

## 2022-05-12 PROCEDURE — 6370000000 HC RX 637 (ALT 250 FOR IP): Performed by: INTERNAL MEDICINE

## 2022-05-12 PROCEDURE — 99232 SBSQ HOSP IP/OBS MODERATE 35: CPT | Performed by: INTERNAL MEDICINE

## 2022-05-12 PROCEDURE — 97535 SELF CARE MNGMENT TRAINING: CPT

## 2022-05-12 PROCEDURE — 1180000000 HC REHAB R&B

## 2022-05-12 PROCEDURE — 6360000002 HC RX W HCPCS: Performed by: STUDENT IN AN ORGANIZED HEALTH CARE EDUCATION/TRAINING PROGRAM

## 2022-05-12 PROCEDURE — 97130 THER IVNTJ EA ADDL 15 MIN: CPT

## 2022-05-12 PROCEDURE — 82947 ASSAY GLUCOSE BLOOD QUANT: CPT

## 2022-05-12 PROCEDURE — 99232 SBSQ HOSP IP/OBS MODERATE 35: CPT | Performed by: PHYSICAL MEDICINE & REHABILITATION

## 2022-05-12 PROCEDURE — 97116 GAIT TRAINING THERAPY: CPT

## 2022-05-12 PROCEDURE — 97110 THERAPEUTIC EXERCISES: CPT

## 2022-05-12 PROCEDURE — 97129 THER IVNTJ 1ST 15 MIN: CPT

## 2022-05-12 RX ORDER — ASPIRIN 81 MG/1
81 TABLET ORAL DAILY
Qty: 30 TABLET | Refills: 0 | Status: SHIPPED | OUTPATIENT
Start: 2022-05-13

## 2022-05-12 RX ORDER — GABAPENTIN 300 MG/1
600 CAPSULE ORAL 3 TIMES DAILY
Qty: 180 CAPSULE | Refills: 0 | Status: SHIPPED | OUTPATIENT
Start: 2022-05-13 | End: 2022-06-12

## 2022-05-12 RX ORDER — CLOPIDOGREL BISULFATE 75 MG/1
75 TABLET ORAL DAILY
Qty: 30 TABLET | Refills: 3 | Status: SHIPPED | OUTPATIENT
Start: 2022-05-13

## 2022-05-12 RX ORDER — ATORVASTATIN CALCIUM 40 MG/1
40 TABLET, FILM COATED ORAL NIGHTLY
Qty: 30 TABLET | Refills: 3 | Status: SHIPPED | OUTPATIENT
Start: 2022-05-13

## 2022-05-12 RX ORDER — DIVALPROEX SODIUM 500 MG/1
500 TABLET, EXTENDED RELEASE ORAL 2 TIMES DAILY
Qty: 30 TABLET | Refills: 3 | Status: SHIPPED | OUTPATIENT
Start: 2022-05-13

## 2022-05-12 RX ORDER — POTASSIUM CHLORIDE 20 MEQ/1
20 TABLET, EXTENDED RELEASE ORAL
Qty: 60 TABLET | Refills: 3 | Status: SHIPPED | OUTPATIENT
Start: 2022-05-13

## 2022-05-12 RX ORDER — ALBUTEROL SULFATE 90 UG/1
6 AEROSOL, METERED RESPIRATORY (INHALATION) EVERY 6 HOURS PRN
Qty: 1 EACH | Refills: 0 | Status: SHIPPED | OUTPATIENT
Start: 2022-05-12

## 2022-05-12 RX ORDER — POLYETHYLENE GLYCOL 3350 17 G/17G
17 POWDER, FOR SOLUTION ORAL DAILY PRN
Qty: 527 G | Refills: 1 | COMMUNITY
Start: 2022-05-12 | End: 2022-06-11

## 2022-05-12 RX ORDER — GLIPIZIDE 5 MG/1
2.5 TABLET ORAL
Status: DISCONTINUED | OUTPATIENT
Start: 2022-05-13 | End: 2022-05-13 | Stop reason: HOSPADM

## 2022-05-12 RX ORDER — GLIPIZIDE 5 MG/1
2.5 TABLET ORAL
Qty: 60 TABLET | Refills: 3 | Status: SHIPPED | OUTPATIENT
Start: 2022-05-13

## 2022-05-12 RX ORDER — CEPHALEXIN 500 MG/1
500 CAPSULE ORAL EVERY 8 HOURS SCHEDULED
Qty: 14 CAPSULE | Refills: 0 | Status: SHIPPED | OUTPATIENT
Start: 2022-05-13 | End: 2022-05-18

## 2022-05-12 RX ORDER — FENOFIBRATE 160 MG/1
160 TABLET ORAL DAILY
Qty: 30 TABLET | Refills: 3 | Status: SHIPPED | OUTPATIENT
Start: 2022-05-13

## 2022-05-12 RX ORDER — PANTOPRAZOLE SODIUM 40 MG/1
40 TABLET, DELAYED RELEASE ORAL
Qty: 30 TABLET | Refills: 3 | Status: SHIPPED | OUTPATIENT
Start: 2022-05-13

## 2022-05-12 RX ORDER — TOPIRAMATE 25 MG/1
25 TABLET ORAL 2 TIMES DAILY
Qty: 60 TABLET | Refills: 3 | Status: SHIPPED | OUTPATIENT
Start: 2022-05-13

## 2022-05-12 RX ORDER — LEVETIRACETAM 1000 MG/1
1000 TABLET ORAL 2 TIMES DAILY
Qty: 60 TABLET | Refills: 3 | Status: SHIPPED | OUTPATIENT
Start: 2022-05-13

## 2022-05-12 RX ADMIN — ENOXAPARIN SODIUM 40 MG: 100 INJECTION SUBCUTANEOUS at 08:32

## 2022-05-12 RX ADMIN — GABAPENTIN 600 MG: 300 CAPSULE ORAL at 14:37

## 2022-05-12 RX ADMIN — GABAPENTIN 600 MG: 300 CAPSULE ORAL at 21:04

## 2022-05-12 RX ADMIN — TOPIRAMATE 25 MG: 25 TABLET, FILM COATED ORAL at 21:05

## 2022-05-12 RX ADMIN — ASPIRIN 81 MG: 81 TABLET, COATED ORAL at 08:23

## 2022-05-12 RX ADMIN — CEPHALEXIN 500 MG: 500 CAPSULE ORAL at 05:58

## 2022-05-12 RX ADMIN — LEVETIRACETAM 1000 MG: 500 TABLET, FILM COATED ORAL at 21:05

## 2022-05-12 RX ADMIN — CLOPIDOGREL BISULFATE 75 MG: 75 TABLET ORAL at 08:23

## 2022-05-12 RX ADMIN — CEPHALEXIN 500 MG: 500 CAPSULE ORAL at 21:05

## 2022-05-12 RX ADMIN — FENOFIBRATE 160 MG: 160 TABLET ORAL at 08:24

## 2022-05-12 RX ADMIN — PANTOPRAZOLE SODIUM 40 MG: 40 TABLET, DELAYED RELEASE ORAL at 05:58

## 2022-05-12 RX ADMIN — POTASSIUM CHLORIDE 20 MEQ: 20 TABLET, EXTENDED RELEASE ORAL at 08:24

## 2022-05-12 RX ADMIN — DIVALPROEX SODIUM 500 MG: 500 TABLET, EXTENDED RELEASE ORAL at 21:04

## 2022-05-12 RX ADMIN — GABAPENTIN 600 MG: 300 CAPSULE ORAL at 08:24

## 2022-05-12 RX ADMIN — DIVALPROEX SODIUM 500 MG: 500 TABLET, EXTENDED RELEASE ORAL at 08:24

## 2022-05-12 RX ADMIN — TOPIRAMATE 25 MG: 25 TABLET, FILM COATED ORAL at 08:25

## 2022-05-12 RX ADMIN — LEVETIRACETAM 1000 MG: 500 TABLET, FILM COATED ORAL at 08:24

## 2022-05-12 RX ADMIN — ATORVASTATIN CALCIUM 40 MG: 40 TABLET, FILM COATED ORAL at 21:05

## 2022-05-12 RX ADMIN — CEPHALEXIN 500 MG: 500 CAPSULE ORAL at 14:37

## 2022-05-12 ASSESSMENT — PAIN DESCRIPTION - PROGRESSION

## 2022-05-12 ASSESSMENT — PAIN SCALES - GENERAL: PAINLEVEL_OUTOF10: 0

## 2022-05-12 NOTE — PLAN OF CARE
Problem: Discharge Planning  Goal: Discharge to home or other facility with appropriate resources  5/12/2022 1101 by Caleb Orta  Outcome: Progressing     Problem: Safety - Adult  Goal: Free from fall injury  5/12/2022 1101 by Caleb Orta  Outcome: Progressing     Problem: ABCDS Injury Assessment  Goal: Absence of physical injury  5/12/2022 1101 by Caleb Orta  Outcome: Progressing     Problem: Skin/Tissue Integrity  Goal: Absence of new skin breakdown  Description: 1. Monitor for areas of redness and/or skin breakdown  2. Assess vascular access sites hourly  3. Every 4-6 hours minimum:  Change oxygen saturation probe site  4. Every 4-6 hours:  If on nasal continuous positive airway pressure, respiratory therapy assess nares and determine need for appliance change or resting period.   5/12/2022 1101 by Caleb Orta  Outcome: Progressing     Problem: Pain  Goal: Verbalizes/displays adequate comfort level or baseline comfort level  5/12/2022 1101 by aCleb Orta  Outcome: Progressing

## 2022-05-12 NOTE — PROGRESS NOTES
Speech Language Pathology  Speech Language Pathology  [facility]    Cognitive Treatment Note    Date: 5/12/2022  Patients Name: Meenu Chappell  MRN: [id number]  Diagnosis:   Patient Active Problem List   Diagnosis Code    Altered mental status R41.82    Generalized nonconvulsive seizures ([facility] G40.309    History of CVA (cerebrovascular accident) Z80.78    Noncompliance Z91.19    Hemiparesis ([facility] [address] G81.90    Respiratory failure ([address] J96.90    Upper respiratory infection J06.9    Acute respiratory failure ([address] J96.00    HTN (hypertension) I10    HLD (hyperlipidemia) E78.5    Hyperglycemia R73.9    Hypokalemia E87.6    Anemia due to acute blood loss D62    Right sided weakness R53.1    Cerebrovascular accident (CVA) ([facility] [address] I63.9    Tissue plasminogen activator (tPA) administered at other facility within 24 hours before current admission Z92.82       Pain: 0/10    Cognitive Treatment    Treatment time: 0926-2058    Subjective: [x] Alert [x] Cooperative     [] Confused     [] Agitated    [] Lethargic      Objective/Assessment:  Attention: Sustained throughout, distractions minimized. Orientation: Oriented x4. Recall: Image retention (first aid, 15 min delay)- 80% accuracy julienne, 100% cued. Extensive education provided re: use of external memory aides to facilitate recall of fxl information at home. Pt. Verbalized understanding, reports has builtin board and calendar at home to assist with this. Pt. also reports uses weekly pillbox to assist with remembering to take medications. Organization: Following written directions (1-2 step, 3-4 components)- 70% accuracy julienne, 100% cued.      Reviewed strategies (e.g., decreasing rate, rereading direction to ensure understanding, reading direction in its entirety prior to attempting to complete) to assist c completion of direction tasks in future (e.g., reading signs, filling out doctor appt intake forms, following directions on prescription bottles or tax forms). Pt verbalized understanding and agreeable. Problem Solving/Reasoning: n/a    Other: Pt demo occasional word retrieval deficits in conversation; able to utilize circumlocution with verbal cues from [facility] Plan:  [x] Continue ST services    [] Discharge from ST:      Discharge recommendations: [] Inpatient Rehab   [] [place]   [] Outpatient Therapy  [] Follow up at trauma clinic   [] Other:       Treatment completed by:  Trevor Rebollar M.A., CCC-SLP

## 2022-05-12 NOTE — PROGRESS NOTES
[facility] Internal Medicine    CONSULTATION / HISTORY AND PHYSICAL EXAMINATION            Date:   5/12/2022  Patient name:  Kentrell Mejia  Date of admission:  4/28/2022 12:10 PM  MRN:   [id number]  Account:  [de-identified]  YOB: 1950  PCP:    Pasquale Stauffer MD  Room:   4615/7239-49  Code Status:    Full Code    Physician Requesting Consult: Erlinda Jordan MD    Reason for Consult:  Medical management    Chief Complaint:     No chief complaint on file. Right-sided weakness    History Obtained From:     Patient, EMR, nursing staff    History of Present Illness:     73-year-old female with history of questionable seizure disorder, chronic right hemiparesis numbness of right leg migraines admitted on 4/17 after being found altered, concern for stroke  Started on IV tPA but then became obtunded and flaccid and tPA held due to concern for hemorrhagic conversion  She was intubated, stat CT done which was unremarkable subsequently tPA was resumed and completed. MRI brain negative for stroke  Episode of flaccidity concerning for seizure hence loaded with IV Keppra  Extubated 4/19  Persistent right-sided weakness, fluctuating, neurology considering psychosomatic symptoms  Patient is on aspirin Plavix    Diabetes   Diagnosed within last year  Modifying factors on med:   Severity:controlled   Associated signs and symtoms: neuropathy/ckd/ CAD.    aggravated with sugar diet and better with low sugar diet      5/2   Patient again had episode of hypoglycemia in the morning, orange juice was provided  Blood sugar went to 68      Past Medical History:     Past Medical History:   Diagnosis Date    Cerebral artery occlusion with cerebral infarction ([address] 2008, 2014    CVA (cerebral infarction)     Diabetes mellitus ([address]     Hyperlipidemia     Hypertension     Seizures ([facility] [address] 04/2022        Past Surgical History:     Past Surgical History:   Procedure Laterality Date    APPENDECTOMY      BRONCHOSCOPY DIAGNOSTIC  7/24/2014         ENDOSCOPY, COLON, DIAGNOSTIC      HYSTERECTOMY          Medications Prior to Admission:     Prior to Admission medications    Medication Sig Start Date End Date Taking? Authorizing Provider   divalproex (DEPAKOTE ER) 500 MG extended release tablet Take 1 tablet by mouth in the morning and at bedtime 4/26/22   Latanya Cordero MD   levETIRAcetam (KEPPRA) 1000 MG tablet Take 1 tablet by mouth 2 times daily 4/21/22   Pebbles Perez MD   topiramate (TOPAMAX) 25 MG tablet Take 1 tablet by mouth 2 times daily 4/21/22   Pebbles Perez MD   clopidogrel (PLAVIX) 75 MG tablet Take 75 mg by mouth nightly    Historical Provider, MD   gabapentin (NEURONTIN) 600 MG tablet Take 600 mg by mouth 3 times daily. Historical Provider, MD   potassium chloride (KLOR-CON M) 20 MEQ extended release tablet Take 20 mEq by mouth 2 times daily    Historical Provider, MD   fenofibrate (TRIGLIDE) 160 MG tablet Take 160 mg by mouth daily    Historical Provider, MD   Pantoprazole Sodium (PROTONIX PO) Take  by mouth. Historical Provider, MD   acetaminophen (TYLENOL) 160 MG/5ML solution Take 20.3 mLs by mouth every 4 hours as needed for Fever. 7/28/14   Jami Rucker, DO   albuterol (PROVENTIL HFA;VENTOLIN HFA) 108 (90 BASE) MCG/ACT inhaler Inhale 6 puffs into the lungs every 6 hours as needed for Wheezing. 7/28/14   Jami Rucker DO   glucagon, rDNA, 1 MG SOLR injection Inject 1 mg into the muscle as needed for Low blood sugar (Blood glucose less than 70 mg/dL and patient NOT ALERT or NPO and does not have IV access. ). 7/28/14   Jami Rucker DO   glucose (GLUTOSE) 40 % GEL Take 15 g by mouth as needed. 7/28/14   Jami Rucker DO   insulin glargine (LANTUS) 100 UNIT/ML injection vial Inject 10 Units into the skin nightly. 7/28/14   Jami Rucker DO   insulin lispro (HUMALOG) 100 UNIT/ML injection vial Inject 0-12 Units into the skin every 6 hours.  7/28/14   Nikki England DO Corin   niacin (NIASPAN) 500 MG CR tablet Take 1 tablet by mouth nightly. 14   Nereida Burns DO   potassium chloride (KAYCIEL) 20 MEQ/15ML (10%) solution Take 15 mLs by mouth daily. 14   Nereida Burns DO        Allergies:     Patient has no known allergies. Social History:     Tobacco:    reports that she has never smoked. She has never used smokeless tobacco.  Alcohol:      reports no history of alcohol use. Drug Use:  reports no history of drug use. Family History:     History reviewed. No pertinent family history. Review of Systems:     Positive and Negative as described in HPI. CONSTITUTIONAL:  negative for fevers, chills, sweats, fatigue, weight loss  HEENT:  negative for vision, hearing changes, runny nose, throat pain  RESPIRATORY:  negative for shortness of breath, cough, congestion, wheezing. CARDIOVASCULAR:  negative for chest pain, palpitations.   GASTROINTESTINAL:  negative for nausea, vomiting, diarrhea, constipation, change in bowel habits, abdominal pain   GENITOURINARY:  negative for difficulty of urination, burning with urination, frequency   INTEGUMENT:  negative for rash, skin lesions, easy bruising   HEMATOLOGIC/LYMPHATIC:  negative for swelling/edema   ALLERGIC/IMMUNOLOGIC:  negative for urticaria , itching  ENDOCRINE:  negative increase in drinking, increase in urination, hot or cold intolerance  MUSCULOSKELETAL:  negative joint pains, muscle aches, swelling of joints  NEUROLOGICAL:  Positive for right sided weakness, negative for headaches, dizziness, lightheadedness, numbness, pain, tingling extremities      Physical Exam:     BP (!) 96/56   Pulse 70   Temp 98.1 °F (36.7 °C)   Resp 16   Ht 5' 5\" (1.651 m)   Wt 134 lb (60.8 kg)   SpO2 97%   BMI 22.30 kg/m²   Temp (24hrs), Av.1 °F (36.7 °C), Min:98.1 °F (36.7 °C), Max:98.1 °F (36.7 °C)    Recent Labs     05/10/22  1934 22  0558 22  1204 22  0538   POCGLU 185* 72 91 118* Intake/Output Summary (Last 24 hours) at 5/12/2022 1412  Last data filed at 5/12/2022 4332  Gross per 24 hour   Intake 720 ml   Output --   Net 720 ml       General Appearance:  alert, well appearing, and in no acute distress  Head:  normocephalic, atraumatic. Eye: no icterus, redness, pupils equal and reactive, extraocular eye movements intact, conjunctiva clear  Ear: normal external ear, no discharge, hearing intact  Nose:  no drainage noted  Mouth: mucous membranes moist  Neck: supple, no carotid bruits, thyroid not palpable  Lungs: Bilateral equal air entry, clear to ausculation, no wheezing, rales or rhonchi, normal effort  Cardiovascular: normal rate, regular rhythm, no murmur, gallop, rub. Abdomen: Soft, nontender, nondistended, normal bowel sounds, no hepatomegaly or splenomegaly  Neurologic:  Right UE and Lower extremity power 2/5, There are no new focal motor or sensory deficits, normal muscle tone and bulk, no abnormal sensation, normal speech, cranial nerves II through XII grossly intact  Skin: No gross lesions, rashes, bruising or bleeding on exposed skin area  Extremities:  peripheral pulses palpable, no pedal edema or calf pain with palpation  Psych:normal affect    Investigations:      Laboratory Testing:  Recent Results (from the past 24 hour(s))   POC Glucose Fingerstick    Collection Time: 05/12/22  5:38 AM   Result Value Ref Range    POC Glucose 118 (H) 65 - 105 mg/dL       Imaging/Diagonstics:  Recent data reviewed    Assessment :      Primary Problem  Right sided weakness    Active Hospital Problems    Diagnosis Date Noted    Right sided weakness [R53.1] 04/17/2022    HTN (hypertension) [I10] 07/19/2014       Plan:     1. Right-sided weakness MRI brain negative for stroke- PT OT  2. Type 2 diabetes- continue Lantus, sliding scale  3. History of prior strokes-continue aspirin, Plavix, statin  4. History of seizure disorder- continue Keppra, Topamax, Depakote  5.  Hyperlipidemia-patient on statin, fenofibrate  6. H/o Hypertension-currently controlled without meds  7. Recent extubation on 4/19    DVT prophylaxis-Lovenox    May 1  Hypoglycemia noted  Reduce lantus to 15 qhs  5/2   Reducing dose of insulin to 10 units, reducing sliding scale to low-dose  Blood pressure is controlled, off antihypertensives    5/8  Patient blood sugar better controlled after adjusting insulin  Blood pressure controlled  No new complaints    5/9  Patient reports no new complaints. Engaging with physical therapy. Labs and vitals reviewed. No new issues. Continue with current care. 5/10  Blood sugars low normal consistently this morning  We will add metformin, reduce dose of Lantus hopefully will be able to discharge on metformin only  Note paronychia left ring finger-start antibiotics    5/11  Blood sugar still low will D/C Lantus  Left ring finger much better compared to yesterday continue current antibiotics    5/12  Doing well, blood sugars controlled  C/o diarrhea with metformin  Will switch to glipizide  No objection to discharge tomorrow from medical standpoint    Consultations:   [id number] [facility] ORTHOTIST/PROSTHETIST      Thomas Tobin MD  5/12/2022  2:12 PM    Copy sent to Dr. Pasquale Stauffer MD    Please note that this chart was generated using voice recognition Dragon dictation software. Although every effort was made to ensure the accuracy of this automated transcription, some errors in transcription may have occurred.

## 2022-05-12 NOTE — PROGRESS NOTES
Occupational Therapy  Facility/Department: [facility] ACUTE REHAB  Rehabilitation Occupational Therapy Daily Treatment Note    Date: 22  Patient Name: Lisseth Ramon       Room: 9116/6706-49  MRN: [id number]  Account: [de-identified]   : 1950  (70 y.o.) Gender: female        Diagnosis: Right sided weakness           Past Medical History:  has a past medical history of Cerebral artery occlusion with cerebral infarction [facility]), CVA (cerebral infarction), Diabetes mellitus ([facility] [address] Hyperlipidemia, Hypertension, and Seizures ([facility] Past Surgical History:   has a past surgical history that includes Appendectomy; Hysterectomy; Bronchoscopy diagnostic (2014); and Endoscopy, colon, diagnostic. Restrictions       Subjective  Subjective: \"This arm is getting better even though it gets tired. \"  RUE, notes she wants to try knit, celia, do puzzles. (is R dominant) is able to use RUE functionally for basic adls and for writing 1-2 sentences. Objective     Cognition  Overall Cognitive Status: [facility]  Cognition Comment: demo good problem solving, memory, sequencing for adls, good adaptations to physical limitations. pt notes being aware of RLE fatigue and initiates rest noting her cue is \"It starts to drag. \"         ADL  Feeding  Assistance Level: Modified independent  Grooming/Oral Hygiene  Assistance Level: Modified independent  Upper Extremity Bathing  Assistance Level: Modified independent  Lower Extremity Bathing  Assistance Level: Modified independent  Skilled Clinical Factors: pt standing throughout entire shower with tub bench removed from shower. pt bending forward with full trunk flexion to wash feet safely.   Upper Extremity Dressing  Assistance Level: Modified independent  Lower Extremity Dressing  Assistance Level: Modified independent  Putting On/Taking Off Footwear  Assistance Level: Modified independent  Skilled Clinical Factors: including socks, todd hose, shoes and RLE AFO  Toileting  Assistance Level: Modified independent  Toilet Transfers  Equipment: Grab bars  Assistance Level: Modified independent  Tub/Shower Transfers  Type: Shower  Transfer To:  (standing in shower. tub bench removed)  Assistance Level: Stand by assist   OT scores     Eating  Assistance Needed: Independent  CARE Score: 6  Oral Hygiene  Assistance Needed: Independent  CARE Score: [address] needed: Independent  CARE Score: 6  Shower/Bathe Self  Assistance Needed: Independent  CARE Score: 6  Upper Body Dressing  Assistance Needed: Independent  CARE Score: 6  Lower Body Dressing  Assistance Needed: Independent  CARE Score: 6  Putting On/Taking Off Footwear  Assistance Needed: Independent  CARE Score: 6  Toilet Transfer  Assistance needed: Independent  CARE Score: 6  Functional Mobility  Device: Cane  Activity: To/From bathroom;Transport items; Retrieve items  Assistance Level: Modified independent  Skilled Clinical Factors: pt amb in room with cane mod indep to obtain set up, complete shower, change clothes and grooming, clean up post adls (including picking up item from the floor) ,  gather laundry and sort laundry. pt was trialed amb without AE but was unsteady. with cane pt steady. pt stood to complete shower. pt with 2 minor LOB which she indep recovered and ed provided for safety at home  ie. do not stand on RLE , lift LLE and reach L foot with hand. pm amb with 4 WW to carry laundry. most mobility completed without RLE AFO as pt wants to increase her ankle strength. Bed Mobility  Overall Assistance Level: Independent  Transfers  Device: [employer] to Stand  Assistance Level: Independent  Stand to Sit  Assistance Level: Independent  Bed To/From Chair  Assistance Level: Modified independent  Stand Pivot  Assistance Level: Modified independent   OT Exercises  Exercise Treatment: ed pt she can turn home tasks into RUE strengthening tasks by adding 1 lb wrist weight during ie. laundry. Dynamic Standing Balance Exercises: pt citlalli standing and ambulating with cane during adls with many reaching and bending tasks and turns mod indep, citlalli 2-3 min x 7, 5-6 min x 3, 7-9 min x 2. Motor Control/Coordination: \"This arm is getting better even though it gets tired. \"  OZIEL, notes she wants to try knit, celia. (is R dominant) is able to use RUE functionally for basic adls and for writing 1-2 sentences. Pt demo Mod indep light housekeeping task:  post ed pt practiced using 4 WW to complete laundry- setting basket on seat to transport laundry. placed laundry into washer, removed laundry from dryer. pt sat EOB to fold laundry indep, used 4 WW to ambulate to put laundry away in closet, drawers in room. Assessment  Assessment  Activity Tolerance: Patient tolerated evaluation without incident       Patient Education  Education  Education Provided: IADL Function; Safety;Equipment  Education Provided Comments: pt notes she has habit of keeping phone on her at all times at home to call for help if needed. post ed pt practiced using 4 WW to complete laundry- setting basket on seat to transport laundry. pt has 4 WW at home. ed pt re fall risk and do not place LE on edge of tub to dry in standing as was prior habit,  ed pt balance is improved with use of straight cane today compared to no device and cane or 4 WW are recommended for ambulation inside her home at this time. Education Method: Demonstration;Verbal  Education Outcome: Demonstrated understanding;Verbalized understanding    Plan    to be d/c home tomorrow    Goals  Long Term Goals  Time Frame for Long term goals : By discharge/ LTG 1-5 met,  goal 6 OZIEL has achieved [facility] as dominant UE for adls, not formally assessed. Long Term Goal 1: Patient will perform BADLs with modified IND and Good safety. Long Term Goal 2: Patient will perform stand pivot transfers during self-care with modified IND and Good safety.   Long Term Goal 3: Patient will perform functional mobility during self-care at w/c level with modified IND. / modify goal: 5-8-22  mod indep functional mobility for ambulation during adls with good safety. Long Term Goal 4: Patient will verbalize/demonstrate Good understanding of Fall Prevention strategies during self-care to maximize safety and IND. Long Term Goal 5: Patient will verbalize/demonstrate Good understanding of home exercise program for R UE ROM, strengthening, and coordination as appropriate.     Therapy Time   Individual Concurrent Group Co-treatment   Time In 1410         Time Out 8415         Minutes 27                05/12/22 1453 05/12/22 1455   OT Individual Minutes   Time In [address] [facility] E   Time Out [phone]   Minutes 93 [address], [place]

## 2022-05-12 NOTE — CARE COORDINATION
Spoke with [facility] representative, Early Number, at this time. Confirmed with representative [facility] receipt of clinical documentation for concurrent review for authorization of continued stay for patient, case determination pending and in review. Writer inquired about resources for case management and discharge planning for coordination of home health care services for patient. Contact number [phone] obtained and communicated with [facility] case management representative, Adal Brooke RN, for discharge planning purposes.

## 2022-05-12 NOTE — PROGRESS NOTES
Physical Therapy  Facility/Department: [facility] ACUTE REHAB      NAME: Silvia Farias  : 1950 (80 y.o.)  MRN: [id number]  CODE STATUS: Full Code    Date of Service: 22      Past Medical History:   Diagnosis Date    Cerebral artery occlusion with cerebral infarction ([facility] [address] ,     CVA (cerebral infarction)     Diabetes mellitus ([facility] [address]     Hyperlipidemia     Hypertension     Seizures ([facility] [address] 2022     Past Surgical History:   Procedure Laterality Date    APPENDECTOMY      BRONCHOSCOPY DIAGNOSTIC  2014         ENDOSCOPY, COLON, DIAGNOSTIC      HYSTERECTOMY         Chart Reviewed: Yes  Patient assessed for rehabilitation services?: Yes  Additional Pertinent Hx: Silvia Farias is a 80 y.o. female with history of CVA, HTN, and HLD admitted to [facility] on 2022. She initially presented with acute-onset right hemiparesis. NIHSS 21. She was given tPA by the mobile stroke unit, but infusion was stopped due to worsening of symptoms and concern for possible hemorrhagic conversion. She was intubated prior to arrival to the ED. CT head showed no acute intracranial abnormality, and tPA was restarted. She was also loaded with keppra due to concern for seizures. MRI brain showed no acute intracranial abnormality. Extubated 22. Mesfin Ag Per notes, she had a similar episodes in  and . Per Neuro notes- Reported history of chronic infarcts with residual right sided weakness but not clearly evident on brain imaging. Neuro recommending EMG as outpatient. Pt admotted to rehab unit on 22. Family / Caregiver Present: No  Referral Date : 22  Diagnosis: Right side weakness    Restrictions:  Restrictions/Precautions: Fall Risk;General Precautions; Up as Tolerated  Position Activity Restriction  Other position/activity restrictions: SBP <180     SUBJECTIVE  Pain: No complaints of pain at this time       Post Treatment Pain Screening       Prior Level of Function:  Social/Functional History  Lives With: Alone  Type of Home: House  Home Layout: One level  Home Access: Level entry  Bathroom Shower/Tub: Tub/Shower unit,Curtain  Bathroom Toilet: Standard (Sink counter on the right side)  Bathroom Equipment: Hand-held shower  Bathroom Accessibility: Wheelchair accessible  Home Equipment: Rollator,Cane  ADL Assistance: Independent  Homemaking Assistance: Independent  Homemaking Responsibilities: Yes  Ambulation Assistance: Independent (St cane for outside)  Transfer Assistance: Independent  Active : Yes  Mode of Transportation: Car  Occupation: Retired  Leisure & Hobbies: Knitting, puzzles  Additional Comments: Son works nights, daughter in law works days, can assist. Pt reports daughter is able to provide prn assist upon discharge         Cognition  Overall Cognitive Status: WFL       Functional Mobility  Bed mobility  Rolling to Left: Stand by assistance  Rolling to Right: Stand by assistance  Supine to Sit: Stand by assistance  Sit to Supine: Stand by assistance  Scooting: Stand by assistance  Transfers  Sit to Stand: Modified independent  Stand to sit: Modified independent  Bed to Chair: Modified independent  Stand Pivot Transfers: Modified independent  Balance  Posture: Fair  Sitting - Static: Fair  Sitting - Dynamic: Fair  Standing - Static: Fair;+  Comments: Standing with no AD    Environmental Mobility  Ambulation  Surface: level tile  Device: Rollator  Other Apparatus: Right;AFO  Assistance: Supervision  Quality of Gait: Pt had one lateral LOB to her right  Gait Deviations: Decreased step height;Decreased step length; Slow Irene; Shuffles  Distance: 200ft  Ambulation 2  Surface - 2: level tile;outdoors;carpet  Device 2: Single point cane  Other Apparatus 2: Right;AFO  Assistance 2: Supervision  Quality of Gait 2: Patient able to dorsiflex with amb. on carpet. AD SPC. No reports of dizziness or LOB. Gait Deviations: Decreased step height;Decreased step length; Slow Irene; Shuffles  Distance: 100ft, standing rest break in elevator, 160ft (20ft outdoors)  Stairs/Curb  Stairs?: Yes  Stairs  # Steps : 10  Stairs Height:  (4\" & 6\")  Rails: Bilateral  Other Apparatus: Right;AFO  Assistance: Stand by assistance  Propulsion 1  Propulsion: Manual  Level: Level Tile  Method: LUE;RUE  Level of Assistance: Stand by assistance  Description/ Details: straight path with 2 180 degree turns  Distance: 70 ft    PT Exercises  Exercise Treatment: Stretching: seated BLE gastrocnemius 30\" x3 (PTA and pt applied stretch)  Resistive Exercises: seated B LE exercises x20 reps with #2 and green tband. R AFO doffed for exercises   Circulation/Endurance Exercises: ankle pumps throughout the day  Dynamic Standing Balance Exercises: standing B LE exercises x10 reps with RW and mirror used for postural feedback. Standing Open/Closed Kinetic Chain Exercises: Standing marches in front of mirror with RW 2 [id number] with focus on locking R knee into extension. Exercise Equipment: [employer] L3 x 10min    ASSESSMENT       Activity Tolerance  Activity Tolerance: Patient tolerated treatment well                GOALS  Patient Goals   Patient goals : To get better  Short Term Goals  Time Frame for Short term goals: 10 days  Short term goal 1: Pt to perform bed mobility from flat surface independently  Short term goal 2: Demonstrate functional transfers min A  Short term goal 3: Pt to ambulate distance of 100 ft with rolling walker at mod A  Short term goal 4: Pt able to improve sitting balance at EOB/EOM to good to be alen to eat meals. Short term goal 5: Demonstrate dynamic standing balance of fiar - to decrease fall risk  Short Term Goal 6: Pt able to  propel w/c distance of 100 to 150 ft, CGA, level surfaces.   Long Term Goals  Time Frame for Long term goals : By DC  Long term goal 1: Pt able to perform transfers at mod-I  Long term goal 2: Pt able to ambulate distance of 100 to 150 ft with appropriate device, at min A. with assistive device. Long term goal 3: Pt able to perform a curb step with a rolling wwalker/UE support, mod A  Long term goal 4: Pt able to propel w/c distance of 150 ft , level surfaces, including turns, mod-I  Long term goal 5: Pt  able to improve standing balance with assistive device to fair to reduce fall risk  Long term goal 6: Improve 2MWT distance to 80 ft to improve function and gait speed. Long term goal 7: Improve PASS score to 25/36 to improve overall function. PLAN OF CARE    Plan  Plan:  minutes of therapy at least 5 out of 7 days a week  Specific Instructions for Next Treatment: Continue rollator for ambultion/functional tasks  Current Treatment Recommendations: Strengthening;ROM;Balance training;Functional mobility training;Gait training;Neuromuscular re-education;Transfer training; Endurance training; Wheelchair mobility training;Stair training;Home exercise program;Safety education & training;Patient/Caregiver education & training;Equipment evaluation, education, & procurement; Modalities; Therapeutic activities  Safety Devices  Type of Devices: Call light within reach;Gait belt;Patient at risk for falls; All fall risk precautions in place; Left in chair  Restraints  Restraints Initially in Place: No    Therapy Time          05/12/22 0930 05/12/22 1300   PT Individual Minutes   Time In 0930 1300   Time Out 1030 [id number]   Minutes [address] [place], [place], 05/12/22 at 4:51 PM

## 2022-05-12 NOTE — PROGRESS NOTES
Physical Medicine & Rehabilitation  Progress Note      Subjective:      80year-old female with R sided weakness. Patient is doing well today. She notes that she has diarrhea when she takes Metformin. ROS:  Denies fevers, chills, sweats. No chest pain, palpitations, lightheadedness. Denies coughing, wheezing or shortness of breath. Denies abdominal pain, nausea, diarrhea or constipation. No new areas of joint pain. Denies new areas of numbness or weakness. Denies new anxiety or depression issues. No new skin problems. Rehabilitation:   Progressing in therapies. PT:  Restrictions/Precautions: Fall Risk,General Precautions,Up as Tolerated  Other position/activity restrictions: SBP <180   Transfers  Sit to Stand: Modified independent  Stand to sit: Modified independent  Bed to Chair: Modified independent  Stand Pivot Transfers: Modified independent  Squat Pivot Transfers: Minimal Assistance  Comment: Pt continues to progress requiring less cues from therapist. Demo's good safety with t/fs. Ambulation  Surface: level tile  Device: No Device  Other Apparatus: Right,AFO  Assistance: Supervision  Quality of Gait: Pt had one lateral LOB to her right  Gait Deviations: Decreased step height,Decreased step length,Slow Irene,Shuffles  Distance: 200ft  Comments: Pt demo's improved endurance this session, ambulating further distances with less rest breaks on multiple surfaces. More Ambulation?: Yes    Transfers  Sit to Stand: Modified independent  Stand to sit: Modified independent  Bed to Chair: Modified independent  Stand Pivot Transfers: Modified independent  Squat Pivot Transfers: Minimal Assistance  Comment: Pt continues to progress requiring less cues from therapist. Demo's good safety with t/fs.      Ambulation  Surface: level tile  Device: No Device  Other Apparatus: Right,AFO  Assistance: Supervision  Quality of Gait: Pt had one lateral LOB to her right  Gait Deviations: Decreased step height,Decreased step length,Slow Irene,Shuffles  Distance: 200ft  Comments: Pt demo's improved endurance this session, ambulating further distances with less rest breaks on multiple surfaces. More Ambulation?: Yes    Surface: level tile  Ambulation  Surface: level tile  Device: No Device  Other Apparatus: Right,AFO  Assistance: Supervision  Quality of Gait: Pt had one lateral LOB to her right  Gait Deviations: Decreased step height,Decreased step length,Slow Irene,Shuffles  Distance: 200ft  Comments: Pt demo's improved endurance this session, ambulating further distances with less rest breaks on multiple surfaces. More Ambulation?: Yes    OT:  ADL  Feeding: Setup  Feeding Skilled Clinical Factors: Patient reports use of non-dominant L hand for self-feeding currently  Grooming: Stand by assistance  Grooming Skilled Clinical Factors: While seated in w/c at sink  UE Bathing: Moderate assistance  UE Bathing Skilled Clinical Factors: Assist for L UE and R UE positioning while seated on tub transfer bench in shower  LE Bathing: Moderate assistance  LE Bathing Skilled Clinical Factors: Assist for buttocks and perineal area; Minimal assist for standing balance  UE Dressing: Minimal assistance  UE Dressing Skilled Clinical Factors: Assist bra fastening and threading L UE  LE Dressing: Maximum assistance  LE Dressing Skilled Clinical Factors: Assist to thread R LE, don TEDs & pull pants over hips  Toileting: Maximum assistance  Toileting Skilled Clinical Factors: Assist for clothing management; personal hygiene while seated  Additional Comments: OT facilitated patient engagement in showering routine including bathing, dressing, toileting, and grooming tasks. Patient demonstrates Fair compensatory strategies for self-care with further education warranted to maximize safety and IND.    Instrumental ADL's  Instrumental ADLs: Yes     Balance  Sitting Balance: Stand by assistance  Standing Balance: Minimal assistance Bed mobility  Rolling to Left: Stand by assistance  Rolling to Right: Stand by assistance  Supine to Sit: Stand by assistance  Sit to Supine: Stand by assistance  Scooting: Stand by assistance  Bed Mobility Comments: PT mat, wedge, 3 pillows. Transfers  Stand Pivot Transfers: 2 Person assistance,Moderate assistance  Sit to stand: Moderate assistance  Stand to sit: Moderate assistance  Transfer Comments: with Minimal verbal cues for hand placement and safety   Toilet Transfers  Toilet - Technique: Stand pivot,To right,To left  Equipment Used: Raised toilet seat with rails  Toilet Transfer: 2 Person assistance,Moderate assistance  Toilet Transfers Comments: with Minimal verbal cues for hand placement and safety     Shower Transfers  Shower - Transfer From: Wheelchair  Shower - Transfer Type: To and From  Shower - Transfer To: Transfer tub bench  Shower - Technique: Stand pivot,To right,To left  Shower Transfers: 2 Person assistance,Moderate assistance       SPEECH:  Subjective: [x]? Alert     [x]? Cooperative     []? Confused     []? Agitated    []? Lethargic        Objective/Assessment:  Attention: Sustained throughout, distractions minimized.      Orientation: Oriented x4.      Recall: Image retention (first aid, 15 min delay)- 80% accuracy julienne, 100% cued.      Extensive education provided re: use of external memory aides to facilitate recall of fxl information at home. Pt. Verbalized understanding, reports has builtin board and calendar at home to assist with this.   Pt. also reports uses weekly pillbox to assist with remembering to take medications.       Organization: Following written directions (1-2 step, 3-4 components)- 70% accuracy julienne, 100% cued.      Reviewed strategies (e.g., decreasing rate, rereading direction to ensure understanding, reading direction in its entirety prior to attempting to complete) to assist c completion of direction tasks in future (e.g., reading signs, filling out doctor appt intake forms, following directions on prescription bottles or tax forms).  Pt verbalized understanding and agreeable.      Problem Solving/Reasoning: n/a     Other: Pt demo occasional word retrieval deficits in conversation; able to utilize circumlocution with verbal cues from ST.       Objective:  BP (!) 96/56   Pulse 70   Temp 98.1 °F (36.7 °C)   Resp 16   Ht 5' 5\" (1.651 m)   Wt 134 lb (60.8 kg)   SpO2 97%   BMI 22.30 kg/m²       GEN: well developed, well nourished, NAD  HEENT: NCAT, PERRL, EOMI, mucous membranes pink and moist  CV: RRR, no murmurs, rubs or gallops  PULM: CTAB, no rales or rhonchi. Respirations WNL and unlabored  ABD: soft, NT, ND, BS+ and equal  NEURO: A&O x3. Sensation intact to light touch. MSK: Functional ROM all extremities . Strength 4+/5 key muscles all extremities. EXTREMITIES: No calf tenderness to palpation bilaterally. No edema BLEs. L 4th digit edema and erythema improved. SKIN: warm dry and intact with good turgor  PSYCH: appropriately interactive. Affect WNL. Diagnostics:     CBC: No results for input(s): WBC, RBC, HGB, HCT, MCV, RDW, PLT in the last 72 hours. BMP: No results for input(s): NA, K, CL, CO2, PHOS, BUN, CREATININE, CA, GLUCOSE in the last 72 hours. BNP: No results for input(s): BNP in the last 72 hours. PT/INR: No results for input(s): PROTIME, INR in the last 72 hours. APTT: No results for input(s): APTT in the last 72 hours. CARDIAC ENZYMES: No results for input(s): CKMB, CKMBINDEX, TROPONINT in the last 72 hours. Invalid input(s): CKTOTAL;3 troponins   FASTING LIPID PANEL:  Lab Results   Component Value Date    CHOL 202 (H) 04/17/2022    HDL 55 04/17/2022    TRIG 164 (H) 04/17/2022     LIVER PROFILE: No results for input(s): AST, ALT, ALB, BILIDIR, BILITOT, ALKPHOS in the last 72 hours.      Current Medications:   Current Facility-Administered Medications: metFORMIN (GLUCOPHAGE) tablet 500 mg, 500 mg, Oral, BID WC  cephALEXin (KEFLEX) capsule 500 mg, 500 mg, Oral, 3 times per day  enoxaparin (LOVENOX) injection 40 mg, 40 mg, SubCUTAneous, Daily  ondansetron (ZOFRAN-ODT) disintegrating tablet 4 mg, 4 mg, Oral, Q8H PRN **OR** [DISCONTINUED] ondansetron (ZOFRAN) injection 4 mg, 4 mg, IntraVENous, Q6H PRN  aspirin EC tablet 81 mg, 81 mg, Oral, Daily  atorvastatin (LIPITOR) tablet 40 mg, 40 mg, Oral, Nightly  albuterol sulfate  (90 Base) MCG/ACT inhaler 6 puff, 6 puff, Inhalation, Q6H PRN  clopidogrel (PLAVIX) tablet 75 mg, 75 mg, Oral, Daily  divalproex (DEPAKOTE ER) extended release tablet 500 mg, 500 mg, Oral, BID  fenofibrate (TRIGLIDE) tablet 160 mg, 160 mg, Oral, Daily  gabapentin (NEURONTIN) capsule 600 mg, 600 mg, Oral, TID  levETIRAcetam (KEPPRA) tablet 1,000 mg, 1,000 mg, Oral, BID  pantoprazole (PROTONIX) tablet 40 mg, 40 mg, Oral, QAM AC  potassium chloride (KLOR-CON M) extended release tablet 20 mEq, 20 mEq, Oral, Daily with breakfast  topiramate (TOPAMAX) tablet 25 mg, 25 mg, Oral, BID  acetaminophen (TYLENOL) tablet 650 mg, 650 mg, Oral, Q4H PRN  polyethylene glycol (GLYCOLAX) packet 17 g, 17 g, Oral, Daily  senna (SENOKOT) tablet 17.2 mg, 2 tablet, Oral, Daily PRN  bisacodyl (DULCOLAX) suppository 10 mg, 10 mg, Rectal, Daily PRN      Impression/Plan:   Impaired ADLs, gait, and mobility due to:      1. Right-sided weakness:  Unknown etiology - possibly psychosomatic. PT/OT for gait, mobility, strengthening, endurance, ADLs, and self care. Plan for outpatient EMG. On aspirin, plavix. On gabapentin. Orthotist ordered right AFO for home  2. Cognitive impairment:  SLP treating. 3. Anemia: Hemoglobin stable. Monitoring. 4. Type 2 Diabetes:  Hemoglobin A1c 10.3 on 4/17. Lantus was discontinued by IM for hypoglycemia on 5/11. IM transitioning to glipizide from metformin for diarrhea. 5. HTN:  Stable without medication  6. HLD: On atorvastatin, fenofibrate  7. History of CVA:  On aspirin, plavix, atorvastatin, fenofibrate  8. Seizure:  On Keppra and Depakote  9. GERD: On pantoprazole  10. L finger paronychia: IM started cephalexin - planned until 5/17. 11. Hypokalemia:  Improved. On KCl repletion daily. Monitoring.   12. Bowel Management: Miralax daily, senokot prn, dulcolax prn. 13. DVT Prophylaxis:  low molecular weight heparin, SCD's while in bed and ALLIE's while in bed  14. Internal medicine for medical management  15. Achieving discharge goals. Anticipate discharge home with home health. Follow up PCP 1-2 weeks, PM&R 4-6 weeks, neurology Dr. Eric Cruz 2-4 weeks. Electronically signed by Donna Lazo MD on 5/12/2022 at 9:17 AM      This note is created with the assistance of a speech recognition program.  While intending to generate a document that actually reflects the content of the visit, the document can still have some errors including those of syntax and sound a like substitutions which may escape proof reading. In such instances, actual meaning can be extrapolated by contextual diversion.

## 2022-05-13 ENCOUNTER — TELEPHONE (OUTPATIENT)
Dept: PHYSICAL MEDICINE AND REHAB | Age: 72
End: 2022-05-13

## 2022-05-13 LAB
ABSOLUTE BANDS #: 0.04 K/UL (ref 0–1)
ABSOLUTE EOS #: 0.04 K/UL (ref 0–0.4)
ABSOLUTE LYMPH #: 2.38 K/UL (ref 1–4.8)
ABSOLUTE MONO #: 0.53 K/UL (ref 0.1–1.3)
ANION GAP SERPL CALCULATED.3IONS-SCNC: 10 MMOL/L (ref 9–17)
BANDS: 1 % (ref 0–10)
BASOPHILS # BLD: 0 % (ref 0–2)
BASOPHILS ABSOLUTE: 0 K/UL (ref 0–0.2)
BUN BLDV-MCNC: 27 MG/DL (ref 8–23)
CALCIUM SERPL-MCNC: 9 MG/DL (ref 8.6–10.4)
CHLORIDE BLD-SCNC: 109 MMOL/L (ref 98–107)
CO2: 20 MMOL/L (ref 20–31)
CREAT SERPL-MCNC: 0.76 MG/DL (ref 0.5–0.9)
EOSINOPHILS RELATIVE PERCENT: 1 % (ref 0–4)
GFR AFRICAN AMERICAN: >60 ML/MIN
GFR NON-AFRICAN AMERICAN: >60 ML/MIN
GFR SERPL CREATININE-BSD FRML MDRD: ABNORMAL ML/MIN/{1.73_M2}
GLUCOSE BLD-MCNC: 109 MG/DL (ref 65–105)
GLUCOSE BLD-MCNC: 123 MG/DL (ref 70–99)
GLUCOSE BLD-MCNC: 72 MG/DL (ref 65–105)
HCT VFR BLD CALC: 31 % (ref 36–46)
HEMOGLOBIN: 10.3 G/DL (ref 12–16)
LYMPHOCYTES # BLD: 54 % (ref 24–44)
MCH RBC QN AUTO: 30.9 PG (ref 26–34)
MCHC RBC AUTO-ENTMCNC: 33.3 G/DL (ref 31–37)
MCV RBC AUTO: 92.9 FL (ref 80–100)
MONOCYTES # BLD: 12 % (ref 1–7)
MORPHOLOGY: NORMAL
PDW BLD-RTO: 14 % (ref 11.5–14.9)
PLATELET # BLD: 191 K/UL (ref 150–450)
PMV BLD AUTO: 6.8 FL (ref 6–12)
POTASSIUM SERPL-SCNC: 3.7 MMOL/L (ref 3.7–5.3)
RBC # BLD: 3.34 M/UL (ref 4–5.2)
SEG NEUTROPHILS: 32 % (ref 36–66)
SEGMENTED NEUTROPHILS ABSOLUTE COUNT: 1.41 K/UL (ref 1.3–9.1)
SODIUM BLD-SCNC: 139 MMOL/L (ref 135–144)
WBC # BLD: 4.4 K/UL (ref 3.5–11)

## 2022-05-13 PROCEDURE — 6360000002 HC RX W HCPCS: Performed by: STUDENT IN AN ORGANIZED HEALTH CARE EDUCATION/TRAINING PROGRAM

## 2022-05-13 PROCEDURE — 36415 COLL VENOUS BLD VENIPUNCTURE: CPT

## 2022-05-13 PROCEDURE — 6370000000 HC RX 637 (ALT 250 FOR IP): Performed by: STUDENT IN AN ORGANIZED HEALTH CARE EDUCATION/TRAINING PROGRAM

## 2022-05-13 PROCEDURE — 97535 SELF CARE MNGMENT TRAINING: CPT

## 2022-05-13 PROCEDURE — 99231 SBSQ HOSP IP/OBS SF/LOW 25: CPT | Performed by: INTERNAL MEDICINE

## 2022-05-13 PROCEDURE — 85025 COMPLETE CBC W/AUTO DIFF WBC: CPT

## 2022-05-13 PROCEDURE — 80048 BASIC METABOLIC PNL TOTAL CA: CPT

## 2022-05-13 PROCEDURE — 82947 ASSAY GLUCOSE BLOOD QUANT: CPT

## 2022-05-13 PROCEDURE — 99238 HOSP IP/OBS DSCHRG MGMT 30/<: CPT | Performed by: PHYSICAL MEDICINE & REHABILITATION

## 2022-05-13 PROCEDURE — 6370000000 HC RX 637 (ALT 250 FOR IP): Performed by: INTERNAL MEDICINE

## 2022-05-13 PROCEDURE — 97530 THERAPEUTIC ACTIVITIES: CPT

## 2022-05-13 PROCEDURE — 97110 THERAPEUTIC EXERCISES: CPT

## 2022-05-13 PROCEDURE — 97116 GAIT TRAINING THERAPY: CPT

## 2022-05-13 RX ADMIN — GLIPIZIDE 2.5 MG: 5 TABLET ORAL at 06:06

## 2022-05-13 RX ADMIN — LEVETIRACETAM 1000 MG: 500 TABLET, FILM COATED ORAL at 08:12

## 2022-05-13 RX ADMIN — TOPIRAMATE 25 MG: 25 TABLET, FILM COATED ORAL at 08:13

## 2022-05-13 RX ADMIN — CEPHALEXIN 500 MG: 500 CAPSULE ORAL at 06:07

## 2022-05-13 RX ADMIN — CLOPIDOGREL BISULFATE 75 MG: 75 TABLET ORAL at 08:12

## 2022-05-13 RX ADMIN — POTASSIUM CHLORIDE 20 MEQ: 20 TABLET, EXTENDED RELEASE ORAL at 08:12

## 2022-05-13 RX ADMIN — CEPHALEXIN 500 MG: 500 CAPSULE ORAL at 13:56

## 2022-05-13 RX ADMIN — PANTOPRAZOLE SODIUM 40 MG: 40 TABLET, DELAYED RELEASE ORAL at 06:07

## 2022-05-13 RX ADMIN — GABAPENTIN 600 MG: 300 CAPSULE ORAL at 08:11

## 2022-05-13 RX ADMIN — FENOFIBRATE 160 MG: 160 TABLET ORAL at 08:12

## 2022-05-13 RX ADMIN — ASPIRIN 81 MG: 81 TABLET, COATED ORAL at 08:12

## 2022-05-13 RX ADMIN — DIVALPROEX SODIUM 500 MG: 500 TABLET, EXTENDED RELEASE ORAL at 08:13

## 2022-05-13 RX ADMIN — GABAPENTIN 600 MG: 300 CAPSULE ORAL at 13:56

## 2022-05-13 RX ADMIN — ENOXAPARIN SODIUM 40 MG: 100 INJECTION SUBCUTANEOUS at 08:10

## 2022-05-13 ASSESSMENT — PAIN DESCRIPTION - PROGRESSION
CLINICAL_PROGRESSION: GRADUALLY IMPROVING

## 2022-05-13 NOTE — TELEPHONE ENCOUNTER
You Duffy from [facility] called to verify the directions on the albuterol inhaler. Directions read 6 puffs every 6 hours. Please verify the directions and I will let her know. Pharmacy states that is a lot of puffs.

## 2022-05-13 NOTE — PLAN OF CARE
Problem: Discharge Planning  Goal: Discharge to home or other facility with appropriate resources  Outcome: Completed     Problem: Safety - Adult  Goal: Free from fall injury  Outcome: Completed     Problem: ABCDS Injury Assessment  Goal: Absence of physical injury  Outcome: Completed     Problem: Skin/Tissue Integrity  Goal: Absence of new skin breakdown  Description: 1. Monitor for areas of redness and/or skin breakdown  2. Assess vascular access sites hourly  3. Every 4-6 hours minimum:  Change oxygen saturation probe site  4. Every 4-6 hours:  If on nasal continuous positive airway pressure, respiratory therapy assess nares and determine need for appliance change or resting period.   Outcome: Completed     Problem: Nutrition Deficit:  Goal: Optimize nutritional status  Outcome: Completed     Problem: Pain  Goal: Verbalizes/displays adequate comfort level or baseline comfort level  Outcome: Completed

## 2022-05-13 NOTE — FLOWSHEET NOTE
SC visit with patient and her daughter prior to discharge     05/13/22 [id number]   Encounter Summary   Encounter Overview/Reason  Spiritual/Emotional Needs   Service Provided For: Patient and family together   Referral/Consult From: [facility] [place]   Support System Children   Last Encounter  05/13/22   Complexity of Encounter Low   Spiritual/Emotional needs   Type Spiritual Support   Assessment/Intervention/Outcome   Assessment Calm;Coping; Hopeful   Intervention Prayer (assurance of)/[place];Sustaining Presence/Ministry of presence; Discussed illness injury and its impact   Outcome Coping;Expressed feelings, needs, and concerns;Expressed Gratitude;Receptive

## 2022-05-13 NOTE — PROGRESS NOTES
Physical Therapy  Facility/Department: [facility] ACUTE REHAB      NAME: Raya Kramer  : 1950 (80 y.o.)  MRN: [id number]  CODE STATUS: Full Code    Date of Service: 22      Past Medical History:   Diagnosis Date    Cerebral artery occlusion with cerebral infarction ([facility] ,     CVA (cerebral infarction)     Diabetes mellitus ([facility]     Hyperlipidemia     Hypertension     Seizures ([facility] [facility] 2022     Past Surgical History:   Procedure Laterality Date    APPENDECTOMY      BRONCHOSCOPY DIAGNOSTIC  2014         ENDOSCOPY, COLON, DIAGNOSTIC      HYSTERECTOMY         Chart Reviewed: Yes  Patient assessed for rehabilitation services?: Yes  Additional Pertinent Hx: Raya Kramer is a 80 y.o. female with history of CVA, HTN, and HLD admitted to [facility] on 2022. She initially presented with acute-onset right hemiparesis. NIHSS 21. She was given tPA by the mobile stroke unit, but infusion was stopped due to worsening of symptoms and concern for possible hemorrhagic conversion. She was intubated prior to arrival to the ED. CT head showed no acute intracranial abnormality, and tPA was restarted. She was also loaded with keppra due to concern for seizures. MRI brain showed no acute intracranial abnormality. Extubated 22. Candie Monzon Per notes, she had a similar episodes in  and . Per Neuro notes- Reported history of chronic infarcts with residual right sided weakness but not clearly evident on brain imaging. Neuro recommending EMG as outpatient. Pt admotted to rehab unit on 22. Family / Caregiver Present: No  Referral Date : 22  Diagnosis: Right side weakness    Restrictions:  Restrictions/Precautions: Fall Risk;General Precautions; Up as Tolerated  Position Activity Restriction  Other position/activity restrictions: SBP <180     SUBJECTIVE  Pain: No complaints of pain at this time       Post Treatment Pain Screening       Prior Level of Function:  Social/Functional History  Lives With: Alone  Type of Home: House  Home Layout: One level  Home Access: Level entry  Bathroom Shower/Tub: Tub/Shower unit,Curtain  Bathroom Toilet: Standard (Sink counter on the right side)  Bathroom Equipment: Hand-held shower  Bathroom Accessibility: Wheelchair accessible  Home Equipment: Rollator,Cane  ADL Assistance: Independent  Homemaking Assistance: Independent  Homemaking Responsibilities: Yes  Ambulation Assistance: Independent (St cane for outside)  Transfer Assistance: Independent  Active : Yes  Mode of Transportation: Car  Occupation: Retired  Leisure & Hobbies: Knitting, puzzles  Additional Comments: Son works nights, daughter in law works days, can assist. Pt reports daughter is able to provide prn assist upon discharge      OBJECTIVE            Cognition  Overall Cognitive Status: WFL    ROM     Functional Mobility  Bed mobility  Rolling to Left: Modified independent  Rolling to Right: Modified independent  Supine to Sit: Modified independent  Sit to Supine: Modified independent  Scooting: Modified independent  Transfers  Sit to Stand: Modified independent  Stand to sit: Modified independent  Bed to Chair: Modified independent  Stand Pivot Transfers: Modified independent  Balance  Posture: Fair  Sitting - Static: Fair  Sitting - Dynamic: Fair  Standing - Static: Fair;+  Comments: Standing with no AD    Environmental Mobility  Ambulation  Surface: level tile  Device: Single point cane  Other Apparatus: Right;AFO  Assistance: Modified Independent  Quality of Gait: Steady pace with no LOB. Gait Deviations: Decreased step height;Decreased step length; Slow Irene; Shuffles  Distance: 200ft  Stairs/Curb  Stairs?: Yes  Stairs  # Steps : 10  Stairs Height:  (4\" & 6\")  Rails: Bilateral  Other Apparatus: Right;AFO  Assistance: Supervision    PT Exercises  Exercise Treatment: HEP given with demonstration  Resistive Exercises: seated B LE exercises x20 reps with #2 and green tband.  R AFO doffed for exercises   Circulation/Endurance Exercises: ankle pumps throughout the day  Exercise Equipment: NuStep L3 x 10min  PASS SCORE  32/36    Postural Assessment Scale for Stroke Patients   (PASS) Scoring Form     Give the subject instructions for each item as written below. When scoring the item, record the lowest response category that applies for each item. Maintaining a Posture  1. Sitting Without Support  Examiner: Have the subject sit on a bench/mat without support and with feet flat on the floor. 3 Can sit for 5 minutes without support  2. Standing with Support Examiner: Have the subject stand, providing support as needed. Evaluate only the ability to stand with or without support. Do not consider the quality of the stance. 3     Can stand with support of only 1 hand  3. Standing Without Support  Examiner:  Have the subject stand without support. Evaluate only the ability to stand with or without support. Do not consider the quality of the stance. 3  Can stand without support for more that 1 minute and simultaneously perform arm movements at about shoulder level  4. Standing on Non-paretic Leg  Examiner: Have the subject stand on the non-paretic leg. Evaluate only the ability to bear weight entirely on the non-paretic leg. Do not consider how the subject accomplishes the task. 2  Can stand on non-paretic leg for more that 5 seconds  5. Standing on Paretic Leg  Examiner: Have the subject stand on the paretic leg. Evaluate only the ability to bear weight entirely on the paretic leg. Do not consider how the subject accomplishes the task. 0  Cannot stand of paretic leg     Maintaining Posture SUBTOTAL     11/15    Changing a Posture  6. Supine to Paretic Side Lateral  Examiner:  Begin with the subject in supine on a treatment mat. Instruct the subject to roll to the paretic side (lateral movement). Assist as necessary.  Evaluated the subject's performance on the amount of help required. Do not consider the quality of performance. 3  Can perform without help  7. Supine to Non-paretic Side Lateral  Examiner:  Begin with the subject in supine on a treatment mat. Instruct the subject to roll to the non-paretic side (lateral movement). Assist as necessary. Evaluate the subject's Performance on the amount of help required. Do not consider the quality of performance. 3  Can perform without help  8. Supine to Sitting up on the Edge of the Mat   Examiner:  Begin with the subject in supine on a treatment mat. Instruct the subject to come to sitting on the edge of the mat. Assist as necessary. Evaluate the subject's performance on the amount of help required. Do not considered the quality performance. 3  Can perform without help   9. Sitting on the Edge of the Mat to Supine  Examiner: Begin with the subject sitting on the edge of a treatment mat. Instruct the subject to return to supine. Assist as necessary. Evaluate the subjects performance on the amount of help required. Co not consider the quality of performance. 3  Can perform without help  10. Sitting to Standing Up  Examiner:  Begin with the subject sitting on the edge of a treatment mat. Instruct the subject to stand up without support. Assist if necessary. Evaluated the subject's performance on the amount ot help required. Do not consider the quality of the performance. 3  Can perform without help  11. Standing Up to Sitting Down  Examiner:  Begin with the subject standing by the edge of a treatment mat. Instruct the subject to sit on the edge of mat without support. Assist if necessary. Evaluated the subject's performance on the amount of help required. Do not consider the quality of the performance. 3  Can perform without help  12 . Standing, Picking up a Pencil from the Floor  Examiner:  Begin with the subject standing. Instruct the subject to  a pencil from the floor without support. Assist if necessary. Evaluate the subject's performance on the amount of help required. Do Not consider the quality of the performance. 3  Can perform without help    Changing Posture SUBTOTAL   21/21    PASS TOTAL   32/36          Activity Tolerance  Activity Tolerance: Patient tolerated treatment well    Assessment  Performance Deficits/Impairments: Decreased functional mobility ; Decreased tolerance to work activity; Decreased strength;Decreased safe awareness;Decreased endurance;Decreased balance;Decreased posture;Decreased ROM  Treatment Diagnosis: Weakness, difficulty walking  Discharge Recommendations: Patient would benefit from continued therapy after discharge;Home with assist PRN  PT D/C Equipment  Other: AFO  PT Equipment Recommendations  Other: AFO         GOALS  Patient Goals   Patient goals : To get better  Short Term Goals  Time Frame for Short term goals: 10 days  Short term goal 1: Pt to perform bed mobility from flat surface independently  Short term goal 2: Demonstrate functional transfers min A  Short term goal 3: Pt to ambulate distance of 100 ft with rolling walker at mod A  Short term goal 4: Pt able to improve sitting balance at EOB/EOM to good to be alen to eat meals. Short term goal 5: Demonstrate dynamic standing balance of fiar - to decrease fall risk  Short Term Goal 6: Pt able to  propel w/c distance of 100 to 150 ft, CGA, level surfaces. Long Term Goals  Time Frame for Long term goals : By DC  Long term goal 1: Pt able to perform transfers at mod-I  Long term goal 2: Pt able to ambulate distance of 100 to 150 ft with appropriate device, at min A. with assistive device.   Long term goal 3: Pt able to perform a curb step with a rolling wwalker/UE support, mod A  Long term goal 4: Pt able to propel w/c distance of 150 ft , level surfaces, including turns, mod-I  Long term goal 5: Pt  able to improve standing balance with assistive device to fair to reduce fall risk  Long term goal 6: Improve 2MWT distance to 80 ft to improve function and gait speed. Long term goal 7: Improve PASS score to 25/36 to improve overall function. PLAN OF CARE    Plan  Plan:  minutes of therapy at least 5 out of 7 days a week  Specific Instructions for Next Treatment: Continue rollator for ambultion/functional tasks  Current Treatment Recommendations: Strengthening;ROM;Balance training;Functional mobility training;Gait training;Neuromuscular re-education;Transfer training; Endurance training; Wheelchair mobility training;Stair training;Home exercise program;Safety education & training;Patient/Caregiver education & training;Equipment evaluation, education, & procurement; Modalities; Therapeutic activities  Safety Devices  Type of Devices: Call light within reach;Gait belt;Patient at risk for falls; All fall risk precautions in place; Left in chair  Restraints  Restraints Initially in Place: No             Therapy Time   Individual Concurrent Group Co-treatment   Time In 0900         Time Out 1000         Minutes [address] [place] [address], 05/13/22 at 5:31 PM

## 2022-05-13 NOTE — PROGRESS NOTES
Occupational Therapy  Facility/Department: [facility] ACUTE REHAB  Rehabilitation Occupational Therapy Daily Treatment Note    Date: 22  Patient Name: Almita Chaves       Room: 7850/3677-31  MRN: [id number]  Account: [de-identified]   : 1950  (70 y.o.) Gender: female             Past Medical History:  has a past medical history of Cerebral artery occlusion with cerebral infarction [facility]), CVA (cerebral infarction), Diabetes mellitus ([facility] [address] Hyperlipidemia, Hypertension, and Seizures ([facility] Past Surgical History:   has a past surgical history that includes Appendectomy; Hysterectomy; Bronchoscopy diagnostic (2014); and Endoscopy, colon, diagnostic. Restrictions  Restrictions/Precautions: Fall Risk;General Precautions; Up as Tolerated  Other position/activity restrictions: SBP <180  Required Braces or Orthoses?: No    Subjective  Subjective: \" My arm just gets so tired\" Pt stated after requring break while brushing hair  Restrictions/Precautions: Fall Risk;General Precautions; Up as Tolerated        Pain Assessment  Pain Assessment: None - Denies Pain    Objective     Cognition  Overall Cognitive Status: WFL  Orientation  Overall Orientation Status: Within Functional Limits         ADL  Feeding  Assistance Level: Modified independent  Grooming/Oral Hygiene  Assistance Level: Modified independent  Skilled Clinical Factors: standing at sink level   Upper Extremity Dressing  Assistance Level: Modified independent  Skilled Clinical Factors: seated to don/doff [place] shirt      Functional Mobility  Device: Cane  Activity: To/From bathroom;Transport items; Retrieve items  Assistance Level: Modified independent  Bed Mobility  Overall Assistance Level: Independent  Additional Factors: With handrails; Head of bed flat  Sit to Stand  Assistance Level: Independent  Skilled Clinical Factors: G hand placement   Stand to Sit  Assistance Level:  Independent         Assessment  Assessment  Activity Tolerance: Patient tolerated treatment well  Discharge Recommendations: Home with assist PRN;Home with [address] [place] [address] in place: Yes  Type of devices: Left in chair;Call light within reach    Patient Education  Education  Education Given To: Patient  Education Provided: Role of Therapy;Plan of Care;Safety;ADL Function;IADL Function; Energy Conservation  Education Method: Demonstration;Verbal  Education Outcome: Demonstrated understanding;Verbalized understanding    Plan  Plan  Times per Week: 5-7  Times per Day: Twice a day  Current Treatment Recommendations: Strengthening;ROM;Balance training;Functional mobility training; Endurance training; Safety education & training;Patient/Caregiver education & training;Equipment evaluation, education, & procurement;Self-Care / ADL; Coordination training;Neuromuscular re-education;Modalities    Goals  Short Term Goals  Time Frame for Short term goals: One week:  5-8-22 all STG met  Short Term Goal 1: Patient will perform upper body bathing and dressing with SUP. Short Term Goal 2: Patient will perform lower body bathing and dressing with Minimal assist.  Short Term Goal 3: Patient will perform toileting tasks with Minimal assist.  Short Term Goal 4: Patient will perform stand pivot transfers during self-care with Minimal assist and Good safety. Short Term Goal 5: Patient will verbalize/demonstrate Good understanding of assistive equipment/durable medical equipment/modified techniques for increased safety and IND with self-care. Additional Goals?: Yes  Short Term Goal 6: Patient will verbalize/demonstrate Good understanding of modified techniques for R UE during self-care. Short Term Goal 7: Patient will actively participate in 30+ minutes of therapeutic exercise/functional activities to promote increased IND with self-care and mobility.   Long Term Goals  Time Frame for Long term goals : By discharge/ LTG 1-5 met,  goal 6 RUE has achieved [facility] as dominant UE for adls, not formally assessed. Long Term Goal 1: Patient will perform BADLs with modified IND and Good safety. Long Term Goal 2: Patient will perform stand pivot transfers during self-care with modified IND and Good safety. Long Term Goal 3: Patient will perform functional mobility during self-care at w/c level with modified IND. / modify goal: 5-8-22  mod indep functional mobility for ambulation during adls with good safety. Long Term Goal 4: Patient will verbalize/demonstrate Good understanding of Fall Prevention strategies during self-care to maximize safety and IND. Long Term Goal 5: Patient will verbalize/demonstrate Good understanding of home exercise program for R UE ROM, strengthening, and coordination as appropriate.   Additional Goals?: Yes  Long Term Goal 6: 9 hole peg test and dynamometer to be assessed for R UE as appropriate    Therapy Time   Individual Concurrent Group Co-treatment   Time In 1100         Time Out 1140         Minutes 40                 OJ Stokes

## 2022-05-13 NOTE — PLAN OF CARE
Problem: Discharge Planning  Goal: Discharge to home or other facility with appropriate resources  Outcome: Completed     Problem: Safety - Adult  Goal: Free from fall injury  Outcome: Completed     Problem: ABCDS Injury Assessment  Goal: Absence of physical injury  Outcome: Completed     Problem: Skin/Tissue Integrity  Goal: Absence of new skin breakdown  Description: 1. Monitor for areas of redness and/or skin breakdown  2. Assess vascular access sites hourly  3. Every 4-6 hours minimum:  Change oxygen saturation probe site  4. Every 4-6 hours:  If on nasal continuous positive airway pressure, respiratory therapy assess nares and determine need for appliance change or resting period.   Outcome: Completed     Problem: Chronic Conditions and Co-morbidities  Goal: Patient's chronic conditions and co-morbidity symptoms are monitored and maintained or improved  Outcome: Completed     Problem: Nutrition Deficit:  Goal: Optimize nutritional status  Outcome: Completed     Problem: Pain  Goal: Verbalizes/displays adequate comfort level or baseline comfort level  Outcome: Completed

## 2022-05-13 NOTE — PROGRESS NOTES
[facility] Internal Medicine    CONSULTATION / HISTORY AND PHYSICAL EXAMINATION            Date:   5/13/2022  Patient name:  Kentrell Mejia  Date of admission:  4/28/2022 12:10 PM  MRN:   [id number]  Account:  [de-identified]  YOB: 1950  PCP:    Pasquale Stauffer MD  Room:   5168/3861-46  Code Status:    Full Code    Physician Requesting Consult: Erlinda Jordan MD    Reason for Consult:  Medical management    Chief Complaint:     No chief complaint on file. Right-sided weakness    History Obtained From:     Patient, EMR, nursing staff    History of Present Illness:     67-year-old female with history of questionable seizure disorder, chronic right hemiparesis numbness of right leg migraines admitted on 4/17 after being found altered, concern for stroke  Started on IV tPA but then became obtunded and flaccid and tPA held due to concern for hemorrhagic conversion  She was intubated, stat CT done which was unremarkable subsequently tPA was resumed and completed. MRI brain negative for stroke  Episode of flaccidity concerning for seizure hence loaded with IV Keppra  Extubated 4/19  Persistent right-sided weakness, fluctuating, neurology considering psychosomatic symptoms  Patient is on aspirin Plavix    Diabetes   Diagnosed within last year  Modifying factors on med:   Severity:controlled   Associated signs and symtoms: neuropathy/ckd/ CAD.    aggravated with sugar diet and better with low sugar diet      5/2   Patient again had episode of hypoglycemia in the morning, orange juice was provided  Blood sugar went to 68      Past Medical History:     Past Medical History:   Diagnosis Date    Cerebral artery occlusion with cerebral infarction ([address] 2008, 2014    CVA (cerebral infarction)     Diabetes mellitus ([address]     Hyperlipidemia     Hypertension     Seizures ([facility] 04/2022        Past Surgical History:     Past Surgical History:   Procedure Laterality Date    APPENDECTOMY      BRONCHOSCOPY DIAGNOSTIC  7/24/2014         ENDOSCOPY, COLON, DIAGNOSTIC      HYSTERECTOMY          Medications Prior to Admission:     Prior to Admission medications    Medication Sig Start Date End Date Taking? Authorizing Provider   aspirin 81 MG EC tablet Take 1 tablet by mouth daily 5/13/22  Yes Pérez Mitchell MD   albuterol sulfate  (90 Base) MCG/ACT inhaler Inhale 6 puffs into the lungs every 6 hours as needed for Wheezing 5/12/22  Yes Pérez Mitchell MD   divalproex (DEPAKOTE ER) 500 MG extended release tablet Take 1 tablet by mouth in the morning and at bedtime 5/13/22  Yes Pérez Mitchell MD   gabapentin (NEURONTIN) 300 MG capsule Take 2 capsules by mouth 3 times daily for 30 days.  5/13/22 6/12/22 Yes Pérez Mitchell MD   levETIRAcetam (KEPPRA) 1000 MG tablet Take 1 tablet by mouth 2 times daily 5/13/22  Yes Pérez Mitchell MD   topiramate (TOPAMAX) 25 MG tablet Take 1 tablet by mouth 2 times daily 5/13/22  Yes Pérez Mitchell MD   glipiZIDE (GLUCOTROL) 5 MG tablet Take 0.5 tablets by mouth every morning (before breakfast) 5/13/22  Yes Pérez Mitchell MD   atorvastatin (LIPITOR) 40 MG tablet Take 1 tablet by mouth nightly 5/13/22  Yes Pérez Mitchell MD   fenofibrate (TRIGLIDE) 160 MG tablet Take 1 tablet by mouth daily 5/13/22  Yes Pérez Mitchell MD   cephALEXin (KEFLEX) 500 MG capsule Take 1 capsule by mouth every 8 hours for 14 doses 5/13/22 5/18/22 Yes Pérez Mitchell MD   polyethylene glycol (GLYCOLAX) 17 g packet Take 17 g by mouth daily as needed for Constipation 5/12/22 6/11/22 Yes Pérez Mitchell MD   potassium chloride (KLOR-CON M) 20 MEQ extended release tablet Take 1 tablet by mouth daily (with breakfast) 5/13/22  Yes Pérez Mitchell MD   clopidogrel (PLAVIX) 75 MG tablet Take 1 tablet by mouth daily 5/13/22  Yes Pérez Mitchell MD   pantoprazole (PROTONIX) 40 MG tablet Take 1 tablet by mouth every morning (before breakfast) 5/13/22  Yes Joelle Womack MD   acetaminophen (TYLENOL) 160 MG/5ML solution Take 20.3 mLs by mouth every 4 hours as needed for Fever. 14   Shashank Cosme DO        Allergies:     Patient has no known allergies. Social History:     Tobacco:    reports that she has never smoked. She has never used smokeless tobacco.  Alcohol:      reports no history of alcohol use. Drug Use:  reports no history of drug use. Family History:     History reviewed. No pertinent family history. Review of Systems:     Positive and Negative as described in HPI. CONSTITUTIONAL:  negative for fevers, chills, sweats, fatigue, weight loss  HEENT:  negative for vision, hearing changes, runny nose, throat pain  RESPIRATORY:  negative for shortness of breath, cough, congestion, wheezing. CARDIOVASCULAR:  negative for chest pain, palpitations.   GASTROINTESTINAL:  negative for nausea, vomiting, diarrhea, constipation, change in bowel habits, abdominal pain   GENITOURINARY:  negative for difficulty of urination, burning with urination, frequency   INTEGUMENT:  negative for rash, skin lesions, easy bruising   HEMATOLOGIC/LYMPHATIC:  negative for swelling/edema   ALLERGIC/IMMUNOLOGIC:  negative for urticaria , itching  ENDOCRINE:  negative increase in drinking, increase in urination, hot or cold intolerance  MUSCULOSKELETAL:  negative joint pains, muscle aches, swelling of joints  NEUROLOGICAL:  Positive for right sided weakness, negative for headaches, dizziness, lightheadedness, numbness, pain, tingling extremities      Physical Exam:     /73   Pulse 79   Temp 97.5 °F (36.4 °C) (Oral)   Resp 18   Ht 5' 5\" (1.651 m)   Wt 134 lb (60.8 kg)   SpO2 100%   BMI 22.30 kg/m²   Temp (24hrs), Av.5 °F (36.4 °C), Min:97.5 °F (36.4 °C), Max:97.5 °F (36.4 °C)    Recent Labs     22  1204 22  0538 22  0604 22  1057   POCGLU 91 118* 109* 72     No intake or output data in the 24 hours ending 22 1518    General Appearance:  alert, well appearing, and in no acute distress  Head:  normocephalic, atraumatic. Eye: no icterus, redness, pupils equal and reactive, extraocular eye movements intact, conjunctiva clear  Ear: normal external ear, no discharge, hearing intact  Nose:  no drainage noted  Mouth: mucous membranes moist  Neck: supple, no carotid bruits, thyroid not palpable  Lungs: Bilateral equal air entry, clear to ausculation, no wheezing, rales or rhonchi, normal effort  Cardiovascular: normal rate, regular rhythm, no murmur, gallop, rub.   Abdomen: Soft, nontender, nondistended, normal bowel sounds, no hepatomegaly or splenomegaly  Neurologic:  Right UE and Lower extremity power 2/5, There are no new focal motor or sensory deficits, normal muscle tone and bulk, no abnormal sensation, normal speech, cranial nerves II through XII grossly intact  Skin: No gross lesions, rashes, bruising or bleeding on exposed skin area  Extremities:  peripheral pulses palpable, no pedal edema or calf pain with palpation  Psych:normal affect    Investigations:      Laboratory Testing:  Recent Results (from the past 24 hour(s))   POC Glucose Fingerstick    Collection Time: 05/13/22  6:04 AM   Result Value Ref Range    POC Glucose 109 (H) 65 - 105 mg/dL   Basic Metabolic Panel w/ Reflex to MG    Collection Time: 05/13/22  6:23 AM   Result Value Ref Range    Glucose 123 (H) 70 - 99 mg/dL    BUN 27 (H) 8 - 23 mg/dL    CREATININE 0.76 0.50 - 0.90 mg/dL    Calcium 9.0 8.6 - 10.4 mg/dL    Sodium 139 135 - 144 mmol/L    Potassium 3.7 3.7 - 5.3 mmol/L    Chloride 109 (H) 98 - 107 mmol/L    CO2 20 20 - 31 mmol/L    Anion Gap 10 9 - 17 mmol/L    GFR Non-African American >60 >60 mL/min    GFR African American >60 >60 mL/min    GFR Comment         CBC auto differential    Collection Time: 05/13/22  6:23 AM   Result Value Ref Range    WBC 4.4 3.5 - 11.0 k/uL    RBC 3.34 (L) 4.0 - 5.2 m/uL    Hemoglobin 10.3 (L) 12.0 - 16.0 g/dL    Hematocrit 31.0 (L) 36 - 46 %    MCV 92.9 80 - 100 fL    MCH 30.9 26 - 34 pg    MCHC 33.3 31 - 37 g/dL    RDW 14.0 11.5 - 14.9 %    Platelets 786 744 - 231 k/uL    MPV 6.8 6.0 - 12.0 fL    Seg Neutrophils 32 (L) 36 - 66 %    Lymphocytes 54 (H) 24 - 44 %    Monocytes 12 (H) 1 - 7 %    Eosinophils % 1 0 - 4 %    Basophils 0 0 - 2 %    Bands 1 0 - 10 %    Segs Absolute 1.41 1.3 - 9.1 k/uL    Absolute Lymph # 2.38 1.0 - 4.8 k/uL    Absolute Mono # 0.53 0.1 - 1.3 k/uL    Absolute Eos # 0.04 0.0 - 0.4 k/uL    Basophils Absolute 0.00 0.0 - 0.2 k/uL    Absolute Bands # 0.04 0.0 - 1.0 k/uL    Morphology Normal    POC Glucose Fingerstick    Collection Time: 05/13/22 10:57 AM   Result Value Ref Range    POC Glucose 72 65 - 105 mg/dL       Imaging/Diagonstics:  Recent data reviewed    Assessment :      Primary Problem  Right sided weakness    Active Hospital Problems    Diagnosis Date Noted    Right sided weakness [R53.1] 04/17/2022    HTN (hypertension) [I10] 07/19/2014       Plan:     1. Right-sided weakness MRI brain negative for stroke- PT OT  2. Type 2 diabetes- continue Lantus, sliding scale  3. History of prior strokes-continue aspirin, Plavix, statin  4. History of seizure disorder- continue Keppra, Topamax, Depakote  5. Hyperlipidemia-patient on statin, fenofibrate  6. H/o Hypertension-currently controlled without meds  7. Recent extubation on 4/19    DVT prophylaxis-Lovenox    May 1  Hypoglycemia noted  Reduce lantus to 15 qhs  5/2   Reducing dose of insulin to 10 units, reducing sliding scale to low-dose  Blood pressure is controlled, off antihypertensives    5/8  Patient blood sugar better controlled after adjusting insulin  Blood pressure controlled  No new complaints    5/9  Patient reports no new complaints. Engaging with physical therapy. Labs and vitals reviewed. No new issues. Continue with current care.      5/10  Blood sugars low normal consistently this morning  We will add metformin, reduce dose of Lantus hopefully will be able to discharge on metformin only  Note paronychia left ring finger-start antibiotics    5/11  Blood sugar still low will D/C Lantus  Left ring finger much better compared to yesterday continue current antibiotics    5/12  Doing well, blood sugars controlled  C/o diarrhea with metformin  Will switch to glipizide  No objection to discharge tomorrow from medical standpoint    5/13/22    · Home today  1.   stable          Consultations:   IP CONSULT TO DIETITIAN  IP CONSULT TO SOCIAL WORK  IP CONSULT TO INTERNAL MEDICINE  INPATIENT CONSULT TO ORTHOTIST/PROSTHETIST      Mary Kim MD  5/13/2022  3:18 PM    Copy sent to Dr. Nilda Riddle MD    Please note that this chart was generated using voice recognition Dragon dictation software. Although every effort was made to ensure the accuracy of this automated transcription, some errors in transcription may have occurred.

## 2022-05-13 NOTE — DISCHARGE SUMMARY
Physical Medicine & Rehabilitation  Discharge Summary     Patient Identification:  Mata Armenta  : 1950  Admit date: 2022  Discharge date: 2022   Attending provider: Esther Parekh MD      Discharging provider: Romulo Celestin MD    Primary care provider: Maikel Wang MD     Discharge Diagnoses:   R sided weakness  R foot drop  Cognitive impairment  Anemia  DM II  HTN/HLD  Hx CVA  Seizure  GERD  L finger paronychia  Hypokalemia    Discharge Functional Status:    Physical therapy:  Bed Mobility: Scooting: Stand by assistance  Transfers: Sit to Stand: Modified independent  Stand to sit: Modified independent  Bed to Chair: Modified independent  Squat Pivot Transfers: Minimal Assistance, Ambulation  Surface: level tile  Device: Rollator  Other Apparatus: Right,AFO  Assistance: Supervision  Quality of Gait: Pt had one lateral LOB to her right  Gait Deviations: Decreased step height,Decreased step length,Slow Irene,Shuffles  Distance: 200ft  Comments: Pt demo's improved endurance this session, ambulating further distances with less rest breaks on multiple surfaces. More Ambulation?: Yes, Stairs  # Steps : 10  Stairs Height:  (4\" & 6\")  Rails: Bilateral  Curbs: 6\"  Device: Rolling walker  Other Apparatus: Right,AFO  Assistance: Stand by assistance  Comment: Step to pattern the first 5 steps, but then she was able to complete reciprocal with knee buckle or loss of balance. Mobility:  , PT Equipment Recommendations  Equipment Needed:  (Pt has Rollator as well as Rolling walker at home. )  Other: Pt to receive R AFO, Assessment: Pt performed curb step training today with RW and completed with no difficulites. Pt continues to meet goals, however, would benefit from cont skilled PT to address strength, balance, and functional mobility deficits.     Occupational therapy:  ,  ,      Speech therapy:  Comprehension: 5 - Patient understands basic needs (hungry/hot/pain)  Expression: 6 - Device used to express complex ideas/needs  Social Interaction: 6 - Patient requires medication for mood and/or effect  Problem Solving: 3 - Patient solves simple/routine tasks 50%-74%  Memory: 3 - Patient remembers 50%-74% of the time      Inpatient Rehabilitation Course: Meera Poon is a 80 y.o. female admitted to inpatient rehabilitation on 4/28/2022 for rehab for R sided weakness. INITIAL HPI:  She was originally admitted to [facility] on 4/17/22 for R sided weakness.       She initially presented with acute-onset right hemiparesis.  NIHSS 21.  She was given tPA by the mobile stroke unit, but infusion was stopped due to worsening of symptoms and concern for possible hemorrhagic conversion.  She was intubated prior to arrival to the ED.  CT head showed no acute intracranial abnormality, and tPA was restarted. Shauna Zaman was also loaded with keppra due to concern for seizures.  MRI brain showed no acute intracranial abnormality.  Extubated 4/19/22.  Plan is for LTME and MRI cervical spine. Patient evaluated today:  GEN: well developed, well nourished, NAD  HEENT: NCAT, PERRL, EOMI, mucous membranes pink and moist  CV: RRR, no murmurs, rubs or gallops  PULM: CTAB, no rales or rhonchi. Respirations WNL and unlabored  ABD: soft, NT, ND, BS+ and equal  NEURO: A&O x3. Sensation intact to light touch. MSK: Functional ROM all extremities . Strength 5/5 key muscles all extremities. EXTREMITIES: No calf tenderness to palpation bilaterally. No edema BLEs. L 4th digit edema and erythema improved. SKIN: warm dry and intact with good turgor  PSYCH: appropriately interactive. Affect WNL. Rehab course:   Patient was maintained on ASA, plavix during admission. Orthotist measured her for R AFO which she received at discharge for home. IM managed diabetes and discontinued Lantus for hypoglycemia. She was discharged home on glipizide as metformin gives her diarrhea.  She developed L 4th finger paronychia which was treated with keflex - planned until stop date of 5/17/22. Chronic conditions were stable on home medications. The patient participated in an aggressive multidisciplinary inpatient rehabilitation program involving 3 hours per day, 5 days per week of rehabilitation. Patient benefited from inpatient rehab and was discharged in good stable condition. Consults:   Internal Medicine    Significant Diagnostics:   CBC:   Lab Results   Component Value Date    WBC 4.4 05/13/2022    RBC 3.34 05/13/2022    HGB 10.3 05/13/2022    HCT 31.0 05/13/2022    MCV 92.9 05/13/2022    MCH 30.9 05/13/2022    MCHC 33.3 05/13/2022    RDW 14.0 05/13/2022     05/13/2022    MPV 6.8 05/13/2022     CMP:    Lab Results   Component Value Date     05/13/2022    K 3.7 05/13/2022     05/13/2022    CO2 20 05/13/2022    BUN 27 05/13/2022    CREATININE 0.76 05/13/2022    GFRAA >60 05/13/2022    LABGLOM >60 05/13/2022    GLUCOSE 123 05/13/2022    PROT 6.4 07/23/2014    LABALBU 2.9 07/23/2014    CALCIUM 9.0 05/13/2022    BILITOT 0.40 07/23/2014    ALKPHOS 79 07/23/2014    AST 36 07/23/2014    ALT 56 07/23/2014     Hepatic Function Panel:    Lab Results   Component Value Date    ALKPHOS 79 07/23/2014    ALT 56 07/23/2014    AST 36 07/23/2014    PROT 6.4 07/23/2014    BILITOT 0.40 07/23/2014    BILIDIR 0.28 07/19/2014    IBILI 0.52 07/19/2014    LABALBU 2.9 07/23/2014     HgBA1c:    Lab Results   Component Value Date    LABA1C 10.3 04/17/2022         Patient Instructions:     Activity as tolerated    Medications, precautions and follow up reviewed with patient and family    Follow-up visits: See after visit summary from hospitalization - PCP 1-2 weeks, PM&R 4-6 weeks, Dr. Tamie Bui Neurology 2-4 weeks    Disposition:  Home with home health    Discharge Medications:         Medication List      START taking these medications    aspirin 81 MG EC tablet  Take 1 tablet by mouth daily     atorvastatin 40 MG tablet  Commonly known as: LIPITOR  Take 1 tablet by mouth nightly     cephALEXin 500 MG capsule  Commonly known as: KEFLEX  Take 1 capsule by mouth every 8 hours for 14 doses     gabapentin 300 MG capsule  Commonly known as: NEURONTIN  Take 2 capsules by mouth 3 times daily for 30 days. Replaces: gabapentin 600 MG tablet     glipiZIDE 5 MG tablet  Commonly known as: GLUCOTROL  Take 0.5 tablets by mouth every morning (before breakfast)     polyethylene glycol 17 g packet  Commonly known as: GLYCOLAX  Take 17 g by mouth daily as needed for Constipation        CHANGE how you take these medications    clopidogrel 75 MG tablet  Commonly known as: PLAVIX  Take 1 tablet by mouth daily  What changed: when to take this     pantoprazole 40 MG tablet  Commonly known as: PROTONIX  Take 1 tablet by mouth every morning (before breakfast)  What changed:   · medication strength  · how much to take  · when to take this     potassium chloride 20 MEQ extended release tablet  Commonly known as: KLOR-CON M  Take 1 tablet by mouth daily (with breakfast)  What changed: when to take this        CONTINUE taking these medications    acetaminophen 160 MG/5ML solution  Commonly known as: TYLENOL  Take 20.3 mLs by mouth every 4 hours as needed for Fever.      albuterol sulfate  (90 Base) MCG/ACT inhaler  Inhale 6 puffs into the lungs every 6 hours as needed for Wheezing     divalproex 500 MG extended release tablet  Commonly known as: DEPAKOTE ER  Take 1 tablet by mouth in the morning and at bedtime     fenofibrate 160 MG tablet  Commonly known as: TRIGLIDE  Take 1 tablet by mouth daily     levETIRAcetam 1000 MG tablet  Commonly known as: KEPPRA  Take 1 tablet by mouth 2 times daily     topiramate 25 MG tablet  Commonly known as: TOPAMAX  Take 1 tablet by mouth 2 times daily        STOP taking these medications    gabapentin 600 MG tablet  Commonly known as: NEURONTIN  Replaced by: gabapentin 300 MG capsule     glucagon (rDNA) 1 MG Solr injection     glucose 40 % Gel  Commonly known as: GLUTOSE     insulin glargine 100 UNIT/ML injection vial  Commonly known as: LANTUS     insulin lispro 100 UNIT/ML injection vial  Commonly known as: HUMALOG     niacin 500 MG extended release tablet  Commonly known as: NIASPAN     potassium chloride 20 MEQ/15ML (10%) solution  Commonly known as: Lyndsey Patel           Where to Get Your Medications      These medications were sent to [facility] [id number] [place] [address] [phone] [facility] [phone]  [place] [address] [address]    Phone: [phone]   · albuterol sulfate  (90 Base) MCG/ACT inhaler  · aspirin 81 MG EC tablet  · atorvastatin 40 MG tablet  · cephALEXin 500 MG capsule  · clopidogrel 75 MG tablet  · divalproex 500 MG extended release tablet  · fenofibrate 160 MG tablet  · gabapentin 300 MG capsule  · glipiZIDE 5 MG tablet  · levETIRAcetam 1000 MG tablet  · pantoprazole 40 MG tablet  · potassium chloride 20 MEQ extended release tablet  · topiramate 25 MG tablet     You can get these medications from any pharmacy    You don't need a prescription for these medications  · polyethylene glycol 17 g packet                Sumanth Powers MD

## 2022-05-13 NOTE — PROGRESS NOTES
Discharge instructions given and verbalized understanding.   All belongings returned to pt  Awaiting  per daughter at 0

## 2022-05-13 NOTE — PLAN OF CARE
Problem: Discharge Planning  Goal: Discharge to home or other facility with appropriate resources  Outcome: Completed     Problem: Safety - Adult  Goal: Free from fall injury  Outcome: Completed     Problem: ABCDS Injury Assessment  Goal: Absence of physical injury  Outcome: Completed     Problem: Skin/Tissue Integrity  Goal: Absence of new skin breakdown  Description: 1. Monitor for areas of redness and/or skin breakdown  2. Assess vascular access sites hourly  3. Every 4-6 hours minimum:  Change oxygen saturation probe site  4. Every 4-6 hours:  If on nasal continuous positive airway pressure, respiratory therapy assess nares and determine need for appliance change or resting period.   Outcome: Completed     Problem: Chronic Conditions and Co-morbidities  Goal: Patient's chronic conditions and co-morbidity symptoms are monitored and maintained or improved  Outcome: Completed     Problem: Pain  Goal: Verbalizes/displays adequate comfort level or baseline comfort level  Outcome: Completed     Problem: Nutrition Deficit:  Goal: Optimize nutritional status  Outcome: Completed

## 2022-05-16 NOTE — TELEPHONE ENCOUNTER
[facility] was notified to change the sig. On rx to 2 puffs every 6 hours.   She will get it ready for patient

## 2022-08-02 RX ORDER — PANTOPRAZOLE SODIUM 40 MG/1
TABLET, DELAYED RELEASE ORAL
Qty: 30 TABLET | Refills: 3 | OUTPATIENT
Start: 2022-08-02

## 2022-10-03 RX ORDER — FENOFIBRATE 160 MG/1
TABLET ORAL
Qty: 90 TABLET | OUTPATIENT
Start: 2022-10-03

## 2022-10-04 RX ORDER — DIVALPROEX SODIUM 500 MG/1
TABLET, EXTENDED RELEASE ORAL
Qty: 30 TABLET | Refills: 3 | OUTPATIENT
Start: 2022-10-04

## 2022-10-06 RX ORDER — DIVALPROEX SODIUM 500 MG/1
TABLET, EXTENDED RELEASE ORAL
Qty: 30 TABLET | Refills: 3 | OUTPATIENT
Start: 2022-10-06

## 2022-10-06 RX ORDER — FENOFIBRATE 160 MG/1
TABLET ORAL
Qty: 90 TABLET | OUTPATIENT
Start: 2022-10-06

## 2022-10-19 RX ORDER — ATORVASTATIN CALCIUM 40 MG/1
TABLET, FILM COATED ORAL
Qty: 30 TABLET | Refills: 3 | OUTPATIENT
Start: 2022-10-19

## 2022-12-27 RX ORDER — GABAPENTIN 300 MG/1
CAPSULE ORAL
Qty: 180 CAPSULE | Refills: 0 | OUTPATIENT
Start: 2022-12-27

## 2023-03-15 RX ORDER — POTASSIUM CHLORIDE 1500 MG/1
TABLET, EXTENDED RELEASE ORAL
Qty: 60 TABLET | Refills: 3 | OUTPATIENT
Start: 2023-03-15

## 2023-03-20 RX ORDER — POTASSIUM CHLORIDE 1500 MG/1
TABLET, EXTENDED RELEASE ORAL
Qty: 60 TABLET | Refills: 3 | OUTPATIENT
Start: 2023-03-20

## 2023-03-23 RX ORDER — POTASSIUM CHLORIDE 1500 MG/1
TABLET, EXTENDED RELEASE ORAL
Qty: 60 TABLET | Refills: 3 | OUTPATIENT
Start: 2023-03-23

## 2023-05-09 RX ORDER — ALBUTEROL SULFATE 90 UG/1
AEROSOL, METERED RESPIRATORY (INHALATION)
Qty: 18 G | OUTPATIENT
Start: 2023-05-09

## 2023-05-18 RX ORDER — POTASSIUM CHLORIDE 1500 MG/1
TABLET, EXTENDED RELEASE ORAL
Qty: 60 TABLET | Refills: 3 | OUTPATIENT
Start: 2023-05-18

## 2024-06-26 ENCOUNTER — HOSPITAL ENCOUNTER (OUTPATIENT)
Dept: HOSPITAL 101 - LAB | Age: 74
Discharge: HOME | End: 2024-06-26
Payer: COMMERCIAL

## 2024-06-26 DIAGNOSIS — E11.65: Primary | ICD-10-CM

## 2024-06-26 PROCEDURE — 36415 COLL VENOUS BLD VENIPUNCTURE: CPT

## 2024-06-26 PROCEDURE — 83036 HEMOGLOBIN GLYCOSYLATED A1C: CPT
